# Patient Record
Sex: FEMALE | Race: BLACK OR AFRICAN AMERICAN | NOT HISPANIC OR LATINO | URBAN - METROPOLITAN AREA
[De-identification: names, ages, dates, MRNs, and addresses within clinical notes are randomized per-mention and may not be internally consistent; named-entity substitution may affect disease eponyms.]

---

## 2021-01-18 ENCOUNTER — EMERGENCY (EMERGENCY)
Facility: HOSPITAL | Age: 26
LOS: 0 days | Discharge: HOME | End: 2021-01-18
Attending: EMERGENCY MEDICINE
Payer: MEDICAID

## 2021-01-18 VITALS
RESPIRATION RATE: 18 BRPM | HEART RATE: 105 BPM | OXYGEN SATURATION: 100 % | DIASTOLIC BLOOD PRESSURE: 80 MMHG | TEMPERATURE: 99 F | SYSTOLIC BLOOD PRESSURE: 122 MMHG

## 2021-01-18 VITALS
HEIGHT: 68 IN | RESPIRATION RATE: 20 BRPM | TEMPERATURE: 99 F | OXYGEN SATURATION: 98 % | HEART RATE: 88 BPM | WEIGHT: 194.01 LBS | SYSTOLIC BLOOD PRESSURE: 120 MMHG | DIASTOLIC BLOOD PRESSURE: 82 MMHG

## 2021-01-18 DIAGNOSIS — R10.9 UNSPECIFIED ABDOMINAL PAIN: ICD-10-CM

## 2021-01-18 DIAGNOSIS — Z34.91 ENCOUNTER FOR SUPERVISION OF NORMAL PREGNANCY, UNSPECIFIED, FIRST TRIMESTER: ICD-10-CM

## 2021-01-18 DIAGNOSIS — R07.9 CHEST PAIN, UNSPECIFIED: ICD-10-CM

## 2021-01-18 DIAGNOSIS — R11.10 VOMITING, UNSPECIFIED: ICD-10-CM

## 2021-01-18 LAB
ALBUMIN SERPL ELPH-MCNC: 4.1 G/DL — SIGNIFICANT CHANGE UP (ref 3.3–5)
ALP SERPL-CCNC: 41 U/L — SIGNIFICANT CHANGE UP (ref 40–120)
ALT FLD-CCNC: 80 U/L — HIGH (ref 12–78)
ANION GAP SERPL CALC-SCNC: 11 MMOL/L — SIGNIFICANT CHANGE UP (ref 5–17)
ANISOCYTOSIS BLD QL: SLIGHT — SIGNIFICANT CHANGE UP
APPEARANCE UR: ABNORMAL
AST SERPL-CCNC: 43 U/L — HIGH (ref 15–37)
BACTERIA # UR AUTO: ABNORMAL
BASOPHILS # BLD AUTO: 0.01 K/UL — SIGNIFICANT CHANGE UP (ref 0–0.2)
BASOPHILS NFR BLD AUTO: 0.1 % — SIGNIFICANT CHANGE UP (ref 0–2)
BILIRUB SERPL-MCNC: 1.8 MG/DL — HIGH (ref 0.2–1.2)
BILIRUB UR-MCNC: ABNORMAL
BUN SERPL-MCNC: 7 MG/DL — SIGNIFICANT CHANGE UP (ref 7–23)
CALCIUM SERPL-MCNC: 8.7 MG/DL — SIGNIFICANT CHANGE UP (ref 8.5–10.1)
CHLORIDE SERPL-SCNC: 104 MMOL/L — SIGNIFICANT CHANGE UP (ref 96–108)
CO2 SERPL-SCNC: 22 MMOL/L — SIGNIFICANT CHANGE UP (ref 22–31)
COLOR SPEC: YELLOW — SIGNIFICANT CHANGE UP
CREAT SERPL-MCNC: 0.76 MG/DL — SIGNIFICANT CHANGE UP (ref 0.5–1.3)
DIFF PNL FLD: ABNORMAL
EOSINOPHIL # BLD AUTO: 0 K/UL — SIGNIFICANT CHANGE UP (ref 0–0.5)
EOSINOPHIL NFR BLD AUTO: 0 % — SIGNIFICANT CHANGE UP (ref 0–6)
EPI CELLS # UR: ABNORMAL
GLUCOSE SERPL-MCNC: 118 MG/DL — HIGH (ref 70–99)
GLUCOSE UR QL: NEGATIVE MG/DL — SIGNIFICANT CHANGE UP
HCG SERPL-ACNC: HIGH MIU/ML
HCT VFR BLD CALC: 35 % — SIGNIFICANT CHANGE UP (ref 34.5–45)
HGB BLD-MCNC: 11.9 G/DL — SIGNIFICANT CHANGE UP (ref 11.5–15.5)
IMM GRANULOCYTES NFR BLD AUTO: 0.2 % — SIGNIFICANT CHANGE UP (ref 0–1.5)
KETONES UR-MCNC: ABNORMAL
LEUKOCYTE ESTERASE UR-ACNC: ABNORMAL
LIDOCAIN IGE QN: 48 U/L — LOW (ref 73–393)
LYMPHOCYTES # BLD AUTO: 1.09 K/UL — SIGNIFICANT CHANGE UP (ref 1–3.3)
LYMPHOCYTES # BLD AUTO: 13.4 % — SIGNIFICANT CHANGE UP (ref 13–44)
MAGNESIUM SERPL-MCNC: 2.6 MG/DL — SIGNIFICANT CHANGE UP (ref 1.6–2.6)
MANUAL SMEAR VERIFICATION: SIGNIFICANT CHANGE UP
MCHC RBC-ENTMCNC: 25.5 PG — LOW (ref 27–34)
MCHC RBC-ENTMCNC: 34 GM/DL — SIGNIFICANT CHANGE UP (ref 32–36)
MCV RBC AUTO: 74.9 FL — LOW (ref 80–100)
MICROCYTES BLD QL: SIGNIFICANT CHANGE UP
MONOCYTES # BLD AUTO: 0.23 K/UL — SIGNIFICANT CHANGE UP (ref 0–0.9)
MONOCYTES NFR BLD AUTO: 2.8 % — SIGNIFICANT CHANGE UP (ref 2–14)
NEUTROPHILS # BLD AUTO: 6.77 K/UL — SIGNIFICANT CHANGE UP (ref 1.8–7.4)
NEUTROPHILS NFR BLD AUTO: 83.5 % — HIGH (ref 43–77)
NITRITE UR-MCNC: NEGATIVE — SIGNIFICANT CHANGE UP
NRBC # BLD: 0 /100 WBCS — SIGNIFICANT CHANGE UP (ref 0–0)
PH UR: 6 — SIGNIFICANT CHANGE UP (ref 5–8)
PLAT MORPH BLD: NORMAL — SIGNIFICANT CHANGE UP
PLATELET # BLD AUTO: 348 K/UL — SIGNIFICANT CHANGE UP (ref 150–400)
POTASSIUM SERPL-MCNC: 3.1 MMOL/L — LOW (ref 3.5–5.3)
POTASSIUM SERPL-SCNC: 3.1 MMOL/L — LOW (ref 3.5–5.3)
PROT SERPL-MCNC: 8.2 GM/DL — SIGNIFICANT CHANGE UP (ref 6–8.3)
PROT UR-MCNC: 100 MG/DL
RBC # BLD: 4.67 M/UL — SIGNIFICANT CHANGE UP (ref 3.8–5.2)
RBC # FLD: 14.5 % — SIGNIFICANT CHANGE UP (ref 10.3–14.5)
RBC BLD AUTO: ABNORMAL
RBC CASTS # UR COMP ASSIST: SIGNIFICANT CHANGE UP /HPF (ref 0–4)
SODIUM SERPL-SCNC: 137 MMOL/L — SIGNIFICANT CHANGE UP (ref 135–145)
SP GR SPEC: 1.02 — SIGNIFICANT CHANGE UP (ref 1.01–1.02)
TROPONIN I SERPL-MCNC: <.015 NG/ML — SIGNIFICANT CHANGE UP (ref 0.01–0.04)
UROBILINOGEN FLD QL: 4 MG/DL
WBC # BLD: 8.12 K/UL — SIGNIFICANT CHANGE UP (ref 3.8–10.5)
WBC # FLD AUTO: 8.12 K/UL — SIGNIFICANT CHANGE UP (ref 3.8–10.5)
WBC UR QL: SIGNIFICANT CHANGE UP

## 2021-01-18 PROCEDURE — 99285 EMERGENCY DEPT VISIT HI MDM: CPT

## 2021-01-18 PROCEDURE — 76856 US EXAM PELVIC COMPLETE: CPT | Mod: 26

## 2021-01-18 PROCEDURE — 76700 US EXAM ABDOM COMPLETE: CPT | Mod: 26

## 2021-01-18 RX ORDER — ONDANSETRON 8 MG/1
4 TABLET, FILM COATED ORAL ONCE
Refills: 0 | Status: COMPLETED | OUTPATIENT
Start: 2021-01-18 | End: 2021-01-18

## 2021-01-18 RX ORDER — METOCLOPRAMIDE HCL 10 MG
10 TABLET ORAL ONCE
Refills: 0 | Status: COMPLETED | OUTPATIENT
Start: 2021-01-18 | End: 2021-01-18

## 2021-01-18 RX ORDER — CEFTRIAXONE 500 MG/1
1000 INJECTION, POWDER, FOR SOLUTION INTRAMUSCULAR; INTRAVENOUS ONCE
Refills: 0 | Status: COMPLETED | OUTPATIENT
Start: 2021-01-18 | End: 2021-01-18

## 2021-01-18 RX ORDER — SODIUM CHLORIDE 9 MG/ML
1000 INJECTION INTRAMUSCULAR; INTRAVENOUS; SUBCUTANEOUS ONCE
Refills: 0 | Status: COMPLETED | OUTPATIENT
Start: 2021-01-18 | End: 2021-01-18

## 2021-01-18 RX ORDER — CEFUROXIME AXETIL 250 MG
1 TABLET ORAL
Qty: 14 | Refills: 0
Start: 2021-01-18 | End: 2021-01-24

## 2021-01-18 RX ORDER — PYRIDOXINE HCL (VITAMIN B6) 100 MG
50 TABLET ORAL ONCE
Refills: 0 | Status: COMPLETED | OUTPATIENT
Start: 2021-01-18 | End: 2021-01-18

## 2021-01-18 RX ORDER — POTASSIUM CHLORIDE 20 MEQ
10 PACKET (EA) ORAL ONCE
Refills: 0 | Status: COMPLETED | OUTPATIENT
Start: 2021-01-18 | End: 2021-01-18

## 2021-01-18 RX ORDER — ACETAMINOPHEN 500 MG
975 TABLET ORAL ONCE
Refills: 0 | Status: COMPLETED | OUTPATIENT
Start: 2021-01-18 | End: 2021-01-18

## 2021-01-18 RX ORDER — SODIUM CHLORIDE 9 MG/ML
1000 INJECTION, SOLUTION INTRAVENOUS
Refills: 0 | Status: COMPLETED | OUTPATIENT
Start: 2021-01-18 | End: 2021-01-18

## 2021-01-18 RX ORDER — DOXYLAMINE SUCCINATE AND PYRIDOXINE HYDROCHLORIDE, DELAYED RELEASE TABLETS 10 MG/10 MG 10; 10 MG/1; MG/1
1 TABLET, DELAYED RELEASE ORAL
Qty: 30 | Refills: 0
Start: 2021-01-18 | End: 2021-01-22

## 2021-01-18 RX ADMIN — ONDANSETRON 4 MILLIGRAM(S): 8 TABLET, FILM COATED ORAL at 17:39

## 2021-01-18 RX ADMIN — SODIUM CHLORIDE 1000 MILLILITER(S): 9 INJECTION, SOLUTION INTRAVENOUS at 21:02

## 2021-01-18 RX ADMIN — SODIUM CHLORIDE 1000 MILLILITER(S): 9 INJECTION INTRAMUSCULAR; INTRAVENOUS; SUBCUTANEOUS at 21:27

## 2021-01-18 RX ADMIN — SODIUM CHLORIDE 1000 MILLILITER(S): 9 INJECTION INTRAMUSCULAR; INTRAVENOUS; SUBCUTANEOUS at 17:22

## 2021-01-18 RX ADMIN — Medication 50 MILLIGRAM(S): at 21:36

## 2021-01-18 RX ADMIN — SODIUM CHLORIDE 1000 MILLILITER(S): 9 INJECTION INTRAMUSCULAR; INTRAVENOUS; SUBCUTANEOUS at 21:02

## 2021-01-18 RX ADMIN — CEFTRIAXONE 100 MILLIGRAM(S): 500 INJECTION, POWDER, FOR SOLUTION INTRAMUSCULAR; INTRAVENOUS at 21:37

## 2021-01-18 RX ADMIN — Medication 10 MILLIGRAM(S): at 21:37

## 2021-01-18 RX ADMIN — Medication 100 MILLIEQUIVALENT(S): at 18:40

## 2021-01-18 RX ADMIN — Medication 975 MILLIGRAM(S): at 17:39

## 2021-01-18 NOTE — ED PROVIDER NOTE - CARE PROVIDER_API CALL
Abi Patterson; MPH)  Vanessa FORTE  [address]], [address]  [place] [address]  Phone: [phone]  Fax: [phone]  Follow Up Time: 4-6 Days

## 2021-01-18 NOTE — ED PROVIDER NOTE - OBJECTIVE STATEMENT
25F Hx of x3  presents with c/o abdominal pain / cramping. pt states she's been vomiting since Saturday, unable to keep any food down. She states at the beginning her vomit was yellow and now is red. pt reports abdominal pain is located in the mid abdomen and rates pain 10/10 associated with some chest pain and minor back aches. No Hx of this in the past. pt does admit she has not been passing bowel since Thursday; has been able to urinate a little bit with some burning. no fevers, some chills, no diarrhea, no discharge.

## 2021-01-18 NOTE — ED ADULT NURSE REASSESSMENT NOTE - NS ED NURSE REASSESS COMMENT FT1
Pt verbalizes improvement in symptoms. VSS, slightly tachycardic after multiple liters of fluid. Pt denies dizziness after ambulation. Encouraged to increase PO intake and take antiemetics at home.

## 2021-01-18 NOTE — ED PROVIDER NOTE - NSFOLLOWUPINSTRUCTIONS_ED_ALL_ED_FT
Start taking the prenatal vitamins.    Take the Pregnancy Nausea Medication: DICLEGIS as needed.    Call the OBGYN for an immediate follow up appointment.

## 2021-01-18 NOTE — ED PROVIDER NOTE - CLINICAL SUMMARY MEDICAL DECISION MAKING FREE TEXT BOX
Gastritis versus pancreatitis versus cholecystitis. Rule out pregnancy. Plan: Fluids, labs and US Gastritis versus pancreatitis versus cholecystitis. Rule out pregnancy. Plan: Fluids, labs and US  hcg shows pregnancy. labs otherwise reassuring

## 2021-01-18 NOTE — ED ADULT TRIAGE NOTE - CHIEF COMPLAINT QUOTE
Ems states, " pt c/o abdominal pain that started on saturday , lmp 11/26/2020 approx, pt vomiting , denies diarrhea and fever, 4mg zofran and 200ml saline given in field

## 2021-01-18 NOTE — ED ADULT NURSE NOTE - NSIMPLEMENTINTERV_GEN_ALL_ED
Implemented All Universal Safety Interventions:  [place] to call system. Call bell, personal items and telephone within reach. Instruct patient to call for assistance. Room bathroom lighting operational. Non-slip footwear when patient is off stretcher. Physically safe environment: no spills, clutter or unnecessary equipment. Stretcher in lowest position, wheels locked, appropriate side rails in place.

## 2021-01-18 NOTE — ED PROVIDER NOTE - PATIENT PORTAL LINK FT
You can access the FollowMyHealth Patient Portal offered by [facility] by registering at the following website: [email or web address]. By joining [facility]’s FollowMyHealth portal, you will also be able to view your health information using other applications (apps) compatible with our system.

## 2021-01-19 LAB
CULTURE RESULTS: SIGNIFICANT CHANGE UP
SPECIMEN SOURCE: SIGNIFICANT CHANGE UP

## 2021-01-20 ENCOUNTER — INPATIENT (INPATIENT)
Facility: HOSPITAL | Age: 26
LOS: 3 days | Discharge: HOME HEALTH | End: 2021-01-24
Attending: SPECIALIST | Admitting: SPECIALIST
Payer: MEDICAID

## 2021-01-20 VITALS
DIASTOLIC BLOOD PRESSURE: 81 MMHG | SYSTOLIC BLOOD PRESSURE: 115 MMHG | TEMPERATURE: 98 F | RESPIRATION RATE: 18 BRPM | HEART RATE: 94 BPM | OXYGEN SATURATION: 100 %

## 2021-01-20 LAB
ALBUMIN SERPL ELPH-MCNC: 4.7 G/DL — SIGNIFICANT CHANGE UP (ref 3.3–5)
ALP SERPL-CCNC: 45 U/L — SIGNIFICANT CHANGE UP (ref 40–120)
ALT FLD-CCNC: 438 U/L — HIGH (ref 4–33)
ANION GAP SERPL CALC-SCNC: 14 MMOL/L — SIGNIFICANT CHANGE UP (ref 7–14)
APPEARANCE UR: ABNORMAL
AST SERPL-CCNC: 260 U/L — HIGH (ref 4–32)
BACTERIA # UR AUTO: ABNORMAL
BASOPHILS # BLD AUTO: 0.04 K/UL — SIGNIFICANT CHANGE UP (ref 0–0.2)
BASOPHILS NFR BLD AUTO: 0.7 % — SIGNIFICANT CHANGE UP (ref 0–2)
BILIRUB SERPL-MCNC: 1.4 MG/DL — HIGH (ref 0.2–1.2)
BILIRUB UR-MCNC: ABNORMAL
BLD GP AB SCN SERPL QL: NEGATIVE — SIGNIFICANT CHANGE UP
BUN SERPL-MCNC: 6 MG/DL — LOW (ref 7–23)
CALCIUM SERPL-MCNC: 9.1 MG/DL — SIGNIFICANT CHANGE UP (ref 8.4–10.5)
CHLORIDE SERPL-SCNC: 105 MMOL/L — SIGNIFICANT CHANGE UP (ref 98–107)
CO2 SERPL-SCNC: 20 MMOL/L — LOW (ref 22–31)
COLOR SPEC: ABNORMAL
CREAT SERPL-MCNC: 0.63 MG/DL — SIGNIFICANT CHANGE UP (ref 0.5–1.3)
DIFF PNL FLD: NEGATIVE — SIGNIFICANT CHANGE UP
EOSINOPHIL # BLD AUTO: 0.01 K/UL — SIGNIFICANT CHANGE UP (ref 0–0.5)
EOSINOPHIL NFR BLD AUTO: 0.2 % — SIGNIFICANT CHANGE UP (ref 0–6)
EPI CELLS # UR: ABNORMAL
GLUCOSE SERPL-MCNC: 105 MG/DL — HIGH (ref 70–99)
GLUCOSE UR QL: NEGATIVE — SIGNIFICANT CHANGE UP
HCG SERPL-ACNC: SIGNIFICANT CHANGE UP MIU/ML
HCT VFR BLD CALC: 34.7 % — SIGNIFICANT CHANGE UP (ref 34.5–45)
HGB BLD-MCNC: 11.5 G/DL — SIGNIFICANT CHANGE UP (ref 11.5–15.5)
HYALINE CASTS # UR AUTO: 0 /LPF — SIGNIFICANT CHANGE UP (ref 0–7)
IANC: 4.17 K/UL — SIGNIFICANT CHANGE UP (ref 1.5–8.5)
IMM GRANULOCYTES NFR BLD AUTO: 0.5 % — SIGNIFICANT CHANGE UP (ref 0–1.5)
KETONES UR-MCNC: ABNORMAL
LEUKOCYTE ESTERASE UR-ACNC: NEGATIVE — SIGNIFICANT CHANGE UP
LYMPHOCYTES # BLD AUTO: 1.35 K/UL — SIGNIFICANT CHANGE UP (ref 1–3.3)
LYMPHOCYTES # BLD AUTO: 22.7 % — SIGNIFICANT CHANGE UP (ref 13–44)
MCHC RBC-ENTMCNC: 25.1 PG — LOW (ref 27–34)
MCHC RBC-ENTMCNC: 33.1 GM/DL — SIGNIFICANT CHANGE UP (ref 32–36)
MCV RBC AUTO: 75.8 FL — LOW (ref 80–100)
MONOCYTES # BLD AUTO: 0.35 K/UL — SIGNIFICANT CHANGE UP (ref 0–0.9)
MONOCYTES NFR BLD AUTO: 5.9 % — SIGNIFICANT CHANGE UP (ref 2–14)
NEUTROPHILS # BLD AUTO: 4.17 K/UL — SIGNIFICANT CHANGE UP (ref 1.8–7.4)
NEUTROPHILS NFR BLD AUTO: 70 % — SIGNIFICANT CHANGE UP (ref 43–77)
NITRITE UR-MCNC: NEGATIVE — SIGNIFICANT CHANGE UP
NRBC # BLD: 0 /100 WBCS — SIGNIFICANT CHANGE UP
NRBC # FLD: 0 K/UL — SIGNIFICANT CHANGE UP
PH UR: 6.5 — SIGNIFICANT CHANGE UP (ref 5–8)
PLATELET # BLD AUTO: 312 K/UL — SIGNIFICANT CHANGE UP (ref 150–400)
POTASSIUM SERPL-MCNC: 3 MMOL/L — LOW (ref 3.5–5.3)
POTASSIUM SERPL-SCNC: 3 MMOL/L — LOW (ref 3.5–5.3)
PROT SERPL-MCNC: 7.6 G/DL — SIGNIFICANT CHANGE UP (ref 6–8.3)
PROT UR-MCNC: ABNORMAL
RBC # BLD: 4.58 M/UL — SIGNIFICANT CHANGE UP (ref 3.8–5.2)
RBC # FLD: 14.2 % — SIGNIFICANT CHANGE UP (ref 10.3–14.5)
RBC CASTS # UR COMP ASSIST: 3 /HPF — SIGNIFICANT CHANGE UP (ref 0–4)
RH IG SCN BLD-IMP: POSITIVE — SIGNIFICANT CHANGE UP
SARS-COV-2 RNA SPEC QL NAA+PROBE: SIGNIFICANT CHANGE UP
SODIUM SERPL-SCNC: 139 MMOL/L — SIGNIFICANT CHANGE UP (ref 135–145)
SP GR SPEC: 1.03 — HIGH (ref 1.01–1.02)
UROBILINOGEN FLD QL: ABNORMAL
WBC # BLD: 5.95 K/UL — SIGNIFICANT CHANGE UP (ref 3.8–10.5)
WBC # FLD AUTO: 5.95 K/UL — SIGNIFICANT CHANGE UP (ref 3.8–10.5)
WBC UR QL: 3 /HPF — SIGNIFICANT CHANGE UP (ref 0–5)

## 2021-01-20 PROCEDURE — 76815 OB US LIMITED FETUS(S): CPT | Mod: 26

## 2021-01-20 PROCEDURE — 99218: CPT

## 2021-01-20 PROCEDURE — 76705 ECHO EXAM OF ABDOMEN: CPT | Mod: 26

## 2021-01-20 PROCEDURE — 76817 TRANSVAGINAL US OBSTETRIC: CPT | Mod: 26

## 2021-01-20 RX ORDER — SODIUM CHLORIDE 9 MG/ML
1000 INJECTION, SOLUTION INTRAVENOUS
Refills: 0 | Status: DISCONTINUED | OUTPATIENT
Start: 2021-01-20 | End: 2021-01-21

## 2021-01-20 RX ORDER — POTASSIUM CHLORIDE 20 MEQ
10 PACKET (EA) ORAL
Refills: 0 | Status: COMPLETED | OUTPATIENT
Start: 2021-01-20 | End: 2021-01-20

## 2021-01-20 RX ORDER — FAMOTIDINE 10 MG/ML
20 INJECTION INTRAVENOUS
Refills: 0 | Status: DISCONTINUED | OUTPATIENT
Start: 2021-01-20 | End: 2021-01-22

## 2021-01-20 RX ORDER — SODIUM CHLORIDE 9 MG/ML
1000 INJECTION INTRAMUSCULAR; INTRAVENOUS; SUBCUTANEOUS
Refills: 0 | Status: DISCONTINUED | OUTPATIENT
Start: 2021-01-20 | End: 2021-01-22

## 2021-01-20 RX ORDER — SODIUM CHLORIDE 9 MG/ML
1000 INJECTION INTRAMUSCULAR; INTRAVENOUS; SUBCUTANEOUS ONCE
Refills: 0 | Status: COMPLETED | OUTPATIENT
Start: 2021-01-20 | End: 2021-01-20

## 2021-01-20 RX ORDER — ACETAMINOPHEN 500 MG
1000 TABLET ORAL ONCE
Refills: 0 | Status: COMPLETED | OUTPATIENT
Start: 2021-01-20 | End: 2021-01-20

## 2021-01-20 RX ORDER — METOCLOPRAMIDE HCL 10 MG
10 TABLET ORAL EVERY 8 HOURS
Refills: 0 | Status: DISCONTINUED | OUTPATIENT
Start: 2021-01-20 | End: 2021-01-22

## 2021-01-20 RX ORDER — ONDANSETRON 8 MG/1
4 TABLET, FILM COATED ORAL EVERY 8 HOURS
Refills: 0 | Status: DISCONTINUED | OUTPATIENT
Start: 2021-01-20 | End: 2021-01-22

## 2021-01-20 RX ORDER — METOCLOPRAMIDE HCL 10 MG
10 TABLET ORAL ONCE
Refills: 0 | Status: COMPLETED | OUTPATIENT
Start: 2021-01-20 | End: 2021-01-20

## 2021-01-20 RX ORDER — ONDANSETRON 8 MG/1
4 TABLET, FILM COATED ORAL ONCE
Refills: 0 | Status: COMPLETED | OUTPATIENT
Start: 2021-01-20 | End: 2021-01-20

## 2021-01-20 RX ORDER — FAMOTIDINE 10 MG/ML
20 INJECTION INTRAVENOUS ONCE
Refills: 0 | Status: COMPLETED | OUTPATIENT
Start: 2021-01-20 | End: 2021-01-20

## 2021-01-20 RX ADMIN — ONDANSETRON 4 MILLIGRAM(S): 8 TABLET, FILM COATED ORAL at 21:37

## 2021-01-20 RX ADMIN — Medication 10 MILLIGRAM(S): at 10:42

## 2021-01-20 RX ADMIN — ONDANSETRON 4 MILLIGRAM(S): 8 TABLET, FILM COATED ORAL at 14:42

## 2021-01-20 RX ADMIN — SODIUM CHLORIDE 1000 MILLILITER(S): 9 INJECTION INTRAMUSCULAR; INTRAVENOUS; SUBCUTANEOUS at 10:43

## 2021-01-20 RX ADMIN — Medication 100 MILLIEQUIVALENT(S): at 17:08

## 2021-01-20 RX ADMIN — Medication 400 MILLIGRAM(S): at 17:08

## 2021-01-20 RX ADMIN — Medication 100 MILLIEQUIVALENT(S): at 20:23

## 2021-01-20 RX ADMIN — FAMOTIDINE 20 MILLIGRAM(S): 10 INJECTION INTRAVENOUS at 17:08

## 2021-01-20 RX ADMIN — SODIUM CHLORIDE 1000 MILLILITER(S): 9 INJECTION INTRAMUSCULAR; INTRAVENOUS; SUBCUTANEOUS at 14:31

## 2021-01-20 RX ADMIN — Medication 100 MILLIEQUIVALENT(S): at 14:42

## 2021-01-20 RX ADMIN — SODIUM CHLORIDE 125 MILLILITER(S): 9 INJECTION, SOLUTION INTRAVENOUS at 18:28

## 2021-01-20 NOTE — ED PROVIDER NOTE - ATTENDING CONTRIBUTION TO CARE
Attending Statement: I have reviewed and agree with all pertinent clinical information, including history and physical exam and agree with treatment plan of the PA, except as noted.  26yo F pw  nausea, vomit and abdominal pain x few weeks. Endorse cramping lower abdominal pain, Associated episodes of NB vomit and nausea. Dec po intake. +dysuria no vaginal bleeding. LMP "sometime in October" Was seen at [facility] for same complaints two days ago, given po abx and dc home.   Vital signs noted. laying in bed AO3. no work of breathing. soft non distended tender in the LLQ pelvic dw w PA. no pedal edema. no calf tenderness. normal pulses bilateral feet.  plan labs, urine, tvus, IVF, anti emetic, re assess

## 2021-01-20 NOTE — ED CDU PROVIDER INITIAL DAY NOTE - ATTENDING CONTRIBUTION TO CARE
Attending Statement: I have reviewed and agree with all pertinent clinical information, including history and physical exam and agree with treatment plan of the PA, except as noted.  26yo F pw  nausea, vomit and abdominal pain x few weeks. Endorse cramping lower abdominal pain, Associated episodes of NB vomit and nausea. Dec po intake. +dysuria no vaginal bleeding. LMP "sometime in October" Was seen at [facility] for same complaints two days ago, given po abx and dc home.   Vital signs noted. laying in bed AO3. no work of breathing. soft non distended tender in the LLQ pelvic dw w PA. no pedal edema. no calf tenderness. normal pulses bilateral feet.  plan IVF, anti emetic, am labs trend LFT, obgyn following pt.

## 2021-01-20 NOTE — ED CDU PROVIDER INITIAL DAY NOTE - OBJECTIVE STATEMENT
24 y/o F no PMH  c/o excessive N/V for past few weeks. LMP October, approx 11 weeks GA on latest US. Pt has not followed up with OB for this pregnancy yet. Pt was evaluated at [facility] 3 days ago for same sxs, tx'd for UTI and given prenatal vitamins, no antiemetics. Denies fever, chills, CP, SOB, diarrhea, abnormal vaginal discharge, bleeding or fluid leakage. In ED pt given antiemetics without relief, US confirmed IUP of 14 week and 5 day gestation and +, labs significant for K+ 3.0, bili 1.4, , , OB consulted and advised banana bag, standing antiemetics, and rpt chemistry and trend liver function.

## 2021-01-20 NOTE — ED ADULT NURSE NOTE - OBJECTIVE STATEMENT
Pt awake, alert and oriented x 4 presents with 3 months confirmed pregnancy and constipation with abdominal pain.   pt c/o nausea but no vomiting, no fever, no chest pain or shortness of breath.   IV placed, labs sent, fluids and meds given.   urine sent.   GYN exam done by ED resident and pt awaiting US.   pt denies pain/burning with urination.   denies PMH or pregnancy complications.

## 2021-01-20 NOTE — ED CDU PROVIDER INITIAL DAY NOTE - MEDICAL DECISION MAKING DETAILS
pt with hyperemesis, sent to CDU for continued antiemetics, ivf, rpt labs, will continue to monitor and reassess

## 2021-01-20 NOTE — ED ADULT TRIAGE NOTE - CHIEF COMPLAINT QUOTE
Pt brought in by EMS from home complaining of constipation and abdominal pain. pt states she is 3 months pregnant. Pt denies chest pain, sob, n/v/d, fever or chills.

## 2021-01-20 NOTE — CONSULT NOTE ADULT - ASSESSMENT
24yo  at 14w2d by LMP 10/12 presenting with nausea, vomiting and inability to tolerate PO likely 2/2 Hyperemesis with metabolic derangements    - agree with ED to observe in CDU overnight  - replete K  - elevated transaminitis; trend AST/ALT  - please administer Banana Bag, IVF  - recommend Pepcid IV BID, Standing Zofran/Reglan  - repeat electrolytes in AM  - PO challenge when able   - f/u outpatient with our low risk clinic; will send email to arrange      JONNATHAN Kelsey,PGY-2  seen and evaluated w/ Dr. Castillo

## 2021-01-20 NOTE — CONSULT NOTE ADULT - SUBJECTIVE AND OBJECTIVE BOX
JERMAIN ALEGRIA  25y  Female [id number]    HPI: 24yo , LMP 10/12/20 at 14w3d by LMP presenting with nausea, vomiting, and inability to tolerate PO. Pt states she has not been able to eat anything for the last 5 days and she is vomiting every 5-10 minutes. States vomit was originally made up of food contents but now is brown and bloody with occasional episodes of bright red blood. She has not taken any anti-nausea medications. Pt had positive UPT but has not seen an OB in this pregnancy. Denies hyperemesis in previous pregnancies. Reports occasional headaches, constipation, and lizzy colored urine. She denies blurry vision, temperature changes, fevers, chills, chest pain, shortness of breath.      Ob/Gyn Physician: recently moved from [place]; does not have Ob/Gyn for this pregnancy    Obhx: c/s x3 (, , 2019 - [facility]UP)  GynHx: Denies fibroids, cysts, abnormal pap smears, STIs  PMH: denies  PSH: c/s x3  Social: Denies Toxic Habits x3; denies anxiety/depression  Meds: PNV  Allergies: [facility] Meds:   MEDICATIONS  (STANDING):  acetaminophen  IVPB .. 1000 milliGRAM(s) IV Intermittent Once  famotidine Injectable 20 milliGRAM(s) IV Push once  potassium chloride  10 mEq/100 mL IVPB 10 milliEquivalent(s) IV Intermittent every 1 hour  sodium chloride 0.9% 1000 milliLiter(s) (125 mL/Hr) IV Continuous <Continuous>    MEDICATIONS  (PRN):      Allergies    No Known Allergies    Intolerances        PAST MEDICAL & SURGICAL HISTORY:  No significant past surgical history                Vital Signs Last 24 Hrs  T(C): 37.3 (2021 14:24), Max: 37.3 (2021 14:24)  T(F): 99.1 (2021 14:24), Max: 99.1 (2021 14:24)  HR: 82 (2021 14:24) (82 - 94)  BP: 120/73 (2021 14:24) (115/81 - 120/73)  BP(mean): --  RR: 18 (2021 14:24) (18 - 18)  SpO2: 100% (2021 14:24) (100% - 100%)    Physical Exam:   General:  NAD   CV: RR  Lungs: unlabored on RA  Abd: Soft, non-tender, non-distended,  +BS  :  No bleeding on pad.  External labia wnl.   Speculum Exam: Physiologic discharge.  Bimanual exam with no cervical motion tenderness, uterus appropriate size, adnexa non palpable b/l.  Cervix closed  Ext: non-tender b/l, no edema     LABS:                            11.5   5.95  )-----------( 312      ( 2021 11:03 )             34.7         139  |  105  |  6<L>  ----------------------------<  105<H>  3.0<L>   |  20<L>  |  0.63    Ca    9.1      2021 11:03    TPro  7.6  /  Alb  4.7  /  TBili  1.4<H>  /  DBili  x   /  AST  260<H>  /  ALT  438<H>  /  AlkPhos  45      I&O's Detail      Urinalysis Basic - ( 2021 11:03 )    Color: Lizzy / Appearance: Slightly Turbid / S.027 / pH: x  Gluc: x / Ketone: Large  / Bili: Small / Urobili: 3 mg/dL   Blood: x / Protein: 100 mg/dL / Nitrite: Negative   Leuk Esterase: Negative / RBC: 3 /HPF / WBC 3 /HPF   Sq Epi: x / Non Sq Epi: Many / Bacteria: Moderate        RADIOLOGY & ADDITIONAL STUDIES:    < from: US Transvaginal, OB (21 @ 13:58) >  EXAM:  US OB TRANSVAGINAL        PROCEDURE DATE:  2021         INTERPRETATION:  Clinical information: Pregnancy evaluation. Left lower quadrant pain.    Transabdominal pelvic ultrasound:    A single live Intrauterine gestation is present in breech presentation. No previa. Amniotic fluid volume is within normal limits. The cervical length measures 4.0 cm.    Fetal motion is seen in real-time and the fetal heart rate measures 158 bpm.    Measurements are as follows:    BPD: 2.7 cm corresponding to 14 weeks 6 days.  HC: 10.3 cm corresponding to 14 weeks 6 days.  AC: 7.8 cm corresponding to 14 weeks 2 days.  FL: 1.6 cm corresponding to 14 weeks 6 days.    Estimated fetal weight: 100 g +/- 15 g. ( 0 lb 4 oz +/- 1 oz)    Estimated fetal weight percent: 51%    Both ovaries are seen, and within normal limits. The right ovary measures 2.1 x 1.1 x 2.1 cm. The left ovary measures 2.5 x 1.3 x 2.5 cm.    IMPRESSION:  Single live intrauterine gestation at 14 weeks and 5 days by this ultrasound.  14 weeks and 2 days by LMP corresponding to an KD of 2021.                ROBERT MCCULLOUGH MD; Attending Radiologist  This document has been electronically signed. 2021  2:22PM    < end of copied text >   JERMAIN ALEGRIA  25y  Female [id number]    HPI: 24yo , LMP 10/12/20 at 14w3d by LMP presenting with nausea, vomiting, and inability to tolerate PO. Pt states she has not been able to eat anything for the last 5 days and she is vomiting every 5-10 minutes. States vomit was originally made up of food contents but now is brown and bloody with occasional episodes of bright red blood. She has not taken any anti-nausea medications. Pt had positive UPT but has not seen an OB in this pregnancy. Pt initially presented to [facility] last Friday and was sent home with prenatal vitamins and antibiotics (she does not know why). Denies hyperemesis in previous pregnancies. Reports occasional headaches, constipation, and lizzy colored urine. She denies blurry vision, temperature changes, fevers, chills, chest pain, shortness of breath.      Ob/Gyn Physician: recently moved from [place]; does not have Ob/Gyn for this pregnancy    Obhx: c/s x3 (, , 2019 - [facility])  GynHx: Denies fibroids, cysts, abnormal pap smears, STIs  PMH: denies  PSH: c/s x3  Social: Denies Toxic Habits x3; denies anxiety/depression  Meds: PNV  Allergies: [facility] Meds:   MEDICATIONS  (STANDING):  acetaminophen  IVPB .. 1000 milliGRAM(s) IV Intermittent Once  famotidine Injectable 20 milliGRAM(s) IV Push once  potassium chloride  10 mEq/100 mL IVPB 10 milliEquivalent(s) IV Intermittent every 1 hour  sodium chloride 0.9% 1000 milliLiter(s) (125 mL/Hr) IV Continuous <Continuous>    MEDICATIONS  (PRN):      Allergies    No Known Allergies    Intolerances        PAST MEDICAL & SURGICAL HISTORY:  No significant past surgical history                Vital Signs Last 24 Hrs  T(C): 37.3 (2021 14:24), Max: 37.3 (2021 14:24)  T(F): 99.1 (2021 14:24), Max: 99.1 (2021 14:24)  HR: 82 (2021 14:24) (82 - 94)  BP: 120/73 (2021 14:24) (115/81 - 120/73)  BP(mean): --  RR: 18 (2021 14:24) (18 - 18)  SpO2: 100% (2021 14:24) (100% - 100%)    Physical Exam:   General:  NAD   CV: RR  Lungs: unlabored on RA  Abd: Soft, non-tender, non-distended,  +BS  :  No bleeding on pad.  External labia wnl.   Speculum Exam: Physiologic discharge.  Bimanual exam with no cervical motion tenderness, uterus appropriate size, adnexa non palpable b/l.  Cervix closed  Ext: non-tender b/l, no edema     LABS:                            11.5   5.95  )-----------( 312      ( 2021 11:03 )             34.7         139  |  105  |  6<L>  ----------------------------<  105<H>  3.0<L>   |  20<L>  |  0.63    Ca    9.1      2021 11:03    TPro  7.6  /  Alb  4.7  /  TBili  1.4<H>  /  DBili  x   /  AST  260<H>  /  ALT  438<H>  /  AlkPhos  45      I&O's Detail      Urinalysis Basic - ( 2021 11:03 )    Color: Lizzy / Appearance: Slightly Turbid / S.027 / pH: x  Gluc: x / Ketone: Large  / Bili: Small / Urobili: 3 mg/dL   Blood: x / Protein: 100 mg/dL / Nitrite: Negative   Leuk Esterase: Negative / RBC: 3 /HPF / WBC 3 /HPF   Sq Epi: x / Non Sq Epi: Many / Bacteria: Moderate        RADIOLOGY & ADDITIONAL STUDIES:    < from: US Transvaginal, OB (21 @ 13:58) >  EXAM:  US OB TRANSVAGINAL        PROCEDURE DATE:  2021         INTERPRETATION:  Clinical information: Pregnancy evaluation. Left lower quadrant pain.    Transabdominal pelvic ultrasound:    A single live Intrauterine gestation is present in breech presentation. No previa. Amniotic fluid volume is within normal limits. The cervical length measures 4.0 cm.    Fetal motion is seen in real-time and the fetal heart rate measures 158 bpm.    Measurements are as follows:    BPD: 2.7 cm corresponding to 14 weeks 6 days.  HC: 10.3 cm corresponding to 14 weeks 6 days.  AC: 7.8 cm corresponding to 14 weeks 2 days.  FL: 1.6 cm corresponding to 14 weeks 6 days.    Estimated fetal weight: 100 g +/- 15 g. ( 0 lb 4 oz +/- 1 oz)    Estimated fetal weight percent: 51%    Both ovaries are seen, and within normal limits. The right ovary measures 2.1 x 1.1 x 2.1 cm. The left ovary measures 2.5 x 1.3 x 2.5 cm.    IMPRESSION:  Single live intrauterine gestation at 14 weeks and 5 days by this ultrasound.  14 weeks and 2 days by LMP corresponding to an KD of 2021.                ROBERT MCCULLOUGH MD; Attending Radiologist  This document has been electronically signed. 2021  2:22PM    < end of copied text >

## 2021-01-20 NOTE — ED PROVIDER NOTE - PHYSICAL EXAMINATION
Vital signs reviewed.   CONSTITUTIONAL: Well-appearing; well-nourished; in no apparent distress. Non-toxic appearing.   HEAD: Normocephalic, atraumatic.  EYES: PERRL, EOM intact, conjunctiva and no sclera injection noted  ENT: normal nose; no rhinorrhea  CARD: Normal S1, S2  RESP: Normal chest excursion with respiration; breath sounds clear and equal bilaterally; no wheezes, rhonchi, or rales.  ABD/GI: soft, non-distended; LLQ Tenderness  : No blood noted in vaginal vault. No CMT or adnexal tenderness. Chaperon Xochitl RN  EXT/MS: moves all extremities; distal pulses are normal, no pedal edema.  SKIN: Normal for age and race; warm; dry; good turgor; no apparent lesions or exudate noted.  NEURO: Awake, alert, oriented x 3, no gross deficits, CN II-XII grossly intact, no motor or sensory deficit noted.  PSYCH: Normal mood; appropriate affect.

## 2021-01-20 NOTE — ED PROVIDER NOTE - OBJECTIVE STATEMENT
24 y/o F  questionable last menstrual cycle in October presents to ED c/o abd pain, nausea, and vomiting. Pt notes symptoms have been on and off for a few weeks. Pt was evaluated [facility] 3 days ago. US demonstrated IUP at 11 weeks. Pt was given abx for UTI, and prenatal. Pt continues to endorse lower abd pain. Pt denies recent use  of marijuana, and vaginal bleeding. 24 y/o F  last menstrual cycle in October presents to ED c/o abd pain, nausea, and vomiting. Pt notes symptoms have been on and off for a few weeks. Pt was evaluated [facility] on . US demonstrated IUP at 14 weeks. Pt was given abx for UTI, and prenatal. Pt continues to endorse lower abd pain. Pt denies recent use of marijuana, and vaginal bleeding. Denies fever, dizziness, headache, chest pain, palpitations or syncope

## 2021-01-20 NOTE — ED PROVIDER NOTE - CLINICAL SUMMARY MEDICAL DECISION MAKING FREE TEXT BOX
24 y/o F  questionable last menstrual cycle in October presents to ED c/o abd pain, nausea, and vomiting.  IV fluids, Reglan, and reassess 24 y/o F  questionable last menstrual cycle in October presents to ED c/o abd pain, nausea, and vomiting.  IV fluids, Reglan, and reassess    GYN recommends IVF, Reglan, IV pepcid and CDU for further evaluation. Pt agreeable to plan 26 y/o F  questionable last menstrual cycle in October presents to ED c/o abd pain, nausea, and vomiting.  IV fluids, Reglan, and reassess    GYN recommends IVF, Reglan, IV pepcid and CDU for further evaluation. Elevated LFTs 2/2 to hyperemesis per GYN. Pt agreeable to plan

## 2021-01-20 NOTE — ED PROVIDER NOTE - NS ED ROS FT
Constitutional: (-) fever   Head: Normal cephalic, Atraumatic  Eyes/ENT: (-) vision changes  Cardiovascular: (-) chest pain, (-) wheezing  Respiratory: (-) cough, (-) shortness of breath  Gastrointestinal: (+) nausea (+) vomiting, (-) diarrhea, (+) abdominal pain  : (-) dysuria   Musculoskeletal: (-) back pain  Integumentary: (-) rash, (-) edema  Neurological: (-)loc  Allergic/Immunologic: (-) pruritus

## 2021-01-20 NOTE — ED ADULT NURSE REASSESSMENT NOTE - NS ED NURSE REASSESS COMMENT FT1
pt c/o nausea at this time, medicated as per PRN orders, provided with jello as per pt's request, will continue to monitor.

## 2021-01-21 DIAGNOSIS — O21.0 MILD HYPEREMESIS GRAVIDARUM: ICD-10-CM

## 2021-01-21 LAB
ALBUMIN SERPL ELPH-MCNC: 4.2 G/DL — SIGNIFICANT CHANGE UP (ref 3.3–5)
ALP SERPL-CCNC: 41 U/L — SIGNIFICANT CHANGE UP (ref 40–120)
ALT FLD-CCNC: 489 U/L — HIGH (ref 4–33)
ANION GAP SERPL CALC-SCNC: 14 MMOL/L — SIGNIFICANT CHANGE UP (ref 7–14)
AST SERPL-CCNC: 201 U/L — HIGH (ref 4–32)
BASOPHILS # BLD AUTO: 0.05 K/UL — SIGNIFICANT CHANGE UP (ref 0–0.2)
BASOPHILS NFR BLD AUTO: 0.8 % — SIGNIFICANT CHANGE UP (ref 0–2)
BILIRUB SERPL-MCNC: 1.1 MG/DL — SIGNIFICANT CHANGE UP (ref 0.2–1.2)
BUN SERPL-MCNC: 4 MG/DL — LOW (ref 7–23)
CALCIUM SERPL-MCNC: 8.7 MG/DL — SIGNIFICANT CHANGE UP (ref 8.4–10.5)
CHLORIDE SERPL-SCNC: 104 MMOL/L — SIGNIFICANT CHANGE UP (ref 98–107)
CO2 SERPL-SCNC: 21 MMOL/L — LOW (ref 22–31)
CREAT SERPL-MCNC: 0.59 MG/DL — SIGNIFICANT CHANGE UP (ref 0.5–1.3)
CULTURE RESULTS: SIGNIFICANT CHANGE UP
EOSINOPHIL # BLD AUTO: 0.03 K/UL — SIGNIFICANT CHANGE UP (ref 0–0.5)
EOSINOPHIL NFR BLD AUTO: 0.5 % — SIGNIFICANT CHANGE UP (ref 0–6)
GLUCOSE SERPL-MCNC: 92 MG/DL — SIGNIFICANT CHANGE UP (ref 70–99)
HCT VFR BLD CALC: 34.1 % — LOW (ref 34.5–45)
HGB BLD-MCNC: 11.2 G/DL — LOW (ref 11.5–15.5)
IANC: 4.07 K/UL — SIGNIFICANT CHANGE UP (ref 1.5–8.5)
IMM GRANULOCYTES NFR BLD AUTO: 0.5 % — SIGNIFICANT CHANGE UP (ref 0–1.5)
LYMPHOCYTES # BLD AUTO: 1.77 K/UL — SIGNIFICANT CHANGE UP (ref 1–3.3)
LYMPHOCYTES # BLD AUTO: 28.4 % — SIGNIFICANT CHANGE UP (ref 13–44)
MAGNESIUM SERPL-MCNC: 1.8 MG/DL — SIGNIFICANT CHANGE UP (ref 1.6–2.6)
MCHC RBC-ENTMCNC: 25.2 PG — LOW (ref 27–34)
MCHC RBC-ENTMCNC: 32.8 GM/DL — SIGNIFICANT CHANGE UP (ref 32–36)
MCV RBC AUTO: 76.8 FL — LOW (ref 80–100)
MONOCYTES # BLD AUTO: 0.29 K/UL — SIGNIFICANT CHANGE UP (ref 0–0.9)
MONOCYTES NFR BLD AUTO: 4.6 % — SIGNIFICANT CHANGE UP (ref 2–14)
NEUTROPHILS # BLD AUTO: 4.07 K/UL — SIGNIFICANT CHANGE UP (ref 1.8–7.4)
NEUTROPHILS NFR BLD AUTO: 65.2 % — SIGNIFICANT CHANGE UP (ref 43–77)
NRBC # BLD: 0 /100 WBCS — SIGNIFICANT CHANGE UP
NRBC # FLD: 0 K/UL — SIGNIFICANT CHANGE UP
PLATELET # BLD AUTO: 283 K/UL — SIGNIFICANT CHANGE UP (ref 150–400)
POTASSIUM SERPL-MCNC: 3 MMOL/L — LOW (ref 3.5–5.3)
POTASSIUM SERPL-SCNC: 3 MMOL/L — LOW (ref 3.5–5.3)
PROT SERPL-MCNC: 6.9 G/DL — SIGNIFICANT CHANGE UP (ref 6–8.3)
RBC # BLD: 4.44 M/UL — SIGNIFICANT CHANGE UP (ref 3.8–5.2)
RBC # FLD: 13.9 % — SIGNIFICANT CHANGE UP (ref 10.3–14.5)
RH IG SCN BLD-IMP: POSITIVE — SIGNIFICANT CHANGE UP
SARS-COV-2 IGG SERPL QL IA: POSITIVE
SARS-COV-2 IGM SERPL IA-ACNC: 5.16 INDEX — HIGH
SODIUM SERPL-SCNC: 139 MMOL/L — SIGNIFICANT CHANGE UP (ref 135–145)
SPECIMEN SOURCE: SIGNIFICANT CHANGE UP
WBC # BLD: 6.24 K/UL — SIGNIFICANT CHANGE UP (ref 3.8–10.5)
WBC # FLD AUTO: 6.24 K/UL — SIGNIFICANT CHANGE UP (ref 3.8–10.5)

## 2021-01-21 PROCEDURE — 99217: CPT

## 2021-01-21 PROCEDURE — 99222 1ST HOSP IP/OBS MODERATE 55: CPT | Mod: GC

## 2021-01-21 RX ORDER — DIAZEPAM 5 MG
5 TABLET ORAL AT BEDTIME
Refills: 0 | Status: DISCONTINUED | OUTPATIENT
Start: 2021-01-21 | End: 2021-01-21

## 2021-01-21 RX ORDER — SODIUM CHLORIDE 9 MG/ML
1000 INJECTION, SOLUTION INTRAVENOUS
Refills: 0 | Status: DISCONTINUED | OUTPATIENT
Start: 2021-01-21 | End: 2021-01-22

## 2021-01-21 RX ORDER — DIAZEPAM 5 MG
2.5 TABLET ORAL AT BEDTIME
Refills: 0 | Status: DISCONTINUED | OUTPATIENT
Start: 2021-01-21 | End: 2021-01-24

## 2021-01-21 RX ORDER — POTASSIUM CHLORIDE 20 MEQ
10 PACKET (EA) ORAL
Refills: 0 | Status: COMPLETED | OUTPATIENT
Start: 2021-01-21 | End: 2021-01-21

## 2021-01-21 RX ORDER — DIAZEPAM 5 MG
5 TABLET ORAL
Refills: 0 | Status: DISCONTINUED | OUTPATIENT
Start: 2021-01-21 | End: 2021-01-24

## 2021-01-21 RX ORDER — DIAZEPAM 5 MG
10 TABLET ORAL
Refills: 0 | Status: DISCONTINUED | OUTPATIENT
Start: 2021-01-21 | End: 2021-01-21

## 2021-01-21 RX ADMIN — ONDANSETRON 4 MILLIGRAM(S): 8 TABLET, FILM COATED ORAL at 07:36

## 2021-01-21 RX ADMIN — Medication 5 MILLIGRAM(S): at 14:37

## 2021-01-21 RX ADMIN — SODIUM CHLORIDE 1000 MILLILITER(S): 9 INJECTION, SOLUTION INTRAVENOUS at 03:58

## 2021-01-21 RX ADMIN — Medication 10 MILLIGRAM(S): at 02:00

## 2021-01-21 RX ADMIN — Medication 100 MILLIEQUIVALENT(S): at 14:16

## 2021-01-21 RX ADMIN — Medication 2.5 MILLIGRAM(S): at 22:40

## 2021-01-21 RX ADMIN — FAMOTIDINE 20 MILLIGRAM(S): 10 INJECTION INTRAVENOUS at 07:36

## 2021-01-21 RX ADMIN — SODIUM CHLORIDE 150 MILLILITER(S): 9 INJECTION, SOLUTION INTRAVENOUS at 15:04

## 2021-01-21 RX ADMIN — Medication 100 MILLIEQUIVALENT(S): at 10:26

## 2021-01-21 RX ADMIN — Medication 30 MILLILITER(S): at 07:35

## 2021-01-21 RX ADMIN — Medication 100 MILLIEQUIVALENT(S): at 11:39

## 2021-01-21 RX ADMIN — SODIUM CHLORIDE 125 MILLILITER(S): 9 INJECTION INTRAMUSCULAR; INTRAVENOUS; SUBCUTANEOUS at 04:06

## 2021-01-21 RX ADMIN — FAMOTIDINE 20 MILLIGRAM(S): 10 INJECTION INTRAVENOUS at 18:26

## 2021-01-21 RX ADMIN — Medication 25 MILLIGRAM(S): at 10:30

## 2021-01-21 NOTE — ED CDU PROVIDER SUBSEQUENT DAY NOTE - ATTENDING CONTRIBUTION TO CARE
CDU MD GARAY:  I performed a face to face bedside interview with patient regarding history of present illness, review of symptoms and past medical history. I completed an independent physical exam.  I have discussed patient's plan of care with PA.   I agree with note as stated above, having amended the EMR as needed to reflect my findings. I have discussed the assessment and plan of care.  This includes during the time I functioned as the attending physician for this patient.

## 2021-01-21 NOTE — H&P ADULT - HISTORY OF PRESENT ILLNESS
JERMAIN ALEGRIA  25y  Female [id number]    HPI: 26yo , LMP 10/12/20 at 14w3d by LMP presenting with nausea, vomiting, and inability to tolerate PO. Pt states she has not been able to eat anything for the last 5 days and she is vomiting every 5-10 minutes. States vomit was originally made up of food contents but now is brown and bloody with occasional episodes of bright red blood. She has not taken any anti-nausea medications. Pt had positive UPT but has not seen an OB in this pregnancy. Pt initially presented to [facility] last Friday and was sent home with prenatal vitamins and antibiotics (she does not know why). Denies hyperemesis in previous pregnancies. Reports occasional headaches, constipation, and lizzy colored urine. She denies blurry vision, temperature changes, fevers, chills, chest pain, shortness of breath.      Ob/Gyn Physician: recently moved from [place]; does not have Ob/Gyn for this pregnancy    Obhx: c/s x3 (, , 2019 - [facility])  GynHx: Denies fibroids, cysts, abnormal pap smears, STIs  PMH: denies  PSH: c/s x3  Social: Denies Toxic Habits x3; denies anxiety/depression  Meds: PNV, Valium 10mg AM, 5mg PM  Allergies: NKDA

## 2021-01-21 NOTE — H&P ADULT - ASSESSMENT
24yo  at 14w3d by LMP 10/12 presenting with nausea, vomiting and inability to tolerate PO. Pt initially admitted to CDU overnight  26yo  at 14w3d by LMP 10/12 presenting with nausea, vomiting and inability to tolerate PO. Pt initially admitted to CDU overnight for antiemetics and PO challenge however showing minimal clinical improvement with persistent electrolyte abnormalities. Will admit to Antepartum service for management of Hyperemesis.

## 2021-01-21 NOTE — ED CDU PROVIDER DISPOSITION NOTE - CLINICAL COURSE
26 y/o F no PMH  ~14 wks pregnant c/o excessive N/V for past few weeks. Labs significant for hypokalemis (3.0) and mild trasnaminitis - OB consulted and advised banana bag, standing antiemetics, and rpt chemistry and trend liver function. Pt. threw up overnight - in am received pepcid/phenergan. Rpt labs show no change in K+ of decreased in LFTS. Reassessed by [facility] resident and attending. Pt. still not feeling well- will admit to Dr. Reshma Kulkarni.

## 2021-01-21 NOTE — ED CDU PROVIDER SUBSEQUENT DAY NOTE - MEDICAL DECISION MAKING DETAILS
26 y/o F no PMH   Hyperemesis   - continue anti-emetics as needed  - PO challenge   - continue IVF  Hypokalemia   - s/p 3 K runs pending repeat labs  Transaminitis  - trend LFTs repeat labs pending  - re-eval by GYN

## 2021-01-21 NOTE — PROGRESS NOTE ADULT - PROBLEM SELECTOR PLAN 1
- continue pepcid BID, Reglan, Zofran  - please administer Phenergan suppository   - f/u AM labs, replete electrolytes PRN  - PO challenge     CRebel Kelsey PGY-2

## 2021-01-21 NOTE — H&P ADULT - NSHPPHYSICALEXAM_GEN_ALL_CORE
Vital Signs Last 24 Hrs  T(C): 36.9 (21 Jan 2021 06:10), Max: 37.3 (20 Jan 2021 14:24)  T(F): 98.5 (21 Jan 2021 06:10), Max: 99.1 (20 Jan 2021 14:24)  HR: 94 (21 Jan 2021 06:10) (82 - 94)  BP: 122/80 (21 Jan 2021 06:10) (101/63 - 128/86)  BP(mean): --  ABP: --  ABP(mean): --  RR: 17 (21 Jan 2021 06:10) (16 - 18)  SpO2: 100% (21 Jan 2021 06:10) (99% - 100%)      Physical Exam:   General:  NAD   CV: RR  Lungs: unlabored on RA  Abd: Soft, non-tender, non-distended,  +BS  :  No bleeding on pad.  External labia wnl.   Speculum Exam: Physiologic discharge.  Bimanual exam with no cervical motion tenderness, uterus appropriate size, adnexa non palpable b/l.  Cervix closed  Ext: non-tender b/l, no edema

## 2021-01-21 NOTE — ED CDU PROVIDER SUBSEQUENT DAY NOTE - HISTORY
24 y/o F no PMH  c/o excessive N/V for past few weeks. LMP October, approx 11 weeks GA on latest US. US confirmed IUP of 14 week and 5 day gestation and +, labs significant for K+ 3.0, bili 1.4, , , OB consulted and advised banana bag, standing antiemetics, and rpt chemistry and trend liver function.  Pt s/p K repletion pending repeat labs. Able to tolerate PO overnight. No acute complaints. VSS.

## 2021-01-21 NOTE — H&P ADULT - PROBLEM SELECTOR PLAN 1
- admit to Antepartum service  - continue banana bag; will add 20meq K given persistent Hypokalemia  - continue pepcid BID, reglan, zofran, phenergan suppository  - pt minimall tolerating CLD with persistent nausea; continue resuscitation inpatient until able to tolerate PO  - PNV  - home valium ordered   - monitor electrolytes daily    JONNATHAN Kelsey, PGY-2  seen and discussed with Dr. Castillo

## 2021-01-21 NOTE — H&P ADULT - NSHPLABSRESULTS_GEN_ALL_CORE
11.2   6.24  )-----------( 283      (  @ 07:55 )             34.1      @ 07:55    139  |  104  |  4   ----------------------------<  92  3.0   |  21  |  0.59    Ca    8.7       @ 07:55  Mg     1.8      @ 07:55    TPro  6.9  /  Alb  4.2  /  TBili  1.1  /  DBili  x   /  AST  201  /  ALT  489  /  AlkPhos  41   @ 07:55      Urinalysis Basic - ( 2021 11:03 )    Color: Rebecca / Appearance: Slightly Turbid / S.027 / pH: x  Gluc: x / Ketone: Large  / Bili: Small / Urobili: 3 mg/dL   Blood: x / Protein: 100 mg/dL / Nitrite: Negative   Leuk Esterase: Negative / RBC: 3 /HPF / WBC 3 /HPF   Sq Epi: x / Non Sq Epi: Many / Bacteria: Moderate      u< from: US Transvaginal, OB (21 @ 13:58) >    EXAM:  US OB TRANSVAGINAL        PROCEDURE DATE:  2021         INTERPRETATION:  Clinical information: Pregnancy evaluation. Left lower quadrant pain.    Transabdominal pelvic ultrasound:    A single live Intrauterine gestation is present in breech presentation. No previa. Amniotic fluid volume is within normal limits. The cervical length measures 4.0 cm.    Fetal motion is seen in real-time and the fetal heart rate measures 158 bpm.    Measurements are as follows:    BPD: 2.7 cm corresponding to 14 weeks 6 days.  HC: 10.3 cm corresponding to 14 weeks 6 days.  AC: 7.8 cm corresponding to 14 weeks 2 days.  FL: 1.6 cm corresponding to 14 weeks 6 days.    Estimated fetal weight: 100 g +/- 15 g. ( 0 lb 4 oz +/- 1 oz)    Estimated fetal weight percent: 51%    Both ovaries are seen, and within normal limits. The right ovary measures 2.1 x 1.1 x 2.1 cm. The left ovary measures 2.5 x 1.3 x 2.5 cm.    IMPRESSION:  Single live intrauterine gestation at 14 weeks and 5 days by this ultrasound.  14 weeks and 2 days by LMP corresponding to an KD of 2021.                ROBERT MCCULLOUGH MD; Attending Radiologist  This document has been electronically signed. 2021  2:22PM    < end of copied text >

## 2021-01-22 ENCOUNTER — TRANSCRIPTION ENCOUNTER (OUTPATIENT)
Age: 26
End: 2021-01-22

## 2021-01-22 LAB
ALBUMIN SERPL ELPH-MCNC: 3.2 G/DL — LOW (ref 3.3–5)
ALP SERPL-CCNC: 35 U/L — LOW (ref 40–120)
ALT FLD-CCNC: 341 U/L — HIGH (ref 4–33)
ANION GAP SERPL CALC-SCNC: 10 MMOL/L — SIGNIFICANT CHANGE UP (ref 7–14)
AST SERPL-CCNC: 104 U/L — HIGH (ref 4–32)
BILIRUB SERPL-MCNC: 0.6 MG/DL — SIGNIFICANT CHANGE UP (ref 0.2–1.2)
BUN SERPL-MCNC: 3 MG/DL — LOW (ref 7–23)
CALCIUM SERPL-MCNC: 7.9 MG/DL — LOW (ref 8.4–10.5)
CHLORIDE SERPL-SCNC: 107 MMOL/L — SIGNIFICANT CHANGE UP (ref 98–107)
CO2 SERPL-SCNC: 20 MMOL/L — LOW (ref 22–31)
CREAT SERPL-MCNC: 0.54 MG/DL — SIGNIFICANT CHANGE UP (ref 0.5–1.3)
GLUCOSE SERPL-MCNC: 88 MG/DL — SIGNIFICANT CHANGE UP (ref 70–99)
HCT VFR BLD CALC: 26.4 % — LOW (ref 34.5–45)
HGB BLD-MCNC: 8.7 G/DL — LOW (ref 11.5–15.5)
MAGNESIUM SERPL-MCNC: 1.6 MG/DL — SIGNIFICANT CHANGE UP (ref 1.6–2.6)
MCHC RBC-ENTMCNC: 25.4 PG — LOW (ref 27–34)
MCHC RBC-ENTMCNC: 33 GM/DL — SIGNIFICANT CHANGE UP (ref 32–36)
MCV RBC AUTO: 77 FL — LOW (ref 80–100)
NRBC # BLD: 0 /100 WBCS — SIGNIFICANT CHANGE UP
NRBC # FLD: 0 K/UL — SIGNIFICANT CHANGE UP
PHOSPHATE SERPL-MCNC: 2.4 MG/DL — LOW (ref 2.5–4.5)
PLATELET # BLD AUTO: 233 K/UL — SIGNIFICANT CHANGE UP (ref 150–400)
POTASSIUM SERPL-MCNC: 3 MMOL/L — LOW (ref 3.5–5.3)
POTASSIUM SERPL-SCNC: 3 MMOL/L — LOW (ref 3.5–5.3)
PROT SERPL-MCNC: 5.3 G/DL — LOW (ref 6–8.3)
RBC # BLD: 3.43 M/UL — LOW (ref 3.8–5.2)
RBC # FLD: 14.1 % — SIGNIFICANT CHANGE UP (ref 10.3–14.5)
SODIUM SERPL-SCNC: 137 MMOL/L — SIGNIFICANT CHANGE UP (ref 135–145)
WBC # BLD: 5.38 K/UL — SIGNIFICANT CHANGE UP (ref 3.8–10.5)
WBC # FLD AUTO: 5.38 K/UL — SIGNIFICANT CHANGE UP (ref 3.8–10.5)

## 2021-01-22 PROCEDURE — 99232 SBSQ HOSP IP/OBS MODERATE 35: CPT | Mod: GC

## 2021-01-22 RX ORDER — POTASSIUM PHOSPHATE, MONOBASIC POTASSIUM PHOSPHATE, DIBASIC 236; 224 MG/ML; MG/ML
30 INJECTION, SOLUTION INTRAVENOUS ONCE
Refills: 0 | Status: COMPLETED | OUTPATIENT
Start: 2021-01-22 | End: 2021-01-22

## 2021-01-22 RX ORDER — ACETAMINOPHEN 500 MG
975 TABLET ORAL ONCE
Refills: 0 | Status: DISCONTINUED | OUTPATIENT
Start: 2021-01-22 | End: 2021-01-22

## 2021-01-22 RX ORDER — METOCLOPRAMIDE HCL 10 MG
10 TABLET ORAL EVERY 8 HOURS
Refills: 0 | Status: DISCONTINUED | OUTPATIENT
Start: 2021-01-22 | End: 2021-01-24

## 2021-01-22 RX ORDER — ONDANSETRON 8 MG/1
1 TABLET, FILM COATED ORAL
Qty: 10 | Refills: 0
Start: 2021-01-22 | End: 2021-01-24

## 2021-01-22 RX ORDER — FAMOTIDINE 10 MG/ML
1 INJECTION INTRAVENOUS
Qty: 60 | Refills: 0
Start: 2021-01-22 | End: 2021-02-20

## 2021-01-22 RX ORDER — ACETAMINOPHEN 500 MG
1000 TABLET ORAL ONCE
Refills: 0 | Status: COMPLETED | OUTPATIENT
Start: 2021-01-22 | End: 2021-01-22

## 2021-01-22 RX ORDER — DIAZEPAM 5 MG
1 TABLET ORAL
Qty: 0 | Refills: 0 | DISCHARGE
Start: 2021-01-22

## 2021-01-22 RX ORDER — ONDANSETRON 8 MG/1
4 TABLET, FILM COATED ORAL EVERY 8 HOURS
Refills: 0 | Status: DISCONTINUED | OUTPATIENT
Start: 2021-01-22 | End: 2021-01-24

## 2021-01-22 RX ORDER — SODIUM CHLORIDE 9 MG/ML
1000 INJECTION, SOLUTION INTRAVENOUS
Refills: 0 | Status: DISCONTINUED | OUTPATIENT
Start: 2021-01-22 | End: 2021-01-22

## 2021-01-22 RX ORDER — DEXTROSE MONOHYDRATE, SODIUM CHLORIDE, AND POTASSIUM CHLORIDE 50; .745; 4.5 G/1000ML; G/1000ML; G/1000ML
1000 INJECTION, SOLUTION INTRAVENOUS
Refills: 0 | Status: DISCONTINUED | OUTPATIENT
Start: 2021-01-22 | End: 2021-01-22

## 2021-01-22 RX ORDER — SODIUM CHLORIDE 9 MG/ML
1000 INJECTION, SOLUTION INTRAVENOUS
Refills: 0 | Status: COMPLETED | OUTPATIENT
Start: 2021-01-22 | End: 2021-01-22

## 2021-01-22 RX ORDER — ONDANSETRON 8 MG/1
4 TABLET, FILM COATED ORAL
Qty: 168 | Refills: 0
Start: 2021-01-22 | End: 2021-02-04

## 2021-01-22 RX ORDER — FAMOTIDINE 10 MG/ML
20 INJECTION INTRAVENOUS
Refills: 0 | Status: DISCONTINUED | OUTPATIENT
Start: 2021-01-22 | End: 2021-01-23

## 2021-01-22 RX ORDER — PYRIDOXINE HCL (VITAMIN B6) 100 MG
1 TABLET ORAL
Qty: 0 | Refills: 0 | DISCHARGE
Start: 2021-01-22

## 2021-01-22 RX ORDER — ONDANSETRON 8 MG/1
4 TABLET, FILM COATED ORAL EVERY 8 HOURS
Refills: 0 | Status: DISCONTINUED | OUTPATIENT
Start: 2021-01-22 | End: 2021-01-22

## 2021-01-22 RX ORDER — METOCLOPRAMIDE HCL 10 MG
10 TABLET ORAL THREE TIMES A DAY
Refills: 0 | Status: DISCONTINUED | OUTPATIENT
Start: 2021-01-22 | End: 2021-01-22

## 2021-01-22 RX ORDER — SODIUM CHLORIDE 9 MG/ML
1000 INJECTION, SOLUTION INTRAVENOUS
Qty: 14000 | Refills: 0
Start: 2021-01-22 | End: 2021-02-04

## 2021-01-22 RX ORDER — POTASSIUM CHLORIDE 20 MEQ
10 PACKET (EA) ORAL
Refills: 0 | Status: COMPLETED | OUTPATIENT
Start: 2021-01-22 | End: 2021-01-22

## 2021-01-22 RX ORDER — PYRIDOXINE HCL (VITAMIN B6) 100 MG
50 TABLET ORAL
Refills: 0 | Status: DISCONTINUED | OUTPATIENT
Start: 2021-01-22 | End: 2021-01-24

## 2021-01-22 RX ORDER — POTASSIUM CHLORIDE 20 MEQ
40 PACKET (EA) ORAL ONCE
Refills: 0 | Status: DISCONTINUED | OUTPATIENT
Start: 2021-01-22 | End: 2021-01-22

## 2021-01-22 RX ORDER — METOCLOPRAMIDE HCL 10 MG
5 TABLET ORAL
Qty: 210 | Refills: 0
Start: 2021-01-22 | End: 2021-02-04

## 2021-01-22 RX ADMIN — SODIUM CHLORIDE 150 MILLILITER(S): 9 INJECTION, SOLUTION INTRAVENOUS at 15:00

## 2021-01-22 RX ADMIN — Medication 100 MILLIEQUIVALENT(S): at 15:06

## 2021-01-22 RX ADMIN — Medication 100 MILLIEQUIVALENT(S): at 16:25

## 2021-01-22 RX ADMIN — FAMOTIDINE 20 MILLIGRAM(S): 10 INJECTION INTRAVENOUS at 21:23

## 2021-01-22 RX ADMIN — Medication 10 MILLIGRAM(S): at 11:34

## 2021-01-22 RX ADMIN — FAMOTIDINE 20 MILLIGRAM(S): 10 INJECTION INTRAVENOUS at 06:06

## 2021-01-22 RX ADMIN — ONDANSETRON 4 MILLIGRAM(S): 8 TABLET, FILM COATED ORAL at 10:34

## 2021-01-22 RX ADMIN — Medication 100 MILLIEQUIVALENT(S): at 21:28

## 2021-01-22 RX ADMIN — Medication 12.5 MILLIGRAM(S): at 21:53

## 2021-01-22 RX ADMIN — ONDANSETRON 4 MILLIGRAM(S): 8 TABLET, FILM COATED ORAL at 21:48

## 2021-01-22 RX ADMIN — SODIUM CHLORIDE 125 MILLILITER(S): 9 INJECTION INTRAMUSCULAR; INTRAVENOUS; SUBCUTANEOUS at 03:00

## 2021-01-22 RX ADMIN — Medication 400 MILLIGRAM(S): at 11:51

## 2021-01-22 RX ADMIN — Medication 400 MILLIGRAM(S): at 23:02

## 2021-01-22 RX ADMIN — Medication 50 MILLIGRAM(S): at 06:06

## 2021-01-22 RX ADMIN — DEXTROSE MONOHYDRATE, SODIUM CHLORIDE, AND POTASSIUM CHLORIDE 125 MILLILITER(S): 50; .745; 4.5 INJECTION, SOLUTION INTRAVENOUS at 09:45

## 2021-01-22 RX ADMIN — POTASSIUM PHOSPHATE, MONOBASIC POTASSIUM PHOSPHATE, DIBASIC 83.33 MILLIMOLE(S): 236; 224 INJECTION, SOLUTION INTRAVENOUS at 09:45

## 2021-01-22 NOTE — CHART NOTE - NSCHARTNOTEFT_GEN_A_CORE
If patient discharged today she can start IV home infusions tomorrow 1/23/21 once daily with home care agency.    Mayra ORTA-BC If patient discharged today she can start IV home infusions tomorrow 1/25/21 once daily with home care agency.     Mayra ORTA-BC

## 2021-01-22 NOTE — DISCHARGE NOTE ANTEPARTUM - BLURRED VISION
Subjective   36-year-old white female presents secondary to overdose.  Patient states that she had a headache.  She states that her neighbor had Opana and offered to give it to her IV.  She subsequently collapsed and stopped breathing.  Apparently bystanders performed CPR subsequently the ambulance gave her 2 mg of Narcan and she responded.  She was barely breathing upon their arrival.  Patient is awake alert and oriented x3 at this time.  She states that she has a history of migraines.  She states that her headache felt like her previous migraine.  She denies any falls or injury.  She states that her chest is sore.  She denies any shortness of breath.  No abdominal pain.  No nausea vomiting.  She adamantly denies suicidal homicidal ideation.          Review of Systems   Constitutional: Negative.  Negative for fever.   HENT: Negative.    Respiratory: Negative.    Cardiovascular: Negative.  Negative for chest pain.   Gastrointestinal: Negative.  Negative for abdominal pain.   Endocrine: Negative.    Genitourinary: Negative.  Negative for dysuria.   Skin: Negative.    Neurological: Negative.    Psychiatric/Behavioral: Negative.  Negative for suicidal ideas.   All other systems reviewed and are negative.      Past Medical History:   Diagnosis Date   • Kidney stone    • Urinary tract infection        Allergies   Allergen Reactions   • Reglan [Metoclopramide] Rash       Past Surgical History:   Procedure Laterality Date   •  SECTION  ;    • CHOLECYSTECTOMY     • CYSTOSCOPY BLADDER STONE LITHOTRIPSY  2012   • DILATION AND CURETTAGE, DIAGNOSTIC / THERAPEUTIC         Family History   Problem Relation Age of Onset   • Cancer Maternal Grandmother    • Heart disease Maternal Grandmother    • Stroke Maternal Grandmother    • Cancer Paternal Grandfather    • Heart disease Paternal Grandfather    • Diabetes Paternal Grandfather        Social History     Socioeconomic History   • Marital status:       Spouse name: Not on file   • Number of children: Not on file   • Years of education: Not on file   • Highest education level: Not on file   Tobacco Use   • Smoking status: Never Smoker   Substance and Sexual Activity   • Alcohol use: No   • Drug use: No   • Sexual activity: Yes     Birth control/protection: Condom     Comment: nurse,  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   Constitutional:       General: She is not in acute distress.     Appearance: She is well-developed. She is not diaphoretic.   HENT:      Head: Normocephalic and atraumatic.      Right Ear: External ear normal.      Left Ear: External ear normal.      Nose: Nose normal.   Eyes:      Conjunctiva/sclera: Conjunctivae normal.      Pupils: Pupils are equal, round, and reactive to light.   Neck:      Musculoskeletal: Normal range of motion and neck supple.      Vascular: No JVD.      Trachea: No tracheal deviation.   Cardiovascular:      Rate and Rhythm: Normal rate and regular rhythm.      Heart sounds: Normal heart sounds. No murmur.   Pulmonary:      Effort: Pulmonary effort is normal. No respiratory distress.      Breath sounds: Normal breath sounds. No wheezing.   Abdominal:      General: Bowel sounds are normal.      Palpations: Abdomen is soft.      Tenderness: There is no abdominal tenderness.   Musculoskeletal: Normal range of motion.         General: No deformity.   Skin:     General: Skin is warm and dry.      Coloration: Skin is not pale.      Findings: No erythema or rash.   Neurological:      Mental Status: She is alert and oriented to person, place, and time.      Cranial Nerves: No cranial nerve deficit.   Psychiatric:         Behavior: Behavior normal.         Thought Content: Thought content normal.         Procedures           ED Course  ED Course as of Oct 15 0909   Wed Oct 14, 2020   1914 Normal sinus rhythm rate of 81.  .  MN interval of  136.  QRS of 98.  No acute ST changes per Dr. Coleman   ECG 12 Lead [JI]   1923 Seen immediately with Dr. Coleman    [JI]   2013 Signed out to Dr. Marie    [JI]      ED Course User Index  [JI] William Rockwell PA                                           MDM  Number of Diagnoses or Management Options  Non-dose-related adverse reaction to medication, initial encounter: new and requires workup     Amount and/or Complexity of Data Reviewed  Clinical lab tests: reviewed and ordered  Tests in the radiology section of CPT®: reviewed and ordered  Tests in the medicine section of CPT®: reviewed and ordered        Final diagnoses:   Non-dose-related adverse reaction to medication, initial encounter            William Rockwell PA  10/15/20 0909     Statement Selected

## 2021-01-22 NOTE — DISCHARGE NOTE ANTEPARTUM - ADDITIONAL INSTRUCTIONS
- Continue antinausea medications and IV fluids at home with home care  - Follow up with Clinic at [facility] on 2/4 at 8am as scheduled  - Return with worsening vomiting, unable to tolerate drink/food, contractions, leaking fluid or decreased fetal movement - Continue antinausea medications and IV fluids at home with home care  - Follow up with Clinic at [facility] on 2/4 at 8am as scheduled  - Return with worsening vomiting, unable to tolerate drink/food, contractions, leaking fluid or decreased fetal movement  -please eat bland foods. Avoid spices, acidic food, heavy meals

## 2021-01-22 NOTE — PROGRESS NOTE ADULT - ASSESSMENT
A/P: 25y HD2  admitted w/ hyperemesis. The patient's vitals stable overnight. She was able to tolerate solid food last night without nausea/vomiting. Patient is stable and doing well.      CV: Hemodynamically stable  Pulm: Saturating well on room air, encourage oob/amb  GI: Continue regular diet. Continue Vit B6, Pepcid BID, Reglan, Zofran, Phenergan for nausea PRN. Continue electrolyte repletion.   : Voiding spontanously   Heme: c/w HSQ and SCDs for DVT ppx  FEN: Banana bag + K  ID: Afebrile  Endo: No active issues   Dispo: Continue routine  care. Likely discharge today if no vomiting.    Nathan Yee PGY-3 A/P: 25y HD2  admitted w/ hyperemesis. The patient's vitals stable overnight. She was able to tolerate solid food last night without nausea/vomiting. Patient is stable and doing well.      CV: Hemodynamically stable  Pulm: Saturating well on room air, encourage oob/amb  GI: Continue regular diet. Continue Vit B6, Pepcid BID, Reglan, Zofran, Phenergan for nausea PRN. Continue electrolyte repletion.   : Voiding spontanously   Heme: c/w HSQ and SCDs for DVT ppx  FEN: Banana bag + K  ID: Afebrile  Endo: No active issues   Dispo: Continue routine  care. Likely discharge today if no vomiting.    Nathan Yee PGY-3    MFM Fellow Note     Patient is a 25 yr  @ 14  admitted secondary to hyperemesis. Patient symptoms overall improved. She had one episode of vomiting today, however it was after eating a large meal.   We reviewed to continue to eat small frequent meals and to avoid large meals  Hypokalemia noted today, will continue to replete   Will continue current regimen and establish home nursing care so patient can receive IV hydration  Continue current regimen and monitor symptoms. If patient continues to feel improvement in symptoms can consider discharge today     Seen and evaluated with Dr. Mendez (MFM Attending)     Lionel Arriaga MD   Maternal Fetal Medicine Fellow

## 2021-01-22 NOTE — DISCHARGE NOTE ANTEPARTUM - PATIENT PORTAL LINK FT
You can access the FollowMyHealth Patient Portal offered by [facility] by registering at the following website: [email or web address]. By joining [facility]’s FollowMyHealth portal, you will also be able to view your health information using other applications (apps) compatible with our system.

## 2021-01-22 NOTE — DISCHARGE NOTE ANTEPARTUM - MEDICATION SUMMARY - MEDICATIONS TO TAKE
I will START or STAY ON the medications listed below when I get home from the hospital:    diazePAM 5 mg oral tablet  -- 1 tab(s) by mouth , As needed, anxiety  -- Indication: For Home med for anxiety    ondansetron 2 mg/mL injectable solution  -- 4 milligram(s) injectable 3 times a day, As Needed -for nausea   -- Indication: For Hyperemesis affecting pregnancy, antepartum    metoclopramide 5 mg/mL injectable solution  -- 5 milligram(s) injectable 3 times a day, As Needed -for nausea   -- Indication: For Hyperemesis affecting pregnancy, antepartum    famotidine 20 mg oral tablet  -- 1 tab(s) by mouth 2 times a day  -- Indication: For Hyperemesis affecting pregnancy, antepartum    Sodium Chloride 0.9% intravenous solution  -- 1000 milliliter(s) intravenous once a day  folic acid additive 1 milliGRAM(s) thiamine additive 100 milliGRAM(s) multivitamin additive 10 milliLiter(s) potassium chloride additive 20 milliEquivalent(s) End date: 2/5/2021   -- Indication: For Hyperemesis affecting pregnancy, antepartum    pyridoxine 50 mg oral tablet  -- 1 tab(s) by mouth 2 times a day  -- Indication: For Hyperemesis affecting pregnancy, antepartum   I will START or STAY ON the medications listed below when I get home from the hospital:    diazePAM 5 mg oral tablet  -- 1 tab(s) by mouth , As needed, anxiety  -- Indication: For Home med for anxiety    ondansetron 2 mg/mL injectable solution  -- 4 milligram(s) injectable 3 times a day, As Needed -for nausea   -- Indication: For Hyperemesis affecting pregnancy, antepartum    metoclopramide 5 mg/mL injectable solution  -- 5 milligram(s) injectable 3 times a day, As Needed -for nausea   -- Indication: For Hyperemesis affecting pregnancy, antepartum    promethazine 25 mg rectal suppository  -- 1 suppository(ies) rectally once a day (at bedtime)   -- For rectal use only.  Keep in refrigerator.  Do not freeze.  May cause drowsiness.  Alcohol may intensify this effect.  Use care when operating dangerous machinery.  Obtain medical advice before taking any non-prescription drugs as some may affect the action of this medication.    -- Indication: For for nausea until home IV therapy begins on 1/25    ondansetron 4 mg oral disintegrating strip  -- 1 each by mouth 3 times a day   -- Check with your doctor before becoming pregnant.  Do not chew, break, or crush.  Obtain medical advice before taking any non-prescription drugs as some may affect the action of this medication.    -- Indication: For for nausea until home iV therapy begins on 1/25    famotidine 20 mg oral tablet  -- 1 tab(s) by mouth 2 times a day  -- Indication: For Hyperemesis affecting pregnancy, antepartum    Sodium Chloride 0.9% intravenous solution  -- 1000 milliliter(s) intravenous once a day  folic acid additive 1 milliGRAM(s) thiamine additive 100 milliGRAM(s) multivitamin additive 10 milliLiter(s) potassium chloride additive 20 milliEquivalent(s) End date: 2/5/2021   -- Indication: For Hyperemesis affecting pregnancy, antepartum    pyridoxine 50 mg oral tablet  -- 1 tab(s) by mouth 2 times a day  -- Indication: For Hyperemesis affecting pregnancy, antepartum   I will START or STAY ON the medications listed below when I get home from the hospital:    diazePAM 5 mg oral tablet  -- 1 tab(s) by mouth , As needed, anxiety  -- Indication: For Home med for anxiety    ondansetron 2 mg/mL injectable solution  -- 4 milligram(s) injectable 3 times a day, As Needed -for nausea   -- Indication: For Hyperemesis affecting pregnancy, antepartum    metoclopramide 5 mg/mL injectable solution  -- 5 milligram(s) injectable 3 times a day, As Needed -for nausea   -- Indication: For Hyperemesis affecting pregnancy, antepartum    promethazine 25 mg rectal suppository  -- 1 suppository(ies) rectally once a day (at bedtime)   -- For rectal use only.  Keep in refrigerator.  Do not freeze.  May cause drowsiness.  Alcohol may intensify this effect.  Use care when operating dangerous machinery.  Obtain medical advice before taking any non-prescription drugs as some may affect the action of this medication.    -- Indication: For for nausea until home IV therapy begins on 1/25    ondansetron 4 mg oral disintegrating strip  -- 1 each by mouth 3 times a day   -- Check with your doctor before becoming pregnant.  Do not chew, break, or crush.  Obtain medical advice before taking any non-prescription drugs as some may affect the action of this medication.    -- Indication: For for nausea until home iV therapy begins on 1/25    famotidine 20 mg oral tablet  -- 1 tab(s) by mouth 2 times a day  -- Indication: For Hyperemesis affecting pregnancy, antepartum    Sodium Chloride 0.9% intravenous solution  -- 1000 milliliter(s) intravenous once a day  folic acid additive 1 milliGRAM(s) thiamine additive 100 milliGRAM(s) multivitamin additive 10 milliLiter(s) potassium chloride additive 20 milliEquivalent(s) End date: 2/5/2021   -- Indication: For Hyperemesis affecting pregnancy, antepartum    pantoprazole 40 mg oral delayed release tablet  -- 1 tab(s) by mouth once a day (before a meal)  -- Indication: For Hyperemesis affecting pregnancy, antepartum    pyridoxine 50 mg oral tablet  -- 1 tab(s) by mouth 2 times a day  -- Indication: For Hyperemesis affecting pregnancy, antepartum   I will START or STAY ON the medications listed below when I get home from the hospital:    diazePAM 5 mg oral tablet  -- 1 tab(s) by mouth , As needed, anxiety  -- Indication: For Home med for anxiety    metoclopramide 5 mg/mL injectable solution  --  injectable   -- Indication: For Hyperemesis affecting pregnancy, antepartum    ondansetron 4 mg oral tablet, disintegrating  -- 1 tab(s) by mouth every 8 hours  -- Indication: For Mild hyperemesis gravidarum    promethazine 25 mg rectal suppository  -- 1 suppository(ies) rectally once a day (at bedtime)   -- For rectal use only.  Keep in refrigerator.  Do not freeze.  May cause drowsiness.  Alcohol may intensify this effect.  Use care when operating dangerous machinery.  Obtain medical advice before taking any non-prescription drugs as some may affect the action of this medication.    -- Indication: For Hyperemesis affecting pregnancy, antepartum    famotidine 20 mg oral tablet  -- 1 tab(s) by mouth 2 times a day  -- Indication: For Hyperemesis affecting pregnancy, antepartum    Sodium Chloride 0.9% intravenous solution  -- 1000 milliliter(s) intravenous once a day  folic acid additive 1 milliGRAM(s) thiamine additive 100 milliGRAM(s) multivitamin additive 10 milliLiter(s) potassium chloride additive 20 milliEquivalent(s) End date: 2/5/2021   -- Indication: For Hyperemesis affecting pregnancy, antepartum    Prenatal Multivitamins with Folic Acid 1 mg oral tablet  --  by mouth   -- Indication: For pregnancy    pantoprazole 40 mg oral delayed release tablet  -- 1 tab(s) by mouth once a day (before a meal)  -- Indication: For Hyperemesis affecting pregnancy, antepartum    pyridoxine 50 mg oral tablet  -- 1 tab(s) by mouth 2 times a day  -- Indication: For Hyperemesis affecting pregnancy, antepartum

## 2021-01-22 NOTE — DISCHARGE NOTE ANTEPARTUM - PROVIDER TOKENS
FREE:[LAST:[[facility]],PHONE:[phone]],FAX:[(   [phone]    -],ADDRESS:[[phone] [facility] building]] 2021 03:16

## 2021-01-22 NOTE — DISCHARGE NOTE ANTEPARTUM - HOSPITAL COURSE
25y HD2  admitted w/ hyperemesis at 14.4 weeks gestation. Patient with no prenatal care thus far. Potassium and phosphorous noted to be low on admission, given IV supplementation, IV hydration and antiemetics. Patient tolerating regular diet without nausea or vomiting. TUVS: A single live Intrauterine gestation is present in breech presentation. No previa. Amniotic fluid volume is within normal limits. The cervical length measures 4.0 cm. Fetal motion is seen in real-time and the fetal heart rate measures 158 bpm. Single live intrauterine gestation at 14 weeks and 5 days by this ultrasound.  14 weeks and 2 days by LMP corresponding to an KD of 7/19/2021.Continue regular diet. Continue Vit B6, Pepcid BID, Reglan, Zofran, Phenergan for nausea PRN. Patient set up for First OB appointment with [facility] on 2/4/21 at 8am.         25y HD2  admitted w/ hyperemesis at 14.4 weeks gestation. Patient with no prenatal care thus far. Potassium and phosphorous noted to be low on admission, and LFTs elevated; given IV supplementation, IV hydration and antiemetics. Patient tolerating regular diet without nausea or vomiting. RUQ sono: WNL, Liver enzymes downtrending. TUVS: A single live Intrauterine gestation is present in breech presentation. No previa. Amniotic fluid volume is within normal limits. The cervical length measures 4.0 cm. Fetal motion is seen in real-time and the fetal heart rate measures 158 bpm. Single live intrauterine gestation at 14 weeks and 5 days by this ultrasound.14 weeks and 2 days by LMP corresponding to an KD of 7/19/2021.Continue regular diet. Continue Vit B6, Pepcid BID, Reglan, Zofran, for nausea PRN. Stable for discharge home with IV hydration and home care nurse. Patient set up for [facility] appointment with [facility] on 2/4/21 at 8am.         25y HD2  admitted w/ hyperemesis at 14.4 weeks gestation. Patient with no prenatal care thus far. Potassium and phosphorous noted to be low on admission, and LFTs elevated; given IV supplementation, IV hydration and antiemetics. Patient tolerating regular diet without nausea or vomiting. RUQ sono: WNL, Liver enzymes downtrending. TUVS: A single live Intrauterine gestation is present in breech presentation. No previa. Amniotic fluid volume is within normal limits. The cervical length measures 4.0 cm. Fetal motion is seen in real-time and the fetal heart rate measures 158 bpm. Single live intrauterine gestation at 14 weeks and 5 days by this ultrasound.14 weeks and 2 days by LMP corresponding to an KD of 7/19/2021.Continue regular diet. Continue Vit B6, Pepcid BID, Reglan, Zofran, for nausea PRN.  She as started on protonix for significant esophagitis related pain and K repleted. Her epigastric pain has improved with this. Stable for discharge home with IV hydration and home care nurse. Patient set up for First OB appointment with [facility] on 2/4/21 at 8am.

## 2021-01-22 NOTE — DISCHARGE NOTE ANTEPARTUM - HOME CARE AGENCY TYPE OF SERVICE:
IV Hydration. Nurse will visit patient on 01/25/2021. Nurse will call patient to schedule visit time.

## 2021-01-22 NOTE — DISCHARGE NOTE ANTEPARTUM - PLAN OF CARE
- Continue antinausea medications and IV fluids at home with home care  - Follow up with Clinic at [facility] on 2/4 at 8am as scheduled  - Return with worsening vomiting, unable to tolerate drink/food, contractions, leaking fluid or decreased fetal movement Recovery

## 2021-01-23 LAB
ALBUMIN SERPL ELPH-MCNC: 3.7 G/DL — SIGNIFICANT CHANGE UP (ref 3.3–5)
ALP SERPL-CCNC: 35 U/L — LOW (ref 40–120)
ALT FLD-CCNC: 344 U/L — HIGH (ref 4–33)
ANION GAP SERPL CALC-SCNC: 12 MMOL/L — SIGNIFICANT CHANGE UP (ref 7–14)
APPEARANCE UR: CLEAR — SIGNIFICANT CHANGE UP
AST SERPL-CCNC: 88 U/L — HIGH (ref 4–32)
BILIRUB SERPL-MCNC: 0.6 MG/DL — SIGNIFICANT CHANGE UP (ref 0.2–1.2)
BILIRUB UR-MCNC: NEGATIVE — SIGNIFICANT CHANGE UP
BUN SERPL-MCNC: 2 MG/DL — LOW (ref 7–23)
CALCIUM SERPL-MCNC: 8.2 MG/DL — LOW (ref 8.4–10.5)
CHLORIDE SERPL-SCNC: 105 MMOL/L — SIGNIFICANT CHANGE UP (ref 98–107)
CO2 SERPL-SCNC: 19 MMOL/L — LOW (ref 22–31)
COLOR SPEC: YELLOW — SIGNIFICANT CHANGE UP
CREAT SERPL-MCNC: 0.48 MG/DL — LOW (ref 0.5–1.3)
DIFF PNL FLD: NEGATIVE — SIGNIFICANT CHANGE UP
GLUCOSE SERPL-MCNC: 98 MG/DL — SIGNIFICANT CHANGE UP (ref 70–99)
GLUCOSE UR QL: NEGATIVE — SIGNIFICANT CHANGE UP
KETONES UR-MCNC: ABNORMAL
LEUKOCYTE ESTERASE UR-ACNC: NEGATIVE — SIGNIFICANT CHANGE UP
MAGNESIUM SERPL-MCNC: 1.6 MG/DL — SIGNIFICANT CHANGE UP (ref 1.6–2.6)
NITRITE UR-MCNC: NEGATIVE — SIGNIFICANT CHANGE UP
PH UR: 6 — SIGNIFICANT CHANGE UP (ref 5–8)
PHOSPHATE SERPL-MCNC: 2.8 MG/DL — SIGNIFICANT CHANGE UP (ref 2.5–4.5)
POTASSIUM SERPL-MCNC: 3.1 MMOL/L — LOW (ref 3.5–5.3)
POTASSIUM SERPL-SCNC: 3.1 MMOL/L — LOW (ref 3.5–5.3)
PROT SERPL-MCNC: 5.9 G/DL — LOW (ref 6–8.3)
PROT UR-MCNC: ABNORMAL
SODIUM SERPL-SCNC: 136 MMOL/L — SIGNIFICANT CHANGE UP (ref 135–145)
SP GR SPEC: 1.02 — SIGNIFICANT CHANGE UP (ref 1.01–1.02)
UROBILINOGEN FLD QL: SIGNIFICANT CHANGE UP

## 2021-01-23 PROCEDURE — 99232 SBSQ HOSP IP/OBS MODERATE 35: CPT | Mod: GC

## 2021-01-23 RX ORDER — CITRIC ACID/SODIUM CITRATE 300-500 MG
30 SOLUTION, ORAL ORAL ONCE
Refills: 0 | Status: COMPLETED | OUTPATIENT
Start: 2021-01-23 | End: 2021-01-23

## 2021-01-23 RX ORDER — ACETAMINOPHEN 500 MG
1000 TABLET ORAL ONCE
Refills: 0 | Status: DISCONTINUED | OUTPATIENT
Start: 2021-01-23 | End: 2021-01-23

## 2021-01-23 RX ORDER — FAMOTIDINE 10 MG/ML
20 INJECTION INTRAVENOUS
Refills: 0 | Status: DISCONTINUED | OUTPATIENT
Start: 2021-01-23 | End: 2021-01-24

## 2021-01-23 RX ORDER — MORPHINE SULFATE 50 MG/1
4 CAPSULE, EXTENDED RELEASE ORAL ONCE
Refills: 0 | Status: DISCONTINUED | OUTPATIENT
Start: 2021-01-23 | End: 2021-01-23

## 2021-01-23 RX ORDER — FAMOTIDINE 10 MG/ML
20 INJECTION INTRAVENOUS ONCE
Refills: 0 | Status: COMPLETED | OUTPATIENT
Start: 2021-01-23 | End: 2021-01-23

## 2021-01-23 RX ORDER — POTASSIUM CHLORIDE 20 MEQ
20 PACKET (EA) ORAL
Refills: 0 | Status: COMPLETED | OUTPATIENT
Start: 2021-01-23 | End: 2021-01-23

## 2021-01-23 RX ORDER — SUCRALFATE 1 G
1 TABLET ORAL
Refills: 0 | Status: DISCONTINUED | OUTPATIENT
Start: 2021-01-23 | End: 2021-01-24

## 2021-01-23 RX ORDER — POTASSIUM CHLORIDE 20 MEQ
10 PACKET (EA) ORAL
Refills: 0 | Status: DISCONTINUED | OUTPATIENT
Start: 2021-01-23 | End: 2021-01-23

## 2021-01-23 RX ORDER — SODIUM CHLORIDE 9 MG/ML
1000 INJECTION INTRAMUSCULAR; INTRAVENOUS; SUBCUTANEOUS
Refills: 0 | Status: DISCONTINUED | OUTPATIENT
Start: 2021-01-23 | End: 2021-01-24

## 2021-01-23 RX ADMIN — Medication 1 GRAM(S): at 16:03

## 2021-01-23 RX ADMIN — FAMOTIDINE 20 MILLIGRAM(S): 10 INJECTION INTRAVENOUS at 08:47

## 2021-01-23 RX ADMIN — SODIUM CHLORIDE 125 MILLILITER(S): 9 INJECTION INTRAMUSCULAR; INTRAVENOUS; SUBCUTANEOUS at 01:23

## 2021-01-23 RX ADMIN — FAMOTIDINE 20 MILLIGRAM(S): 10 INJECTION INTRAVENOUS at 23:08

## 2021-01-23 RX ADMIN — ONDANSETRON 4 MILLIGRAM(S): 8 TABLET, FILM COATED ORAL at 13:01

## 2021-01-23 RX ADMIN — Medication 20 MILLIEQUIVALENT(S): at 14:07

## 2021-01-23 RX ADMIN — Medication 20 MILLIEQUIVALENT(S): at 16:03

## 2021-01-23 RX ADMIN — FAMOTIDINE 20 MILLIGRAM(S): 10 INJECTION INTRAVENOUS at 13:01

## 2021-01-23 RX ADMIN — MORPHINE SULFATE 4 MILLIGRAM(S): 50 CAPSULE, EXTENDED RELEASE ORAL at 16:46

## 2021-01-23 RX ADMIN — ONDANSETRON 4 MILLIGRAM(S): 8 TABLET, FILM COATED ORAL at 22:11

## 2021-01-23 RX ADMIN — Medication 10 MILLIGRAM(S): at 08:42

## 2021-01-23 RX ADMIN — Medication 10 MILLIGRAM(S): at 16:48

## 2021-01-23 RX ADMIN — Medication 20 MILLIEQUIVALENT(S): at 19:41

## 2021-01-23 RX ADMIN — Medication 50 MILLIGRAM(S): at 08:42

## 2021-01-23 RX ADMIN — Medication 50 MILLIGRAM(S): at 22:11

## 2021-01-23 RX ADMIN — ONDANSETRON 4 MILLIGRAM(S): 8 TABLET, FILM COATED ORAL at 06:15

## 2021-01-23 RX ADMIN — Medication 30 MILLILITER(S): at 23:18

## 2021-01-23 RX ADMIN — Medication 5 MILLIGRAM(S): at 14:48

## 2021-01-23 NOTE — PROGRESS NOTE ADULT - ASSESSMENT
A/P: 26y/o  at 14w5d admitted w/ hyperemesis. Patient was about to be discharged yesterday but had nausea/vomiting so kept additional night. Reports symptoms overall well controlled but acid feeling still present. Vitals stable.     CV: Hemodynamically stable  Pulm: Saturating well on room air, encourage oob/amb  GI: Continue regular diet. Continue Vit B6, Pepcid BID, Reglan, Zofran, Phenergan for nausea PRN. Will try bicitra  : Voiding spontaneously   Heme: c/w HSQ and SCDs for DVT ppx  FEN: Banana bag + K.  Continue electrolyte repletion.   ID: Afebrile  Endo: No active issues   Dispo: likely d/c home today    JONNATHAN Alonso PGY3

## 2021-01-24 VITALS
DIASTOLIC BLOOD PRESSURE: 58 MMHG | OXYGEN SATURATION: 100 % | TEMPERATURE: 98 F | RESPIRATION RATE: 18 BRPM | SYSTOLIC BLOOD PRESSURE: 100 MMHG | HEART RATE: 86 BPM

## 2021-01-24 LAB
ALBUMIN SERPL ELPH-MCNC: 3.3 G/DL — SIGNIFICANT CHANGE UP (ref 3.3–5)
ALP SERPL-CCNC: 33 U/L — LOW (ref 40–120)
ALT FLD-CCNC: 266 U/L — HIGH (ref 4–33)
ANION GAP SERPL CALC-SCNC: 12 MMOL/L — SIGNIFICANT CHANGE UP (ref 7–14)
APPEARANCE UR: ABNORMAL
AST SERPL-CCNC: 57 U/L — HIGH (ref 4–32)
BASOPHILS # BLD AUTO: 0.02 K/UL — SIGNIFICANT CHANGE UP (ref 0–0.2)
BASOPHILS NFR BLD AUTO: 0.4 % — SIGNIFICANT CHANGE UP (ref 0–2)
BILIRUB SERPL-MCNC: 0.6 MG/DL — SIGNIFICANT CHANGE UP (ref 0.2–1.2)
BILIRUB UR-MCNC: NEGATIVE — SIGNIFICANT CHANGE UP
BUN SERPL-MCNC: <2 MG/DL — LOW (ref 7–23)
CALCIUM SERPL-MCNC: 8 MG/DL — LOW (ref 8.4–10.5)
CHLORIDE SERPL-SCNC: 105 MMOL/L — SIGNIFICANT CHANGE UP (ref 98–107)
CO2 SERPL-SCNC: 19 MMOL/L — LOW (ref 22–31)
COLOR SPEC: YELLOW — SIGNIFICANT CHANGE UP
CREAT SERPL-MCNC: 0.48 MG/DL — LOW (ref 0.5–1.3)
DIFF PNL FLD: ABNORMAL
EOSINOPHIL # BLD AUTO: 0.06 K/UL — SIGNIFICANT CHANGE UP (ref 0–0.5)
EOSINOPHIL NFR BLD AUTO: 1.1 % — SIGNIFICANT CHANGE UP (ref 0–6)
GLUCOSE SERPL-MCNC: 85 MG/DL — SIGNIFICANT CHANGE UP (ref 70–99)
GLUCOSE UR QL: NEGATIVE — SIGNIFICANT CHANGE UP
HAV IGM SER-ACNC: SIGNIFICANT CHANGE UP
HBV CORE IGM SER-ACNC: SIGNIFICANT CHANGE UP
HBV SURFACE AG SER-ACNC: SIGNIFICANT CHANGE UP
HCT VFR BLD CALC: 27.4 % — LOW (ref 34.5–45)
HCV AB S/CO SERPL IA: 0.08 S/CO — SIGNIFICANT CHANGE UP (ref 0–0.99)
HCV AB SERPL-IMP: SIGNIFICANT CHANGE UP
HGB BLD-MCNC: 8.9 G/DL — LOW (ref 11.5–15.5)
IANC: 3.21 K/UL — SIGNIFICANT CHANGE UP (ref 1.5–8.5)
IMM GRANULOCYTES NFR BLD AUTO: 0.8 % — SIGNIFICANT CHANGE UP (ref 0–1.5)
KETONES UR-MCNC: ABNORMAL
LEUKOCYTE ESTERASE UR-ACNC: NEGATIVE — SIGNIFICANT CHANGE UP
LYMPHOCYTES # BLD AUTO: 1.68 K/UL — SIGNIFICANT CHANGE UP (ref 1–3.3)
LYMPHOCYTES # BLD AUTO: 32 % — SIGNIFICANT CHANGE UP (ref 13–44)
MAGNESIUM SERPL-MCNC: 1.5 MG/DL — LOW (ref 1.6–2.6)
MCHC RBC-ENTMCNC: 25.1 PG — LOW (ref 27–34)
MCHC RBC-ENTMCNC: 32.5 GM/DL — SIGNIFICANT CHANGE UP (ref 32–36)
MCV RBC AUTO: 77.2 FL — LOW (ref 80–100)
MONOCYTES # BLD AUTO: 0.24 K/UL — SIGNIFICANT CHANGE UP (ref 0–0.9)
MONOCYTES NFR BLD AUTO: 4.6 % — SIGNIFICANT CHANGE UP (ref 2–14)
NEUTROPHILS # BLD AUTO: 3.21 K/UL — SIGNIFICANT CHANGE UP (ref 1.8–7.4)
NEUTROPHILS NFR BLD AUTO: 61.1 % — SIGNIFICANT CHANGE UP (ref 43–77)
NITRITE UR-MCNC: NEGATIVE — SIGNIFICANT CHANGE UP
NRBC # BLD: 0 /100 WBCS — SIGNIFICANT CHANGE UP
NRBC # FLD: 0 K/UL — SIGNIFICANT CHANGE UP
PH UR: 6.5 — SIGNIFICANT CHANGE UP (ref 5–8)
PHOSPHATE SERPL-MCNC: 2.4 MG/DL — LOW (ref 2.5–4.5)
PLATELET # BLD AUTO: 225 K/UL — SIGNIFICANT CHANGE UP (ref 150–400)
POTASSIUM SERPL-MCNC: 3.4 MMOL/L — LOW (ref 3.5–5.3)
POTASSIUM SERPL-SCNC: 3.4 MMOL/L — LOW (ref 3.5–5.3)
PROT SERPL-MCNC: 5.6 G/DL — LOW (ref 6–8.3)
PROT UR-MCNC: ABNORMAL
RBC # BLD: 3.55 M/UL — LOW (ref 3.8–5.2)
RBC # FLD: 14.1 % — SIGNIFICANT CHANGE UP (ref 10.3–14.5)
SODIUM SERPL-SCNC: 136 MMOL/L — SIGNIFICANT CHANGE UP (ref 135–145)
SP GR SPEC: 1.01 — SIGNIFICANT CHANGE UP (ref 1.01–1.02)
T4 FREE SERPL-MCNC: 1.2 NG/DL — SIGNIFICANT CHANGE UP (ref 0.9–1.8)
UROBILINOGEN FLD QL: ABNORMAL
WBC # BLD: 5.25 K/UL — SIGNIFICANT CHANGE UP (ref 3.8–10.5)
WBC # FLD AUTO: 5.25 K/UL — SIGNIFICANT CHANGE UP (ref 3.8–10.5)

## 2021-01-24 RX ORDER — ONDANSETRON 8 MG/1
4 TABLET, FILM COATED ORAL
Qty: 168 | Refills: 0
Start: 2021-01-24 | End: 2021-02-06

## 2021-01-24 RX ORDER — ONDANSETRON 8 MG/1
1 TABLET, FILM COATED ORAL
Qty: 10 | Refills: 0
Start: 2021-01-24 | End: 2021-01-26

## 2021-01-24 RX ORDER — FAMOTIDINE 10 MG/ML
1 INJECTION INTRAVENOUS
Qty: 60 | Refills: 0
Start: 2021-01-24 | End: 2021-02-22

## 2021-01-24 RX ORDER — PYRIDOXINE HCL (VITAMIN B6) 100 MG
1 TABLET ORAL
Qty: 60 | Refills: 3
Start: 2021-01-24 | End: 2021-05-23

## 2021-01-24 RX ORDER — MORPHINE SULFATE 50 MG/1
4 CAPSULE, EXTENDED RELEASE ORAL ONCE
Refills: 0 | Status: DISCONTINUED | OUTPATIENT
Start: 2021-01-24 | End: 2021-01-24

## 2021-01-24 RX ORDER — METOCLOPRAMIDE HCL 10 MG
0 TABLET ORAL
Qty: 0 | Refills: 0 | DISCHARGE
Start: 2021-01-24

## 2021-01-24 RX ORDER — PANTOPRAZOLE SODIUM 20 MG/1
40 TABLET, DELAYED RELEASE ORAL
Refills: 0 | Status: DISCONTINUED | OUTPATIENT
Start: 2021-01-24 | End: 2021-01-24

## 2021-01-24 RX ORDER — PANTOPRAZOLE SODIUM 20 MG/1
1 TABLET, DELAYED RELEASE ORAL
Qty: 30 | Refills: 2
Start: 2021-01-24

## 2021-01-24 RX ORDER — METOCLOPRAMIDE HCL 10 MG
5 TABLET ORAL
Qty: 210 | Refills: 0
Start: 2021-01-24 | End: 2021-02-06

## 2021-01-24 RX ORDER — HEPARIN SODIUM 5000 [USP'U]/ML
5000 INJECTION INTRAVENOUS; SUBCUTANEOUS EVERY 12 HOURS
Refills: 0 | Status: DISCONTINUED | OUTPATIENT
Start: 2021-01-24 | End: 2021-01-24

## 2021-01-24 RX ORDER — ONDANSETRON 8 MG/1
1 TABLET, FILM COATED ORAL
Qty: 0 | Refills: 0 | DISCHARGE
Start: 2021-01-24

## 2021-01-24 RX ADMIN — PANTOPRAZOLE SODIUM 40 MILLIGRAM(S): 20 TABLET, DELAYED RELEASE ORAL at 07:02

## 2021-01-24 RX ADMIN — ONDANSETRON 4 MILLIGRAM(S): 8 TABLET, FILM COATED ORAL at 13:45

## 2021-01-24 RX ADMIN — ONDANSETRON 4 MILLIGRAM(S): 8 TABLET, FILM COATED ORAL at 06:57

## 2021-01-24 RX ADMIN — Medication 1 GRAM(S): at 06:58

## 2021-01-24 RX ADMIN — Medication 10 MILLIGRAM(S): at 00:31

## 2021-01-24 RX ADMIN — MORPHINE SULFATE 4 MILLIGRAM(S): 50 CAPSULE, EXTENDED RELEASE ORAL at 00:32

## 2021-01-24 RX ADMIN — FAMOTIDINE 20 MILLIGRAM(S): 10 INJECTION INTRAVENOUS at 08:58

## 2021-01-24 RX ADMIN — Medication 50 MILLIGRAM(S): at 06:57

## 2021-01-24 NOTE — PROGRESS NOTE ADULT - SUBJECTIVE AND OBJECTIVE BOX
Patient is a 25y old  Female who presents with a chief complaint of Hyperemesis (2021 02:52)      HPI:  JERMAIN ALEGRIA  25y  Female [id number]    HPI: 26yo , LMP 10/12/20 at 14w3d by LMP presenting with nausea, vomiting, and inability to tolerate PO. Pt states she has not been able to eat anything for the last 5 days and she is vomiting every 5-10 minutes. States vomit was originally made up of food contents but now is brown and bloody with occasional episodes of bright red blood. She has not taken any anti-nausea medications. Pt had positive UPT but has not seen an OB in this pregnancy. Pt initially presented to [facility] last Friday and was sent home with prenatal vitamins and antibiotics (she does not know why). Denies hyperemesis in previous pregnancies. Reports occasional headaches, constipation, and lizzy colored urine. She denies blurry vision, temperature changes, fevers, chills, chest pain, shortness of breath.      Ob/Gyn Physician: recently moved from [place]; does not have Ob/Gyn for this pregnancy    Obhx: c/s x3 (, , 2019 - [facility])  GynHx: Denies fibroids, cysts, abnormal pap smears, STIs  PMH: denies  PSH: c/s x3  Social: Denies Toxic Habits x3; denies anxiety/depression  Meds: PNV, Valium 10mg AM, 5mg PM  Allergies: NKDA     (2021 09:38)      PAST MEDICAL & SURGICAL HISTORY:  No pertinent past medical history    No significant past surgical history        MEDICATIONS  (STANDING):  acetaminophen  IVPB .. 1000 milliGRAM(s) IV Intermittent once  citric acid/sodium citrate Solution 30 milliLiter(s) Oral once  famotidine    Tablet 20 milliGRAM(s) Oral two times a day  ondansetron   Disintegrating Tablet 4 milliGRAM(s) Oral every 8 hours  pyridoxine 50 milliGRAM(s) Oral two times a day  sodium chloride 0.9%. 1000 milliLiter(s) (125 mL/Hr) IV Continuous <Continuous>              Vital Signs Last 24 Hrs  T(C): 36.7 (2021 18:03), Max: 37.1 (2021 14:06)  T(F): 98 (2021 18:03), Max: 98.8 (2021 14:06)  HR: 80 (2021 06:17) (78 - 96)  BP: 98/59 (2021 06:17) (98/59 - 123/79)  BP(mean): --  RR: 18 (2021 18:03) (16 - 18)  SpO2: 100% (2021 18:03) (100% - 100%)    PHYSICAL EXAM:  HAs upper abdominal pain mostly and vommiting is continuing off and on.  Will order ultrasound abdomen and if non diagnostic MRI for upper abdominal pain  Amylase/lipase        I&O's Summary    2021 07:01  -  2021 07:00  --------------------------------------------------------  IN: 1125 mL / OUT: 0 mL / NET: 1125 mL    2021 07:01  -  2021 10:16  --------------------------------------------------------  IN: 525 mL / OUT: 300 mL / NET: 225 mL        LABORATORY:                        8.7    5.38  )-----------( 233      ( 2021 06:43 )             26.4         136  |  105  |  2<L>  ----------------------------<  98  3.1<L>   |  19<L>  |  0.48<L>    Ca    8.2<L>      2021 09:04  Phos  2.8       Mg     1.6         TPro  5.9<L>  /  Alb  3.7  /  TBili  0.6  /  DBili  x   /  AST  88<H>  /  ALT  344<H>  /  AlkPhos  35<L>        Urinalysis Basic - ( 2021 08:03 )    Color: Yellow / Appearance: Clear / S.016 / pH: x  Gluc: x / Ketone: Large  / Bili: Negative / Urobili: <2 mg/dL   Blood: x / Protein: Trace / Nitrite: Negative   Leuk Esterase: Negative / RBC: x / WBC x   Sq Epi: x / Non Sq Epi: x / Bacteria: x    
JERMAIN ALEGRIA    R3 Antepartum Note, HD#4    Interval events: Patient reports worsening epigastric pain as well as LLQ pain and L back pain. Pain is relieved with morphine. On review of history, patient reports that nausea/vomiting became worse 6d ago, but that the emesis became bloody and epigastric pain started 2d later. Last episode of hematemesis yesterday morning--approx 4cm spot of bright red blood in the toilet when she vomited. Pain is exacerbated by swallowing and vomiting. Alleviated by morphine.    Patient seen and examined at bedside. Pt denies LOF, VB, ctx, HA, epigastric pain, blurred vision, CP, SOB, N/V, fevers, and chills. No lightheadedness or dizziness.    Vital Signs Last 24 Hours  T(C): 37 (01-24-21 @ 06:56), Max: 37.3 (01-24-21 @ 01:52)  HR: 78 (01-24-21 @ 06:56) (78 - 93)  BP: 109/56 (01-24-21 @ 06:56) (94/59 - 119/79)  RR: 16 (01-24-21 @ 06:56) (16 - 19)  SpO2: 98% (01-24-21 @ 06:56) (97% - 100%)    CAPILLARY BLOOD GLUCOSE          Physical Exam:  General: NAD  Neck: No TTP or swelling  Chest: TTP bilateral costochondral borders  Abdomen: Soft, gravid, mild TTP b/l LW, no rebound or guarding. Pain reproducible with lateral deviation of uterus.  Ext: No pain or swelling      Labs:             8.9    5.25  )-----------( 225      ( 01-24 @ 02:08 )             27.4     01-24 @ 02:08    136  |  105  |  <2  ----------------------------<  85  3.4   |  19  |  0.48    Ca    8.0      01-24 @ 02:08  Phos  2.4     01-24 @ 02:08  Mg     1.5     01-24 @ 02:08    TPro  5.6  /  Alb  3.3  /  TBili  0.6  /  DBili  x   /  AST  57  /  ALT  266  /  AlkPhos  33  01-24 @ 02:08            MEDICATIONS  (STANDING):  famotidine    Tablet 20 milliGRAM(s) Oral two times a day  ondansetron   Disintegrating Tablet 4 milliGRAM(s) Oral every 8 hours  pantoprazole    Tablet 40 milliGRAM(s) Oral before breakfast  pyridoxine 50 milliGRAM(s) Oral two times a day  sodium chloride 0.9%. 1000 milliLiter(s) (125 mL/Hr) IV Continuous <Continuous>  sucralfate suspension 1 Gram(s) Oral two times a day    MEDICATIONS  (PRN):  diazepam    Tablet 5 milliGRAM(s) Oral <User Schedule> PRN anxiety  diazepam    Tablet 2.5 milliGRAM(s) Oral at bedtime PRN anxiety  metoclopramide Injectable 10 milliGRAM(s) IV Push every 8 hours PRN N/V  
R3 Antepartum Note, HD#3    Interval events: none.    Patient seen and examined at bedside, no acute overnight events. Reports persistence of acid reflux, worse when trying to sleep at night. Pt denies LOF, VB, CTX, HA, blurred vision, CP, SOB, N/V, fevers, and chills.    Vital Signs Last 24 Hours  T(C): 36.7 (01-22-21 @ 18:03), Max: 37.3 (01-22-21 @ 05:36)  HR: 90 (01-22-21 @ 21:53) (78 - 96)  BP: 118/76 (01-22-21 @ 21:53) (84/50 - 123/79)  RR: 18 (01-22-21 @ 18:03) (16 - 18)  SpO2: 100% (01-22-21 @ 18:03) (98% - 100%)    CAPILLARY BLOOD GLUCOSE    Physical Exam:  General: NAD  Abdomen: Soft, non-tender, gravid  Ext: No pain or swelling    FH check yesterday wnl    Labs:             8.7    5.38  )-----------( 233      ( 01-22 @ 06:43 )             26.4     01-22 @ 06:43    137  |  107  |  3   ----------------------------<  88  3.0   |  20  |  0.54    Ca    7.9      01-22 @ 06:43  Phos  2.4     01-22 @ 06:43  Mg     1.6     01-22 @ 06:43    TPro  5.3  /  Alb  3.2  /  TBili  0.6  /  DBili  x   /  AST  104  /  ALT  341  /  AlkPhos  35  01-22 @ 06:43        MEDICATIONS  (STANDING):  famotidine    Tablet 20 milliGRAM(s) Oral two times a day  ondansetron   Disintegrating Tablet 4 milliGRAM(s) Oral every 8 hours  pyridoxine 50 milliGRAM(s) Oral two times a day  sodium chloride 0.9%. 1000 milliLiter(s) (125 mL/Hr) IV Continuous <Continuous>    MEDICATIONS  (PRN):  diazepam    Tablet 5 milliGRAM(s) Oral <User Schedule> PRN anxiety  diazepam    Tablet 2.5 milliGRAM(s) Oral at bedtime PRN anxiety  metoclopramide Injectable 10 milliGRAM(s) IV Push every 8 hours PRN N/V  
R3 GYN Progress Note   HD# 2    Patient seen and examined at bedside.  No acute events overnight. No acute complaints.  Pain well controlled.  Patient is ambulating and tolerating PO solids without vomiting. She hasn't felt any nausea overnight. Was able to eat soup broth and stuffing.   Patient is voiding spontanously  Denies CP, SOB, N/V, fevers, and chills.    Vital Signs Last 24 Hours  T(C): 37.3 (01-22-21 @ 05:36), Max: 37.3 (01-22-21 @ 05:36)  HR: 81 (01-22-21 @ 05:36) (81 - 95)  BP: 84/50 (01-22-21 @ 05:36) (78/43 - 111/72)  RR: 18 (01-22-21 @ 05:36) (14 - 18)  SpO2: 99% (01-22-21 @ 05:36) (97% - 100%)    I&O's Summary    21 Jan 2021 07:01  -  22 Jan 2021 07:00  --------------------------------------------------------  IN: 2075 mL / OUT: 0 mL / NET: 2075 mL        Physical Exam:  General: NAD  CV: RR, S1, S2, no M/R/G  Lungs: CTA b/l, good air flow b/l   Abdomen: Soft, appropriately-tender,normoactive bowel sounds, Gravid uterus  : no bleeding on pad  Ext: No pain or swelling     Labs:                        8.7    5.38  )-----------( 233      ( 22 Jan 2021 06:43 )             26.4   baso x      eos x      imm gran x      lymph x      mono x      poly x                            11.2   6.24  )-----------( 283      ( 21 Jan 2021 07:55 )             34.1   baso 0.8    eos 0.5    imm gran 0.5    lymph 28.4   mono 4.6    poly 65.2                         11.5   5.95  )-----------( 312      ( 20 Jan 2021 11:03 )             34.7   baso 0.7    eos 0.2    imm gran 0.5    lymph 22.7   mono 5.9    poly 70.0       MEDICATIONS  (STANDING):  famotidine Injectable 20 milliGRAM(s) IV Push two times a day  prenatal multivitamin 1 Tablet(s) Oral daily  pyridoxine 50 milliGRAM(s) Oral two times a day  sodium chloride 0.9% 1000 milliLiter(s) (150 mL/Hr) IV Continuous <Continuous>  sodium chloride 0.9%. 1000 milliLiter(s) (125 mL/Hr) IV Continuous <Continuous>    MEDICATIONS  (PRN):  diazepam    Tablet 5 milliGRAM(s) Oral <User Schedule> PRN anxiety  diazepam    Tablet 2.5 milliGRAM(s) Oral at bedtime PRN anxiety  metoclopramide Injectable 10 milliGRAM(s) IV Push every 8 hours PRN N/V  ondansetron Injectable 4 milliGRAM(s) IV Push every 8 hours PRN Nausea and/or Vomiting      
R2 GYN Progress Note    Patient seen and examined at bedside.  Pt reports 4 episodes of NBNB emesis. States she ate applesauce and jello but threw it up. She does, however, feel slightly better. Patient denies chest pain, shortness of breath, fevers, chills, diarrhea.       Vital Signs Last 24 Hours  T(C): 37 (01-20-21 @ 20:22), Max: 37.3 (01-20-21 @ 14:24)  HR: 89 (01-21-21 @ 02:00) (82 - 94)  BP: 128/86 (01-21-21 @ 02:00) (101/63 - 128/86)  RR: 18 (01-21-21 @ 02:00) (16 - 18)  SpO2: 100% (01-21-21 @ 02:00) (99% - 100%)    I&O's Summary      Physical Exam:  General: NAD  CV: RR  Lungs: unlabored on RA  Abdomen: Soft, nontender, nondistended, +BS  Ext: No pain or swelling     Labs:                        11.5   5.95  )-----------( 312      ( 20 Jan 2021 11:03 )             34.7   baso 0.7    eos 0.2    imm gran 0.5    lymph 22.7   mono 5.9    poly 70.0       MEDICATIONS  (STANDING):  aluminum hydroxide/magnesium hydroxide/simethicone Suspension 30 milliLiter(s) Oral once  famotidine Injectable 20 milliGRAM(s) IV Push two times a day  sodium chloride 0.9% 1000 milliLiter(s) (125 mL/Hr) IV Continuous <Continuous>  sodium chloride 0.9%. 1000 milliLiter(s) (125 mL/Hr) IV Continuous <Continuous>    MEDICATIONS  (PRN):  metoclopramide Injectable 10 milliGRAM(s) IV Push every 8 hours PRN N/V  ondansetron Injectable 4 milliGRAM(s) IV Push every 8 hours PRN Nausea and/or Vomiting

## 2021-01-24 NOTE — PROGRESS NOTE ADULT - ASSESSMENT
A/P: 24y/o  at 14w6d admitted w/ hyperemesis. Patient was about to be discharged yesterday but had significant epigastric pain so kept additional night. MRI pending. Vitals stable.     CV: Hemodynamically stable. Drop in H/H noted, stable from previous. Likely dilutional but given h/o hematemesis will continue to monitor.  Pulm: Saturating well on room air, encourage oob/amb  GI: Continue regular diet. Continue Vit B6, Pepcid BID, Reglan, Zofran, Phenergan, bicitra for nausea PRN. Protonix added for possible esophagitis.  : Voiding spontaneously   Heme: c/w HSQ and SCDs for DVT ppx  FEN: Banana bag + K.  Continue electrolyte repletion.   ID: Afebrile  Endo: No active issues   Dispo: c/w inpatient care    Rebecca Cordero PGY3

## 2021-02-04 ENCOUNTER — RESULT REVIEW (OUTPATIENT)
Age: 26
End: 2021-02-04

## 2021-02-04 ENCOUNTER — NON-APPOINTMENT (OUTPATIENT)
Age: 26
End: 2021-02-04

## 2021-02-04 ENCOUNTER — APPOINTMENT (OUTPATIENT)
Dept: OBGYN | Facility: HOSPITAL | Age: 26
End: 2021-02-04

## 2021-02-04 ENCOUNTER — OUTPATIENT (OUTPATIENT)
Dept: OUTPATIENT SERVICES | Facility: HOSPITAL | Age: 26
LOS: 1 days | End: 2021-02-04
Payer: MEDICAID

## 2021-02-04 VITALS
SYSTOLIC BLOOD PRESSURE: 121 MMHG | BODY MASS INDEX: 27.89 KG/M2 | HEIGHT: 68 IN | WEIGHT: 184 LBS | TEMPERATURE: 97.3 F | DIASTOLIC BLOOD PRESSURE: 69 MMHG | HEART RATE: 102 BPM

## 2021-02-04 DIAGNOSIS — Z82.49 FAMILY HISTORY OF ISCHEMIC HEART DISEASE AND OTHER DISEASES OF THE CIRCULATORY SYSTEM: ICD-10-CM

## 2021-02-04 DIAGNOSIS — Z83.3 FAMILY HISTORY OF DIABETES MELLITUS: ICD-10-CM

## 2021-02-04 DIAGNOSIS — Z00.00 ENCOUNTER FOR GENERAL ADULT MEDICAL EXAMINATION W/OUT ABNORMAL FINDINGS: ICD-10-CM

## 2021-02-04 DIAGNOSIS — Z34.82 ENCOUNTER FOR SUPERVISION OF OTHER NORMAL PREGNANCY, SECOND TRIMESTER: ICD-10-CM

## 2021-02-04 DIAGNOSIS — O21.9 VOMITING OF PREGNANCY, UNSPECIFIED: ICD-10-CM

## 2021-02-04 DIAGNOSIS — Z87.59 PERSONAL HISTORY OF OTHER COMPLICATIONS OF PREGNANCY, CHILDBIRTH AND THE PUERPERIUM: ICD-10-CM

## 2021-02-04 DIAGNOSIS — F17.200 NICOTINE DEPENDENCE, UNSPECIFIED, UNCOMPLICATED: ICD-10-CM

## 2021-02-04 DIAGNOSIS — Z78.9 OTHER SPECIFIED HEALTH STATUS: ICD-10-CM

## 2021-02-04 DIAGNOSIS — K59.00 CONSTIPATION, UNSPECIFIED: ICD-10-CM

## 2021-02-04 DIAGNOSIS — Z86.59 PERSONAL HISTORY OF OTHER COMPLICATIONS OF PREGNANCY, CHILDBIRTH AND THE PUERPERIUM: ICD-10-CM

## 2021-02-04 PROCEDURE — 83020 HEMOGLOBIN ELECTROPHORESIS: CPT | Mod: 26

## 2021-02-04 RX ORDER — DOCUSATE SODIUM 100 MG/1
100 CAPSULE ORAL TWICE DAILY
Qty: 60 | Refills: 1 | Status: ACTIVE | COMMUNITY
Start: 2021-02-04 | End: 1900-01-01

## 2021-02-04 RX ORDER — ONDANSETRON 4 MG/1
4 TABLET ORAL AS DIRECTED
Qty: 30 | Refills: 0 | Status: ACTIVE | COMMUNITY
Start: 2021-02-04

## 2021-02-04 RX ORDER — PAROXETINE HYDROCHLORIDE 20 MG/1
20 TABLET, FILM COATED ORAL DAILY
Qty: 30 | Refills: 0 | Status: COMPLETED | COMMUNITY
Start: 2021-02-04 | End: 2021-03-06

## 2021-02-04 RX ORDER — DIAZEPAM 5 MG/1
5 TABLET ORAL
Refills: 0 | Status: ACTIVE | COMMUNITY
Start: 2021-02-04

## 2021-02-05 DIAGNOSIS — K59.00 CONSTIPATION, UNSPECIFIED: ICD-10-CM

## 2021-02-05 DIAGNOSIS — F41.9 ANXIETY DISORDER, UNSPECIFIED: ICD-10-CM

## 2021-02-05 DIAGNOSIS — Z87.59 PERSONAL HISTORY OF OTHER COMPLICATIONS OF PREGNANCY, CHILDBIRTH AND THE PUERPERIUM: ICD-10-CM

## 2021-02-05 DIAGNOSIS — Z34.82 ENCOUNTER FOR SUPERVISION OF OTHER NORMAL PREGNANCY, SECOND TRIMESTER: ICD-10-CM

## 2021-02-05 DIAGNOSIS — O21.9 VOMITING OF PREGNANCY, UNSPECIFIED: ICD-10-CM

## 2021-02-05 DIAGNOSIS — F43.10 POST-TRAUMATIC STRESS DISORDER, UNSPECIFIED: ICD-10-CM

## 2021-02-05 PROBLEM — F32.9 MAJOR DEPRESSIVE DISORDER, SINGLE EPISODE, UNSPECIFIED: Chronic | Status: ACTIVE | Noted: 2021-01-18

## 2021-02-05 LAB
24R-OH-CALCIDIOL SERPL-MCNC: 14.8 NG/ML — LOW (ref 30–80)
A1C WITH ESTIMATED AVERAGE GLUCOSE RESULT: 5 % — SIGNIFICANT CHANGE UP (ref 4–5.6)
ALBUMIN SERPL ELPH-MCNC: 4.3 G/DL — SIGNIFICANT CHANGE UP (ref 3.3–5)
ALP SERPL-CCNC: 37 U/L — LOW (ref 40–120)
ALT FLD-CCNC: 41 U/L — HIGH (ref 4–33)
ANION GAP SERPL CALC-SCNC: 18 MMOL/L — HIGH (ref 7–14)
APPEARANCE UR: ABNORMAL
AST SERPL-CCNC: 16 U/L — SIGNIFICANT CHANGE UP (ref 4–32)
BACTERIA # UR AUTO: ABNORMAL
BASOPHILS # BLD AUTO: 0.04 K/UL — SIGNIFICANT CHANGE UP (ref 0–0.2)
BASOPHILS NFR BLD AUTO: 0.8 % — SIGNIFICANT CHANGE UP (ref 0–2)
BILIRUB SERPL-MCNC: 0.4 MG/DL — SIGNIFICANT CHANGE UP (ref 0.2–1.2)
BILIRUB UR-MCNC: NEGATIVE — SIGNIFICANT CHANGE UP
BUN SERPL-MCNC: 6 MG/DL — LOW (ref 7–23)
CALCIUM SERPL-MCNC: 9.2 MG/DL — SIGNIFICANT CHANGE UP (ref 8.4–10.5)
CHLORIDE SERPL-SCNC: 98 MMOL/L — SIGNIFICANT CHANGE UP (ref 98–107)
CO2 SERPL-SCNC: 19 MMOL/L — LOW (ref 22–31)
COLOR SPEC: YELLOW — SIGNIFICANT CHANGE UP
CREAT SERPL-MCNC: 0.51 MG/DL — SIGNIFICANT CHANGE UP (ref 0.5–1.3)
CULTURE RESULTS: SIGNIFICANT CHANGE UP
DIFF PNL FLD: NEGATIVE — SIGNIFICANT CHANGE UP
EOSINOPHIL # BLD AUTO: 0.06 K/UL — SIGNIFICANT CHANGE UP (ref 0–0.5)
EOSINOPHIL NFR BLD AUTO: 1.2 % — SIGNIFICANT CHANGE UP (ref 0–6)
EPI CELLS # UR: 19 /HPF — HIGH (ref 0–5)
ESTIMATED AVERAGE GLUCOSE: 97 MG/DL — SIGNIFICANT CHANGE UP (ref 68–114)
GLUCOSE SERPL-MCNC: 46 MG/DL — CRITICAL LOW (ref 70–99)
GLUCOSE UR QL: NEGATIVE — SIGNIFICANT CHANGE UP
HBV SURFACE AG SER-ACNC: SIGNIFICANT CHANGE UP
HCT VFR BLD CALC: 34.1 % — LOW (ref 34.5–45)
HCV AB S/CO SERPL IA: 0.08 S/CO — SIGNIFICANT CHANGE UP (ref 0–0.99)
HCV AB SERPL-IMP: SIGNIFICANT CHANGE UP
HGB BLD-MCNC: 10.9 G/DL — LOW (ref 11.5–15.5)
HIV 1+2 AB+HIV1 P24 AG SERPL QL IA: SIGNIFICANT CHANGE UP
HYALINE CASTS # UR AUTO: 0 /LPF — SIGNIFICANT CHANGE UP (ref 0–7)
IANC: 2.86 K/UL — SIGNIFICANT CHANGE UP (ref 1.5–8.5)
IMM GRANULOCYTES NFR BLD AUTO: 0.4 % — SIGNIFICANT CHANGE UP (ref 0–1.5)
KETONES UR-MCNC: NEGATIVE — SIGNIFICANT CHANGE UP
LEUKOCYTE ESTERASE UR-ACNC: NEGATIVE — SIGNIFICANT CHANGE UP
LYMPHOCYTES # BLD AUTO: 1.95 K/UL — SIGNIFICANT CHANGE UP (ref 1–3.3)
LYMPHOCYTES # BLD AUTO: 37.5 % — SIGNIFICANT CHANGE UP (ref 13–44)
MCHC RBC-ENTMCNC: 25.7 PG — LOW (ref 27–34)
MCHC RBC-ENTMCNC: 32 GM/DL — SIGNIFICANT CHANGE UP (ref 32–36)
MCV RBC AUTO: 80.4 FL — SIGNIFICANT CHANGE UP (ref 80–100)
MONOCYTES # BLD AUTO: 0.27 K/UL — SIGNIFICANT CHANGE UP (ref 0–0.9)
MONOCYTES NFR BLD AUTO: 5.2 % — SIGNIFICANT CHANGE UP (ref 2–14)
NEUTROPHILS # BLD AUTO: 2.86 K/UL — SIGNIFICANT CHANGE UP (ref 1.8–7.4)
NEUTROPHILS NFR BLD AUTO: 54.9 % — SIGNIFICANT CHANGE UP (ref 43–77)
NITRITE UR-MCNC: NEGATIVE — SIGNIFICANT CHANGE UP
NRBC # BLD: 0 /100 WBCS — SIGNIFICANT CHANGE UP
NRBC # FLD: 0 K/UL — SIGNIFICANT CHANGE UP
PH UR: 6.5 — SIGNIFICANT CHANGE UP (ref 5–8)
PLATELET # BLD AUTO: 288 K/UL — SIGNIFICANT CHANGE UP (ref 150–400)
POTASSIUM SERPL-MCNC: 3.6 MMOL/L — SIGNIFICANT CHANGE UP (ref 3.5–5.3)
POTASSIUM SERPL-SCNC: 3.6 MMOL/L — SIGNIFICANT CHANGE UP (ref 3.5–5.3)
PROT SERPL-MCNC: 7.1 G/DL — SIGNIFICANT CHANGE UP (ref 6–8.3)
PROT UR-MCNC: ABNORMAL
RBC # BLD: 4.24 M/UL — SIGNIFICANT CHANGE UP (ref 3.8–5.2)
RBC # FLD: 16.3 % — HIGH (ref 10.3–14.5)
RBC CASTS # UR COMP ASSIST: 8 /HPF — HIGH (ref 0–4)
SARS-COV-2 IGG SERPL QL IA: NEGATIVE — SIGNIFICANT CHANGE UP
SARS-COV-2 IGM SERPL IA-ACNC: 0.1 INDEX — SIGNIFICANT CHANGE UP
SODIUM SERPL-SCNC: 135 MMOL/L — SIGNIFICANT CHANGE UP (ref 135–145)
SP GR SPEC: 1.02 — SIGNIFICANT CHANGE UP (ref 1.01–1.02)
SPECIMEN SOURCE: SIGNIFICANT CHANGE UP
URATE SERPL-MCNC: 3.4 MG/DL — SIGNIFICANT CHANGE UP (ref 2.5–7)
UROBILINOGEN FLD QL: SIGNIFICANT CHANGE UP
WBC # BLD: 5.2 K/UL — SIGNIFICANT CHANGE UP (ref 3.8–10.5)
WBC # FLD AUTO: 5.2 K/UL — SIGNIFICANT CHANGE UP (ref 3.8–10.5)
WBC UR QL: 4 /HPF — SIGNIFICANT CHANGE UP (ref 0–5)

## 2021-02-06 LAB
LEAD BLD-MCNC: <1 UG/DL — SIGNIFICANT CHANGE UP (ref 0–4)
MEV IGG SER-ACNC: >300 AU/ML — SIGNIFICANT CHANGE UP
MEV IGG+IGM SER-IMP: POSITIVE — SIGNIFICANT CHANGE UP
RUBV IGG SER-ACNC: 3.9 INDEX — SIGNIFICANT CHANGE UP
RUBV IGG SER-IMP: POSITIVE — SIGNIFICANT CHANGE UP
T PALLIDUM AB TITR SER: NEGATIVE — SIGNIFICANT CHANGE UP
VZV IGG FLD QL IA: 3519 INDEX — SIGNIFICANT CHANGE UP
VZV IGG FLD QL IA: POSITIVE — SIGNIFICANT CHANGE UP

## 2021-02-08 DIAGNOSIS — K59.00 CONSTIPATION, UNSPECIFIED: ICD-10-CM

## 2021-02-08 LAB
2ND TRIMESTER DATA: SIGNIFICANT CHANGE UP
AFP SERPL-ACNC: SIGNIFICANT CHANGE UP
B-HCG FREE SERPL-MCNC: SIGNIFICANT CHANGE UP
CLINICAL BIOCHEMIST REVIEW: SIGNIFICANT CHANGE UP
CYTOLOGY SPEC DOC CYTO: SIGNIFICANT CHANGE UP
DEMOGRAPHIC DATA: SIGNIFICANT CHANGE UP
HEMOGLOBIN INTERPRETATION: SIGNIFICANT CHANGE UP
HGB A MFR BLD: 61.3 % — LOW
HGB A2 MFR BLD: 3.2 % — SIGNIFICANT CHANGE UP (ref 2.4–3.5)
HGB F MFR BLD: <1 % — SIGNIFICANT CHANGE UP (ref 0–1.5)
HGB S BLD QL: POSITIVE
HGB S MFR BLD: 35.2 % — HIGH
INHIBIN A SERPL-MCNC: SIGNIFICANT CHANGE UP
SCREEN-FOOTER: SIGNIFICANT CHANGE UP
SOLUBILITY: POSITIVE
U ESTRIOL SERPL-SCNC: SIGNIFICANT CHANGE UP

## 2021-02-08 RX ORDER — DOCUSATE SODIUM 100 MG/1
100 CAPSULE ORAL TWICE DAILY
Qty: 60 | Refills: 3 | Status: ACTIVE | COMMUNITY
Start: 2021-02-08 | End: 1900-01-01

## 2021-02-08 RX ORDER — MULTIVIT-MIN/IRON/FOLIC ACID/K 18-600-40
50 MCG CAPSULE ORAL
Qty: 30 | Refills: 4 | Status: ACTIVE | COMMUNITY
Start: 2021-02-08 | End: 1900-01-01

## 2021-02-08 RX ORDER — CHLORHEXIDINE GLUCONATE 4 %
325 (65 FE) LIQUID (ML) TOPICAL DAILY
Qty: 40 | Refills: 3 | Status: ACTIVE | COMMUNITY
Start: 2021-02-08 | End: 1900-01-01

## 2021-02-12 LAB — CYSTIC FIBROSIS EXPANDED PANEL: SIGNIFICANT CHANGE UP

## 2021-02-16 LAB
C TRACH RRNA SPEC QL NAA+PROBE: SIGNIFICANT CHANGE UP
C TRACH+GC RRNA SPEC QL PROBE: SIGNIFICANT CHANGE UP
N GONORRHOEA RRNA SPEC QL NAA+PROBE: SIGNIFICANT CHANGE UP

## 2021-03-01 ENCOUNTER — APPOINTMENT (OUTPATIENT)
Dept: ANTEPARTUM | Facility: CLINIC | Age: 26
End: 2021-03-01
Payer: MEDICAID

## 2021-03-01 ENCOUNTER — NON-APPOINTMENT (OUTPATIENT)
Age: 26
End: 2021-03-01

## 2021-03-01 ENCOUNTER — RESULT REVIEW (OUTPATIENT)
Age: 26
End: 2021-03-01

## 2021-03-01 ENCOUNTER — ASOB RESULT (OUTPATIENT)
Age: 26
End: 2021-03-01

## 2021-03-01 ENCOUNTER — OUTPATIENT (OUTPATIENT)
Dept: OUTPATIENT SERVICES | Facility: HOSPITAL | Age: 26
LOS: 1 days | End: 2021-03-01

## 2021-03-01 ENCOUNTER — APPOINTMENT (OUTPATIENT)
Dept: OBGYN | Facility: HOSPITAL | Age: 26
End: 2021-03-01

## 2021-03-01 VITALS
DIASTOLIC BLOOD PRESSURE: 63 MMHG | BODY MASS INDEX: 28.89 KG/M2 | TEMPERATURE: 98 F | SYSTOLIC BLOOD PRESSURE: 109 MMHG | HEART RATE: 82 BPM | WEIGHT: 190 LBS

## 2021-03-01 DIAGNOSIS — B37.3 CANDIDIASIS OF VULVA AND VAGINA: ICD-10-CM

## 2021-03-01 DIAGNOSIS — R79.89 OTHER SPECIFIED ABNORMAL FINDINGS OF BLOOD CHEMISTRY: ICD-10-CM

## 2021-03-01 LAB
ALT FLD-CCNC: 20 U/L — SIGNIFICANT CHANGE UP (ref 4–33)
AST SERPL-CCNC: 24 U/L — SIGNIFICANT CHANGE UP (ref 4–32)

## 2021-03-01 PROCEDURE — 76811 OB US DETAILED SNGL FETUS: CPT | Mod: 26

## 2021-03-02 RX ORDER — TERCONAZOLE 80 MG/1
80 SUPPOSITORY VAGINAL
Qty: 1 | Refills: 1 | Status: DISCONTINUED | COMMUNITY
Start: 2021-03-01 | End: 2021-03-02

## 2021-03-02 RX ORDER — MICONAZOLE NITRATE 200 MG-2 %
200 & 2 KIT VAGINAL TWICE DAILY
Qty: 1 | Refills: 0 | Status: ACTIVE | COMMUNITY
Start: 2021-03-02 | End: 1900-01-01

## 2021-03-03 LAB
GAMMA INTERFERON BACKGROUND BLD IA-ACNC: 0.03 IU/ML — SIGNIFICANT CHANGE UP
M TB IFN-G BLD-IMP: NEGATIVE — SIGNIFICANT CHANGE UP
M TB IFN-G CD4+ BCKGRND COR BLD-ACNC: 0 IU/ML — SIGNIFICANT CHANGE UP
M TB IFN-G CD4+CD8+ BCKGRND COR BLD-ACNC: 0 IU/ML — SIGNIFICANT CHANGE UP
QUANT TB PLUS MITOGEN MINUS NIL: 8.3 IU/ML — SIGNIFICANT CHANGE UP

## 2021-03-08 DIAGNOSIS — R79.89 OTHER SPECIFIED ABNORMAL FINDINGS OF BLOOD CHEMISTRY: ICD-10-CM

## 2021-03-08 DIAGNOSIS — B37.3 CANDIDIASIS OF VULVA AND VAGINA: ICD-10-CM

## 2021-03-08 DIAGNOSIS — R74.01 ELEVATION OF LEVELS OF LIVER TRANSAMINASE LEVELS: ICD-10-CM

## 2021-03-08 DIAGNOSIS — Z34.92 ENCOUNTER FOR SUPERVISION OF NORMAL PREGNANCY, UNSPECIFIED, SECOND TRIMESTER: ICD-10-CM

## 2021-03-18 ENCOUNTER — EMERGENCY (EMERGENCY)
Facility: HOSPITAL | Age: 26
LOS: 1 days | Discharge: HOME | End: 2021-03-18
Attending: EMERGENCY MEDICINE | Admitting: EMERGENCY MEDICINE
Payer: MEDICAID

## 2021-03-18 VITALS
TEMPERATURE: 98 F | DIASTOLIC BLOOD PRESSURE: 61 MMHG | HEIGHT: 68 IN | HEART RATE: 106 BPM | SYSTOLIC BLOOD PRESSURE: 107 MMHG | RESPIRATION RATE: 18 BRPM | OXYGEN SATURATION: 100 %

## 2021-03-18 PROCEDURE — 99284 EMERGENCY DEPT VISIT MOD MDM: CPT

## 2021-03-18 NOTE — ED ADULT TRIAGE NOTE - CHIEF COMPLAINT QUOTE
Pt reports having an anxiety attack today. States she has had anxiety for "months" but she has been trying to "wean off my medications." Pt states she is prescribed valium & medical marijuana. Reports having a glass of wine today to reduce her anxiety. EMS reports "pt drank a bottle of Ro at home today." Also reports being 23 weeks pregnant (due date 7/19). Denies SI/HI/hallucination.

## 2021-03-19 ENCOUNTER — EMERGENCY (EMERGENCY)
Facility: HOSPITAL | Age: 26
LOS: 1 days | Discharge: HOME | End: 2021-03-19
Attending: STUDENT IN AN ORGANIZED HEALTH CARE EDUCATION/TRAINING PROGRAM | Admitting: STUDENT IN AN ORGANIZED HEALTH CARE EDUCATION/TRAINING PROGRAM
Payer: MEDICAID

## 2021-03-19 VITALS
DIASTOLIC BLOOD PRESSURE: 56 MMHG | OXYGEN SATURATION: 100 % | TEMPERATURE: 98 F | SYSTOLIC BLOOD PRESSURE: 94 MMHG | RESPIRATION RATE: 18 BRPM | HEART RATE: 99 BPM

## 2021-03-19 VITALS
RESPIRATION RATE: 18 BRPM | TEMPERATURE: 98 F | DIASTOLIC BLOOD PRESSURE: 83 MMHG | HEART RATE: 128 BPM | SYSTOLIC BLOOD PRESSURE: 128 MMHG | HEIGHT: 68 IN | OXYGEN SATURATION: 100 %

## 2021-03-19 LAB
ALBUMIN SERPL ELPH-MCNC: 3.8 G/DL — SIGNIFICANT CHANGE UP (ref 3.3–5)
ALP SERPL-CCNC: 31 U/L — LOW (ref 40–120)
ALT FLD-CCNC: 22 U/L — SIGNIFICANT CHANGE UP (ref 4–33)
ANION GAP SERPL CALC-SCNC: 14 MMOL/L — SIGNIFICANT CHANGE UP (ref 7–14)
AST SERPL-CCNC: 19 U/L — SIGNIFICANT CHANGE UP (ref 4–32)
BASOPHILS # BLD AUTO: 0.07 K/UL — SIGNIFICANT CHANGE UP (ref 0–0.2)
BASOPHILS NFR BLD AUTO: 1.2 % — SIGNIFICANT CHANGE UP (ref 0–2)
BILIRUB SERPL-MCNC: 1.3 MG/DL — HIGH (ref 0.2–1.2)
BLOOD GAS VENOUS COMPREHENSIVE RESULT: SIGNIFICANT CHANGE UP
BUN SERPL-MCNC: 8 MG/DL — SIGNIFICANT CHANGE UP (ref 7–23)
CALCIUM SERPL-MCNC: 8.6 MG/DL — SIGNIFICANT CHANGE UP (ref 8.4–10.5)
CHLORIDE SERPL-SCNC: 108 MMOL/L — HIGH (ref 98–107)
CO2 SERPL-SCNC: 19 MMOL/L — LOW (ref 22–31)
CREAT SERPL-MCNC: 0.65 MG/DL — SIGNIFICANT CHANGE UP (ref 0.5–1.3)
EOSINOPHIL # BLD AUTO: 0.11 K/UL — SIGNIFICANT CHANGE UP (ref 0–0.5)
EOSINOPHIL NFR BLD AUTO: 1.9 % — SIGNIFICANT CHANGE UP (ref 0–6)
GLUCOSE SERPL-MCNC: 82 MG/DL — SIGNIFICANT CHANGE UP (ref 70–99)
HCT VFR BLD CALC: 29.9 % — LOW (ref 34.5–45)
HGB BLD-MCNC: 10.1 G/DL — LOW (ref 11.5–15.5)
IANC: 2.34 K/UL — SIGNIFICANT CHANGE UP (ref 1.5–8.5)
IMM GRANULOCYTES NFR BLD AUTO: 0.8 % — SIGNIFICANT CHANGE UP (ref 0–1.5)
LYMPHOCYTES # BLD AUTO: 2.93 K/UL — SIGNIFICANT CHANGE UP (ref 1–3.3)
LYMPHOCYTES # BLD AUTO: 49.7 % — HIGH (ref 13–44)
MAGNESIUM SERPL-MCNC: 1.6 MG/DL — SIGNIFICANT CHANGE UP (ref 1.6–2.6)
MCHC RBC-ENTMCNC: 26.5 PG — LOW (ref 27–34)
MCHC RBC-ENTMCNC: 33.8 GM/DL — SIGNIFICANT CHANGE UP (ref 32–36)
MCV RBC AUTO: 78.5 FL — LOW (ref 80–100)
MONOCYTES # BLD AUTO: 0.4 K/UL — SIGNIFICANT CHANGE UP (ref 0–0.9)
MONOCYTES NFR BLD AUTO: 6.8 % — SIGNIFICANT CHANGE UP (ref 2–14)
NEUTROPHILS # BLD AUTO: 2.34 K/UL — SIGNIFICANT CHANGE UP (ref 1.8–7.4)
NEUTROPHILS NFR BLD AUTO: 39.6 % — LOW (ref 43–77)
NRBC # BLD: 0 /100 WBCS — SIGNIFICANT CHANGE UP
NRBC # FLD: 0 K/UL — SIGNIFICANT CHANGE UP
PHOSPHATE SERPL-MCNC: 3.7 MG/DL — SIGNIFICANT CHANGE UP (ref 2.5–4.5)
PLATELET # BLD AUTO: 269 K/UL — SIGNIFICANT CHANGE UP (ref 150–400)
POTASSIUM SERPL-MCNC: 3.5 MMOL/L — SIGNIFICANT CHANGE UP (ref 3.5–5.3)
POTASSIUM SERPL-SCNC: 3.5 MMOL/L — SIGNIFICANT CHANGE UP (ref 3.5–5.3)
PROT SERPL-MCNC: 6.4 G/DL — SIGNIFICANT CHANGE UP (ref 6–8.3)
RBC # BLD: 3.81 M/UL — SIGNIFICANT CHANGE UP (ref 3.8–5.2)
RBC # FLD: 14.7 % — HIGH (ref 10.3–14.5)
SODIUM SERPL-SCNC: 141 MMOL/L — SIGNIFICANT CHANGE UP (ref 135–145)
WBC # BLD: 5.9 K/UL — SIGNIFICANT CHANGE UP (ref 3.8–10.5)
WBC # FLD AUTO: 5.9 K/UL — SIGNIFICANT CHANGE UP (ref 3.8–10.5)

## 2021-03-19 PROCEDURE — 99284 EMERGENCY DEPT VISIT MOD MDM: CPT

## 2021-03-19 RX ORDER — ACETAMINOPHEN 500 MG
975 TABLET ORAL ONCE
Refills: 0 | Status: COMPLETED | OUTPATIENT
Start: 2021-03-19 | End: 2021-03-19

## 2021-03-19 RX ADMIN — Medication 975 MILLIGRAM(S): at 06:19

## 2021-03-19 RX ADMIN — Medication 975 MILLIGRAM(S): at 04:50

## 2021-03-19 NOTE — ED ADULT NURSE NOTE - OBJECTIVE STATEMENT
Pt received to room 25. Pt A&Ox4, ambulatory at baseline. Pt states that she came in for a "mental reason." Pt states that she is approximately 6 months pregnant, due date 7/19. Pt states that she had 2 previous births delivered by c section. Pt states that she has been having increasing anxiety and panic attacks. Pt states that she drank an unknown amount of wine yesterday. Respirations equal and unlabored, no acute distress noted. Denies chest pain, palpitations, SOB, fever, cough, chills, N/V/D, recent sick contacts. 20g IV placed in right forearm, labs drawn and sent as ordered. Vital signs as noted, comfort measures provided, call bell within reach. MD at bedside, will continue to monitor.

## 2021-03-19 NOTE — ED PROVIDER NOTE - CLINICAL SUMMARY MEDICAL DECISION MAKING FREE TEXT BOX
26yo F  currently 23 wks pregnant seen last night for anxiety and intoxication, presenting today for anxiety. only had one alcohol drink today, clinically sober. initially came to speak with psychiatrist but doesn't want to wait for medical clearance and requesting discharge. with no abd cramping, vaginal bleeding or discharge. will obtain FHR and supply pt with material for crisis center and discharge home. feels safe at home and denies any HI/SI

## 2021-03-19 NOTE — ED PROVIDER NOTE - PMH
<<----- Click to add NO pertinent Past Medical History Anxiety    Depression    No pertinent past medical history

## 2021-03-19 NOTE — ED PROVIDER NOTE - PATIENT PORTAL LINK FT
You can access the FollowMyHealth Patient Portal offered by [facility] by registering at the following website: [email or web address]. By joining [employer]’s FollowMyHealth portal, you will also be able to view your health information using other applications (apps) compatible with our system.

## 2021-03-19 NOTE — ED PROVIDER NOTE - PATIENT PORTAL LINK FT
You can access the FollowMyHealth Patient Portal offered by [facility] by registering at the following website: [email or web address]. By joining [employer]’s FollowMyHealth portal, you will also be able to view your health information using other applications (apps) compatible with our system.

## 2021-03-19 NOTE — ED PROVIDER NOTE - PROGRESS NOTE DETAILS
ruth callahan pgy2: pt states that she wants to talk with a psychiatrist, however she doesn't want to have to wait for medical clearance to do so. doesn't like being away from her children for so long and that she wants to go home. no Hx of hurting herself, denies SI/HI and feels safe at home. discussed the crisis center and that if she is not willing to stay to speak with psychiatry she can walk in to the crisis center for evaluation. pt is alert and oriented and clinically sober at this tome. will obtain FHR and discharge with crisis center.
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
## 2021-03-19 NOTE — ED PROVIDER NOTE - CONSTITUTIONAL, MLM
normal... anxious appearing and tearful on exam. awake, alert, oriented to person, place, time/situation.

## 2021-03-19 NOTE — ED PROVIDER NOTE - PROGRESS NOTE DETAILS
Lyudmila-PGY2:  bpm on bedside doppler Warren SANON: Spoke with OB - pt is previable and has no OB related complaints at this time, no need for  huddle. Lyudmila-PGY2: pt seen and re-evaluated at bedside. Pt comfortable in NAD.  Denies any SI/HI, states she feels safe at home. Will continue to monitor for sobriety. Warren SANON: Pt feeling better, tolerating PO.   is here to pick her up.  Areli for dc.

## 2021-03-19 NOTE — ED PROVIDER NOTE - OBJECTIVE STATEMENT
24 y/o F at 23 weeks gestation (KD 7/19/21) BIB EMS for alcohol intoxication.  Per EMS report pt drank a bottle of [place] tonight.  Pt endorses drinking alcohol and calling EMS, but hx limited by patient intoxication.

## 2021-03-19 NOTE — ED PROVIDER NOTE - PMH
Anxiety    Depression    No pertinent past medical history     <<----- Click to add NO pertinent Past Medical History
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
## 2021-03-19 NOTE — ED PROVIDER NOTE - CLINICAL SUMMARY MEDICAL DECISION MAKING FREE TEXT BOX
24 y/o F at 23 weeks gestation BIB EMS for alcohol intoxication.  Pt is acutely intoxicated on exam.  Atraumatic exam, FHR is appropriate.  Will obtain labs to eval for electrolyte disturbance (pt with recent hospitalization for hyperemesis gravidarum), attempt to obtain collateral, reassess for sobriety.

## 2021-03-19 NOTE — ED PROVIDER NOTE - ATTENDING CONTRIBUTION TO CARE
25F with pmh anxiety, depression 23 weeks pregnant presenting with increased anxiety. States she was very anxious tonight, having another panic attack and has been having frequent panic attacks about her pregnancy and has been having trouble reaching a therapist. Evaluated earlier today for same symptoms and discharged. States she has been drinking alcohol because of the stress with one mixed drink earlier today. She states called 911 because she was stressed and wanted to speak with a psychiatrist but by the time she arrived to ED she was much calmer and just wants to go home. Denies any current complaints.     GEN: NAD, awake, well appearing  HEENT: NCAT, MMM, normal conjunctiva, perrl  CHEST/LUNGS: Non-tachypneic, CTAB, bilateral breath sounds  CARDIAC: Non-tachycardic, s1s2, normal perfusion, no peripheral edema  ABDOMEN: Soft gravid abd, NTND, No rebound/guarding  MSK: No joint tenderness, no gross deformity of extremities  SKIN: No rashes, no petechiae, no vesicles  NEURO: CN grossly intact, normal coordination, no focal motor or sensory deficits  PSYCH: Alert, appropriate, cooperative. Denies SI/HI/hallucinations    Patient presenting for increased anxiety requesting psych evaluation. However, stating feeling much better and would rather go home because she does not want to spend all night in the hospital waiting for psych to see her. States she just feels more stressed waiting in the hospital and will follow up at the crisis center. 25F with pmh anxiety, depression 23 weeks pregnant presenting with increased anxiety. States she was very anxious tonight, having another panic attack and has been having frequent panic attacks about her pregnancy and has been having trouble reaching a therapist. Evaluated earlier today for same symptoms and discharged. States she has been drinking alcohol because of the stress with one mixed drink earlier today. She states called 911 because she was stressed and wanted to speak with a psychiatrist but by the time she arrived to ED she was much calmer and just wants to go home. Denies any current complaints.     GEN: NAD, awake, well appearing  HEENT: NCAT, MMM, normal conjunctiva, perrl  CHEST/LUNGS: Non-tachypneic, CTAB, bilateral breath sounds  CARDIAC: Non-tachycardic, s1s2, normal perfusion, no peripheral edema  ABDOMEN: Soft gravid abd, NTND, No rebound/guarding  MSK: No joint tenderness, no gross deformity of extremities  SKIN: No rashes, no petechiae, no vesicles  NEURO: CN grossly intact, normal coordination, no focal motor or sensory deficits  PSYCH: Alert, appropriate, cooperative. Denies SI/HI/hallucinations    Patient presenting for increased anxiety requesting psych evaluation. However, stating feeling much better and would rather go home because she does not want to spend all night in the hospital waiting for psych to see her. States she just feels more stressed waiting in the hospital and will follow up at the crisis center. Patient clinically sober, does not appear to be an acute danger to self or others. Given information for Crisis Center with patient understanding to follow up.

## 2021-03-20 ENCOUNTER — EMERGENCY (EMERGENCY)
Facility: HOSPITAL | Age: 26
LOS: 0 days | Discharge: HOME | End: 2021-03-20
Attending: EMERGENCY MEDICINE
Payer: MEDICAID

## 2021-03-20 VITALS — WEIGHT: 169.98 LBS | HEIGHT: 68 IN

## 2021-03-20 VITALS
OXYGEN SATURATION: 99 % | SYSTOLIC BLOOD PRESSURE: 100 MMHG | DIASTOLIC BLOOD PRESSURE: 64 MMHG | RESPIRATION RATE: 18 BRPM | TEMPERATURE: 99 F | HEART RATE: 98 BPM

## 2021-03-20 DIAGNOSIS — F10.129 ALCOHOL ABUSE WITH INTOXICATION, UNSPECIFIED: ICD-10-CM
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
## 2021-03-20 DIAGNOSIS — O99.312 ALCOHOL USE COMPLICATING PREGNANCY, SECOND TRIMESTER: ICD-10-CM

## 2021-03-20 DIAGNOSIS — Z3A.21 21 WEEKS GESTATION OF PREGNANCY: ICD-10-CM

## 2021-03-20 LAB
ALBUMIN SERPL ELPH-MCNC: 3.5 G/DL — SIGNIFICANT CHANGE UP (ref 3.3–5)
ALP SERPL-CCNC: 38 U/L — LOW (ref 40–120)
ALT FLD-CCNC: 39 U/L — SIGNIFICANT CHANGE UP (ref 12–78)
AMPHET UR-MCNC: NEGATIVE — SIGNIFICANT CHANGE UP
ANION GAP SERPL CALC-SCNC: 13 MMOL/L — SIGNIFICANT CHANGE UP (ref 5–17)
APTT BLD: 29.1 SEC — SIGNIFICANT CHANGE UP (ref 27.5–35.5)
AST SERPL-CCNC: 29 U/L — SIGNIFICANT CHANGE UP (ref 15–37)
BARBITURATES UR SCN-MCNC: NEGATIVE — SIGNIFICANT CHANGE UP
BASOPHILS # BLD AUTO: 0.1 K/UL — SIGNIFICANT CHANGE UP (ref 0–0.2)
BASOPHILS NFR BLD AUTO: 1.7 % — SIGNIFICANT CHANGE UP (ref 0–2)
BENZODIAZ UR-MCNC: POSITIVE — SIGNIFICANT CHANGE UP
BILIRUB SERPL-MCNC: 1.8 MG/DL — HIGH (ref 0.2–1.2)
BUN SERPL-MCNC: 7 MG/DL — SIGNIFICANT CHANGE UP (ref 7–23)
CALCIUM SERPL-MCNC: 7.7 MG/DL — LOW (ref 8.5–10.1)
CHLORIDE SERPL-SCNC: 111 MMOL/L — HIGH (ref 96–108)
CO2 SERPL-SCNC: 18 MMOL/L — LOW (ref 22–31)
COCAINE METAB.OTHER UR-MCNC: NEGATIVE — SIGNIFICANT CHANGE UP
CREAT SERPL-MCNC: 0.78 MG/DL — SIGNIFICANT CHANGE UP (ref 0.5–1.3)
EOSINOPHIL # BLD AUTO: 0.04 K/UL — SIGNIFICANT CHANGE UP (ref 0–0.5)
EOSINOPHIL NFR BLD AUTO: 0.7 % — SIGNIFICANT CHANGE UP (ref 0–6)
ETHANOL SERPL-MCNC: 360 MG/DL — HIGH (ref 0–10)
GLUCOSE SERPL-MCNC: 79 MG/DL — SIGNIFICANT CHANGE UP (ref 70–99)
HCG SERPL-ACNC: HIGH MIU/ML
HCT VFR BLD CALC: 30.4 % — LOW (ref 34.5–45)
HGB BLD-MCNC: 10.7 G/DL — LOW (ref 11.5–15.5)
IMM GRANULOCYTES NFR BLD AUTO: 0.9 % — SIGNIFICANT CHANGE UP (ref 0–1.5)
INR BLD: 1.23 RATIO — HIGH (ref 0.88–1.16)
LYMPHOCYTES # BLD AUTO: 2.82 K/UL — SIGNIFICANT CHANGE UP (ref 1–3.3)
LYMPHOCYTES # BLD AUTO: 48.1 % — HIGH (ref 13–44)
MCHC RBC-ENTMCNC: 26.6 PG — LOW (ref 27–34)
MCHC RBC-ENTMCNC: 35.2 GM/DL — SIGNIFICANT CHANGE UP (ref 32–36)
MCV RBC AUTO: 75.6 FL — LOW (ref 80–100)
METHADONE UR-MCNC: NEGATIVE — SIGNIFICANT CHANGE UP
MONOCYTES # BLD AUTO: 0.21 K/UL — SIGNIFICANT CHANGE UP (ref 0–0.9)
MONOCYTES NFR BLD AUTO: 3.6 % — SIGNIFICANT CHANGE UP (ref 2–14)
NEUTROPHILS # BLD AUTO: 2.64 K/UL — SIGNIFICANT CHANGE UP (ref 1.8–7.4)
NEUTROPHILS NFR BLD AUTO: 45 % — SIGNIFICANT CHANGE UP (ref 43–77)
NRBC # BLD: 0 /100 WBCS — SIGNIFICANT CHANGE UP (ref 0–0)
OPIATES UR-MCNC: NEGATIVE — SIGNIFICANT CHANGE UP
PCP SPEC-MCNC: SIGNIFICANT CHANGE UP
PCP UR-MCNC: NEGATIVE — SIGNIFICANT CHANGE UP
PLATELET # BLD AUTO: 300 K/UL — SIGNIFICANT CHANGE UP (ref 150–400)
POTASSIUM SERPL-MCNC: 3.5 MMOL/L — SIGNIFICANT CHANGE UP (ref 3.5–5.3)
POTASSIUM SERPL-SCNC: 3.5 MMOL/L — SIGNIFICANT CHANGE UP (ref 3.5–5.3)
PROT SERPL-MCNC: 7.3 GM/DL — SIGNIFICANT CHANGE UP (ref 6–8.3)
PROTHROM AB SERPL-ACNC: 14.1 SEC — HIGH (ref 10.6–13.6)
RAPID RVP RESULT: SIGNIFICANT CHANGE UP
RBC # BLD: 4.02 M/UL — SIGNIFICANT CHANGE UP (ref 3.8–5.2)
RBC # FLD: 14.6 % — HIGH (ref 10.3–14.5)
SARS-COV-2 RNA SPEC QL NAA+PROBE: SIGNIFICANT CHANGE UP
SODIUM SERPL-SCNC: 142 MMOL/L — SIGNIFICANT CHANGE UP (ref 135–145)
THC UR QL: NEGATIVE — SIGNIFICANT CHANGE UP
TSH SERPL-MCNC: 0.52 UU/ML — SIGNIFICANT CHANGE UP (ref 0.36–3.74)
WBC # BLD: 5.86 K/UL — SIGNIFICANT CHANGE UP (ref 3.8–10.5)
WBC # FLD AUTO: 5.86 K/UL — SIGNIFICANT CHANGE UP (ref 3.8–10.5)

## 2021-03-20 PROCEDURE — 93010 ELECTROCARDIOGRAM REPORT: CPT

## 2021-03-20 PROCEDURE — 76805 OB US >/= 14 WKS SNGL FETUS: CPT | Mod: 26

## 2021-03-20 PROCEDURE — 99285 EMERGENCY DEPT VISIT HI MDM: CPT

## 2021-03-20 RX ORDER — ACETAMINOPHEN 500 MG
650 TABLET ORAL ONCE
Refills: 0 | Status: COMPLETED | OUTPATIENT
Start: 2021-03-20 | End: 2021-03-20

## 2021-03-20 RX ORDER — DIPHENHYDRAMINE HCL 50 MG
50 CAPSULE ORAL ONCE
Refills: 0 | Status: COMPLETED | OUTPATIENT
Start: 2021-03-20 | End: 2021-03-20

## 2021-03-20 RX ADMIN — Medication 650 MILLIGRAM(S): at 10:06

## 2021-03-20 RX ADMIN — Medication 50 MILLIGRAM(S): at 01:37
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
## 2021-03-20 NOTE — ED PROVIDER NOTE - PROGRESS NOTE DETAILS
will hold for yadi as pt. is still sleepy and not cooperative for questions Robi: Pt care signed out to me at change of shift. Clinically sober. Robi: Pt went for OB US. SW met w/ pt.  called and will pick pt up from ED. Robi: Pt went for OB US. Requesting Tylenol for headache. SW met w/ pt - pt in treatment per last note from January however SW will provide w/ additional resources. Pt denies HI/SI/hallucinations.  called and will pick pt up from ED. Robi: Pt went for OB US. Requesting Tylenol for headache. SW met w/ pt: Pt in treatment per last note from January however SW will provide w/ additional resources. Pt denies SI/HI/hallucinations.  called and will pick pt up from ED. Stable for d/c home. Given and instructed for OB f/u. D/w pt importance of alcohol cessation. Pt understands and agrees w/ this plan.

## 2021-03-20 NOTE — ED PROVIDER NOTE - PHYSICAL EXAMINATION
Gen: AAOx3, NAD, laying comfortably in stretcher, alcohol on breath, slurred speech   Head: ncat, perrla, eomi b/l  Neck: supple, no lymphadenopathy, no midline deviation  Heart: rrr, no m/r/g  Lungs: CTA b/l, no rales/ronchi/wheezes  Abd: soft, nontender, gravid consistent with dates, no rebound or guarding  Ext: no clubbing/cyanosis/edema  Neuro: sensation and muscle strength intact b/l, unsteady gait, CN2-12 intact b/l

## 2021-03-20 NOTE — ED ADULT NURSE NOTE - OBJECTIVE STATEMENT
BIBA for intoxication, patient from home,  called EMS due to patient being 6 months pregnant and intoxicated. Patient is alert, agitated, refusing VS at this time, denies pain.

## 2021-03-20 NOTE — ED ADULT NURSE REASSESSMENT NOTE - NS ED NURSE REASSESS COMMENT FT1
Pt received sitting on stretcher asleep but easily aroused , pt on 1:1 observation in place  in NAD. Pt AOx3 , no complaint voiced  RR even unlabored. abd:pt pregnant . Denies Nausea, Vomiting, Diarrhea. Skin warm, dry, color appropriate for age and race.

## 2021-03-20 NOTE — ED PROVIDER NOTE - PATIENT PORTAL LINK FT
You can access the FollowMyHealth Patient Portal offered by [facility] by registering at the following website: [email or web address]. By joining [facility]’s FollowMyHealth portal, you will also be able to view your health information using other applications (apps) compatible with our system.

## 2021-03-20 NOTE — ED PROVIDER NOTE - CARE PROVIDER_API CALL
Luz Sousa  OBSTETRICS AND GYNECOLOGY  [address] [id number]  Phone: [phone]  Fax: [phone]  Follow Up Time: 4-6 Days

## 2021-03-20 NOTE — ED PROVIDER NOTE - CLINICAL SUMMARY MEDICAL DECISION MAKING FREE TEXT BOX
24 yo F with alcohol abuse and current intox, denies si/hi/hallucination, no vaginal/pelvic complains at this time  -basic lab, etoh, drug screen, hcg, rvp/covid, tsh, us pelvis if pt. accepts to check fetal status (although no pelvic complaints at this time--high risk given significant drinking history), benadryl to assist sleep  -will reeval when sober

## 2021-03-21 ENCOUNTER — EMERGENCY (EMERGENCY)
Facility: HOSPITAL | Age: 26
LOS: 1 days | Discharge: HOME | End: 2021-03-21
Attending: EMERGENCY MEDICINE | Admitting: EMERGENCY MEDICINE
Payer: MEDICAID

## 2021-03-21 VITALS
OXYGEN SATURATION: 100 % | RESPIRATION RATE: 20 BRPM | SYSTOLIC BLOOD PRESSURE: 125 MMHG | DIASTOLIC BLOOD PRESSURE: 77 MMHG | HEART RATE: 109 BPM | HEIGHT: 68 IN | TEMPERATURE: 98 F

## 2021-03-21 VITALS
DIASTOLIC BLOOD PRESSURE: 69 MMHG | RESPIRATION RATE: 18 BRPM | TEMPERATURE: 98 F | SYSTOLIC BLOOD PRESSURE: 113 MMHG | HEART RATE: 100 BPM | OXYGEN SATURATION: 100 %

## 2021-03-21 LAB
ALBUMIN SERPL ELPH-MCNC: 3.9 G/DL — SIGNIFICANT CHANGE UP (ref 3.3–5)
ALP SERPL-CCNC: 37 U/L — LOW (ref 40–120)
ALT FLD-CCNC: 39 U/L — HIGH (ref 4–33)
ANION GAP SERPL CALC-SCNC: 23 MMOL/L — HIGH (ref 7–14)
APPEARANCE UR: ABNORMAL
AST SERPL-CCNC: 36 U/L — HIGH (ref 4–32)
BACTERIA # UR AUTO: ABNORMAL
BASOPHILS # BLD AUTO: 0.15 K/UL — SIGNIFICANT CHANGE UP (ref 0–0.2)
BASOPHILS NFR BLD AUTO: 3.7 % — HIGH (ref 0–2)
BILIRUB SERPL-MCNC: 1.6 MG/DL — HIGH (ref 0.2–1.2)
BILIRUB UR-MCNC: NEGATIVE — SIGNIFICANT CHANGE UP
BLOOD GAS VENOUS COMPREHENSIVE RESULT: SIGNIFICANT CHANGE UP
BUN SERPL-MCNC: 5 MG/DL — LOW (ref 7–23)
CALCIUM SERPL-MCNC: 7.8 MG/DL — LOW (ref 8.4–10.5)
CHLORIDE SERPL-SCNC: 103 MMOL/L — SIGNIFICANT CHANGE UP (ref 98–107)
CO2 SERPL-SCNC: 15 MMOL/L — LOW (ref 22–31)
COLOR SPEC: SIGNIFICANT CHANGE UP
CREAT SERPL-MCNC: 0.68 MG/DL — SIGNIFICANT CHANGE UP (ref 0.5–1.3)
DIFF PNL FLD: NEGATIVE — SIGNIFICANT CHANGE UP
EOSINOPHIL # BLD AUTO: 0.04 K/UL — SIGNIFICANT CHANGE UP (ref 0–0.5)
EOSINOPHIL NFR BLD AUTO: 0.9 % — SIGNIFICANT CHANGE UP (ref 0–6)
EPI CELLS # UR: 9 /HPF — HIGH (ref 0–5)
GLUCOSE SERPL-MCNC: 83 MG/DL — SIGNIFICANT CHANGE UP (ref 70–99)
GLUCOSE UR QL: NEGATIVE — SIGNIFICANT CHANGE UP
HCT VFR BLD CALC: 29.6 % — LOW (ref 34.5–45)
HGB BLD-MCNC: 10.2 G/DL — LOW (ref 11.5–15.5)
HYALINE CASTS # UR AUTO: 1 /LPF — SIGNIFICANT CHANGE UP (ref 0–7)
IANC: 1.56 K/UL — SIGNIFICANT CHANGE UP (ref 1.5–8.5)
KETONES UR-MCNC: NEGATIVE — SIGNIFICANT CHANGE UP
LEUKOCYTE ESTERASE UR-ACNC: NEGATIVE — SIGNIFICANT CHANGE UP
LYMPHOCYTES # BLD AUTO: 1.87 K/UL — SIGNIFICANT CHANGE UP (ref 1–3.3)
LYMPHOCYTES # BLD AUTO: 45.8 % — HIGH (ref 13–44)
MCHC RBC-ENTMCNC: 26.4 PG — LOW (ref 27–34)
MCHC RBC-ENTMCNC: 34.5 GM/DL — SIGNIFICANT CHANGE UP (ref 32–36)
MCV RBC AUTO: 76.7 FL — LOW (ref 80–100)
MONOCYTES # BLD AUTO: 0.11 K/UL — SIGNIFICANT CHANGE UP (ref 0–0.9)
MONOCYTES NFR BLD AUTO: 2.8 % — SIGNIFICANT CHANGE UP (ref 2–14)
NEUTROPHILS # BLD AUTO: 1.64 K/UL — LOW (ref 1.8–7.4)
NEUTROPHILS NFR BLD AUTO: 40.2 % — LOW (ref 43–77)
NITRITE UR-MCNC: NEGATIVE — SIGNIFICANT CHANGE UP
PCP SPEC-MCNC: SIGNIFICANT CHANGE UP
PH UR: 6 — SIGNIFICANT CHANGE UP (ref 5–8)
PLATELET # BLD AUTO: 287 K/UL — SIGNIFICANT CHANGE UP (ref 150–400)
POTASSIUM SERPL-MCNC: 3.2 MMOL/L — LOW (ref 3.5–5.3)
POTASSIUM SERPL-SCNC: 3.2 MMOL/L — LOW (ref 3.5–5.3)
PROT SERPL-MCNC: 6.7 G/DL — SIGNIFICANT CHANGE UP (ref 6–8.3)
PROT UR-MCNC: NEGATIVE — SIGNIFICANT CHANGE UP
RBC # BLD: 3.86 M/UL — SIGNIFICANT CHANGE UP (ref 3.8–5.2)
RBC # FLD: 14.7 % — HIGH (ref 10.3–14.5)
RBC CASTS # UR COMP ASSIST: 1 /HPF — SIGNIFICANT CHANGE UP (ref 0–4)
SODIUM SERPL-SCNC: 141 MMOL/L — SIGNIFICANT CHANGE UP (ref 135–145)
SP GR SPEC: 1 — LOW (ref 1.01–1.02)
TOXICOLOGY SCREEN, DRUGS OF ABUSE, SERUM RESULT: SIGNIFICANT CHANGE UP
TSH SERPL-MCNC: 0.6 UIU/ML — SIGNIFICANT CHANGE UP (ref 0.27–4.2)
UROBILINOGEN FLD QL: SIGNIFICANT CHANGE UP
WBC # BLD: 4.09 K/UL — SIGNIFICANT CHANGE UP (ref 3.8–10.5)
WBC # FLD AUTO: 4.09 K/UL — SIGNIFICANT CHANGE UP (ref 3.8–10.5)
WBC UR QL: 2 /HPF — SIGNIFICANT CHANGE UP (ref 0–5)

## 2021-03-21 PROCEDURE — 93010 ELECTROCARDIOGRAM REPORT: CPT

## 2021-03-21 PROCEDURE — 99285 EMERGENCY DEPT VISIT HI MDM: CPT | Mod: 25

## 2021-03-21 RX ORDER — POTASSIUM CHLORIDE 20 MEQ
40 PACKET (EA) ORAL ONCE
Refills: 0 | Status: COMPLETED | OUTPATIENT
Start: 2021-03-21 | End: 2021-03-21

## 2021-03-21 RX ORDER — ACETAMINOPHEN 500 MG
975 TABLET ORAL ONCE
Refills: 0 | Status: COMPLETED | OUTPATIENT
Start: 2021-03-21 | End: 2021-03-21

## 2021-03-21 RX ORDER — SODIUM CHLORIDE 9 MG/ML
1000 INJECTION, SOLUTION INTRAVENOUS ONCE
Refills: 0 | Status: COMPLETED | OUTPATIENT
Start: 2021-03-21 | End: 2021-03-21

## 2021-03-21 RX ORDER — FOLIC ACID 0.8 MG
1 TABLET ORAL ONCE
Refills: 0 | Status: COMPLETED | OUTPATIENT
Start: 2021-03-21 | End: 2021-03-21

## 2021-03-21 RX ORDER — THIAMINE MONONITRATE (VIT B1) 100 MG
100 TABLET ORAL ONCE
Refills: 0 | Status: COMPLETED | OUTPATIENT
Start: 2021-03-21 | End: 2021-03-21

## 2021-03-21 RX ADMIN — Medication 975 MILLIGRAM(S): at 07:48

## 2021-03-21 RX ADMIN — SODIUM CHLORIDE 1000 MILLILITER(S): 9 INJECTION, SOLUTION INTRAVENOUS at 08:01

## 2021-03-21 RX ADMIN — SODIUM CHLORIDE 1000 MILLILITER(S): 9 INJECTION, SOLUTION INTRAVENOUS at 07:16

## 2021-03-21 RX ADMIN — Medication 40 MILLIEQUIVALENT(S): at 07:48

## 2021-03-21 RX ADMIN — Medication 1 MILLIGRAM(S): at 07:47

## 2021-03-21 RX ADMIN — Medication 100 MILLIGRAM(S): at 07:16
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
## 2021-03-21 NOTE — ED PROVIDER NOTE - PATIENT PORTAL LINK FT
You can access the FollowMyHealth Patient Portal offered by [facility] by registering at the following website: [email or web address]. By joining [facility]’s FollowMyHealth portal, you will also be able to view your health information using other applications (apps) compatible with our system.

## 2021-03-21 NOTE — ED PROVIDER NOTE - PROGRESS NOTE DETAILS
Lyudmila-PGY2: pt received at sign-out, seen and evaluated at bedside.  Pt tearful on exam, denies any abdominal pain or vaginal bleeding. FH on bedside doppler 145 bpm. Pending lab results, will reassess when clinically sober. Lucks-PGY2: EKG showing prolonged QTc 507. K 3.2, will replete. Added magnesium level to labs. Lyudmila-PGY2: pt seen and re-evaluated at bedside.  Pt comfortable in NAD.  Pt clinically sober at this time with steady gait. Pt denies any SI/HI at this time, states she "was drunk and wanted to sleep." Pt states she will take Lyft home, requesting information for outpatient resources. Discussed with SW to provide resources, will provide information for crisis center.

## 2021-03-21 NOTE — ED PROVIDER NOTE - CLINICAL SUMMARY MEDICAL DECISION MAKING FREE TEXT BOX
Puneet SAN MD PGY3: 26 yo F,  currently 23wks pregnant (KD 21), with alcohol intox in pregnancy also endorsing passive SI at this time, albeit intoxicated. WIll obtain IV, labs, TSH, serum tox and urine tox included. Placed on 1:1 obs. Does not have capacity at this time, will reassess once sober.

## 2021-03-21 NOTE — ED ADULT TRIAGE NOTE - CHIEF COMPLAINT QUOTE
Patient is 5 month pregnant, presents to ED for ETOH intox and suicidal thoughts.  She expressed to EMS, she want to jump and she's not getting the help she needs.  Seen yesterday at [facility] for ETOH intox. Complaining of lower back pain and left shoulder pain. Patient is 5 month pregnant, presents to ED for ETOH intox and suicidal thoughts.  She expressed to EMS, she want to jump and she's not getting the help she needs.  Seen yesterday at [facility] for ETOH intox. Complaining of lower back pain and left shoulder pain.  History of anxiety.

## 2021-03-21 NOTE — ED PROVIDER NOTE - NSFOLLOWUPINSTRUCTIONS_ED_ALL_ED_FT
Alcohol Abuse    Alcohol intoxication occurs when the amount of alcohol that a person has consumed impairs his or her ability to mentally and physically function. Chronic alcohol consumption can also lead to a variety of health issues including neurological disease, stomach disease, heart disease, liver disease, etc. Do not drive after drinking alcohol. Drinking enough alcohol to end up in an Emergency Room suggests you may have an alcohol abuse problem. Seek help at a drug addiction center.    Follow up with your OB/GYN as discussed.    SEEK IMMEDIATE MEDICAL CARE IF YOU HAVE ANY OF THE FOLLOWING SYMPTOMS: seizures, vomiting blood, blood in your stool, lightheadedness/dizziness, or becoming shaky to tremulous when you stop drinking.     You have been given information necessary to follow up with the  [facility] ([facility]) Crisis center & other outpatient  psychiatric clinics within your community    • [facility] walk in Crisis centre  [address] [place] [address] [phone] [email or web address]  Hours of operation:  Day	                                        Hours  [place]                                  Closed  Monday                                9am - 3pm  Tuesday                                9am - 3pm  Wednesday                          9am - 3pm  Thursday                               9am - 3pm  Friday                                    9am - 3pm  Saturday                                Closed    .....additionally if your current problem is associated with drug or alcohol abuse further information can be obtained at the Drug Abuse Evaluation Health Referral Servce ([facility])    • [facility] clinic [address] [place] [address] [phone] [email or web address]    Additionally if more support and information and help is needed in the area of suicide prevention pleas3 feel free to contact :   • Su[place] [address]  Phone: 8-905-101-FSND (3481)  Web Address: http://www.suicidepreventionlifeline.org  • Suicide Awareness Voices of Education  [address]  [place], [place]  Phone: 1-585.743.8463  Web Address: http://www.save.org

## 2021-03-21 NOTE — ED ADULT NURSE NOTE - CHIEF COMPLAINT QUOTE
Patient is 5 month pregnant, presents to ED for ETOH intox and suicidal thoughts.  She expressed to EMS, she want to jump and she's not getting the help she needs.  Seen yesterday at [facility] for ETOH intox. Complaining of lower back pain and left shoulder pain.  History of anxiety.
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
## 2021-03-21 NOTE — ED PROVIDER NOTE - PHYSICAL EXAMINATION
Puneet SAN MD PGY3:   PHYSICAL EXAM:    GENERAL: Tearful, slightly disheveled.   HEENT:  Atraumatic, Normocephalic  CHEST/LUNG: Chest rise equal bilaterally  HEART: Regular rate and rhythm  ABDOMEN: Soft, Nontender, Nondistended  EXTREMITIES:  2+ Peripheral Pulses.  PSYCH: A&Ox3, tearful, endorses passive SI "I just want to end it all"  SKIN: No obvious rashes or lesions Puneet SAN MD PGY3:   PHYSICAL EXAM:    GENERAL: Tearful, slightly disheveled.   HEENT:  Atraumatic, Normocephalic  CHEST/LUNG: Chest rise equal bilaterally  HEART: Regular rate and rhythm  ABDOMEN: Soft, Nontender, Nondistended  EXTREMITIES:  2+ Peripheral Pulses.  PSYCH: A&Ox3, tearful, endorses passive SI "I just want to end it all"  SKIN: No obvious rashes or lesions  ATTENDING PHYSICAL EXAM  GEN - NAD; well appearing; A+O x3  HEAD - NC/AT; EYES/NOSE - PERRL, EOMI, mucous membranes moist, no discharge; THROAT: Oral cavity and pharynx normal. No inflammation, swelling, exudate, or lesions  NECK: Neck supple, non-tender without lymphadenopathy, no masses, no JVD  PULMONARY - CTA b/l, symmetric breath sounds, no w/r/r  CARDIAC -s1s2, RRR, no M,R,G  ABDOMEN - + Gravid with uterine fundus above umbilicus, NT, soft, no guarding, no rebound  BACK - no CVA tenderness, No vertebral or paravertebral tenderness  EXTREMITIES - symmetric pulses, 2+ dp, capillary refill < 2 seconds, no clubbing, no cyanosis, no edema  SKIN - no rash or bruising   NEUROLOGIC - alert, CN 2-12 intact, no focal deficits  PSYCH - normal thought.  + SI

## 2021-03-22 LAB
CULTURE RESULTS: SIGNIFICANT CHANGE UP
SPECIMEN SOURCE: SIGNIFICANT CHANGE UP

## 2021-03-23 NOTE — ED POST DISCHARGE NOTE - RESULT SUMMARY
culture grew 3 or more types of organisms  which indicate collection contamination, consider recollection only if clinically indicated. No antibiotic listed in ED provider note or prescription writer at time of discharge. Patient pregnant. Patient contact # [phone] S/W patient discussed with patient needs a reepat UA/UCX with MD. Discussed with patient need to return to ED if symptoms don't continue to improve or recur or develops any new or worsening symptoms that are of concern.

## 2021-03-27 ENCOUNTER — EMERGENCY (EMERGENCY)
Facility: HOSPITAL | Age: 26
LOS: 1 days | Discharge: HOME | End: 2021-03-27
Attending: STUDENT IN AN ORGANIZED HEALTH CARE EDUCATION/TRAINING PROGRAM | Admitting: STUDENT IN AN ORGANIZED HEALTH CARE EDUCATION/TRAINING PROGRAM
Payer: MEDICAID

## 2021-03-27 VITALS
OXYGEN SATURATION: 100 % | SYSTOLIC BLOOD PRESSURE: 130 MMHG | RESPIRATION RATE: 18 BRPM | HEART RATE: 115 BPM | DIASTOLIC BLOOD PRESSURE: 70 MMHG | HEIGHT: 68 IN | TEMPERATURE: 98 F

## 2021-03-27 PROCEDURE — 99284 EMERGENCY DEPT VISIT MOD MDM: CPT

## 2021-03-27 NOTE — ED ADULT TRIAGE NOTE - CHIEF COMPLAINT QUOTE
PT brought in by EMS for ETOH intoxication. As per PT/ EMS approx 5 weeks pregnant,  called 911 due to ETOH intox. Denies other drugs, PHX. States takes valium for anxiety. Denies CP, SOB. Pt tkane to  with PCA to secure belongings. PT brought in by EMS for ETOH intoxication. As per PT/ EMS approx 5 weeks pregnant,  called 911 due to ETOH intox. Denies other drugs, PHX. States takes valium for anxiety. Denies CP, SOB. Pt tkane to  with PCA to secure belongings.    Addendum: PT belongings secured in locker outside room 21. Valuables sent to security as per protocol.

## 2021-03-28 VITALS
RESPIRATION RATE: 19 BRPM | HEART RATE: 108 BPM | DIASTOLIC BLOOD PRESSURE: 66 MMHG | TEMPERATURE: 98 F | SYSTOLIC BLOOD PRESSURE: 104 MMHG | OXYGEN SATURATION: 100 %

## 2021-03-28 NOTE — ED PROVIDER NOTE - NSFOLLOWUPINSTRUCTIONS_ED_ALL_ED_FT
No signs of emergency medical condition on today's workup.   Please follow up with your primary care physician in 24-48 hours  Please come back if any of the following: Fever, chest pain, shortness of breath, nausea, vomiting, back pain, pelvic pain, suicidal or homicidal thoughts

## 2021-03-28 NOTE — ED PROVIDER NOTE - PATIENT PORTAL LINK FT
You can access the FollowMyHealth Patient Portal offered by [facility] by registering at the following website: [email or web address]. By joining [employer]’s FollowMyHealth portal, you will also be able to view your health information using other applications (apps) compatible with our system.
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
## 2021-03-28 NOTE — ED ADULT NURSE NOTE - CHIEF COMPLAINT QUOTE
PT brought in by EMS for ETOH intoxication. As per PT/ EMS approx 5 weeks pregnant,  called 911 due to ETOH intox. Denies other drugs, PHX. States takes valium for anxiety. Denies CP, SOB. Pt tkane to  with PCA to secure belongings.    Addendum: PT belongings secured in locker outside room 21. Valuables sent to security as per protocol.

## 2021-03-28 NOTE — ED PROVIDER NOTE - PHYSICAL EXAMINATION
Gen: AAOx3, non-toxic  Head: NCAT  HEENT: EOMI, oral mucosa moist, normal conjunctiva  Lung: CTAB, no respiratory distress, no wheezes/rhonchi/rales B/L, speaking in full sentences  CV: RRR, no murmurs, rubs or gallops  Abd: soft, NTND no guarding, no CVA tenderness            FHR: 146  MSK: no visible deformities  Neuro: No focal sensory or motor deficits  Skin: Warm, well perfused, no rash  Psych: normal affect.   ~Tigre Davis M.D. Resident
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
## 2021-03-28 NOTE — ED PROVIDER NOTE - NS ED ROS FT
GENERAL: No fever or chills, EYES: no change in vision, HEENT: no trouble swallowing or speaking, CARDIAC: no chest pain, PULMONARY: no cough or SOB, GI: no abdominal pain, no nausea, no vomiting, no diarrhea or constipation, : No changes in urination, SKIN: no rashes, NEURO: no headache,  MSK: No joint pain ~Tigre Davis M.D. Resident

## 2021-03-28 NOTE — ED ADULT NURSE NOTE - CAS DISCH BELONGINGS RETURNED
valuables from security returned. Belongings from cabinet across room 21 also returned to patient./Yes

## 2021-03-28 NOTE — ED PROVIDER NOTE - CLINICAL SUMMARY MEDICAL DECISION MAKING FREE TEXT BOX
26yF  at 24 weeks pregnant presents with intox. Patient well appearing, clinically sober and no evidence of intox.

## 2021-03-28 NOTE — ED PROVIDER NOTE - PROGRESS NOTE DETAILS
Susan SANON: Patient states that she feels safe being discharged. States that she can go a hotel or her sisters place.

## 2021-03-29 ENCOUNTER — EMERGENCY (EMERGENCY)
Facility: HOSPITAL | Age: 26
LOS: 1 days | Discharge: LEFT BEFORE DISCHARGE | End: 2021-03-29
Admitting: EMERGENCY MEDICINE
Payer: MEDICAID

## 2021-03-29 VITALS
HEART RATE: 131 BPM | RESPIRATION RATE: 18 BRPM | SYSTOLIC BLOOD PRESSURE: 100 MMHG | HEIGHT: 68 IN | DIASTOLIC BLOOD PRESSURE: 65 MMHG | TEMPERATURE: 98 F | OXYGEN SATURATION: 99 %

## 2021-03-29 PROCEDURE — L9991: CPT
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
## 2021-03-29 NOTE — ED ADULT NURSE REASSESSMENT NOTE - NS ED NURSE REASSESS COMMENT FT1
late entry [id number]; pt located in ambulance bay awaiting evaluation.  Pt talking/arguing on cell phone.  Pt ambulated to bathroom and back to stretcher without difficulty unassisted pt encouraged to stay on stretcher for safety.  Pt moved closer to triage desk.  Pt continued to talk loudly on phone.  At 1940 pt noted to not be on stretcher, ED waiting areas and bathroom searched, immediate outdoor space searched for pt, pt not seen.  Code flight called, pt not located.  Multiple attempts made to call pt's cellphone, pt not answering.

## 2021-03-30 ENCOUNTER — NON-APPOINTMENT (OUTPATIENT)
Age: 26
End: 2021-03-30

## 2021-03-30 ENCOUNTER — INPATIENT (INPATIENT)
Facility: HOSPITAL | Age: 26
LOS: 1 days | Discharge: LEFT BEFORE DISCHARGE | End: 2021-04-01
Attending: STUDENT IN AN ORGANIZED HEALTH CARE EDUCATION/TRAINING PROGRAM | Admitting: STUDENT IN AN ORGANIZED HEALTH CARE EDUCATION/TRAINING PROGRAM
Payer: MEDICAID

## 2021-03-30 ENCOUNTER — APPOINTMENT (OUTPATIENT)
Dept: ANTEPARTUM | Facility: HOSPITAL | Age: 26
End: 2021-03-30
Payer: MEDICAID

## 2021-03-30 ENCOUNTER — ASOB RESULT (OUTPATIENT)
Age: 26
End: 2021-03-30

## 2021-03-30 VITALS
HEART RATE: 134 BPM | HEIGHT: 68 IN | TEMPERATURE: 98 F | RESPIRATION RATE: 15 BRPM | DIASTOLIC BLOOD PRESSURE: 60 MMHG | SYSTOLIC BLOOD PRESSURE: 100 MMHG

## 2021-03-30 DIAGNOSIS — Z02.9 ENCOUNTER FOR ADMINISTRATIVE EXAMINATIONS, UNSPECIFIED: ICD-10-CM

## 2021-03-30 DIAGNOSIS — Z29.9 ENCOUNTER FOR PROPHYLACTIC MEASURES, UNSPECIFIED: ICD-10-CM

## 2021-03-30 DIAGNOSIS — Z3A.24 24 WEEKS GESTATION OF PREGNANCY: ICD-10-CM

## 2021-03-30 DIAGNOSIS — E87.6 HYPOKALEMIA: ICD-10-CM

## 2021-03-30 DIAGNOSIS — R45.851 SUICIDAL IDEATIONS: ICD-10-CM

## 2021-03-30 DIAGNOSIS — F10.230 ALCOHOL DEPENDENCE WITH WITHDRAWAL, UNCOMPLICATED: ICD-10-CM

## 2021-03-30 DIAGNOSIS — F41.9 ANXIETY DISORDER, UNSPECIFIED: ICD-10-CM

## 2021-03-30 LAB
ALBUMIN SERPL ELPH-MCNC: 4.1 G/DL — SIGNIFICANT CHANGE UP (ref 3.3–5)
ALP SERPL-CCNC: 43 U/L — SIGNIFICANT CHANGE UP (ref 40–120)
ALT FLD-CCNC: 77 U/L — HIGH (ref 4–33)
ANION GAP SERPL CALC-SCNC: 17 MMOL/L — HIGH (ref 7–14)
ANION GAP SERPL CALC-SCNC: 19 MMOL/L — HIGH (ref 7–14)
APAP SERPL-MCNC: <15 UG/ML — SIGNIFICANT CHANGE UP (ref 15–25)
AST SERPL-CCNC: 52 U/L — HIGH (ref 4–32)
BASOPHILS # BLD AUTO: 0.07 K/UL — SIGNIFICANT CHANGE UP (ref 0–0.2)
BASOPHILS NFR BLD AUTO: 1.7 % — SIGNIFICANT CHANGE UP (ref 0–2)
BILIRUB SERPL-MCNC: 1.1 MG/DL — SIGNIFICANT CHANGE UP (ref 0.2–1.2)
BUN SERPL-MCNC: 4 MG/DL — LOW (ref 7–23)
BUN SERPL-MCNC: 5 MG/DL — LOW (ref 7–23)
CALCIUM SERPL-MCNC: 6.8 MG/DL — LOW (ref 8.4–10.5)
CALCIUM SERPL-MCNC: 8.4 MG/DL — SIGNIFICANT CHANGE UP (ref 8.4–10.5)
CHLORIDE SERPL-SCNC: 103 MMOL/L — SIGNIFICANT CHANGE UP (ref 98–107)
CHLORIDE SERPL-SCNC: 98 MMOL/L — SIGNIFICANT CHANGE UP (ref 98–107)
CO2 SERPL-SCNC: 14 MMOL/L — LOW (ref 22–31)
CO2 SERPL-SCNC: 19 MMOL/L — LOW (ref 22–31)
CREAT SERPL-MCNC: 0.56 MG/DL — SIGNIFICANT CHANGE UP (ref 0.5–1.3)
CREAT SERPL-MCNC: 0.6 MG/DL — SIGNIFICANT CHANGE UP (ref 0.5–1.3)
EOSINOPHIL # BLD AUTO: 0 K/UL — SIGNIFICANT CHANGE UP (ref 0–0.5)
EOSINOPHIL NFR BLD AUTO: 0 % — SIGNIFICANT CHANGE UP (ref 0–6)
ETHANOL SERPL-MCNC: 210 MG/DL — HIGH
GLUCOSE SERPL-MCNC: 110 MG/DL — HIGH (ref 70–99)
GLUCOSE SERPL-MCNC: 111 MG/DL — HIGH (ref 70–99)
HCT VFR BLD CALC: 31 % — LOW (ref 34.5–45)
HGB BLD-MCNC: 10.5 G/DL — LOW (ref 11.5–15.5)
IANC: 1.6 K/UL — SIGNIFICANT CHANGE UP (ref 1.5–8.5)
LYMPHOCYTES # BLD AUTO: 1.57 K/UL — SIGNIFICANT CHANGE UP (ref 1–3.3)
LYMPHOCYTES # BLD AUTO: 38.6 % — SIGNIFICANT CHANGE UP (ref 13–44)
MCHC RBC-ENTMCNC: 25.4 PG — LOW (ref 27–34)
MCHC RBC-ENTMCNC: 33.9 GM/DL — SIGNIFICANT CHANGE UP (ref 32–36)
MCV RBC AUTO: 74.9 FL — LOW (ref 80–100)
MONOCYTES # BLD AUTO: 0.18 K/UL — SIGNIFICANT CHANGE UP (ref 0–0.9)
MONOCYTES NFR BLD AUTO: 4.4 % — SIGNIFICANT CHANGE UP (ref 2–14)
NEUTROPHILS # BLD AUTO: 1.75 K/UL — LOW (ref 1.8–7.4)
NEUTROPHILS NFR BLD AUTO: 43 % — SIGNIFICANT CHANGE UP (ref 43–77)
PLATELET # BLD AUTO: 192 K/UL — SIGNIFICANT CHANGE UP (ref 150–400)
POTASSIUM SERPL-MCNC: 2.8 MMOL/L — CRITICAL LOW (ref 3.5–5.3)
POTASSIUM SERPL-MCNC: 4.9 MMOL/L — SIGNIFICANT CHANGE UP (ref 3.5–5.3)
POTASSIUM SERPL-SCNC: 2.8 MMOL/L — CRITICAL LOW (ref 3.5–5.3)
POTASSIUM SERPL-SCNC: 4.9 MMOL/L — SIGNIFICANT CHANGE UP (ref 3.5–5.3)
PROT SERPL-MCNC: 7.1 G/DL — SIGNIFICANT CHANGE UP (ref 6–8.3)
RBC # BLD: 4.14 M/UL — SIGNIFICANT CHANGE UP (ref 3.8–5.2)
RBC # FLD: 13.9 % — SIGNIFICANT CHANGE UP (ref 10.3–14.5)
SALICYLATES SERPL-MCNC: <5 MG/DL — LOW (ref 15–30)
SARS-COV-2 RNA SPEC QL NAA+PROBE: SIGNIFICANT CHANGE UP
SODIUM SERPL-SCNC: 134 MMOL/L — LOW (ref 135–145)
SODIUM SERPL-SCNC: 136 MMOL/L — SIGNIFICANT CHANGE UP (ref 135–145)
TOXICOLOGY SCREEN, DRUGS OF ABUSE, SERUM RESULT: SIGNIFICANT CHANGE UP
WBC # BLD: 4.07 K/UL — SIGNIFICANT CHANGE UP (ref 3.8–10.5)
WBC # FLD AUTO: 4.07 K/UL — SIGNIFICANT CHANGE UP (ref 3.8–10.5)

## 2021-03-30 PROCEDURE — 99223 1ST HOSP IP/OBS HIGH 75: CPT

## 2021-03-30 PROCEDURE — 93010 ELECTROCARDIOGRAM REPORT: CPT

## 2021-03-30 PROCEDURE — 99285 EMERGENCY DEPT VISIT HI MDM: CPT | Mod: 25

## 2021-03-30 PROCEDURE — 76816 OB US FOLLOW-UP PER FETUS: CPT | Mod: 26

## 2021-03-30 RX ORDER — THIAMINE MONONITRATE (VIT B1) 100 MG
500 TABLET ORAL THREE TIMES A DAY
Refills: 0 | Status: DISCONTINUED | OUTPATIENT
Start: 2021-03-30 | End: 2021-04-01

## 2021-03-30 RX ORDER — FOLIC ACID 0.8 MG
1 TABLET ORAL DAILY
Refills: 0 | Status: DISCONTINUED | OUTPATIENT
Start: 2021-03-30 | End: 2021-04-01

## 2021-03-30 RX ORDER — PREGABALIN 225 MG/1
1000 CAPSULE ORAL DAILY
Refills: 0 | Status: DISCONTINUED | OUTPATIENT
Start: 2021-03-30 | End: 2021-04-01

## 2021-03-30 RX ORDER — POTASSIUM CHLORIDE 20 MEQ
40 PACKET (EA) ORAL
Refills: 0 | Status: COMPLETED | OUTPATIENT
Start: 2021-03-30 | End: 2021-03-30

## 2021-03-30 RX ORDER — SODIUM CHLORIDE 9 MG/ML
1000 INJECTION INTRAMUSCULAR; INTRAVENOUS; SUBCUTANEOUS ONCE
Refills: 0 | Status: COMPLETED | OUTPATIENT
Start: 2021-03-30 | End: 2021-03-30

## 2021-03-30 RX ORDER — MAGNESIUM SULFATE 500 MG/ML
1 VIAL (ML) INJECTION ONCE
Refills: 0 | Status: COMPLETED | OUTPATIENT
Start: 2021-03-30 | End: 2021-03-30

## 2021-03-30 RX ORDER — INFLUENZA VIRUS VACCINE 15; 15; 15; 15 UG/.5ML; UG/.5ML; UG/.5ML; UG/.5ML
0.5 SUSPENSION INTRAMUSCULAR ONCE
Refills: 0 | Status: COMPLETED | OUTPATIENT
Start: 2021-03-30 | End: 2021-03-30

## 2021-03-30 RX ORDER — DIAZEPAM 5 MG
0 TABLET ORAL
Qty: 0 | Refills: 0 | DISCHARGE

## 2021-03-30 RX ORDER — POTASSIUM CHLORIDE 20 MEQ
10 PACKET (EA) ORAL
Refills: 0 | Status: COMPLETED | OUTPATIENT
Start: 2021-03-30 | End: 2021-03-30

## 2021-03-30 RX ORDER — THIAMINE MONONITRATE (VIT B1) 100 MG
100 TABLET ORAL DAILY
Refills: 0 | Status: CANCELLED | OUTPATIENT
Start: 2021-04-03 | End: 2021-04-01

## 2021-03-30 RX ADMIN — Medication 40 MILLIEQUIVALENT(S): at 11:16

## 2021-03-30 RX ADMIN — Medication 2 MILLIGRAM(S): at 18:24

## 2021-03-30 RX ADMIN — Medication 100 MILLIEQUIVALENT(S): at 08:04

## 2021-03-30 RX ADMIN — Medication 100 MILLIEQUIVALENT(S): at 07:05

## 2021-03-30 RX ADMIN — SODIUM CHLORIDE 1000 MILLILITER(S): 9 INJECTION INTRAMUSCULAR; INTRAVENOUS; SUBCUTANEOUS at 08:11

## 2021-03-30 RX ADMIN — Medication 2 MILLIGRAM(S): at 22:48

## 2021-03-30 RX ADMIN — Medication 100 MILLIEQUIVALENT(S): at 06:05

## 2021-03-30 RX ADMIN — Medication 100 GRAM(S): at 10:16

## 2021-03-30 RX ADMIN — SODIUM CHLORIDE 1000 MILLILITER(S): 9 INJECTION INTRAMUSCULAR; INTRAVENOUS; SUBCUTANEOUS at 03:03

## 2021-03-30 RX ADMIN — Medication 105 MILLIGRAM(S): at 22:48

## 2021-03-30 RX ADMIN — Medication 40 MILLIEQUIVALENT(S): at 14:58
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
## 2021-03-30 NOTE — H&P ADULT - PROBLEM SELECTOR PLAN 2
-Reports severe uncontrolled anxiety leading to alcohol abuse  -C/w paxil for now  -Psych evaluation requested. Patient is interested in inpatient psych admission  -c/w constant observation -Reports severe uncontrolled anxiety leading to alcohol abuse  -C/w paxil for now  -On valium ( See chart note for ISTOP) - discuss with psych if should be continued.   -Psych evaluation requested. Patient is interested in inpatient psych admission  -c/w constant observation

## 2021-03-30 NOTE — ED PROVIDER NOTE - PHYSICAL EXAMINATION
Gen: AAOx3, intox  Head: NCAT  HEENT: EOMI, oral mucosa moist, normal conjunctiva  Lung: CTAB, no respiratory distress, speaking in full sentences  CV: RRR, no murmurs, rubs or gallops  Abd: soft, NTND, no guarding, no CVA tenderness          FHR: 146 Chaperoned by Amisha THOMAS  MSK: no visible deformities  Neuro: No focal sensory or motor deficits  Skin: Warm, well perfused, no rash  Psych: intox  ~Tigre Davis M.D. Resident

## 2021-03-30 NOTE — ED PROVIDER NOTE - CARE PLAN
Principal Discharge DX:	Suicidal ideation  Secondary Diagnosis:	Tachycardia  Secondary Diagnosis:	Hypokalemia

## 2021-03-30 NOTE — ED ADULT NURSE NOTE - OBJECTIVE STATEMENT
Break coverage RN: Pt aaox3 and ambulatory at baseline brought in for psych eval. Pt is 6 months pregnant. Pt denies S-I, H-I, drug use, abdominal pain, N+V. Pt states she drank today but does not drink daily. Pt asking for food and drink. Vital signs as noted. 20g in L wrist; labs collected and sent as per MD order. Pt calm and cooperative at this time.

## 2021-03-30 NOTE — H&P ADULT - HISTORY OF PRESENT ILLNESS
27 y/o F with PMH of PTSD, anxiety, depression, alcohol abuse who presents with alcohol intoxication. Patient states that she is extremely anxious and depression. She has been drinking 1/2 pint to 1pint of alcohol for last week or so to control her anxiety. She also reports severe insomnia. She states that her last drink was last night. Denies h/o DTs but reports she did have seizure after stopping drinking about a year ago. Denies any chest pain or SOB. Has acid reflux and nausea. No abdominal pain. No vaginal bleeding. Feels fetal movement. Wants to voluntarily go to in patient psych facility. At home takes paxil and valium and states that current medications are not helpful.     Vital Signs Last 24 Hrs  T(C): 36.7 (30 Mar 2021 08:32), Max: 36.7 (29 Mar 2021 17:47)  T(F): 98 (30 Mar 2021 08:32), Max: 98 (29 Mar 2021 17:47)  HR: 109 (30 Mar 2021 08:32) (104 - 134)  BP: 117/58 (30 Mar 2021 08:32) (98/58 - 117/58)  BP(mean): --  RR: 18 (30 Mar 2021 08:32) (15 - 18)  SpO2: 99% (30 Mar 2021 08:32) (99% - 100%)      CIWA score 0

## 2021-03-30 NOTE — CHART NOTE - NSCHARTNOTEFT_GEN_A_CORE
Patient Name: Rachel Zuñiga     YOB: 1995     Address: [address] [place] [address]     03/08/2021 03/18/2021 diazepam 5 mg tablet  45 30 [facility] Vinod [employer] [id number]   02/19/2021 02/27/2021 [facility]iazepam 5 mg tablet  [id number]   01/14/2021 01/30/2021 diazepam 5 mg tablet  45 30 [facility] Joan [id number]

## 2021-03-30 NOTE — CONSULT NOTE ADULT - ASSESSMENT
31 yo  at 24 weeks 1 day gestation dated by LMP c/w 14 week sono, presents to the ED with suicidal ideation, clinically stable at this time    - Patient is hemodynamically stable with normal vital signs and benign physical exam  - Elevated blood alcohol level   - Patient denies any obstetric complaints. Fetal tracing reactive (per Dr. Jackson, not seen by me) with reassuring fetal status.  - ATU to scan fetus this afternoon    Patient admitted to Medicine team for electrolyte abnormalities.  33 yo  at 24 weeks 1 day gestation dated by LMP c/w 14 week sono, presents to the ED with suicidal ideation, clinically stable at this time    - Patient is hemodynamically stable with normal vital signs and benign physical exam  - Elevated blood alcohol level   - Patient denies any obstetric complaints. Fetal tracing reactive (per Dr. Jackson, not seen by me) with reassuring fetal status.  - ATU to scan fetus this afternoon  - For daily NSTs  - Delivery not indicated at this time,  - MOD- plan for repeat     Will address consents for delivery once patient's system cleared of alcohol.    Patient admitted to Medicine team for electrolyte abnormalities. Psych liason team consulted.    OB team to continue to follow  Seen and evaluated with Dr. Vanessa Rose MD  Fellow, Maternal Fetal Medicine
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
## 2021-03-30 NOTE — BH CONSULTATION LIAISON ASSESSMENT NOTE - CURRENT MEDICATION
MEDICATIONS  (STANDING):  cyanocobalamin 1000 MICROGram(s) Oral daily  folic acid 1 milliGRAM(s) Oral daily  LORazepam   Injectable   IV Push   prenatal multivitamin 1 Tablet(s) Oral daily  thiamine IVPB 500 milliGRAM(s) IV Intermittent three times a day    MEDICATIONS  (PRN):

## 2021-03-30 NOTE — ED ADULT NURSE NOTE - SUICIDE SCREENING DEPRESSION
[facility], [place], Emergency Department    [address] [address]    Phone:  [phone]                                       Starr Martinez   MRN: [id number]    Department:  [facility], Emergency Department   Date of Visit:  4/29/2017           After Visit Summary Signature Page     I have received my discharge instructions, and my questions have been answered. I have discussed any challenges I see with this plan with the nurse or doctor.    ..........................................................................................................................................  Patient/Patient Representative Signature      ..........................................................................................................................................  Patient Representative Print Name and Relationship to Patient    ..................................................               ................................................  Date                                            Time    ..........................................................................................................................................  Reviewed by Signature/Title    ...................................................              ..............................................  Date                                                            Time           Negative
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
## 2021-03-30 NOTE — ED PROVIDER NOTE - PROGRESS NOTE DETAILS
pt sleeping, ordered K runs for hypokalemia. Pending urine sample, repeat K, BH evaluation and SW. Sign out to day team. pt sleeping, ordered K runs for hypokalemia. Pending urine sample, repeat K, BH evaluation and SW. OB for fetal monitoring.  Sign out to day team. Navin Edward PGY3  psych consulted, will evaluate pt, recommend CIWA. Obgyn consulted, will perform NST. pt more alert,  regular. not tremulous, no tongue fasciculation. pulse ox 98-100RA no cp no sob no abdominal pain. will give a second IVF, OB to evaluate pt. Navin Edward PGY3  psych consulted, will evaluate pt, recommend CIWA. Obgyn consulted, will perform NST. CIWA 0 currently Navin Edward PGY3  pt receiving NST currently. admitted to medicine. psych C/L will need to follow patient inpatient

## 2021-03-30 NOTE — ED ADULT TRIAGE NOTE - CHIEF COMPLAINT QUOTE
alert intoxicated drank a liter rum 6 months pregnant  as per significant other stated to EMS that patient has intermittent SI no plan  hx psych depression anxiety PTSD

## 2021-03-30 NOTE — H&P ADULT - ASSESSMENT
25 y/o F with PMH of PTSD, anxiety, depression, alcohol abuse who presents with alcohol intoxication and admitted for alcohol intoxication

## 2021-03-30 NOTE — ED PROVIDER NOTE - CLINICAL SUMMARY MEDICAL DECISION MAKING FREE TEXT BOX
26yF  at 24 weeks pregnant (KD 21), presents with intox during pregnancy. Will get psych clearance labs, psych consult, social work and CPS involvement

## 2021-03-30 NOTE — ED PROVIDER NOTE - NS ED ROS FT
GENERAL: intox, No fever or chills, EYES: no change in vision, HEENT: no trouble swallowing or speaking, CARDIAC: no chest pain, PULMONARY: no cough or SOB, GI: no abdominal pain, no nausea, no vomiting, no diarrhea or constipation, : No changes in urination, SKIN: no rashes, NEURO: no headache,  MSK: No joint pain ~Tigre Davis M.D. Resident

## 2021-03-30 NOTE — CONSULT NOTE ADULT - SUBJECTIVE AND OBJECTIVE BOX
M Consult Note    Ms. Zuñiga was seen and evaluated at bedside. She is feeling sleepy. She denies any obstetric complaints: no vaginal bleeding, loss of fluid or decreased fetal movement. She presented to the emergency department with complaints of severe depression and suicidal ideation. No fevers, chills, chest pain, shortness of breath, dizziness or lightheadedness.     PMH: Anxiety, depression, PTSD  PSurgHx:  Delivery x 3  Medications: PNV, Valium, Paxil  OBGYN: , 3 prior CS, 1 twin delivery. Denies any complications  Allergies: None  SocHx: Denies any ilicit drug use. Reports history of alcohol use with increasing frequency in the past 2 weeks. Denies tobacco use. Lives at home with her spouse and 4 children. Does not work. Reports feeling safe at home with good social support.      ICU Vital Signs Last 24 Hrs  T(C): 36.7 (30 Mar 2021 08:32), Max: 36.7 (29 Mar 2021 17:47)  T(F): 98 (30 Mar 2021 08:32), Max: 98 (29 Mar 2021 17:47)  HR: 109 (30 Mar 2021 08:32) (104 - 134)  BP: 117/58 (30 Mar 2021 08:32) (98/58 - 117/58)  BP(mean): --  ABP: --  ABP(mean): --  RR: 18 (30 Mar 2021 08:32) (15 - 18)  SpO2: 99% (30 Mar 2021 08:32) (99% - 100%)    Gen: NAD  Resp: Unlabored  Abd: Soft, gravid, nontender to palpation, no rebound tenderness, no guarding                          10.5   4.07  )-----------( 192      ( 30 Mar 2021 03:27 )             31.0   03-30    136  |  98  |  5<L>  ----------------------------<  110<H>  2.8<LL>   |  19<L>  |  0.60    Ca    8.4      30 Mar 2021 03:27  Mg     1.6     03-30    TPro  7.1  /  Alb  4.1  /  TBili  1.1  /  DBili  x   /  AST  52<H>  /  ALT  77<H>  /  AlkPhos  43  30

## 2021-03-30 NOTE — BH CONSULTATION LIAISON ASSESSMENT NOTE - NSBHCHARTREVIEWVS_PSY_A_CORE FT
Vital Signs Last 24 Hrs  T(C): 37.1 (30 Mar 2021 15:54), Max: 37.1 (30 Mar 2021 15:54)  T(F): 98.7 (30 Mar 2021 15:54), Max: 98.7 (30 Mar 2021 15:54)  HR: 102 (30 Mar 2021 15:54) (102 - 134)  BP: 102/63 (30 Mar 2021 15:54) (98/58 - 117/58)  BP(mean): --  RR: 20 (30 Mar 2021 15:54) (15 - 20)  SpO2: 100% (30 Mar 2021 15:54) (99% - 100%)
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
## 2021-03-30 NOTE — BH CONSULTATION LIAISON ASSESSMENT NOTE - RISK ASSESSMENT
Risk Factors: etoh abuse, hx depression/anxiety, PTSD, reports recent trauma  Protective Factors: residential stability, supportive family, denies si, denies hx si/sa/hi, denies ah/vh, denies access to firearms

## 2021-03-30 NOTE — H&P ADULT - PROBLEM SELECTOR PLAN 1
-Patient with blood alcohol level 210; Last drink yesterday  -Reports h/o alcohol withdrawal seizure  -h.o alcohol abuse  -CIWA scores 0, very mild tremors noted on extended hands  -Monitor CIWA  -Avoid ativan/librium given pregnancy category D  -psych eval requested for management of severe anxiety and depression  -SW evaluation

## 2021-03-30 NOTE — ED PROVIDER NOTE - ATTENDING CONTRIBUTION TO CARE
Attending Statement: I have personally seen and examined this patient. I have fully participated in the care of this patient. I have reviewed all pertinent clinical information, including history physical exam, plan and the Resident's note and agree except as noted  25yo F pw intoxicated. Endorse she was drinking today. Denies drug use. Denies any complaints. NO cp/ no abdomina pain. no cramping. no vag bleeding or dc. PT is preg w KD 7/19/21, 24 weeks preg.  Vital signs noted. laying on side, intoxicated, slurred speech. alert to self/ place. no sign of head/facial trauma no work of breathing. soft nt abdomen. no ecchymosis of chest/back or abdomen. no lac of ext  plan obtain collateral, fetal HR> labs. reassess

## 2021-03-30 NOTE — BH CONSULTATION LIAISON ASSESSMENT NOTE - CASE SUMMARY
case discussed with cristopher cohen np impression and plan discussed and agreed upon. Met with the patient today 3/31. Fetal tracing ongoing. She endorses ongoing symptoms of depression, but adamantly denies any si or hi. Endorses hope that she will be able to connect care with a substance abuse clinic within [facility]. She denies any psychosis when asked. No overt symptoms of alcohol w/d. States that last drink was on Monday.   Continue plan as documented.

## 2021-03-31 DIAGNOSIS — F19.94 OTHER PSYCHOACTIVE SUBSTANCE USE, UNSPECIFIED WITH PSYCHOACTIVE SUBSTANCE-INDUCED MOOD DISORDER: ICD-10-CM

## 2021-03-31 DIAGNOSIS — F41.9 ANXIETY DISORDER, UNSPECIFIED: ICD-10-CM

## 2021-03-31 LAB
ANION GAP SERPL CALC-SCNC: 13 MMOL/L — SIGNIFICANT CHANGE UP (ref 7–14)
BUN SERPL-MCNC: 3 MG/DL — LOW (ref 7–23)
CALCIUM SERPL-MCNC: 7.2 MG/DL — LOW (ref 8.4–10.5)
CHLORIDE SERPL-SCNC: 103 MMOL/L — SIGNIFICANT CHANGE UP (ref 98–107)
CO2 SERPL-SCNC: 20 MMOL/L — LOW (ref 22–31)
COVID-19 SPIKE DOMAIN AB INTERP: POSITIVE
COVID-19 SPIKE DOMAIN ANTIBODY RESULT: 152 U/ML — HIGH
CREAT SERPL-MCNC: 0.53 MG/DL — SIGNIFICANT CHANGE UP (ref 0.5–1.3)
GLUCOSE SERPL-MCNC: 103 MG/DL — HIGH (ref 70–99)
HCT VFR BLD CALC: 25.3 % — LOW (ref 34.5–45)
HGB BLD-MCNC: 8.7 G/DL — LOW (ref 11.5–15.5)
LACTATE SERPL-SCNC: 1.1 MMOL/L — SIGNIFICANT CHANGE UP (ref 0.5–2)
MAGNESIUM SERPL-MCNC: 1.4 MG/DL — LOW (ref 1.6–2.6)
MCHC RBC-ENTMCNC: 26.4 PG — LOW (ref 27–34)
MCHC RBC-ENTMCNC: 34.4 GM/DL — SIGNIFICANT CHANGE UP (ref 32–36)
MCV RBC AUTO: 76.7 FL — LOW (ref 80–100)
NRBC # BLD: 0 /100 WBCS — SIGNIFICANT CHANGE UP
NRBC # FLD: 0 K/UL — SIGNIFICANT CHANGE UP
PHOSPHATE SERPL-MCNC: 1.8 MG/DL — LOW (ref 2.5–4.5)
PLATELET # BLD AUTO: 129 K/UL — LOW (ref 150–400)
POTASSIUM SERPL-MCNC: 3.2 MMOL/L — LOW (ref 3.5–5.3)
POTASSIUM SERPL-SCNC: 3.2 MMOL/L — LOW (ref 3.5–5.3)
RBC # BLD: 3.3 M/UL — LOW (ref 3.8–5.2)
RBC # FLD: 14.1 % — SIGNIFICANT CHANGE UP (ref 10.3–14.5)
SARS-COV-2 IGG+IGM SERPL QL IA: 152 U/ML — HIGH
SARS-COV-2 IGG+IGM SERPL QL IA: POSITIVE
SODIUM SERPL-SCNC: 136 MMOL/L — SIGNIFICANT CHANGE UP (ref 135–145)
WBC # BLD: 2.92 K/UL — LOW (ref 3.8–10.5)
WBC # FLD AUTO: 2.92 K/UL — LOW (ref 3.8–10.5)

## 2021-03-31 PROCEDURE — 99233 SBSQ HOSP IP/OBS HIGH 50: CPT

## 2021-03-31 PROCEDURE — 99223 1ST HOSP IP/OBS HIGH 75: CPT

## 2021-03-31 RX ORDER — POTASSIUM CHLORIDE 20 MEQ
40 PACKET (EA) ORAL EVERY 4 HOURS
Refills: 0 | Status: COMPLETED | OUTPATIENT
Start: 2021-03-31 | End: 2021-03-31

## 2021-03-31 RX ORDER — MAGNESIUM SULFATE 500 MG/ML
2 VIAL (ML) INJECTION ONCE
Refills: 0 | Status: COMPLETED | OUTPATIENT
Start: 2021-03-31 | End: 2021-03-31

## 2021-03-31 RX ORDER — LANOLIN ALCOHOL/MO/W.PET/CERES
6 CREAM (GRAM) TOPICAL AT BEDTIME
Refills: 0 | Status: DISCONTINUED | OUTPATIENT
Start: 2021-03-31 | End: 2021-04-01

## 2021-03-31 RX ADMIN — Medication 40 MILLIEQUIVALENT(S): at 10:32

## 2021-03-31 RX ADMIN — Medication 1 TABLET(S): at 12:29

## 2021-03-31 RX ADMIN — Medication 2 MILLIGRAM(S): at 06:32

## 2021-03-31 RX ADMIN — Medication 40 MILLIEQUIVALENT(S): at 14:31

## 2021-03-31 RX ADMIN — Medication 2 MILLIGRAM(S): at 02:35

## 2021-03-31 RX ADMIN — Medication 2 MILLIGRAM(S): at 10:18

## 2021-03-31 RX ADMIN — Medication 1.5 MILLIGRAM(S): at 22:08

## 2021-03-31 RX ADMIN — PREGABALIN 1000 MICROGRAM(S): 225 CAPSULE ORAL at 12:29

## 2021-03-31 RX ADMIN — Medication 105 MILLIGRAM(S): at 22:08

## 2021-03-31 RX ADMIN — Medication 2 MILLIGRAM(S): at 14:31

## 2021-03-31 RX ADMIN — Medication 105 MILLIGRAM(S): at 06:33

## 2021-03-31 RX ADMIN — Medication 6 MILLIGRAM(S): at 23:45

## 2021-03-31 RX ADMIN — Medication 1 MILLIGRAM(S): at 12:29

## 2021-03-31 RX ADMIN — Medication 50 GRAM(S): at 10:23

## 2021-03-31 RX ADMIN — Medication 105 MILLIGRAM(S): at 14:32

## 2021-03-31 RX ADMIN — Medication 1.5 MILLIGRAM(S): at 18:12

## 2021-03-31 NOTE — PROVIDER CONTACT NOTE (OTHER) - SITUATION
EDC 7/19/21 24.1 weeks. Non reactive NST. EDC 7/19/21 24.1 weeks. Non reactive NST. Admitted for alcohol withdrawal.

## 2021-03-31 NOTE — PROVIDER CONTACT NOTE (OTHER) - ASSESSMENT
Tracing reviewed with Angelica SIMS and , tracing is not reactive at this time.  Pt to remain on continuous EFM at this time.    150 minimal with spontaneous decelerations no accelerations noted at this time. Tracing reviewed with Angelica SIMS and , tracing is not reactive at this time.  Pt to remain on continuous EFM at this time.    150 minimal with spontaneous decelerations no accelerations noted at this time. 3 ctx noted during 20 min NST. Pt denies feeling of tightening or pain at this time.

## 2021-03-31 NOTE — CHART NOTE - NSCHARTNOTEFT_GEN_A_CORE
Per Psych recs, no need for constant observation at this time as pt not endorsing SI. Routine observation.

## 2021-03-31 NOTE — PROGRESS NOTE ADULT - SUBJECTIVE AND OBJECTIVE BOX
Patient is a 26y old  Female who presents with a chief complaint of Anxiety and depression and alcohol intoxication (31 Mar 2021 09:05)      SUBJECTIVE / OVERNIGHT EVENTS:    MEDICATIONS  (STANDING):  cyanocobalamin 1000 MICROGram(s) Oral daily  folic acid 1 milliGRAM(s) Oral daily  influenza   Vaccine 0.5 milliLiter(s) IntraMuscular once  LORazepam   Injectable   IV Push   LORazepam   Injectable 2 milliGRAM(s) IV Push every 4 hours  LORazepam   Injectable 1.5 milliGRAM(s) IV Push every 4 hours  potassium chloride   Powder 40 milliEquivalent(s) Oral every 4 hours  prenatal multivitamin 1 Tablet(s) Oral daily  thiamine IVPB 500 milliGRAM(s) IV Intermittent three times a day    MEDICATIONS  (PRN):        CAPILLARY BLOOD GLUCOSE        I&O's Summary      PHYSICAL EXAM:  GENERAL: NAD, well-developed  HEAD:  Atraumatic, Normocephalic  EYES: EOMI, PERRLA, conjunctiva and sclera clear  NECK: Supple, No JVD  CHEST/LUNG: Clear to auscultation bilaterally; No wheeze  HEART: Regular rate and rhythm; No murmurs, rubs, or gallops  ABDOMEN: Soft, Nontender, Nondistended; Bowel sounds present  EXTREMITIES:  2+ Peripheral Pulses, No clubbing, cyanosis, or edema  PSYCH: AAOx3  NEUROLOGY: non-focal  SKIN: No rashes or lesions    LABS:                        8.7    2.92  )-----------( 129      ( 31 Mar 2021 07:03 )             25.3     03-31    136  |  103  |  3<L>  ----------------------------<  103<H>  3.2<L>   |  20<L>  |  0.53    Ca    7.2<L>      31 Mar 2021 07:03  Phos  1.8     03-31  Mg     1.4     03-31    TPro  7.1  /  Alb  4.1  /  TBili  1.1  /  DBili  x   /  AST  52<H>  /  ALT  77<H>  /  AlkPhos  43  03-30              RADIOLOGY & ADDITIONAL TESTS:    Imaging Personally Reviewed:    Consultant(s) Notes Reviewed:      Care Discussed with Consultants/Other Providers:   Patient is a 26y old  Female who presents with a chief complaint of Anxiety and depression and alcohol intoxication (31 Mar 2021 09:05)      SUBJECTIVE / OVERNIGHT EVENTS: No events overnight. Pt appears mildly anxious with depressed mood. She is tolerating PO without N/V/abd pain.    MEDICATIONS  (STANDING):  cyanocobalamin 1000 MICROGram(s) Oral daily  folic acid 1 milliGRAM(s) Oral daily  influenza   Vaccine 0.5 milliLiter(s) IntraMuscular once  LORazepam   Injectable   IV Push   LORazepam   Injectable 2 milliGRAM(s) IV Push every 4 hours  LORazepam   Injectable 1.5 milliGRAM(s) IV Push every 4 hours  potassium chloride   Powder 40 milliEquivalent(s) Oral every 4 hours  prenatal multivitamin 1 Tablet(s) Oral daily  thiamine IVPB 500 milliGRAM(s) IV Intermittent three times a day    MEDICATIONS  (PRN):    Vital Signs Last 24 Hrs  T(C): 36.7 (31 Mar 2021 10:01), Max: 37.1 (30 Mar 2021 15:54)  T(F): 98 (31 Mar 2021 10:01), Max: 98.7 (30 Mar 2021 15:54)  HR: 93 (31 Mar 2021 10:01) (63 - 102)  BP: 95/60 (31 Mar 2021 10:01) (93/57 - 105/65)  BP(mean): --  RR: 17 (31 Mar 2021 10:01) (17 - 20)  SpO2: 99% (31 Mar 2021 10:01) (99% - 100%)    PHYSICAL EXAM:  GENERAL: NAD, well-developed  HEAD:  Atraumatic, Normocephalic  EYES: EOMI, PERRLA, conjunctiva and sclera clear  NECK: Supple, No JVD  CHEST/LUNG: Clear to auscultation bilaterally; No wheeze  HEART: Regular rate and rhythm; No murmurs, rubs, or gallops  ABDOMEN: +gravid. Soft, Nontender Bowel sounds present  EXTREMITIES:  2+ Peripheral Pulses, No clubbing, cyanosis, or edema  PSYCH: AAOx3, mildly anxious  NEUROLOGY: non-focal, mild b/l hand tremors  SKIN: No rashes or lesions    LABS:                        8.7    2.92  )-----------( 129      ( 31 Mar 2021 07:03 )             25.3     03-31    136  |  103  |  3<L>  ----------------------------<  103<H>  3.2<L>   |  20<L>  |  0.53    Ca    7.2<L>      31 Mar 2021 07:03  Phos  1.8     03-31  Mg     1.4     03-31    TPro  7.1  /  Alb  4.1  /  TBili  1.1  /  DBili  x   /  AST  52<H>  /  ALT  77<H>  /  AlkPhos  43  03-30

## 2021-03-31 NOTE — PROVIDER CONTACT NOTE (OTHER) - ACTION/TREATMENT ORDERED:
As per provider, okay to give Ativan 2mg IV push
Pt repositioned with ice chips. Will continue to monitor.    Tracing placed in pt's chart.

## 2021-03-31 NOTE — PROGRESS NOTE ADULT - ASSESSMENT
27 y/o F with PMH of PTSD, anxiety, depression, alcohol abuse who presents with alcohol intoxication and admitted for alcohol intoxication

## 2021-03-31 NOTE — PROGRESS NOTE ADULT - PROBLEM SELECTOR PLAN 4
impairments found/rehab potential/therapy frequency/anticipated discharge recommendation -Seen by OB  -Monitor for now, delivery not indicated at this time

## 2021-03-31 NOTE — PROGRESS NOTE ADULT - ASSESSMENT
26y  at 24w2d presented with suicidal ideation and alcohol intoxication, admitted for multiple electrolyte abnormalities and for observation.     - appreciate excellent care per primary team  - confirmed with patient that she would like her domestic partner, Sha Hall, to be her healthcare proxy in the event of incapacitation. Phone [phone]. Please obtain formal proxy consents  - discussed standard delivery consents at bedside, to be signed once patient is more alert--still groggy at this time  - medication management to be discussed with psychiatrist. Will  patient re: risks and benefits as well as overall recommendation for continuing psychiatric medications  - daily NST to be performed by L+D nursing  - no obstetric complaints. For any issues, call [phone]. If concern for obstetric emergency, call [facility].    Rebecca Cordeor PGY3 26y  at 24w2d presented with suicidal ideation and alcohol intoxication, admitted for multiple electrolyte abnormalities and for observation.     - appreciate excellent care per primary team  - confirmed with patient that she would like her domestic partner, Sha Hall, to be her healthcare proxy in the event of incapacitation. Phone [phone]. Please obtain formal proxy consents  - discussed standard delivery consents at bedside, to be signed once patient is more alert--still groggy at this time  - medication management to be discussed with psychiatrist. Will  patient re: risks and benefits as well as overall recommendation for continuing psychiatric medications  - daily NST to be performed by L+D nursing  - no obstetric complaints. For any issues, call [phone]. If concern for obstetric emergency, call [facility].    Virginia Gil PGY3    ***MFM ADDENDUM***  Patient seen and evaluated at bedside w/ MFM attending Dr. Velasquez.  Patient reports feeling better, denies abdominal pain consistent with contractions, LOF, VB and reports normal fetal movements. She is worried about being discharged over the weekend because she would like to go straight to her rehabilitation clinic in the [place] for care. She fears that she would start drinking again if she went home. The patient has established care with psych in the [place] for a long time and would like to continue her care there, however it appears she has received mixed messages regarding her paroxetine therapy. She states she was told to take it when she feels very anxious or is having a panic attack rather than maintenance medication. We reviewed that in patients with a history of anxiety controlled on medications like paroxetine, despite not being first line in pregnancy given risk of associated congenital heart defects, that continuing as maintenance therapy (i.e. taking medication daily) is recommended to reduce risk of relapse and other adverse outcomes as discontinuation of medication increases her risk of severe anxiety and subsequent behavioral changes. This may have contributed to her alcohol consumption. Her fetal comprehensive ultrasound did not identify any heart defects however a fetal echocardiogram is suggested. Given that the patient desires discharge, we can schedule as outpatient at her next clinic visit. She also expressed concern regarding rehabilitation scheduling and the possible conflict with school, would suggest social work consultation to help patient with coordination of care. 26y  at 24w2d presented with suicidal ideation and alcohol intoxication, admitted for multiple electrolyte abnormalities and for observation.     - appreciate excellent care per primary team  - confirmed with patient that she would like her domestic partner, Sha Hall, to be her healthcare proxy in the event of incapacitation. Phone [phone]. Please obtain formal proxy consents  - discussed standard delivery consents at bedside, to be signed once patient is more alert--still groggy at this time  - medication management to be discussed with psychiatrist. Will  patient re: risks and benefits as well as overall recommendation for continuing psychiatric medications  - daily NST to be performed by L+D nursing  - no obstetric complaints. For any issues, call [phone]. If concern for obstetric emergency, call [facility].    Rebecca Cordero PGY3

## 2021-03-31 NOTE — PROGRESS NOTE ADULT - SUBJECTIVE AND OBJECTIVE BOX
ALEJANDRO ALEGRIA    R3 Antepartum Note, HD#2    Interval events: patient admitted yesterday, no acute events.    Patient seen and examined at bedside, no acute overnight events. No acute complaints. Pt reports +FM, denies LOF, VB, ctx, HA, epigastric pain, blurred vision, CP, SOB, N/V, fevers, and chills.    Vital Signs Last 24 Hours  T(C): 36.6 (03-31-21 @ 05:52), Max: 37.1 (03-30-21 @ 15:54)  HR: 93 (03-31-21 @ 06:31) (63 - 102)  BP: 105/65 (03-31-21 @ 06:31) (93/57 - 105/65)  RR: 18 (03-31-21 @ 06:31) (18 - 20)  SpO2: 99% (03-31-21 @ 06:31) (99% - 100%)    CAPILLARY BLOOD GLUCOSE          Physical Exam:  General: NAD  Abdomen: Soft, non-tender, gravid  Ext: No pain or swelling    NST reactive overnight    Labs:             8.7    2.92  )-----------( 129      ( 03-31 @ 07:03 )             25.3     03-31 @ 07:03    136  |  103  |  3   ----------------------------<  103  3.2   |  20  |  0.53    Ca    7.2      03-31 @ 07:03  Phos  1.8     03-31 @ 07:03  Mg     1.4     03-31 @ 07:03    TPro  7.1  /  Alb  4.1  /  TBili  1.1  /  DBili  x   /  AST  52  /  ALT  77  /  AlkPhos  43  03-30 @ 03:27            MEDICATIONS  (STANDING):  cyanocobalamin 1000 MICROGram(s) Oral daily  folic acid 1 milliGRAM(s) Oral daily  influenza   Vaccine 0.5 milliLiter(s) IntraMuscular once  LORazepam   Injectable   IV Push   LORazepam   Injectable 2 milliGRAM(s) IV Push every 4 hours  LORazepam   Injectable 1.5 milliGRAM(s) IV Push every 4 hours  prenatal multivitamin 1 Tablet(s) Oral daily  thiamine IVPB 500 milliGRAM(s) IV Intermittent three times a day    MEDICATIONS  (PRN):

## 2021-04-01 ENCOUNTER — APPOINTMENT (OUTPATIENT)
Dept: OBGYN | Facility: HOSPITAL | Age: 26
End: 2021-04-01

## 2021-04-01 ENCOUNTER — TRANSCRIPTION ENCOUNTER (OUTPATIENT)
Age: 26
End: 2021-04-01

## 2021-04-01 ENCOUNTER — NON-APPOINTMENT (OUTPATIENT)
Age: 26
End: 2021-04-01

## 2021-04-01 VITALS
SYSTOLIC BLOOD PRESSURE: 103 MMHG | OXYGEN SATURATION: 100 % | RESPIRATION RATE: 14 BRPM | DIASTOLIC BLOOD PRESSURE: 64 MMHG | HEART RATE: 90 BPM | TEMPERATURE: 98 F

## 2021-04-01 PROCEDURE — 99233 SBSQ HOSP IP/OBS HIGH 50: CPT

## 2021-04-01 PROCEDURE — 99239 HOSP IP/OBS DSCHRG MGMT >30: CPT

## 2021-04-01 PROCEDURE — 99232 SBSQ HOSP IP/OBS MODERATE 35: CPT | Mod: GC

## 2021-04-01 RX ORDER — THIAMINE MONONITRATE (VIT B1) 100 MG
1 TABLET ORAL
Qty: 0 | Refills: 0 | DISCHARGE
Start: 2021-04-01

## 2021-04-01 RX ORDER — DIAZEPAM 5 MG
1 TABLET ORAL
Qty: 0 | Refills: 0 | DISCHARGE

## 2021-04-01 RX ORDER — PREGABALIN 225 MG/1
1 CAPSULE ORAL
Qty: 0 | Refills: 0 | DISCHARGE
Start: 2021-04-01

## 2021-04-01 RX ORDER — FOLIC ACID 0.8 MG
1 TABLET ORAL
Qty: 0 | Refills: 0 | DISCHARGE
Start: 2021-04-01

## 2021-04-01 RX ADMIN — Medication 105 MILLIGRAM(S): at 06:17

## 2021-04-01 RX ADMIN — Medication 105 MILLIGRAM(S): at 14:05

## 2021-04-01 RX ADMIN — Medication 1.5 MILLIGRAM(S): at 02:15

## 2021-04-01 RX ADMIN — Medication 1.5 MILLIGRAM(S): at 14:02

## 2021-04-01 RX ADMIN — Medication 1.5 MILLIGRAM(S): at 06:16
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
## 2021-04-01 NOTE — PROGRESS NOTE ADULT - PROBLEM SELECTOR PLAN 1
-Patient with blood alcohol level 210; last drink 1 day PTA  -Reports h/o alcohol withdrawal seizure  -CIWA scores 1-2, very mild tremors noted on extended hands  -Monitor CIWA  -per OB ok with standing ativan taper, in addition add symptom triggered ativan  -SW evaluation
-Patient with blood alcohol level 210; last drink 1 day PTA  -Reports h/o alcohol withdrawal seizure  -CIWA scores 0-1, UE b/l tremors resolved  -Monitor CIWA  -per OB ok with standing ativan taper, in addition add symptom triggered ativan  -SW evaluation  - pt wishes to be discharged, will have psych evaluate if pt can leave AMA.

## 2021-04-01 NOTE — PROGRESS NOTE ADULT - SUBJECTIVE AND OBJECTIVE BOX
Patient seen and evaluated at bedside w/ MFM attending Dr. Velasquez.  Patient reports feeling better, denies abdominal pain consistent with contractions, LOF, VB and reports normal fetal movements. She is worried about being discharged over the weekend because she would like to go straight to her rehabilitation clinic in the [place] for care. She fears that she would start drinking again if she went home. The patient has established care with psych in the [place] for a long time and would like to continue her care there, however it appears she has received mixed messages regarding her paroxetine therapy. She states she was told to take it when she feels very anxious or is having a panic attack rather than maintenance medication. We reviewed that in patients with a history of anxiety controlled on medications like paroxetine, despite not being first line in pregnancy given risk of associated congenital heart defects, that continuing as maintenance therapy (i.e. taking medication daily) is recommended to reduce risk of relapse and other adverse outcomes as discontinuation of medication increases her risk of severe anxiety and subsequent behavioral changes. This may have contributed to her alcohol consumption. Her fetal comprehensive ultrasound did not identify any heart defects however a fetal echocardiogram is suggested. Given that the patient desires discharge, we can schedule as outpatient at her next clinic visit. She also expressed concern regarding rehabilitation scheduling and the possible conflict with school, would suggest social work consultation to help patient with coordination of care.   Patient seen and evaluated at bedside w/ MFM attending Dr. Velasquez.  Patient reports feeling better, denies abdominal pain consistent with contractions, LOF, VB and reports normal fetal movements. She is worried about being discharged over the weekend because she would like to go straight to her rehabilitation clinic in the [place] for care. She fears that she would start drinking again if she went home. The patient has established care with psych in the [place] for a long time and would like to continue her care there, however it appears she has received mixed messages regarding her paroxetine therapy. She states she was told to take it when she feels very anxious or is having a panic attack rather than maintenance medication. We reviewed that in patients with a history of anxiety controlled on medications like paroxetine, despite not being first line in pregnancy given risk of associated congenital heart defects, that continuing as maintenance therapy (i.e. taking medication daily) is recommended to reduce risk of relapse and other adverse outcomes as discontinuation of medication increases her risk of severe anxiety and subsequent behavioral changes. This may have contributed to her alcohol consumption. Her fetal comprehensive ultrasound did not identify any heart defects however a fetal echocardiogram is recommended. Given that the patient desires discharge and fetal echo is not urgent,, we can schedule fetal echo  as outpatient at her next clinic visit. She also expressed concern regarding rehabilitation scheduling and the possible conflict with school, would suggest social work consultation to help patient with coordination of care.

## 2021-04-01 NOTE — PROGRESS NOTE ADULT - PROBLEM SELECTOR PLAN 6
Dispo: psych to evaluate if has capacity to AMA especially given she is pregnant.
Patient interested in inpatient evaluation

## 2021-04-01 NOTE — DISCHARGE NOTE NURSING/CASE MANAGEMENT/SOCIAL WORK - PATIENT PORTAL LINK FT
You can access the FollowMyHealth Patient Portal offered by [facility] by registering at the following website: [email or web address]. By joining [facility]’s FollowMyHealth portal, you will also be able to view your health information using other applications (apps) compatible with our system.

## 2021-04-01 NOTE — DISCHARGE NOTE PROVIDER - HOSPITAL COURSE
27 y/o F with PMH of PTSD, anxiety, depression, alcohol abuse who presents with alcohol intoxication and admitted for alcohol intoxication and possible etoh withdrawal.    Alcohol withdrawal syndrome  -Patient with blood alcohol level 210; last drink 1 day PTA  -Reports h/o alcohol withdrawal seizure -CIWA scores 0-1, UE b/l tremors resolved ativan taper continued during hospital stay. OB consulted, Per OB ok with standing ativan taper, in addition add symptom triggered ativan  -SW evaluation called and followed patient. Patient has significant hsitory of Anxiety and depression. Reports severe uncontrolled anxiety leading to alcohol abuse. Psychiatry consulted recommending to  HOLD: Paxil, Prazosin, Remeron, Valium. -On valium ( See chart note for ISTOP) but will cont to hold. Patient reporting interest in inpatient substance abuse rehab in the [place]. Social madea ware and reached out to [facility] who confirmed she can come tomorrow am before 8am for intake as a walk in.  Patient requesting to leave hospital today however medically not cleared considering her continued ativan taper and risk for withdrawl.  Psych team evaluated patient and confirmed she may sign out AMA if that is her wish and is cleared to do so per psychiatry. Of note patient has been followed throughout admission by OB/MFM team and u ndergoing frequent NST per OB.  Patient to follow up outpatient with [facility], with whom she follows.    Patient is leaving hospital AMA. Risks described to patient and she agrees and understands risks.

## 2021-04-01 NOTE — PROGRESS NOTE ADULT - REASON FOR ADMISSION
Anxiety and depression and alcohol intoxication

## 2021-04-01 NOTE — BH CONSULTATION LIAISON PROGRESS NOTE - NSBHCONSULTMEDPRN_PSY_A_CORE
Progress Notes by Zeny Chavis RN,PALAKE at 06/12/17 04:14 PM     Author:  Zeny Chavis RN,PALAKE Service:  (none) Author Type:  Registered Nurse-Ancillary     Filed:  06/12/17 04:21 PM Encounter Date:  6/12/2017 Status:  Signed     :  Zeny Chavis RN,REMY (Registered Nurse-Ancillary)              Zenypayton Del Rosario, who is a patient of Dr. Rock, came in today for information on insulin pumps and CGM.[EK1.1T]   Patient is interested in the 670G.[EK1.1M]      Patient verbalized understanding of the purpose of basal insulin, and bolus insulin, the benefits and risks of insulin pump therapy,[EK1.1T] carb-to-insulin ratio, sensitivity, basal rate, active insulin time,[EK1.1M] the necessity of testing[EK1.1T] twice a day to calibrate the sensor,[EK1.1M] importance of counting carbohydrates, the need for changing infusion set every three days sensor every seven days, how to treat low blood sugars, wearing a medical ID, steps to follow when glucose levels are over 250, sick-day protocol, DKA and DKA prevention[EK1.1T] and[EK1.1M] use of glucagon.[EK1.1T]  Patient was recently hospitalized for DKA.[EK1.1M]      Patient was shown the Medtronic and T-Slim pumps and Uptake Medicalite and Dexcom CGMs.[EK1.1T]  She is interested in the 670G, and will do further research on it.  She will also practice carb counting which she has not done before.  Book on carb counting was given to the patient.  She was also given forms to fill out when she decides on a pump.    Time spent with patient:  1 hour[EK1.1M]      Revision History        User Key Date/Time User Provider Type Action    > EK1.1 06/12/17 04:21 PM Zeny Chavis RN,CDE Registered Nurse-Ancillary Sign    M - Manual, T - Template            
no

## 2021-04-01 NOTE — DISCHARGE NOTE PROVIDER - NSDCCPCAREPLAN_GEN_ALL_CORE_FT
PRINCIPAL DISCHARGE DIAGNOSIS  Diagnosis: Suicidal ideation  Assessment and Plan of Treatment: You were evaluated by psychiatry duringyour stay.   You need further psychiatric care outpatien t      SECONDARY DISCHARGE DIAGNOSES  Diagnosis: Alcohol withdrawal syndrome without complication  Assessment and Plan of Treatment: You were being treated for alcohol withdrawl with a taper of ativan.  It is not recommended for you to leave the hospital at thie time, however your chose to leave against medical advice.   You need to follow up for further care for substance abuse.   You have reported that you have an appointment for an inpatient program [facility] - Substance Abuse.    Diagnosis: Anxiety and depression  Assessment and Plan of Treatment: Follow up with psychiatry outpatient    Diagnosis: 24 weeks gestation of pregnancy  Assessment and Plan of Treatment: Continue prenatal medication   You must follow up with your OB outpatient for further care     PRINCIPAL DISCHARGE DIAGNOSIS  Diagnosis: Suicidal ideation  Assessment and Plan of Treatment: You were evaluated by psychiatry duringyour stay.   You need further psychiatric care outpatien t      SECONDARY DISCHARGE DIAGNOSES  Diagnosis: Alcohol withdrawal syndrome without complication  Assessment and Plan of Treatment: You were being treated for alcohol withdrawl with a taper of ativan.  It is not recommended for you to leave the hospital at thie time, however your chose to leave against medical advice.   You need to follow up for further care for substance abuse.   You have reported that you have an appointment for an inpatient program [facility] - Substance Abuse.    Diagnosis: Anxiety and depression  Assessment and Plan of Treatment: Follow up with psychiatry outpatient    Diagnosis: 24 weeks gestation of pregnancy  Assessment and Plan of Treatment: Continue prenatal vitamin  You must follow up with your OB outpatient for further care  YOUR NEXT APPOINTMENT IS APRIL 6th

## 2021-04-01 NOTE — PROGRESS NOTE ADULT - SUBJECTIVE AND OBJECTIVE BOX
Patient is a 26y old  Female who presents with a chief complaint of Anxiety and depression and alcohol intoxication (31 Mar 2021 13:14)      SUBJECTIVE / OVERNIGHT EVENTS: no events overnight. today AM pt stated she wants to be discharged and get admitted to inpatient rehab at [facility]. She denies feeling anxious, HA, tremors, N/V, audio or visual hallucination    MEDICATIONS  (STANDING):  cyanocobalamin 1000 MICROGram(s) Oral daily  folic acid 1 milliGRAM(s) Oral daily  influenza   Vaccine 0.5 milliLiter(s) IntraMuscular once  LORazepam     Tablet   Oral   LORazepam     Tablet 1 milliGRAM(s) Oral every 4 hours  LORazepam     Tablet 1 milliGRAM(s) Oral every 4 hours  LORazepam   Injectable   IV Push   LORazepam   Injectable 1.5 milliGRAM(s) IV Push every 4 hours  melatonin 6 milliGRAM(s) Oral at bedtime  prenatal multivitamin 1 Tablet(s) Oral daily  thiamine IVPB 500 milliGRAM(s) IV Intermittent three times a day    MEDICATIONS  (PRN):        CAPILLARY BLOOD GLUCOSE        I&O's Summary      PHYSICAL EXAM:  GENERAL: NAD, well-developed  HEAD:  Atraumatic, Normocephalic  EYES: EOMI, PERRLA, conjunctiva and sclera clear  NECK: Supple, No JVD  CHEST/LUNG: Clear to auscultation bilaterally; No wheeze  HEART: Regular rate and rhythm; No murmurs, rubs, or gallops  ABDOMEN: Soft, Nontender, Nondistended; Bowel sounds present  EXTREMITIES:  2+ Peripheral Pulses, No clubbing, cyanosis, or edema  PSYCH: AAOx3  NEUROLOGY: non-focal  SKIN: No rashes or lesions    LABS:                        9.4    3.77  )-----------( 130      ( 01 Apr 2021 07:19 )             28.3     04-01    134<L>  |  106  |  3<L>  ----------------------------<  91  3.6   |  18<L>  |  0.52    Ca    7.9<L>      01 Apr 2021 07:19  Phos  1.8     03-31  Mg     1.7     04-01                RADIOLOGY & ADDITIONAL TESTS:    Imaging Personally Reviewed:    Consultant(s) Notes Reviewed:      Care Discussed with Consultants/Other Providers:

## 2021-04-01 NOTE — DISCHARGE NOTE PROVIDER - NSFOLLOWUPCLINICS_GEN_ALL_ED_FT
[facility]  [place] [address]  Phone: [phone]  Fax:      [facility]  [place] [address]  Phone: [phone]  Fax:     [facility]  OB/GYN & Surg  [address] [place] [id number]  Phone: [phone]  Fax:

## 2021-04-01 NOTE — CHART NOTE - NSCHARTNOTEFT_GEN_A_CORE
Patient requesting to be discharged. Psych team evaluated patient and determined patient able to sign out AMA. Patient reporting reason to sign out AMA being patient wants to sign into [facility] tomorrow am by 8 am. Patient educated on risks of leaving including abrupt discontinuation of ativan taper. Patient reports understanding of necessary follow up care and risks to leaving against medical advice.   Dr. Barnes aware, [facility] team also made aware. Patient educated on not using alcohol and reports she is seeking help and has no intention of using alcohol.

## 2021-04-01 NOTE — DISCHARGE NOTE PROVIDER - NSDCMRMEDTOKEN_GEN_ALL_CORE_FT
Paxil 20 mg oral tablet: 1 tab(s) orally once a day  Prenatal Multivitamins with Folic Acid 1 mg oral capsule: 1 cap(s) orally once a day  Valium 5 mg oral tablet: 1  orally once a day, and 1/2 tab at bedtime As Needed   cyanocobalamin 1000 mcg oral tablet: 1 tab(s) orally once a day  folic acid 1 mg oral tablet: 1 tab(s) orally once a day  Prenatal Multivitamins with Folic Acid 1 mg oral capsule: 1 cap(s) orally once a day  thiamine 100 mg oral tablet: 1 tab(s) orally once a day

## 2021-04-01 NOTE — PROGRESS NOTE ADULT - ASSESSMENT
25 y/o F with PMH of PTSD, anxiety, depression, alcohol abuse who presents with alcohol intoxication and admitted for alcohol intoxication and possible etoh withdrawal.

## 2021-04-01 NOTE — PROGRESS NOTE ADULT - ASSESSMENT
MFM attg  I spoke with pt and counseled her face to face.  Recommend fetal echo as outpt due to Paxil exposure.  Would not discontinue Paxil if this has been effective treatment for her anxiety- pt implied she got mixed messages re continuation, but she is poor historian, may have been referring to intermittent use of benzodiazepine.

## 2021-04-01 NOTE — BH CONSULTATION LIAISON PROGRESS NOTE - CURRENT MEDICATION
MEDICATIONS  (STANDING):  cyanocobalamin 1000 MICROGram(s) Oral daily  folic acid 1 milliGRAM(s) Oral daily  influenza   Vaccine 0.5 milliLiter(s) IntraMuscular once  LORazepam     Tablet   Oral   LORazepam     Tablet 1 milliGRAM(s) Oral every 4 hours  LORazepam     Tablet 1 milliGRAM(s) Oral every 4 hours  melatonin 6 milliGRAM(s) Oral at bedtime  prenatal multivitamin 1 Tablet(s) Oral daily  thiamine IVPB 500 milliGRAM(s) IV Intermittent three times a day    MEDICATIONS  (PRN):

## 2021-04-01 NOTE — BH CONSULTATION LIAISON PROGRESS NOTE - NSBHCHARTREVIEWVS_PSY_A_CORE FT
Vital Signs Last 24 Hrs  T(C): 36.7 (01 Apr 2021 14:00), Max: 36.8 (31 Mar 2021 22:06)  T(F): 98 (01 Apr 2021 14:00), Max: 98.3 (01 Apr 2021 02:06)  HR: 90 (01 Apr 2021 14:00) (87 - 99)  BP: 103/64 (01 Apr 2021 14:00) (93/56 - 103/66)  BP(mean): --  RR: 14 (01 Apr 2021 14:00) (14 - 16)  SpO2: 100% (01 Apr 2021 14:00) (100% - 100%)

## 2021-04-01 NOTE — PROGRESS NOTE ADULT - PROBLEM SELECTOR PLAN 2
-Reports severe uncontrolled anxiety leading to alcohol abuse  -psych recc  to HOLD: Paxil, Prazosin, Remeron, Valium until evaluated by CL attending  -On valium ( See chart note for ISTOP) but will cont to hold  -Patient is interested in inpatient psych admission  -c/w constant observation
-Reports severe uncontrolled anxiety leading to alcohol abuse  -psych recc  to HOLD: Paxil, Prazosin, Remeron, Valium until evaluated by CL attending  -On valium ( See chart note for ISTOP) but will cont to hold  -Patient is interested in inpatient psych admission  -c/w constant observation

## 2021-04-04 ENCOUNTER — INPATIENT (INPATIENT)
Facility: HOSPITAL | Age: 26
LOS: 4 days | Discharge: HOME | End: 2021-04-09
Attending: HOSPITALIST | Admitting: HOSPITALIST
Payer: MEDICAID

## 2021-04-04 VITALS
TEMPERATURE: 99 F | RESPIRATION RATE: 18 BRPM | DIASTOLIC BLOOD PRESSURE: 75 MMHG | HEIGHT: 68 IN | OXYGEN SATURATION: 100 % | HEART RATE: 106 BPM | SYSTOLIC BLOOD PRESSURE: 131 MMHG

## 2021-04-04 DIAGNOSIS — N39.0 URINARY TRACT INFECTION, SITE NOT SPECIFIED: ICD-10-CM

## 2021-04-04 DIAGNOSIS — Z3A.24 24 WEEKS GESTATION OF PREGNANCY: ICD-10-CM

## 2021-04-04 DIAGNOSIS — F32.9 MAJOR DEPRESSIVE DISORDER, SINGLE EPISODE, UNSPECIFIED: ICD-10-CM

## 2021-04-04 DIAGNOSIS — E87.2 ACIDOSIS: ICD-10-CM

## 2021-04-04 DIAGNOSIS — F10.239 ALCOHOL DEPENDENCE WITH WITHDRAWAL, UNSPECIFIED: ICD-10-CM

## 2021-04-04 DIAGNOSIS — R80.9 PROTEINURIA, UNSPECIFIED: ICD-10-CM

## 2021-04-04 DIAGNOSIS — Z29.9 ENCOUNTER FOR PROPHYLACTIC MEASURES, UNSPECIFIED: ICD-10-CM

## 2021-04-04 LAB
ALBUMIN SERPL ELPH-MCNC: 5 G/DL — SIGNIFICANT CHANGE UP (ref 3.3–5)
ALP SERPL-CCNC: 55 U/L — SIGNIFICANT CHANGE UP (ref 40–120)
ALT FLD-CCNC: 171 U/L — HIGH (ref 4–33)
ANION GAP SERPL CALC-SCNC: 20 MMOL/L — HIGH (ref 7–14)
APPEARANCE UR: ABNORMAL
AST SERPL-CCNC: 181 U/L — HIGH (ref 4–32)
BACTERIA # UR AUTO: ABNORMAL
BASE EXCESS BLDV CALC-SCNC: -6.3 MMOL/L — LOW (ref -3–2)
BASOPHILS # BLD AUTO: 0.01 K/UL — SIGNIFICANT CHANGE UP (ref 0–0.2)
BASOPHILS NFR BLD AUTO: 0.2 % — SIGNIFICANT CHANGE UP (ref 0–2)
BILIRUB SERPL-MCNC: 1.8 MG/DL — HIGH (ref 0.2–1.2)
BILIRUB UR-MCNC: ABNORMAL
BLOOD GAS VENOUS - CREATININE: 0.8 MG/DL — SIGNIFICANT CHANGE UP (ref 0.5–1.3)
BLOOD GAS VENOUS COMPREHENSIVE RESULT: SIGNIFICANT CHANGE UP
BUN SERPL-MCNC: 5 MG/DL — LOW (ref 7–23)
CALCIUM SERPL-MCNC: 9.6 MG/DL — SIGNIFICANT CHANGE UP (ref 8.4–10.5)
CHLORIDE BLDV-SCNC: 113 MMOL/L — HIGH (ref 96–108)
CHLORIDE SERPL-SCNC: 104 MMOL/L — SIGNIFICANT CHANGE UP (ref 98–107)
CO2 SERPL-SCNC: 16 MMOL/L — LOW (ref 22–31)
COLOR SPEC: ABNORMAL
CREAT SERPL-MCNC: 0.64 MG/DL — SIGNIFICANT CHANGE UP (ref 0.5–1.3)
DIFF PNL FLD: ABNORMAL
EOSINOPHIL # BLD AUTO: 0 K/UL — SIGNIFICANT CHANGE UP (ref 0–0.5)
EOSINOPHIL NFR BLD AUTO: 0 % — SIGNIFICANT CHANGE UP (ref 0–6)
EPI CELLS # UR: 24 /HPF — HIGH (ref 0–5)
ETHANOL SERPL-MCNC: <10 MG/DL — SIGNIFICANT CHANGE UP
GAS PNL BLDV: 144 MMOL/L — SIGNIFICANT CHANGE UP (ref 136–146)
GLUCOSE BLDV-MCNC: 125 MG/DL — HIGH (ref 70–99)
GLUCOSE SERPL-MCNC: 120 MG/DL — HIGH (ref 70–99)
GLUCOSE UR QL: NEGATIVE — SIGNIFICANT CHANGE UP
HCO3 BLDV-SCNC: 19 MMOL/L — LOW (ref 20–27)
HCT VFR BLD CALC: 36.9 % — SIGNIFICANT CHANGE UP (ref 34.5–45)
HCT VFR BLDA CALC: 39.3 % — SIGNIFICANT CHANGE UP (ref 34.5–46.5)
HGB BLD CALC-MCNC: 12.8 G/DL — SIGNIFICANT CHANGE UP (ref 11.5–15.5)
HGB BLD-MCNC: 12.1 G/DL — SIGNIFICANT CHANGE UP (ref 11.5–15.5)
HYALINE CASTS # UR AUTO: 56 /LPF — HIGH (ref 0–7)
IANC: 4.08 K/UL — SIGNIFICANT CHANGE UP (ref 1.5–8.5)
IMM GRANULOCYTES NFR BLD AUTO: 0.9 % — SIGNIFICANT CHANGE UP (ref 0–1.5)
KETONES UR-MCNC: ABNORMAL
LACTATE BLDV-MCNC: 1.8 MMOL/L — SIGNIFICANT CHANGE UP (ref 0.5–2)
LEUKOCYTE ESTERASE UR-ACNC: ABNORMAL
LYMPHOCYTES # BLD AUTO: 0.89 K/UL — LOW (ref 1–3.3)
LYMPHOCYTES # BLD AUTO: 16.3 % — SIGNIFICANT CHANGE UP (ref 13–44)
MAGNESIUM SERPL-MCNC: 2.3 MG/DL — SIGNIFICANT CHANGE UP (ref 1.6–2.6)
MCHC RBC-ENTMCNC: 26.6 PG — LOW (ref 27–34)
MCHC RBC-ENTMCNC: 32.8 GM/DL — SIGNIFICANT CHANGE UP (ref 32–36)
MCV RBC AUTO: 81.1 FL — SIGNIFICANT CHANGE UP (ref 80–100)
MONOCYTES # BLD AUTO: 0.43 K/UL — SIGNIFICANT CHANGE UP (ref 0–0.9)
MONOCYTES NFR BLD AUTO: 7.9 % — SIGNIFICANT CHANGE UP (ref 2–14)
NEUTROPHILS # BLD AUTO: 4.08 K/UL — SIGNIFICANT CHANGE UP (ref 1.8–7.4)
NEUTROPHILS NFR BLD AUTO: 74.7 % — SIGNIFICANT CHANGE UP (ref 43–77)
NITRITE UR-MCNC: NEGATIVE — SIGNIFICANT CHANGE UP
NRBC # BLD: 0 /100 WBCS — SIGNIFICANT CHANGE UP
NRBC # FLD: 0 K/UL — SIGNIFICANT CHANGE UP
PCO2 BLDV: 34 MMHG — LOW (ref 39–42)
PH BLDV: 7.34 — SIGNIFICANT CHANGE UP (ref 7.32–7.43)
PH UR: 6 — SIGNIFICANT CHANGE UP (ref 5–8)
PLATELET # BLD AUTO: 243 K/UL — SIGNIFICANT CHANGE UP (ref 150–400)
PO2 BLDV: 33 MMHG — LOW (ref 35–40)
POTASSIUM BLDV-SCNC: 3.5 MMOL/L — SIGNIFICANT CHANGE UP (ref 3.4–4.5)
POTASSIUM SERPL-MCNC: 4 MMOL/L — SIGNIFICANT CHANGE UP (ref 3.5–5.3)
POTASSIUM SERPL-SCNC: 4 MMOL/L — SIGNIFICANT CHANGE UP (ref 3.5–5.3)
PROT SERPL-MCNC: 8.8 G/DL — HIGH (ref 6–8.3)
PROT UR-MCNC: ABNORMAL
RBC # BLD: 4.55 M/UL — SIGNIFICANT CHANGE UP (ref 3.8–5.2)
RBC # FLD: 15.2 % — HIGH (ref 10.3–14.5)
RBC CASTS # UR COMP ASSIST: 18 /HPF — HIGH (ref 0–4)
SAO2 % BLDV: 54.3 % — LOW (ref 60–85)
SARS-COV-2 RNA SPEC QL NAA+PROBE: SIGNIFICANT CHANGE UP
SODIUM SERPL-SCNC: 140 MMOL/L — SIGNIFICANT CHANGE UP (ref 135–145)
SP GR SPEC: 1.03 — HIGH (ref 1.01–1.02)
UROBILINOGEN FLD QL: ABNORMAL
WBC # BLD: 5.46 K/UL — SIGNIFICANT CHANGE UP (ref 3.8–10.5)
WBC # FLD AUTO: 5.46 K/UL — SIGNIFICANT CHANGE UP (ref 3.8–10.5)
WBC UR QL: 14 /HPF — HIGH (ref 0–5)

## 2021-04-04 PROCEDURE — 93010 ELECTROCARDIOGRAM REPORT: CPT

## 2021-04-04 PROCEDURE — 99285 EMERGENCY DEPT VISIT HI MDM: CPT | Mod: 25

## 2021-04-04 RX ORDER — METOCLOPRAMIDE HCL 10 MG
10 TABLET ORAL ONCE
Refills: 0 | Status: COMPLETED | OUTPATIENT
Start: 2021-04-04 | End: 2021-04-04

## 2021-04-04 RX ORDER — ONDANSETRON 8 MG/1
4 TABLET, FILM COATED ORAL ONCE
Refills: 0 | Status: COMPLETED | OUTPATIENT
Start: 2021-04-04 | End: 2021-04-04

## 2021-04-04 RX ORDER — CEFTRIAXONE 500 MG/1
1000 INJECTION, POWDER, FOR SOLUTION INTRAMUSCULAR; INTRAVENOUS ONCE
Refills: 0 | Status: COMPLETED | OUTPATIENT
Start: 2021-04-04 | End: 2021-04-04

## 2021-04-04 RX ORDER — SODIUM CHLORIDE 9 MG/ML
1000 INJECTION INTRAMUSCULAR; INTRAVENOUS; SUBCUTANEOUS ONCE
Refills: 0 | Status: COMPLETED | OUTPATIENT
Start: 2021-04-04 | End: 2021-04-04

## 2021-04-04 RX ADMIN — ONDANSETRON 4 MILLIGRAM(S): 8 TABLET, FILM COATED ORAL at 16:53

## 2021-04-04 RX ADMIN — SODIUM CHLORIDE 1000 MILLILITER(S): 9 INJECTION INTRAMUSCULAR; INTRAVENOUS; SUBCUTANEOUS at 16:53

## 2021-04-04 RX ADMIN — CEFTRIAXONE 100 MILLIGRAM(S): 500 INJECTION, POWDER, FOR SOLUTION INTRAMUSCULAR; INTRAVENOUS at 19:52

## 2021-04-04 RX ADMIN — Medication 10 MILLIGRAM(S): at 21:20

## 2021-04-04 NOTE — CONSULT NOTE ADULT - ASSESSMENT
25y/o  at 24w6d with history of alcohol abuse this gestation (last drink reportedly 3/28) s/p recent admission to [facility] for suicidal ideation and alcohol abuse discharged AMA to outpatient rehab representing with persistent nausea and emesis as well as inability to tolerate oral intake since x4d. Fetal monitoring notable for BPP 6/10 (nonreactive NST, no breathing).  Differential diagnosis of patients presenting complaint include alcohol withdrawal versus possible preeclampsia given increased AST/ALT versus pancreatitis or possible appendicitis. Preeclampsia remains low within the differential given that patient is normotensive without sequelae.     - Appreciate management of presumed alcohol withdrawal as per primary medicine team.   - Would recommend utox   - Please obtain amylase/lipase to rule-out possible pancreatitis as alternative etiology of sx  - At this gestational age, BPP 6/10 is equivocal. Will repeat BPP/NST in AM   - MFM will continue to follow     D/w Dr. Valencia (OB service attending) and Dr. Pedroza (MFM fellow)  Sumi, PGY-2  27y/o  at 24w6d with history of alcohol abuse this gestation (last drink reportedly 3/28) s/p recent admission to [facility] for suicidal ideation and alcohol abuse discharged AMA to outpatient rehab representing with persistent nausea and emesis as well as inability to tolerate oral intake since x4d. Fetal monitoring notable for BPP 6/10 (nonreactive NST, no breathing).  Differential diagnosis of patients presenting complaint include alcohol withdrawal versus possible preeclampsia given increased AST/ALT versus pancreatitis or possible appendicitis. Preeclampsia remains low within the differential given that patient is normotensive without sequelae.     - Appreciate management of presumed alcohol withdrawal as per primary medicine team.   - Would recommend utox   - Please obtain amylase/lipase to rule-out possible pancreatitis as alternative etiology of sx  - At this gestational age, BPP 6/10 is equivocal. Will repeat BPP/NST in AM   - MFM will continue to follow     D/w Dr. Valencia (OB service attending) and Dr. Pedroza (MFM fellow)  Sumi, PGY-2     ***MFM ADDENDUM***  Called by PGY2 regarding management recommendations.  This patient is well known to us, recently left AMA on Thursday to obtain care at her rehabilitation center in [place] however was unable to be admitted.  Etiology of her presentation unclear as blood alcohol level is <10 and it has been 1 week since her last alcoholic beverage. Appreciate medicine care.  Regarding fetal status, BPP 6/10 is equivocal at this gestational age, would suggest toxicology evaluation and will repeat testing in AM.  d/w MFM attending Dr. Grande

## 2021-04-04 NOTE — H&P ADULT - PROBLEM SELECTOR PLAN 1
Patient had recent admission for alcohol withdrawal syndrome. At that time ; last drink 3/29/21  -Reports hx of alcohol withdrawal seizure  -CIWA  -per OB at prior hospitalization, ok with standing ativan taper, in addition add symptom triggered ativan  -SW evaluation since denied utpatient rehab care coordinated facility in [place]. Patient presents with nausea, vomiting, abdominal/ chest pain. Differential includes alcohol withdrawal, UTI, medication withdrawal, pancreatitis/gastritis   - afebrile, tachycardia, /75, satting well RA.  - EKG shows normal sinus tachycardia w/ . No OLIVIA/STD.   - mildly elevated transaminases, higher than prior admission.   - low suspicion for alcohol withdrawal given last drink 3/29 and no new substances. Will send tox to confirm  - possible medication withdrawal as patient told to hold valium, remeron, paxil, minipress until follow up outpt or evaluation by psychiatry, which was interrupted as pt left AMA. Psych consult  - check amylase, lipase. Will obtain RUQ u/s  - PRN reglan, zofran. IVF rehydration given poor PO intake. Patient presents with nausea, vomiting, abdominal/chest pain. Differential includes alcohol withdrawal, UTI, medication withdrawal, pancreatitis/gastritis   - afebrile, tachycardia, /75, satting well RA.  - EKG shows normal sinus tachycardia w/ . No OLIVIA/STD.   - mildly elevated transaminases, higher than prior admission.   - low suspicion for alcohol withdrawal given last drink 3/29 and no new substances. Will send tox to confirm  - possible medication withdrawal as patient told to hold valium, remeron, paxil, minipress until follow up outpt or evaluation by psychiatry, which was interrupted as pt left AMA. Psych consult  - check amylase, lipase. Will obtain RUQ u/s  - PRN reglan, zofran. IVF rehydration given poor PO intake. Patient presents with nausea, vomiting, abdominal/chest pain. Differential includes alcohol withdrawal, UTI, medication withdrawal, pancreatitis/gastritis   - afebrile, tachycardia, /75, satting well RA.  - EKG shows normal sinus tachycardia w/ . No OLIVIA/STD.   - mildly elevated transaminases, higher than prior admission.   - low suspicion for alcohol withdrawal given last drink 3/29 and no new substances. Will send tox to confirm  - possible medication withdrawal as patient told to hold valium, remeron, paxil, minipress until follow up outpt or evaluation by psychiatry, which was interrupted as pt left AMA. Psych consult in AM  - check amylase, lipase. Will obtain RUQ u/s  - PRN reglan, zofran. IVF rehydration given poor PO intake.  - Patient also reports blood mixed with her emesis since Saturday, states that she has been throwing up every 10-30 minutes since Friday, suspect patient likely has a nevin hill tear, would make her NPO, place on protonic PPI BID, and monitor, if bleeding continues then consider GI eval

## 2021-04-04 NOTE — ED ADULT TRIAGE NOTE - NS ED NURSE BANDS TYPE
Name band; Finasteride Male Counseling: Finasteride Counseling:  I discussed with the patient the risks of use of finasteride including but not limited to decreased libido, decreased ejaculate volume, gynecomastia, and depression. Women should not handle medication.  All of the patient's questions and concerns were addressed. Finasteride Counseling:  I discussed with the patient the risks of use of finasteride including but not limited to decreased libido, decreased ejaculate volume, gynecomastia, and depression. Women should not handle medication.  All of the patient's questions and concerns were addressed.

## 2021-04-04 NOTE — H&P ADULT - ASSESSMENT
Patient is a 27 y/o F with PMH of PTSD, anxiety, depression, alcohol abuse w/ seizures, with recent admission for 3/30-4/1 for alcohol intoxication and withdrawal and left AMA when not medically not cleared considering her continued ativan taper and risk for withdrawal, who presents to ED for as patient was recently left AMA to f/u at outpatient rehab care coordinated facility in [place] but was denied. Patient with metabolic acidosis with bicarb on presentation of 16, gap of 20. Patient is a 27 y/o F with PMH of PTSD, anxiety, depression, alcohol abuse w/ seizures, with recent admission for 3/30-4/1 for alcohol intoxication and withdrawal in setting of pregnancy and left AMA, who presents to ED for nausea, emesis and poor PO intake with associated abdominal/ chest pain likely in setting of polysubstance withdrawal. Patient is a 25 y/o F with PMH of PTSD, anxiety, depression, alcohol abuse w/ seizures, with recent admission for 3/30-4/1 for alcohol intoxication and withdrawal in setting of pregnancy and left AMA, who presents to ED for nausea, emesis and poor PO intake with associated abdominal/ chest pain likely in setting of EtOH withdrawal.

## 2021-04-04 NOTE — H&P ADULT - NSHPREVIEWOFSYSTEMS_GEN_ALL_CORE
REVIEW OF SYSTEMS:    CONSTITUTIONAL: No weakness, fevers or chills  EYES/ENT: No visual changes;  No vertigo or throat pain   NECK: No pain or stiffness  RESPIRATORY: No cough, wheezing, hemoptysis; No shortness of breath  CARDIOVASCULAR: No chest pain or palpitations  GASTROINTESTINAL: No abdominal or epigastric pain. No nausea, vomiting, or hematemesis; No diarrhea or constipation. No melena or hematochezia.  GENITOURINARY: No dysuria, frequency or hematuria  NEUROLOGICAL: No numbness or weakness  SKIN: No itching, burning, rashes, or lesions   All other review of systems is negative unless indicated above. REVIEW OF SYSTEMS:    CONSTITUTIONAL: + generalized fatigue and shaking. No fevers  EYES/ENT: No current visual changes;    NECK: No pain or stiffness  RESPIRATORY: No cough, wheezing, hemoptysis; No shortness of breath  CARDIOVASCULAR: See HPI  GASTROINTESTINAL: + mid anterior abdominal pain.  No diarrhea. No melena or hematochezia.  GENITOURINARY: No dysuria, hematuria  NEUROLOGICAL: No numbness or weakness  All other review of systems is negative unless indicated above. REVIEW OF SYSTEMS:    CONSTITUTIONAL: + generalized fatigue and shaking. No fevers  EYES: No visual changes or eye discharge  ENT: No rhinorrhea or sore throat  NECK: No pain or stiffness  RESPIRATORY: No cough, wheezing, hemoptysis; No shortness of breath  CARDIOVASCULAR: +Midsternal chest pain radiating to epigastrium, no palpitations, no AGUIRRE  GASTROINTESTINAL: + mid anterior abdominal pain. +N/V. No diarrhea. No melena or hematochezia.  GENITOURINARY: No dysuria, hematuria  NEUROLOGICAL: No numbness or weakness

## 2021-04-04 NOTE — ED ADULT NURSE NOTE - OBJECTIVE STATEMENT
Receive pt. in ER room 28 alert and oriented x 4, presenting to the ER requesting detox. from alcohol Pt. is pregnant and stated " I need detox from alcohol , I went to a facility but they refuse me". C/o of nausea and vomiting. Medicated as ordered, labs sent.

## 2021-04-04 NOTE — ED PROVIDER NOTE - OBJECTIVE STATEMENT
25 Y/O F  25 weeks pregnant presents to ER for ETOH withdrawal. Pt discharged on  after outpatient rehab care coordinated to facility in [place]. Presented to facility but was refused because not resident of [place] and told to return next week. Last consumed ETOH on Monday 3/29. No recent substance use. Follows at [facility]. Provided Ativan taper during inpatient stay. Admits to increased weakness, nausea but tolerating PO. Denies fever, dizziness, headache, auditory/visual hallucinations, chest pain, SOB, difficulty breathing, SI/HI, abdominal pain, dysuria or vaginal bleeding.

## 2021-04-04 NOTE — ED ADULT TRIAGE NOTE - CHIEF COMPLAINT QUOTE
Pt is 24 weeks pregnant, was admitted here for alcohol withdrawal, on ativan drip, discharged and referred to a treatment facility a few days ago. Pt was unable to go to the facility because they had no space for her, had to take an emergent patient. Pt reports last alcohol intake on Monday, c/o N/V, headache, chest pain, abd pain.

## 2021-04-04 NOTE — ED PROVIDER NOTE - ATTENDING CONTRIBUTION TO CARE
Attending MD Ramos.  Agree with above.  Pt is a 25 yo female at 24 wks pregnant who lives with partner and 4 children.  Pt has hx of EtOH abuse (1/2-1 pint EtOH regularly) and hx of DT’s with seizures, PTSD, anxiety, depression and previous inpt detox.  Pt endorsing EtOH withdrawal this last week and was d/c’d for outpt rehab but was unable to get into the facility because they didn’t have space for her.  Now presenting to ED with complaint of headache, CP, n/v, abdominal pain. Attending MD Ramos.  Agree with above.  Pt is a 25 yo female at 24 wks pregnant who lives with partner and 4 children.  Pt has hx of EtOH abuse (1/2-1 pint EtOH regularly) and hx of DT’s with seizures, PTSD, anxiety, depression and previous inpt detox.  Pt endorsing EtOH withdrawal this last week and was d/c’d for outpt rehab but was unable to get into the facility because they didn’t have space for her.  Now presenting to ED with complaint of headache, CP, n/v, abdominal pain.  Planned FHR, OB consult, formal CIWA score, discuss with SW re: best plan for concern for need for rehab/detox.  Pt hemodynamically stable with only mild tachy at time of signout to incoming team.

## 2021-04-04 NOTE — H&P ADULT - ATTENDING COMMENTS
Patient seen and examined on 4/5/2021, case discussed with Dr. Cuate Chang. This is a 26M with history as above who presents to the Patient seen and examined on 4/5/2021, case discussed with Dr. Cuate Chang. This is a 26M with history as above who presents to the hospital with complaints of epigastric abd/chest pain with associated nausea/emesis and EtOH withdrawals. Patient was at [facility] recently for EtOH abuse and was being managed with an ativan taper but left early as she wanted to get into a substance abuse program in the [place] but was unable to get in. Said that she went home and was initially doing well but on Friday started to have her symptoms. Also reports EtOH use on Friday but states it was not her usual amount. Since Friday has had persistent nausea/vomiting with associated abd/chest pain (describes it as a knot that there persistently, does not radiate to her back/upper chest/L chest, does not worsen with exertion). Has been throwing up constantly and therefore came to the hospital. On eval, noted to be in some distress 2/2 abd pain/nausea/emesis, states she is still throwing up (yellow/some blood mixed in), and now states that she is having withdrawal symptoms (noted to have tremulousness, tongue fasciculations, states she has worsening anxiety/HA/nausea/agitation). Lab work notable for transaminitis and a positive UA (though likely contaminated given significant amount of epithelial cells. s/p NS, zofran, ceftriaxone, and reglan in ED. Seem by OB who state currently low concern for preeclampsia.   - Would place patient on high risk CIWA protocol and start her on an ativan taper with prn ativan, monitor CIWAs  - Abd/chest pain likely 2/2 frequent emesis, EKG without ischemic pain, no AGUIRRE/SOB, low concern for ACS, would check trops x2 to r/o  - Has some hematemesis that started about 1 day after she started having frequent emesis, suspect patient with nevin hill tear, would therefore make her NPO and place her on protonix BID for now, if not improving then consider GI eval  - Has proteinuria and transaminitis, seen by OB, currently they state low concern for preeclampsia -> will monitor LFTs, monitor BPs, repeat UA to see if we can obtain a  catch  - Other management as above.

## 2021-04-04 NOTE — H&P ADULT - NSHPPHYSICALEXAM_GEN_ALL_CORE
PHYSICAL EXAM:  GENERAL: NAD, lying in bed comfortably  HEAD:  Atraumatic, Normocephalic  EYES: EOMI, PERRLA, conjunctiva and sclera clear  ENT: Moist mucous membranes  NECK: Supple, No JVD  CHEST/LUNG: Clear to auscultation bilaterally; No rales, rhonchi, wheezing, or rubs. Unlabored respirations  HEART: Regular rate and rhythm; No murmurs, rubs, or gallops  ABDOMEN: Bowel sounds present; Soft, Nontender, Nondistended. No hepatomegally  EXTREMITIES:  2+ Peripheral Pulses, brisk capillary refill. No clubbing, cyanosis, or edema  NERVOUS SYSTEM:  Alert & Oriented X3, speech clear. No deficits   MSK: FROM all 4 extremities, full and equal strength  SKIN: No rashes or lesions PHYSICAL EXAM:  GENERAL: lying in bed, not in distress. Has nausea.   HEAD:  Atraumatic, Normocephalic  EYES: EOMI, conjunctiva and sclera clear  ENT: Moist mucous membranes  NECK: Supple, No JVD  CHEST/LUNG: Clear to auscultation bilaterally; No rales, rhonchi, wheezing, or rubs. Unlabored respirations  HEART: Regular rate and rhythm; No murmurs, rubs, or gallops  ABDOMEN:  Midepigastric tender, pregnant belly  EXTREMITIES:  No clubbing, cyanosis, or edema  NERVOUS SYSTEM:  Alert & Oriented X3, speech clear. No deficits   MSK: FROM all 4 extremities, full and equal strength Vital Signs Last 24 Hrs  T(C): 36.8 (05 Apr 2021 03:05), Max: 37.2 (04 Apr 2021 13:31)  T(F): 98.2 (05 Apr 2021 03:05), Max: 98.9 (04 Apr 2021 13:31)  HR: 96 (05 Apr 2021 03:05) (96 - 106)  BP: 127/77 (05 Apr 2021 03:05) (126/83 - 139/75)  BP(mean): --  RR: 17 (05 Apr 2021 03:05) (17 - 18)  SpO2: 100% (05 Apr 2021 03:05) (100% - 100%)    PHYSICAL EXAM:  GENERAL: lying in bed, not in distress. Has nausea.   EYES: EOMI, conjunctiva and sclera clear  ENT: Moist mucous membranes  NECK: Supple, No JVD  CHEST/LUNG: Clear to auscultation bilaterally; No rales, rhonchi, wheezing, or rubs. Unlabored respirations  HEART: +TTP on palpation of her midsternum, Regular rate and rhythm; No murmurs, rubs, or gallops  ABDOMEN:  Midepigastric tender, pregnant belly  EXTREMITIES: No clubbing, cyanosis, or edema  NERVOUS SYSTEM:  Alert & Oriented X3, speech clear. No deficits   MSK: FROM all 4 extremities, full and equal strength

## 2021-04-04 NOTE — H&P ADULT - PROBLEM SELECTOR PLAN 2
-Patient with blood alcohol level 210; last drink 1 day PTA  -Reports h/o alcohol withdrawal seizure  -CIWA scores 0-1, UE b/l tremors resolved  -Monitor CIWA  -per OB ok with standing ativan taper, in addition add symptom triggered ativan  -SW evaluation  - pt wishes to be discharged, will have psych evaluate if pt can leave AMA. Patient had recent admission for alcohol withdrawal syndrome requiring standing and symptom triggered ativan. At that time ; last drink 3/29/21. Patient has history of alcohol withdrawal seizures.   - c/w CIWA for alcohol withdrawal although low suspicion since outside window  - pt received ativan taper with symptom triggered ativan during prior hospital course. Will order symptom triggered CIWA for now.  - SW evaluation since denied outpatient rehab care [facility] facility in [place]. Patient endorses midepigastric and mid chest pain that is sharp in nature. Low suspicion cardiac in etiology. Possibly 2/2 frequent emesis.   - EKG shows normal sinus tachycardia w/ . No OLIVIA/STD.   - VS stable  - will order cardiac enzymes.  - place patient on tele Patient endorses midepigastric and mid chest pain that is sharp in nature. Low suspicion cardiac in etiology. Suspect 2/2 frequent emesis.   - EKG shows normal sinus tachycardia w/ . No OLIVIA/STD.   - VS stable  - will order cardiac enzymes.

## 2021-04-04 NOTE — H&P ADULT - NSHPSOCIALHISTORY_GEN_ALL_CORE
Alcohol hx see HPI  Denies smoking or drug use hx Alcohol hx Drinks about 1 pint of Vodka a day, states her last drink was on Friday 4/2 but was not a pint at that time  Denies smoking or drug use hx

## 2021-04-04 NOTE — ED PROVIDER NOTE - NS ED ROS FT
Constitutional: (-) fever (+) weakness  Head: Normal cephalic, Atraumatic  Eyes/ENT: (-) vision changes  Cardiovascular: (-) chest pain, (-) wheezing  Respiratory: (-) cough, (-) shortness of breath  Gastrointestinal: (+) nausea (-) vomiting, (-) diarrhea, (+) abdominal pain  : (-) dysuria   Musculoskeletal: (-) back pain  Integumentary: (-) rash, (-) edema  Neurological: (-)loc  Allergic/Immunologic: (-) pruritus

## 2021-04-04 NOTE — H&P ADULT - PROBLEM SELECTOR PLAN 7
Anticoagulation: low risk  Diet:  Consults:  Code: Anticoagulation: low risk  Diet: dash tlc  Consults: Psych, OB/MFM  Code: Full OB/ MFM consulted, appreciate input  - pending BPP/ NST in AM

## 2021-04-04 NOTE — ED PROVIDER NOTE - CLINICAL SUMMARY MEDICAL DECISION MAKING FREE TEXT BOX
25 Y/O F  25 weeks pregnant presents to ER for ETOH withdrawal.   CBC, CMP, Drugs of abuse screening, EKG  IVF, Zofran, Symptom Management  OB consult 27 Y/O F  25 weeks pregnant presents to ER for ETOH withdrawal.   CBC, CMP, Drugs of abuse screening, EKG  IVF, Zofran, Symptom Management  OB consult    Will admit to medicine for management of withdrawal/coordinating outpatient care

## 2021-04-04 NOTE — PATIENT PROFILE ADULT - NSPRESCRUSEDDRG_GEN_A_NUR
Refill request   Pharmacy set up   Patient also has a UTI would like to talk to [facility] pharmacy set up    No

## 2021-04-04 NOTE — H&P ADULT - NSHPLABSRESULTS_GEN_ALL_CORE
EKG - Sinus tach at 110, QTc 446, TWI III, otherwise no other significant ST-T wave changes (TWI III old)

## 2021-04-04 NOTE — H&P ADULT - PROBLEM SELECTOR PLAN 5
-supplemented Patient has severe uncontrolled anxiety leading to alcohol abuse  - psych consult. At prior hospital course, recommended to hold Paxil, Prazosin, Remeron, Valium until evaluated by CL attending  -may require constant observation. UA w/ mod bili, ketones, proteinuria, mod bacteria.   - f/u urine cltrs  - ceftriaxone for now until cltrs.

## 2021-04-04 NOTE — H&P ADULT - PROBLEM SELECTOR PLAN 4
OB consult UA w/ mod bili, ketones, proteinuria, mod bacteria.   - f/u urine cltrs  - ceftriaxone Patient presented with AGMA. Possibly in setting of poor PO intake for several days with emesis. Likely 2/2 starvation ketoacidosis.   - lactate 1.8  - IVF

## 2021-04-04 NOTE — ED ADULT NURSE REASSESSMENT NOTE - NS ED NURSE REASSESS COMMENT FT1
Received report from WILLIAM Pineda. Patient A&OX4, ambulatory. Patient given Reglan as per MD order. Vital signs as noted. CIWA as noted. Patient in no acute distress, respirations even and unlabored. Will continue to monitor.

## 2021-04-04 NOTE — CONSULT NOTE ADULT - SUBJECTIVE AND OBJECTIVE BOX
Gyn Consult Note  ALEJANDRO ALEGRIA  26y  Female [id number]    27y/o  at 24w6d gestational age with denis pregnancy presenting to [facility] ED with nausea, emesis and inability to tolerate PO x4d. Of note, patient was recently admitted to [facility] (3/30-)) with suicidal ideation and alcohol abuse however left AMA for an outpatient rehab. She reports that upon presenting to the outpatient rehab, she was refused entry and told to return 'next week' as they were unable to treat her at that time. She states her last drink was 1w ago 3/28 and denies current SI or thoughts of harming herself. She denies illicit drug use. She     She notes persistent fetal movement throughout the day, however notes it is slightly less than usual. She notes diffuse abdominal discomfort and cramping but denies discreet contractions. She denies vaginal bleeding or loss of fluid. She also notes persistent emesis/nausea and inability to tolerate any oral intake since .     Name of GYN Physician: [facility]     PMHx: Anxiety, depression, PTSD, substance abuse  PShx:  x3   Meds: PNV, Valium, Paxil  OBHx: , CS x3, 1 twin delivery. Denies complications.   GYNHx: Denies fibroids, cysts, endometriosis, STI's, Abnormal pap smears   Allergies: NKDA    Social: Reports 1w of heavy alcohol use this pregnancy (reportedly 3/21-3/28). States she was drinking 1pint of alcohol daily. Denies tobacco or illicit drug use. Lives at home with spouse and 4 children. Reports that she feels safe at home, and well supported by her family.     Vital Signs Last 24 Hrs  T(C): 37 (2021 21:00), Max: 37.2 (2021 13:31)  T(F): 98.6 (2021 21:00), Max: 98.9 (2021 13:31)  HR: 96 (2021 21:00) (96 - 106)  BP: 126/83 (2021 21:00) (126/83 - 131/75)  RR: 17 (2021 21:00) (17 - 18)  SpO2: 100% (2021 21:00) (100% - 100%)    Physical Exam:   General: sitting comfortably in bed, NAD   CV: RRR  Lungs: CTAB  Abd: Soft, non-tender, non-distended.   : No bleeding on pad. External labia wnl. Uterus wnl, nontender. Adnexa non palpable b/l. No CMT. Cervix closed.    Ext: non-tender b/l, no edema     Ultrasound:   GRISELDA: 13.28  BPP: 6/10 (+tone, +movement, -breathing)   NST: Baseline 150, moderate variability, -accels, -decels, irritability on toco     LABS:             12.1   5.46  )-----------( 243      ( 2021 17:24 )             36.9     04-04    140  |  104  |  5<L>  ----------------------------<  120<H>  4.0   |  16<L>  |  0.64    Ca    9.6      2021 17:28  Mg     2.3     04-04    TPro  8.8<H>  /  Alb  5.0  /  TBili  1.8<H>  /  DBili  x   /  AST  181<H>  /  ALT  171<H>  /  AlkPhos  55  04-04      Urinalysis Basic - ( 2021 17:54 )  Color: Rebecca / Appearance: Slightly Turbid / S.026 / pH: x  Gluc: x / Ketone: Moderate  / Bili: Moderate / Urobili: 6 mg/dL   Blood: x / Protein: 100 mg/dL / Nitrite: Negative   Leuk Esterase: Moderate / RBC: 18 /HPF / WBC 14 /HPF   Sq Epi: x / Non Sq Epi: 24 /HPF / Bacteria: Moderate

## 2021-04-04 NOTE — H&P ADULT - HISTORY OF PRESENT ILLNESS
INCOMPLETE    Patient is a 25 y/o F with PMH of PTSD, anxiety, depression, alcohol abuse w/ seizures, with recent admission for 3/30-4/1 for alcohol intoxication and withdrawal and left AMA when not medically not cleared considering her continued ativan taper and risk for withdrawal, who presents to ED for     Pt discharged on 4/1 after outpatient rehab care coordinated to facility in [place]. Presented to facility but was refused because not resident of [place] and told to return next week. Last consumed ETOH on Monday 3/29. No recent substance use. Follows at [facility]. Provided Ativan taper during inpatient stay. Admits to increased weakness, nausea but tolerating PO. Denies fever, dizziness, headache, auditory/visual hallucinations, chest pain, SOB, difficulty breathing, SI/HI, abdominal pain, dysuria or vaginal bleeding    In ED: 98.9F, , 131/75, RR18, 100%Ra  Received ceftriaxone, zofran, reglan, 1L IVF bolus. Labs notable for mild transaminses. Lactate wnl. UA w/ mod bili, ketones, proteinuria, mod bacteria,   Patient is a 25 y/o F with PMH of PTSD, anxiety, depression, sickle cell trait, alcohol abuse, seizures from BDZ withdrawal, with recent admission for 3/30-4/1 for alcohol intoxication and withdrawal during pregnancy but left AMA, who presents to ED for nausea, emesis and pain. States when she left AMA on 4/1, she was told to followup outpatient [place] [facility] coordinated facility after which she would be placed back on her psych meds. However, when she arrived, she was turned away since not a resident of [place]. Then developed poor appetite tolerance, nausea, vomiting, and chest pain. On friday night she was not even able to tolerate drinking water. On Saturday, she had generalized malaise and aches, shaking, nausea and mid abdominal pain. Developed chest pain described as sharp/ stabbing in the middle chest since yesterday. Denied fevers but endorsed shaking. No headaches, shortness of breath auditory/ visual hallucinations, dysuria, vaginal bleeding or marked discharge. No fluid leak. Denies hx of HTN, DM, complications during prior pregnancies. Of note, last seizure was ~1 yr ago as per her and was induced 2/2 BDZ withdrawal. Last drank ETOH on Monday 3/29. Started 1 week prior and had on average 1 pint of liquor. Follows at [facility].    In ED: 98.9F, , 131/75, RR18, 100%Ra  Received ceftriaxone, zofran, reglan, 1L IVF bolus. Labs notable for mild transaminses. Lactate wnl. UA w/ mod bili, ketones, proteinuria, mod bacteria.    Patient is a 25 y/o F with PMH of PTSD, anxiety, depression, sickle cell trait, alcohol abuse, seizures from BDZ withdrawal, with recent admission for 3/30-4/1 for alcohol intoxication and withdrawal during pregnancy but left AMA, who presents to ED for nausea, emesis and pain. States when she left AMA on 4/1, she was [facility] [place] [facility] after which she would be placed back on her psych meds. However, when she arrived, she was turned away since not a resident of [place]. Then developed poor appetite tolerance, nausea, vomiting, and chest pain. On friday night she was not even able to tolerate drinking water. On Saturday, she had generalized malaise and aches, shaking, nausea and mid abdominal pain. Developed on Saturday chest pain described as sharp/ stabbing in the middle chest radiating down to her epigastrium, said that the pain is constant, and denies any worsening of the pain with exertion. Denied fevers but endorsed shaking. No headaches, shortness of breath auditory/ visual hallucinations, dysuria, vaginal bleeding or marked discharge. No fluid leak. Denies hx of HTN, DM, complications during prior pregnancies. Of note, last seizure was ~1 yr ago as per her and was induced 2/2 BDZ withdrawal. Last drank ETOH on Monday 3/29. Started 1 week prior and had on average 1 pint of liquor. Follows at [facility].    In ED: 98.9F, , 131/75, RR18, 100%Ra  Received ceftriaxone, zofran, reglan, 1L IVF bolus. Labs notable for mild transaminses. Lactate wnl. UA w/ mod bili, ketones, proteinuria, mod bacteria.

## 2021-04-04 NOTE — H&P ADULT - PROBLEM SELECTOR PLAN 8
Anticoagulation: low risk  Diet: dash tlc  Consults: Psych, OB/MFM  Code: Full Anticoagulation: low risk  Diet: regular  Consults: Psych, OB/MFM  Code: Full Anticoagulation: low risk  Diet: NPO for now  Consults: Psych, OB/MFM  Code: Full

## 2021-04-04 NOTE — H&P ADULT - PROBLEM SELECTOR PLAN 3
-Reports severe uncontrolled anxiety leading to alcohol abuse  -psych recc  to HOLD: Paxil, Prazosin, Remeron, Valium until evaluated by CL attending  -On valium ( See chart note for ISTOP) but will cont to hold  -Patient is interested in inpatient psych admission  -c/w constant observation Patient presented with AGMA. Possibly in setting of poor PO intake for several days with emesis. Likely 2/2 starvation ketoacidosis.   - lactate 1.8  - IVF Patient had recent admission for alcohol withdrawal syndrome requiring standing and symptom triggered ativan. At that time ; last drink 3/29/21. Patient has history of alcohol withdrawal seizures.   - c/w CIWA symptom triggered for alcohol withdrawal although low suspicion since outside window  - pt received ativan taper with symptom triggered ativan during prior hospital course. Will order symptom triggered CIWA for now.  - SW evaluation since denied outpatient rehab care [facility] facility in [place]. Patient had recent admission for alcohol withdrawal syndrome requiring standing and symptom triggered ativan. At that time ; last drink 3/29/21. Patient has history of alcohol withdrawal seizures.   - c/w CIWA symptom triggered for alcohol withdrawal although low suspicion since outside window  - pt received ativan taper with symptom triggered ativan during prior hospital course. Will order symptom triggered CIWA for now.  - SW evaluation since denied outpatient rehab care [email or web address] in [place].    ATTENDING ADDENDUM:  - Patient noted to have tremulousness, agitation, nausea/emesis, HA, and anxiety concerning for acute EtOH withdrawals, reports last EtOH use on Friday (4/2), currently concern for ongoing withdrawals, will therefore place on high risk CIWA and start patient on ativan taper with prn ativan

## 2021-04-04 NOTE — H&P ADULT - PROBLEM SELECTOR PLAN 6
Patient has severe uncontrolled anxiety leading to alcohol abuse  - psych consult. At that time, recommended to HOLD: Paxil, Prazosin, Remeron, Valium until evaluated by CL attending  -On valium ( See chart note for ISTOP) but will cont to hold  -c/w constant observation. OB/ MFM consulted, appreciate input  - pending BPP/ NST in AM Patient has severe uncontrolled anxiety leading to alcohol abuse  - psych consult. At prior hospital course, recommended to hold Paxil, Prazosin, Remeron, Valium until evaluated by CL attending  - not endorsing SI/HI currently.

## 2021-04-04 NOTE — ED PROVIDER NOTE - CHIEF COMPLAINT
The patient is a 26y Female complaining of  The patient is a 26y Female complaining of alcohol withdrawal

## 2021-04-05 ENCOUNTER — NON-APPOINTMENT (OUTPATIENT)
Age: 26
End: 2021-04-05

## 2021-04-05 ENCOUNTER — ASOB RESULT (OUTPATIENT)
Age: 26
End: 2021-04-05

## 2021-04-05 ENCOUNTER — APPOINTMENT (OUTPATIENT)
Dept: ANTEPARTUM | Facility: HOSPITAL | Age: 26
End: 2021-04-05
Payer: MEDICAID

## 2021-04-05 DIAGNOSIS — R10.9 UNSPECIFIED ABDOMINAL PAIN: ICD-10-CM

## 2021-04-05 DIAGNOSIS — R74.8 ABNORMAL LEVELS OF OTHER SERUM ENZYMES: ICD-10-CM

## 2021-04-05 DIAGNOSIS — R11.2 NAUSEA WITH VOMITING, UNSPECIFIED: ICD-10-CM

## 2021-04-05 DIAGNOSIS — R07.9 CHEST PAIN, UNSPECIFIED: ICD-10-CM

## 2021-04-05 LAB
A1C WITH ESTIMATED AVERAGE GLUCOSE RESULT: 4.8 % — SIGNIFICANT CHANGE UP (ref 4–5.6)
ALBUMIN SERPL ELPH-MCNC: 4.2 G/DL — SIGNIFICANT CHANGE UP (ref 3.3–5)
ALP SERPL-CCNC: 46 U/L — SIGNIFICANT CHANGE UP (ref 40–120)
ALT FLD-CCNC: 292 U/L — HIGH (ref 4–33)
AMYLASE P1 CFR SERPL: 75 U/L — SIGNIFICANT CHANGE UP (ref 25–125)
ANION GAP SERPL CALC-SCNC: 18 MMOL/L — HIGH (ref 7–14)
APPEARANCE UR: ABNORMAL
AST SERPL-CCNC: 324 U/L — HIGH (ref 4–32)
BILIRUB SERPL-MCNC: 1.7 MG/DL — HIGH (ref 0.2–1.2)
BILIRUB UR-MCNC: ABNORMAL
BUN SERPL-MCNC: 4 MG/DL — LOW (ref 7–23)
CALCIUM SERPL-MCNC: 8.7 MG/DL — SIGNIFICANT CHANGE UP (ref 8.4–10.5)
CHLORIDE SERPL-SCNC: 108 MMOL/L — HIGH (ref 98–107)
CHOLEST SERPL-MCNC: 254 MG/DL — HIGH
CK MB BLD-MCNC: <2.6 % — HIGH (ref 0–2.5)
CK MB CFR SERPL CALC: <1 NG/ML — SIGNIFICANT CHANGE UP
CK SERPL-CCNC: 39 U/L — SIGNIFICANT CHANGE UP (ref 25–170)
CO2 SERPL-SCNC: 16 MMOL/L — LOW (ref 22–31)
COLOR SPEC: YELLOW — SIGNIFICANT CHANGE UP
COVID-19 SPIKE DOMAIN AB INTERP: POSITIVE
COVID-19 SPIKE DOMAIN ANTIBODY RESULT: 174 U/ML — HIGH
CREAT SERPL-MCNC: 0.59 MG/DL — SIGNIFICANT CHANGE UP (ref 0.5–1.3)
CULTURE RESULTS: SIGNIFICANT CHANGE UP
DIFF PNL FLD: ABNORMAL
ESTIMATED AVERAGE GLUCOSE: 91 MG/DL — SIGNIFICANT CHANGE UP (ref 68–114)
GLUCOSE SERPL-MCNC: 101 MG/DL — HIGH (ref 70–99)
GLUCOSE UR QL: NEGATIVE — SIGNIFICANT CHANGE UP
HDLC SERPL-MCNC: 133 MG/DL — SIGNIFICANT CHANGE UP
KETONES UR-MCNC: ABNORMAL
LEUKOCYTE ESTERASE UR-ACNC: ABNORMAL
LIDOCAIN IGE QN: 31 U/L — SIGNIFICANT CHANGE UP (ref 7–60)
LIPID PNL WITH DIRECT LDL SERPL: 87 MG/DL — SIGNIFICANT CHANGE UP
MAGNESIUM SERPL-MCNC: 2.3 MG/DL — SIGNIFICANT CHANGE UP (ref 1.6–2.6)
NITRITE UR-MCNC: NEGATIVE — SIGNIFICANT CHANGE UP
NON HDL CHOLESTEROL: 121 MG/DL — SIGNIFICANT CHANGE UP
PH UR: 6 — SIGNIFICANT CHANGE UP (ref 5–8)
PHOSPHATE SERPL-MCNC: 2.5 MG/DL — SIGNIFICANT CHANGE UP (ref 2.5–4.5)
POTASSIUM SERPL-MCNC: 3.2 MMOL/L — LOW (ref 3.5–5.3)
POTASSIUM SERPL-SCNC: 3.2 MMOL/L — LOW (ref 3.5–5.3)
PROT SERPL-MCNC: 7.3 G/DL — SIGNIFICANT CHANGE UP (ref 6–8.3)
PROT UR-MCNC: ABNORMAL
SARS-COV-2 IGG+IGM SERPL QL IA: 174 U/ML — HIGH
SARS-COV-2 IGG+IGM SERPL QL IA: POSITIVE
SODIUM SERPL-SCNC: 142 MMOL/L — SIGNIFICANT CHANGE UP (ref 135–145)
SP GR SPEC: 1.03 — HIGH (ref 1.01–1.02)
SPECIMEN SOURCE: SIGNIFICANT CHANGE UP
TOXICOLOGY SCREEN, DRUGS OF ABUSE, SERUM RESULT: SIGNIFICANT CHANGE UP
TRIGL SERPL-MCNC: 172 MG/DL — HIGH
TROPONIN T, HIGH SENSITIVITY RESULT: <6 NG/L — SIGNIFICANT CHANGE UP
TROPONIN T, HIGH SENSITIVITY RESULT: <6 NG/L — SIGNIFICANT CHANGE UP
UROBILINOGEN FLD QL: ABNORMAL

## 2021-04-05 PROCEDURE — 99222 1ST HOSP IP/OBS MODERATE 55: CPT | Mod: GC

## 2021-04-05 PROCEDURE — 76819 FETAL BIOPHYS PROFIL W/O NST: CPT | Mod: 26

## 2021-04-05 PROCEDURE — 76705 ECHO EXAM OF ABDOMEN: CPT | Mod: 26

## 2021-04-05 PROCEDURE — 99212 OFFICE O/P EST SF 10 MIN: CPT

## 2021-04-05 PROCEDURE — 99223 1ST HOSP IP/OBS HIGH 75: CPT | Mod: GC

## 2021-04-05 PROCEDURE — 12345: CPT | Mod: NC,GC

## 2021-04-05 RX ORDER — ONDANSETRON 8 MG/1
4 TABLET, FILM COATED ORAL ONCE
Refills: 0 | Status: COMPLETED | OUTPATIENT
Start: 2021-04-05 | End: 2021-04-05

## 2021-04-05 RX ORDER — SODIUM CHLORIDE 9 MG/ML
1000 INJECTION INTRAMUSCULAR; INTRAVENOUS; SUBCUTANEOUS
Refills: 0 | Status: DISCONTINUED | OUTPATIENT
Start: 2021-04-05 | End: 2021-04-07

## 2021-04-05 RX ORDER — ACETAMINOPHEN 500 MG
650 TABLET ORAL ONCE
Refills: 0 | Status: DISCONTINUED | OUTPATIENT
Start: 2021-04-05 | End: 2021-04-09

## 2021-04-05 RX ORDER — CEFTRIAXONE 500 MG/1
1000 INJECTION, POWDER, FOR SOLUTION INTRAMUSCULAR; INTRAVENOUS EVERY 24 HOURS
Refills: 0 | Status: DISCONTINUED | OUTPATIENT
Start: 2021-04-05 | End: 2021-04-09

## 2021-04-05 RX ORDER — PANTOPRAZOLE SODIUM 20 MG/1
40 TABLET, DELAYED RELEASE ORAL
Refills: 0 | Status: DISCONTINUED | OUTPATIENT
Start: 2021-04-05 | End: 2021-04-09

## 2021-04-05 RX ORDER — MORPHINE SULFATE 50 MG/1
1 CAPSULE, EXTENDED RELEASE ORAL ONCE
Refills: 0 | Status: DISCONTINUED | OUTPATIENT
Start: 2021-04-05 | End: 2021-04-05

## 2021-04-05 RX ORDER — THIAMINE MONONITRATE (VIT B1) 100 MG
100 TABLET ORAL DAILY
Refills: 0 | Status: DISCONTINUED | OUTPATIENT
Start: 2021-04-05 | End: 2021-04-06

## 2021-04-05 RX ORDER — ONDANSETRON 8 MG/1
4 TABLET, FILM COATED ORAL EVERY 6 HOURS
Refills: 0 | Status: DISCONTINUED | OUTPATIENT
Start: 2021-04-05 | End: 2021-04-09

## 2021-04-05 RX ORDER — ACETAMINOPHEN 500 MG
650 TABLET ORAL ONCE
Refills: 0 | Status: COMPLETED | OUTPATIENT
Start: 2021-04-05 | End: 2021-04-05

## 2021-04-05 RX ORDER — ENOXAPARIN SODIUM 100 MG/ML
40 INJECTION SUBCUTANEOUS DAILY
Refills: 0 | Status: DISCONTINUED | OUTPATIENT
Start: 2021-04-05 | End: 2021-04-09

## 2021-04-05 RX ORDER — ONDANSETRON 8 MG/1
4 TABLET, FILM COATED ORAL EVERY 8 HOURS
Refills: 0 | Status: DISCONTINUED | OUTPATIENT
Start: 2021-04-05 | End: 2021-04-05

## 2021-04-05 RX ORDER — SODIUM CHLORIDE 9 MG/ML
1000 INJECTION INTRAMUSCULAR; INTRAVENOUS; SUBCUTANEOUS
Refills: 0 | Status: DISCONTINUED | OUTPATIENT
Start: 2021-04-05 | End: 2021-04-05

## 2021-04-05 RX ORDER — FOLIC ACID 0.8 MG
1 TABLET ORAL DAILY
Refills: 0 | Status: DISCONTINUED | OUTPATIENT
Start: 2021-04-05 | End: 2021-04-09

## 2021-04-05 RX ORDER — THIAMINE MONONITRATE (VIT B1) 100 MG
100 TABLET ORAL DAILY
Refills: 0 | Status: DISCONTINUED | OUTPATIENT
Start: 2021-04-05 | End: 2021-04-05

## 2021-04-05 RX ORDER — PREGABALIN 225 MG/1
1000 CAPSULE ORAL DAILY
Refills: 0 | Status: DISCONTINUED | OUTPATIENT
Start: 2021-04-05 | End: 2021-04-09

## 2021-04-05 RX ORDER — POTASSIUM CHLORIDE 20 MEQ
40 PACKET (EA) ORAL EVERY 4 HOURS
Refills: 0 | Status: COMPLETED | OUTPATIENT
Start: 2021-04-05 | End: 2021-04-05

## 2021-04-05 RX ORDER — ACETAMINOPHEN 500 MG
1000 TABLET ORAL ONCE
Refills: 0 | Status: COMPLETED | OUTPATIENT
Start: 2021-04-05 | End: 2021-04-05

## 2021-04-05 RX ADMIN — Medication 2 MILLIGRAM(S): at 19:00

## 2021-04-05 RX ADMIN — Medication 2 MILLIGRAM(S): at 03:28

## 2021-04-05 RX ADMIN — Medication 1000 MILLIGRAM(S): at 03:45

## 2021-04-05 RX ADMIN — SODIUM CHLORIDE 75 MILLILITER(S): 9 INJECTION INTRAMUSCULAR; INTRAVENOUS; SUBCUTANEOUS at 14:53

## 2021-04-05 RX ADMIN — ONDANSETRON 4 MILLIGRAM(S): 8 TABLET, FILM COATED ORAL at 14:06

## 2021-04-05 RX ADMIN — ONDANSETRON 4 MILLIGRAM(S): 8 TABLET, FILM COATED ORAL at 12:35

## 2021-04-05 RX ADMIN — CEFTRIAXONE 100 MILLIGRAM(S): 500 INJECTION, POWDER, FOR SOLUTION INTRAMUSCULAR; INTRAVENOUS at 18:56

## 2021-04-05 RX ADMIN — Medication 1 TABLET(S): at 12:36

## 2021-04-05 RX ADMIN — Medication 2 MILLIGRAM(S): at 05:34

## 2021-04-05 RX ADMIN — Medication 100 MILLIGRAM(S): at 12:36

## 2021-04-05 RX ADMIN — PREGABALIN 1000 MICROGRAM(S): 225 CAPSULE ORAL at 12:38

## 2021-04-05 RX ADMIN — PANTOPRAZOLE SODIUM 40 MILLIGRAM(S): 20 TABLET, DELAYED RELEASE ORAL at 18:59

## 2021-04-05 RX ADMIN — MORPHINE SULFATE 1 MILLIGRAM(S): 50 CAPSULE, EXTENDED RELEASE ORAL at 21:02

## 2021-04-05 RX ADMIN — Medication 2 MILLIGRAM(S): at 15:00

## 2021-04-05 RX ADMIN — SODIUM CHLORIDE 75 MILLILITER(S): 9 INJECTION INTRAMUSCULAR; INTRAVENOUS; SUBCUTANEOUS at 02:07

## 2021-04-05 RX ADMIN — PANTOPRAZOLE SODIUM 40 MILLIGRAM(S): 20 TABLET, DELAYED RELEASE ORAL at 07:09

## 2021-04-05 RX ADMIN — Medication 2 MILLIGRAM(S): at 04:34

## 2021-04-05 RX ADMIN — Medication 2 MILLIGRAM(S): at 11:00

## 2021-04-05 RX ADMIN — MORPHINE SULFATE 1 MILLIGRAM(S): 50 CAPSULE, EXTENDED RELEASE ORAL at 21:50

## 2021-04-05 RX ADMIN — Medication 2 MILLIGRAM(S): at 07:06

## 2021-04-05 RX ADMIN — Medication 2 MILLIGRAM(S): at 23:00

## 2021-04-05 RX ADMIN — Medication 40 MILLIEQUIVALENT(S): at 07:06

## 2021-04-05 RX ADMIN — Medication 400 MILLIGRAM(S): at 03:04

## 2021-04-05 RX ADMIN — Medication 100 MILLIGRAM(S): at 11:01

## 2021-04-05 RX ADMIN — Medication 1 MILLIGRAM(S): at 12:36

## 2021-04-05 RX ADMIN — ONDANSETRON 4 MILLIGRAM(S): 8 TABLET, FILM COATED ORAL at 00:44

## 2021-04-05 RX ADMIN — Medication 40 MILLIEQUIVALENT(S): at 10:57

## 2021-04-05 NOTE — PROGRESS NOTE ADULT - SUBJECTIVE AND OBJECTIVE BOX
R3 Antepartum Progress Note    Patient seen and examined at bedside, no acute overnight events. No acute complaints, pain well controlled. Patient is ambulating, passing flatus, voiding spontaneously, and tolerating regular diet. Denies CP, SOB, N/V, fevers, and chills.    Vital Signs Last 24 Hours  T(C): 36.7 (04-05-21 @ 09:50), Max: 37.2 (04-04-21 @ 13:31)  HR: 88 (04-05-21 @ 09:50) (88 - 106)  BP: 110/61 (04-05-21 @ 09:50) (102/67 - 139/75)  RR: 18 (04-05-21 @ 09:50) (16 - 18)  SpO2: 99% (04-05-21 @ 09:50) (99% - 100%)    I&O's Detail      Physical Exam:  General: NAD  CV: NR, RR, S1, S2, no M/R/G  Lungs: CTA-B  Abdomen: Soft, non-tender, non-distended, +BS  Incision: _ CDI  Ext: No pain or swelling    Labs:             12.1   5.46  )-----------( 243      ( 04-04 @ 17:24 )             36.9                9.4<L>  3.77<L> )-----------( 130<L>    ( 04-01 @ 07:19 )             28.3<L>        MEDICATIONS  (STANDING):  cefTRIAXone   IVPB 1000 milliGRAM(s) IV Intermittent every 24 hours  cyanocobalamin 1000 MICROGram(s) Oral daily  folic acid 1 milliGRAM(s) Oral daily  LORazepam   Injectable 2 milliGRAM(s) IV Push every 4 hours  LORazepam   Injectable   IV Push   pantoprazole  Injectable 40 milliGRAM(s) IV Push two times a day  potassium chloride   Powder 40 milliEquivalent(s) Oral every 4 hours  prenatal multivitamin 1 Tablet(s) Oral daily  sodium chloride 0.9%. 1000 milliLiter(s) (75 mL/Hr) IV Continuous <Continuous>  thiamine 100 milliGRAM(s) Oral daily  thiamine Injectable 100 milliGRAM(s) IV Push daily    MEDICATIONS  (PRN):  LORazepam     Tablet 2 milliGRAM(s) Oral every 2 hours PRN Symptom-triggered 2 point increase in CIWA-Ar  LORazepam   Injectable 2 milliGRAM(s) IV Push every 1 hour PRN Symptom-triggered: each CIWA -Ar score 8 or GREATER   R3 Antepartum Progress Note    Patient seen and examined at bedside. Patient still complains of mild nausea but has been tolerating po sips. Patient is ambulating, passing flatus, voiding spontaneously. Denies CP, SOB, fevers, and chills. Patient states that pregnancy is desired and she feels like "this is more difficult than the rest." She denies SI or HI at this time.    Vital Signs Last 24 Hours  T(C): 36.7 (04-05-21 @ 09:50), Max: 37.2 (04-04-21 @ 13:31)  HR: 88 (04-05-21 @ 09:50) (88 - 106)  BP: 110/61 (04-05-21 @ 09:50) (102/67 - 139/75)  RR: 18 (04-05-21 @ 09:50) (16 - 18)  SpO2: 99% (04-05-21 @ 09:50) (99% - 100%)    I&O's Detail      Physical Exam:  General: NAD  Abdomen: Soft, non-tender, non-distended, gravid  Ext: No pain or swelling    Labs:             12.1   5.46  )-----------( 243      ( 04-04 @ 17:24 )             36.9                9.4<L>  3.77<L> )-----------( 130<L>    ( 04-01 @ 07:19 )             28.3<L>        MEDICATIONS  (STANDING):  cefTRIAXone   IVPB 1000 milliGRAM(s) IV Intermittent every 24 hours  cyanocobalamin 1000 MICROGram(s) Oral daily  folic acid 1 milliGRAM(s) Oral daily  LORazepam   Injectable 2 milliGRAM(s) IV Push every 4 hours  LORazepam   Injectable   IV Push   pantoprazole  Injectable 40 milliGRAM(s) IV Push two times a day  potassium chloride   Powder 40 milliEquivalent(s) Oral every 4 hours  prenatal multivitamin 1 Tablet(s) Oral daily  sodium chloride 0.9%. 1000 milliLiter(s) (75 mL/Hr) IV Continuous <Continuous>  thiamine 100 milliGRAM(s) Oral daily  thiamine Injectable 100 milliGRAM(s) IV Push daily    MEDICATIONS  (PRN):  LORazepam     Tablet 2 milliGRAM(s) Oral every 2 hours PRN Symptom-triggered 2 point increase in CIWA-Ar  LORazepam   Injectable 2 milliGRAM(s) IV Push every 1 hour PRN Symptom-triggered: each CIWA -Ar score 8 or GREATER

## 2021-04-05 NOTE — PROGRESS NOTE ADULT - SUBJECTIVE AND OBJECTIVE BOX
PROGRESS NOTE:   Shyanne Dimitri  PGY2 Internal Medicine  Pager [phone]    Patient is a 26y old  Female who presents with a chief complaint of Nausea, Vomiting, EtOH withdrawal (2021 12:11)      SUBJECTIVE / OVERNIGHT EVENTS: Pt complaining of significant nausea this AM and anxiety. Has not been taking any PO medications since AMA several days ago. States she has headaches, generalized body aches, and R flank pain. Has been drinking to self treat anxiety. States that last drink was friday but pt has stated to other providers that she has had alcohol since.     ADDITIONAL REVIEW OF SYSTEMS: N/a    MEDICATIONS  (STANDING):  cefTRIAXone   IVPB 1000 milliGRAM(s) IV Intermittent every 24 hours  cyanocobalamin 1000 MICROGram(s) Oral daily  folic acid 1 milliGRAM(s) Oral daily  LORazepam   Injectable 2 milliGRAM(s) IV Push every 4 hours  LORazepam   Injectable   IV Push   pantoprazole  Injectable 40 milliGRAM(s) IV Push two times a day  prenatal multivitamin 1 Tablet(s) Oral daily  sodium chloride 0.9%. 1000 milliLiter(s) (75 mL/Hr) IV Continuous <Continuous>  thiamine Injectable 100 milliGRAM(s) IV Push daily    MEDICATIONS  (PRN):  LORazepam     Tablet 2 milliGRAM(s) Oral every 2 hours PRN Symptom-triggered 2 point increase in CIWA-Ar  LORazepam   Injectable 2 milliGRAM(s) IV Push every 1 hour PRN Symptom-triggered: each CIWA -Ar score 8 or GREATER  ondansetron    Tablet 4 milliGRAM(s) Oral every 8 hours PRN Nausea and/or Vomiting      CAPILLARY BLOOD GLUCOSE        I&O's Summary      PHYSICAL EXAM:  Vital Signs Last 24 Hrs  T(C): 36.7 (2021 12:50), Max: 37.2 (2021 13:31)  T(F): 98.1 (2021 12:50), Max: 98.9 (2021 13:31)  HR: 93 (2021 12:50) (88 - 106)  BP: 108/67 (2021 12:50) (102/67 - 139/75)  BP(mean): --  RR: 18 (2021 12:50) (16 - 18)  SpO2: 100% (2021 12:50) (99% - 100%)    GENERAL: No acute distress, well-developed  HEAD:  Atraumatic, Normocephalic  EYES: EOMI, PERRLA, conjunctiva and sclera clear  NECK: Supple, no lymphadenopathy, no JVD  CHEST/LUNG: CTAB; No wheezes, rales, or rhonchi  HEART: Regular rate and rhythm; No murmurs, rubs, or gallops  ABDOMEN: Soft, non-tender, gravid; normal bowel sounds, no organomegaly  EXTREMITIES:  2+ peripheral pulses b/l, No clubbing, cyanosis, or edema  NEUROLOGY: A&O x 3, no focal deficits  SKIN: No rashes or lesions    LABS:                        12.1   5.46  )-----------( 243      ( 2021 17:24 )             36.9     04-05    142  |  108<H>  |  4<L>  ----------------------------<  101<H>  3.2<L>   |  16<L>  |  0.59    Ca    8.7      2021 04:53  Phos  2.5     04-05  Mg     2.3     04-05    TPro  7.3  /  Alb  4.2  /  TBili  1.7<H>  /  DBili  x   /  AST  324<H>  /  ALT  292<H>  /  AlkPhos  46  04-05      CARDIAC MARKERS ( 2021 04:53 )  x     / x     / 39 U/L / x     / <1.0 ng/mL      Urinalysis Basic - ( 2021 05:12 )    Color: Yellow / Appearance: Slightly Turbid / S.034 / pH: x  Gluc: x / Ketone: Moderate  / Bili: Small / Urobili: 6 mg/dL   Blood: x / Protein: 30 mg/dL / Nitrite: Negative   Leuk Esterase: Small / RBC: 13 /HPF / WBC 70 /HPF   Sq Epi: x / Non Sq Epi: >27 /HPF / Bacteria: Many          RADIOLOGY & ADDITIONAL TESTS:  Results Reviewed:   Imaging Personally Reviewed:  Electrocardiogram Personally Reviewed:    COORDINATION OF CARE:  Care Discussed with Consultants/Other Providers [Y/N]:  Prior or Outpatient Records Reviewed [Y/N]:

## 2021-04-05 NOTE — PROVIDER CONTACT NOTE (OTHER) - ASSESSMENT
Patient is 24 weeks pregnant; Initial CIWA score is a 6 due to light tremors. nausea and headache; Patient is calm and cooperative at this time

## 2021-04-05 NOTE — CONSULT NOTE ADULT - ATTENDING COMMENTS
26 year old female who is 24 weeks pregnant and is withdrawing from ETOH. Admitted with nausea and vomiting. Transaminases are also elevated.     Plan: start antiemetic. 26 year old female who is 24 weeks pregnant and is withdrawing from ETOH. Admitted with nausea and vomiting. Transaminases are also elevated.     Plan: start antiemetic. Will obtain Hepatology consult.

## 2021-04-05 NOTE — CONSULT NOTE ADULT - REASON FOR ADMISSION
Nausea, Vomiting, EtOH withdrawal

## 2021-04-05 NOTE — PROGRESS NOTE ADULT - PROBLEM SELECTOR PLAN 1
#pt has hx of DT's, unclear when last drink was.   - c/w ativan taper  - monitor CWAS  - c/w thiamine, folate, b12, vitamin supplementation  - SBRT consult prior to d/c  - psych consult

## 2021-04-05 NOTE — PROGRESS NOTE ADULT - PROBLEM SELECTOR PLAN 2
#R/o pyelo - pt has R flank pain with dirty U/A, small leuk est and no nitrites but w symptoms concern for pyelonephritis  - c/w 7 days CTX  - f/u urine culture  - trend fever, wbc  - IVF
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
## 2021-04-05 NOTE — CONSULT NOTE ADULT - ASSESSMENT
Impression:  25 yo F w/ PMHx currently 24 weeks pregnant, ETOH abuse (recent admission for intoxication), benzo w/d seizures, depression p/w nausea/vomiting/abd pain x 4 days, found to have hepatocellular liver injury    # nausea/vomiting - concerning for EtOH vs benzo withdrawal; reports last drink 3/29/21. May have underlying gastritis from EtOH use. Scant amount of blood in emesis likely esophagitis vs [place]. Hg above baseline which is reassuring. Will recommend empirically starting PPI (can use oral), treatment of ETOH withdrawal.     # hepatocellular liver injury - elevated AST/ALT compared to baseline, likely has underlying alcohol liver injury; no new drugs to suggest DILI, will rule out acute viral hepatitis (unlikely) and biliary pathology (also unlikely given normal cholestatic parameters)    Recommendations:  - pantoprazole 40mg PO BID  - treatment of etoh withdrawal per primary team  - [facility]  - acute viral hepatitis panel  - check INR, lipase  - antiemetics (zofran) prn in addition to benzos if ok w/ MFM  - f/u MFM recommendations  - no plans for endoscopy at this time  - rest of care per primary team    GI will continue to follow.     Tomas Alberto, PGY4  Gastroenterology Fellow  Available on [employer] Teams  [phone] ([email or web address] Short Range Pager)  [phone] (Long Range Pager)    After 5pm, please contact the on-call GI fellow. [phone]   Impression:  27 yo F w/ PMHx currently 24 weeks pregnant, ETOH abuse (recent admission for intoxication), benzo w/d seizures, depression p/w nausea/vomiting/abd pain x 4 days, found to have hepatocellular liver injury    # nausea/vomiting - concerning for EtOH vs benzo withdrawal; reports last drink 3/29/21. May have underlying gastritis from EtOH use. Scant amount of blood in emesis likely esophagitis vs [place]. Hg above baseline which is reassuring. Will recommend empirically starting PPI (can use oral), treatment of ETOH withdrawal, and monitoring. No plans for endoscopy at this time. Will also appreciate OB/GYN input for further recommendations, though unlikely nausea/vomiting is related to pregnancy.     # hepatocellular liver injury - elevated AST/ALT compared to baseline, likely has underlying alcohol liver injury; no new drugs to suggest DILI, will rule out acute viral hepatitis (unlikely) and biliary pathology (also unlikely given normal cholestatic parameters). Acute fatty liver of pregnancy less likely.     Recommendations:  - pantoprazole 40mg PO BID  - treatment of etoh withdrawal per primary team  - RUQ   - acute viral hepatitis panel  - check INR, lipase  - antiemetics (zofran) prn in addition to benzos if ok w/ MFM  - f/u MFM recommendations  - no plans for endoscopy at this time  - rest of care per primary team    GI will continue to follow.     Tomas Alberto, PGY4  Gastroenterology Fellow  Available on [employer] Teams  [phone] (vMobo Short Range Pager)  [phone] (Long Range Pager)    After 5pm, please contact the on-call GI fellow. [phone]   Impression:  27 yo F w/ PMHx currently 24 weeks pregnant, ETOH abuse (recent admission for intoxication), benzo w/d seizures, depression p/w nausea/vomiting/abd pain x 4 days, found to have hepatocellular liver injury    # nausea/vomiting - concerning for EtOH vs benzo withdrawal; reports last drink 3/29/21. May have underlying gastritis from EtOH use. Scant amount of blood in emesis likely esophagitis vs [place]. Hg above baseline which is reassuring. Will recommend empirically starting PPI (can use oral), treatment of ETOH withdrawal, and monitoring. No plans for endoscopy at this time. Will also appreciate OB/GYN input for further recommendations, though unlikely nausea/vomiting is related to pregnancy.     # hepatocellular liver injury - elevated AST/ALT compared to baseline, likely has underlying alcohol liver injury; no new drugs to suggest DILI, will rule out acute viral hepatitis (unlikely) and biliary pathology (also unlikely given normal cholestatic parameters). Acute fatty liver of pregnancy less likely, but will discuss w/ hepatology.     Recommendations:  - pantoprazole 40mg PO BID  - treatment of etoh withdrawal per primary team  - RUQ US  - acute viral hepatitis panel  - check INR, lipase  - antiemetics (zofran) prn in addition to benzos if ok w/ MFM  - f/u MFM recommendations  - no plans for endoscopy at this time  - rest of care per primary team    GI will continue to follow.     Tomas Alberto, PGY4  Gastroenterology Fellow  Available on [employer] Teams  [phone] (Lang-8 Short Range Pager)  [phone] (Long Range Pager)    After 5pm, please contact the on-call GI fellow. [phone]

## 2021-04-05 NOTE — PROGRESS NOTE ADULT - PROBLEM SELECTOR PLAN 4
#expected due date (dated per US) July 19th 2021, currently 25 weeks  - OB consulted, apprec recs  - prenatal vitamins  -

## 2021-04-05 NOTE — PROGRESS NOTE ADULT - PROBLEM SELECTOR PLAN 7
F - IV  E - replete as needed  N - npo for now with ice chips and medication, will advance as tolerated tomorrow  D

## 2021-04-05 NOTE — CONSULT NOTE ADULT - ATTENDING COMMENTS
Patient seen and discussed with OB team.  Patient  with 1 set of twins. Now 25 weeks pregnant and admitted with anorexia, vomiting , dyspepsia and upper abdominal pain. Patient has long history of substance abuse and has been drinking cognac heavily over past month. Has elevated liver tests  which have risen recently . Review of chart shows prior liver test elevation in 2021.  Patient not aware of prior hepatitis. she does use tylenol at home along with multiple psychotropic meds. Recently noted minor itching, no elevated BP and platelets normal.     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  suggest topical therapy such as sucralfate for heartburn. Look to reintroduce food as tolerated. Monitor INR. picture looks most like alcohol liver injury rather than pregnancy associated fatty liver.  check uric acid and bile acids.    Patient will need plan and support for maintaining abstinence.

## 2021-04-05 NOTE — PROGRESS NOTE ADULT - ASSESSMENT
27 yo F w/ PMHx currently 25 weeks pregnant with hx of ETOH abuse (recent admission for intoxication), benzo w/d seizures, depression p/w nausea/vomiting/abd pain x 4 days.    # nausea/vomiting - concerning for EtOH vs benzo withdrawal; reports last drink 3/29/21  -GI following and appreciate recs  -Zofran, Reglan, pantoprazole are acceptable anti-emetics  -If clinical suspicion for withdrawal high, can give Ativan/benzodiazepine as needed     #transaminitis  -elevated AST/ALT compared to baseline  -GI following: will rule out acute viral hepatitis (unlikely) and biliary pathology    #FWB  -for NST/BPP today  -BPP on admission was 6/10  -MFM to follow    BRIDGETT Bain PGY-3  [id number] 27 yo F w/ PMHx currently 25 weeks pregnant with hx of ETOH abuse (recent admission for intoxication), benzo w/d seizures, depression p/w nausea/vomiting/abd pain x 4 days.    # nausea/vomiting - concerning for EtOH vs benzo withdrawal; reports last drink 3/29/21  -GI following and appreciate recs  -Zofran, Reglan, pantoprazole are acceptable anti-emetics  -If clinical suspicion for withdrawal high, can give Ativan/benzodiazepine as needed     #transaminitis  -elevated AST/ALT compared to baseline  -GI following: will rule out acute viral hepatitis (unlikely) and biliary pathology    #FWB  -for NST/BPP today  -BPP on admission was 6/10  -MFM to follow    BRIDGETT Bain PGY-3  [id number]    ***MFM ADDENDUM***  Patient seen at bedside w/ MFM attending Dr. Mendez.  She reports continued nausea/vomiting and inability to tolerate PO. She reports generalized abdominal discomfort, denies ctx, LOF, VB and reports fetal movements. She also is reporting pruritis.  Vital signs and labs reviewed, notable for increasing LFTs-- we were with hepatology at bedside and the differential diagnosis is wide, in terms of pregnancy-related complications, hyperemesis gravidarum rarely extends to this point of pregnancy and would be a diagnosis of exclusion at this time, amylase/lipase WNL, hepatitis panel pending although unlikely given recent testing, patient known to have alcohol use disorder however it has been 7 days since last drink and blood levels are negative thus would defer to hepatology/medicine to determine whether this transaminitis is related, patient has normal blood pressures and normal serum glucose, suspicion for preeclampsia/AFLP is low. Appreciated medicine and consulting teams care.  Regarding fetal status, repeat BPP  this AM, will continue  testing with NST daily. 27 yo F w/ PMHx currently 25 weeks pregnant with hx of ETOH abuse (recent admission for intoxication), benzo w/d seizures, depression p/w nausea/vomiting/abd pain x 4 days.    # nausea/vomiting - concerning for EtOH vs benzo withdrawal; reports last drink 3/29/21  -GI following and appreciate recs  -Zofran, Reglan, pantoprazole are acceptable anti-emetics  -If clinical suspicion for withdrawal high, can give Ativan/benzodiazepine as needed     #transaminitis  -elevated AST/ALT compared to baseline  -GI following: will rule out acute viral hepatitis (unlikely) and biliary pathology    #FWB  -for NST/BPP today  -BPP on admission was 6/10  -MFM to follow    BRIDGETT Bain PGY-3  [id number]    ***MFM ADDENDUM***  Patient seen at bedside w/ MFM attending Dr. Mendez.  She reports continued nausea/vomiting and inability to tolerate PO. She reports generalized abdominal discomfort, denies ctx, LOF, VB and reports fetal movements. She also is reporting pruritis.  Vital signs and labs reviewed, notable for increasing LFTs-- we were with hepatology at bedside and the differential diagnosis is wide, in terms of pregnancy-related complications, hyperemesis gravidarum rarely extends to this point of pregnancy and would be a diagnosis of exclusion at this time, amylase/lipase WNL, hepatitis panel pending although unlikely given recent testing, patient known to have alcohol use disorder however it has been 7 days since last drink and blood levels are negative thus would defer to hepatology/medicine to determine whether this transaminitis is related, patient has normal blood pressures and normal serum glucose, suspicion for preeclampsia/AFLP is low. Appreciated medicine and consulting teams care.  Regarding fetal status, repeat BPP  this AM, will continue  testing with NST daily.  There remains a confusion regarding anxiety care for this patient, she was taking paroxetine however states she was told to take it as needed rather than daily, which may been confused with her valium which is a PRN medication. This appears to not have been a recommendation by our clinic. Although paroxetine is not first line in pregnancy and has an association with congenital heart defects, it can certainly be used in pregnancy particularly when benefits of maternal anxiety control outweigh risks of relapse as she resorts to drinking alcohol when anxiety is uncontrolled, she should be maintained on this medication daily.

## 2021-04-05 NOTE — PROGRESS NOTE ADULT - PROBLEM SELECTOR PLAN 5
#likely in setting of alcohol use, low suspicion at this point for HELLP (plt WNR, no evidence of hemolysis), VSS so low suspicion for preeclampsia, RUQ US neg  -GI consult  - f/u hepatitis panel although low suspicion  - dose meds accordingly
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
## 2021-04-05 NOTE — PROGRESS NOTE ADULT - PROBLEM SELECTOR PLAN 6
#multiple possible etiologies: pregnancy (has hx of n/v with prescriptions in past of antiemetics), withdrawal, and pyelonephritis  - c/w zofran prn  - PPI  - IVF  - NPO for now, will advance as tolerated

## 2021-04-05 NOTE — PROVIDER CONTACT NOTE (OTHER) - ACTION/TREATMENT ORDERED:
As per provider Cuate Chang, patient's CIWA protocol is to be discussed with the attending on the proper CIWA protocol the team wants to initiate for the patient; Will continue to monitor

## 2021-04-05 NOTE — CONSULT NOTE ADULT - SUBJECTIVE AND OBJECTIVE BOX
Chief Complaint:  Patient is a 26y old  Female who presents with a chief complaint of Nausea, Vomiting, EtOH withdrawal (2021 21:30)      HPI:  25 yo F w/ PMHx currently 24wk pregnant, ETOH use (recently hospitalized 3/30-21 for etoh withdrawal) h/o benzo w/d, seizures, presenting w/ nausea/vomiting since discharge.     Patient admitted 3/30/21-21 for suicidal ideation and alcohol abuse. Patient left AMA, and presented to [place] [facility] care coordinated to establish care and reinstate her psych meds. However upon arrival patient was refused entry to [facility] bc she is not a resident of the [place]. She states since then, she has poor PO tolerance, ongoing nausea, intermittent vomiting. Vomit mostly bilious, reports several episodes w/ some blood. Also reports some epigastric and chest pain.   Denies fever/chills, no SOB. Reports last drink was prior to previous hospiatlization (3/29/21).     On presentation, patient HDS. Labs notable for Hg above discharge Hg (12, from 9.4), BMP wnl, AST/ALT elevated (324/292 from 181/171 on discharge). Started on ativan taper, PPI, and admitted to medicine.     Allergies:  No Known Allergies      Home Medications:    Hospital Medications:  cefTRIAXone   IVPB 1000 milliGRAM(s) IV Intermittent every 24 hours  cyanocobalamin 1000 MICROGram(s) Oral daily  folic acid 1 milliGRAM(s) Oral daily  LORazepam     Tablet 2 milliGRAM(s) Oral every 2 hours PRN  LORazepam   Injectable 2 milliGRAM(s) IV Push every 4 hours  LORazepam   Injectable   IV Push   LORazepam   Injectable 2 milliGRAM(s) IV Push every 1 hour PRN  pantoprazole  Injectable 40 milliGRAM(s) IV Push two times a day  potassium chloride   Powder 40 milliEquivalent(s) Oral every 4 hours  prenatal multivitamin 1 Tablet(s) Oral daily  sodium chloride 0.9%. 1000 milliLiter(s) IV Continuous <Continuous>  thiamine 100 milliGRAM(s) Oral daily  thiamine Injectable 100 milliGRAM(s) IV Push daily      PMHX/PSHX:  Depression    Anxiety    No pertinent past medical history    No significant past surgical history    No significant past surgical history        Family history:  No pertinent family history in first degree relatives    No pertinent family history in first degree relatives    FH: diabetes mellitus (Father, Mother)        Denies family history of colon cancer/polyps, stomach cancer/polyps, pancreatic cancer/masses, liver cancer/disease, ovarian cancer and endometrial cancer.    Social History:   Tob: Denies  EtOH: Denies  Illicit Drugs: Denies    ROS:     General:  No wt loss, fevers, chills, night sweats, fatigue  Eyes:  Good vision, no reported pain  ENT:  No sore throat, pain, runny nose, dysphagia  CV:  No pain, palpitations, hypo/hypertension  Pulm:  No dyspnea, cough, tachypnea, wheezing  GI:  see HPI  :  No pain, bleeding, incontinence, nocturia  Muscle:  No pain, weakness  Neuro:  No weakness, tingling, memory problems  Psych:  No fatigue, insomnia, mood problems, depression  Endocrine:  No polyuria, polydipsia, cold/heat intolerance  Heme:  No petechiae, ecchymosis, easy bruisability  Skin:  No rash, tattoos, scars, edema    PHYSICAL EXAM:     GENERAL:  No acute distress  HEENT:  NCAT, no scleral icterus   CHEST:  no respiratory distress  HEART:  Regular rate and rhythm  ABDOMEN:  Soft, non-tender, non-distended, normoactive bowel sounds,  no masses, no hepato-splenomegaly, no signs of chronic liver disease  EXTREMITIES: No edema  SKIN:  No rash/erythema/ecchymoses/petechiae/wounds/abscess/warm/dry  NEURO:  Alert and oriented x 3, no asterixis    Vital Signs:  Vital Signs Last 24 Hrs  T(C): 36.4 (2021 07:00), Max: 37.2 (2021 13:31)  T(F): 97.5 (2021 07:00), Max: 98.9 (2021 13:31)  HR: 89 (2021 07:00) (89 - 106)  BP: 102/67 (2021 07:00) (102/67 - 139/75)  BP(mean): --  RR: 17 (2021 07:00) (16 - 18)  SpO2: 100% (2021 07:00) (100% - 100%)  Daily Height in cm: 172.72 (2021 23:41)    Daily     LABS:                        12.1   5.46  )-----------( 243      ( 2021 17:24 )             36.9     Mean Cell Volume: 81.1 fL (21 @ 17:24)    04-    142  |  108<H>  |  4<L>  ----------------------------<  101<H>  3.2<L>   |  16<L>  |  0.59    Ca    8.7      2021 04:53  Phos  2.5     -  Mg     2.3     04-    TPro  7.3  /  Alb  4.2  /  TBili  1.7<H>  /  DBili  x   /  AST  324<H>  /  ALT  292<H>  /  AlkPhos  46  -05    LIVER FUNCTIONS - ( 2021 04:53 )  Alb: 4.2 g/dL / Pro: 7.3 g/dL / ALK PHOS: 46 U/L / ALT: 292 U/L / AST: 324 U/L / GGT: x             Urinalysis Basic - ( 2021 05:12 )    Color: Yellow / Appearance: Slightly Turbid / S.034 / pH: x  Gluc: x / Ketone: Moderate  / Bili: Small / Urobili: 6 mg/dL   Blood: x / Protein: 30 mg/dL / Nitrite: Negative   Leuk Esterase: Small / RBC: 13 /HPF / WBC 70 /HPF   Sq Epi: x / Non Sq Epi: >27 /HPF / Bacteria: Many      Amylase Serum75      Lipase serum31       Ammonia--                          12.1   5.46  )-----------( 243      ( 2021 17:24 )             36.9       Imaging:           Chief Complaint:  Patient is a 26y old  Female who presents with a chief complaint of Nausea, Vomiting, EtOH withdrawal (2021 21:30)      HPI:  25 yo F w/ PMHx currently 24wk pregnant, ETOH use (recently hospitalized 3/30-21 for etoh withdrawal) h/o benzo w/d, seizures, presenting w/ nausea/vomiting since discharge.     Patient admitted 3/30/21-21 for suicidal ideation and alcohol abuse. Patient left AMA, and presented to [facility] re[facility] care coordinated to establish care and reinstate her psych meds. However upon arrival patient was refused entry to [facility] rehab bc she is not a resident of the [place]. She states since then, she has poor PO tolerance, ongoing nausea, intermittent vomiting. Vomit mostly bilious, reports several episodes w/ some blood. Reports 30+ episodes of emesis per day. Also reports some epigastric and chest pain. For symptoms, patient drank EtOH (most recently 21) w/o improvement in symptoms. No bowel movement since hospital discharge (21). No drug use.   Denies fever/chills, no SOB.     On presentation, patient HDS. Labs notable for Hg above discharge Hg (12, from 9.4), BMP wnl, AST/ALT elevated (324/292 from 181/171 on discharge). Started on ativan taper, PPI, and admitted to medicine.     Allergies:  No Known Allergies      Home Medications:    Hospital Medications:  cefTRIAXone   IVPB 1000 milliGRAM(s) IV Intermittent every 24 hours  cyanocobalamin 1000 MICROGram(s) Oral daily  folic acid 1 milliGRAM(s) Oral daily  LORazepam     Tablet 2 milliGRAM(s) Oral every 2 hours PRN  LORazepam   Injectable 2 milliGRAM(s) IV Push every 4 hours  LORazepam   Injectable   IV Push   LORazepam   Injectable 2 milliGRAM(s) IV Push every 1 hour PRN  pantoprazole  Injectable 40 milliGRAM(s) IV Push two times a day  potassium chloride   Powder 40 milliEquivalent(s) Oral every 4 hours  prenatal multivitamin 1 Tablet(s) Oral daily  sodium chloride 0.9%. 1000 milliLiter(s) IV Continuous <Continuous>  thiamine 100 milliGRAM(s) Oral daily  thiamine Injectable 100 milliGRAM(s) IV Push daily      PMHX/PSHX:  Depression    Anxiety    No pertinent past medical history    No significant past surgical history    No significant past surgical history        Family history:  No pertinent family history in first degree relatives    No pertinent family history in first degree relatives    FH: diabetes mellitus (Father, Mother)        Denies family history of colon cancer/polyps, stomach cancer/polyps, pancreatic cancer/masses, liver cancer/disease, ovarian cancer and endometrial cancer.    Social History:   Tob: Denies  EtOH: Denies  Illicit Drugs: Denies    ROS:     General:  No wt loss, fevers, chills, night sweats, fatigue  Eyes:  Good vision, no reported pain  ENT:  No sore throat, pain, runny nose, dysphagia  CV:  No pain, palpitations, hypo/hypertension  Pulm:  No dyspnea, cough, tachypnea, wheezing  GI:  see HPI  :  No pain, bleeding, incontinence, nocturia  Muscle:  No pain, weakness  Neuro:  No weakness, tingling, memory problems  Psych:  No fatigue, insomnia, mood problems, depression  Endocrine:  No polyuria, polydipsia, cold/heat intolerance  Heme:  No petechiae, ecchymosis, easy bruisability  Skin:  No rash, tattoos, scars, edema    PHYSICAL EXAM:     GENERAL:  No acute distress  HEENT:  NCAT, no scleral icterus   CHEST:  no respiratory distress  HEART:  Regular rate and rhythm  ABDOMEN:  Soft, non-tender, non-distended, normoactive bowel sounds,  no masses, no hepato-splenomegaly, no signs of chronic liver disease  EXTREMITIES: No edema  SKIN:  No rash/erythema/ecchymoses/petechiae/wounds/abscess/warm/dry  NEURO:  Alert and oriented x 3, no asterixis    Vital Signs:  Vital Signs Last 24 Hrs  T(C): 36.4 (2021 07:00), Max: 37.2 (2021 13:31)  T(F): 97.5 (2021 07:00), Max: 98.9 (2021 13:31)  HR: 89 (2021 07:00) (89 - 106)  BP: 102/67 (2021 07:00) (102/67 - 139/75)  BP(mean): --  RR: 17 (2021 07:00) (16 - 18)  SpO2: 100% (2021 07:00) (100% - 100%)  Daily Height in cm: 172.72 (2021 23:41)    Daily     LABS:                        12.1   5.46  )-----------( 243      ( 2021 17:24 )             36.9     Mean Cell Volume: 81.1 fL (21 @ 17:24)    04-    142  |  108<H>  |  4<L>  ----------------------------<  101<H>  3.2<L>   |  16<L>  |  0.59    Ca    8.7      2021 04:53  Phos  2.5     04-  Mg     2.3     04-05    TPro  7.3  /  Alb  4.2  /  TBili  1.7<H>  /  DBili  x   /  AST  324<H>  /  ALT  292<H>  /  AlkPhos  46  04-05    LIVER FUNCTIONS - ( 2021 04:53 )  Alb: 4.2 g/dL / Pro: 7.3 g/dL / ALK PHOS: 46 U/L / ALT: 292 U/L / AST: 324 U/L / GGT: x             Urinalysis Basic - ( 2021 05:12 )    Color: Yellow / Appearance: Slightly Turbid / S.034 / pH: x  Gluc: x / Ketone: Moderate  / Bili: Small / Urobili: 6 mg/dL   Blood: x / Protein: 30 mg/dL / Nitrite: Negative   Leuk Esterase: Small / RBC: 13 /HPF / WBC 70 /HPF   Sq Epi: x / Non Sq Epi: >27 /HPF / Bacteria: Many      Amylase Serum75      Lipase serum31       Ammonia--                          12.1   5.46  )-----------( 243      ( 2021 17:24 )             36.9       Imaging:

## 2021-04-05 NOTE — CONSULT NOTE ADULT - SUBJECTIVE AND OBJECTIVE BOX
Patient is a 26y old  Female who presents with a chief complaint of Nausea, Vomiting, EtOH withdrawal (05 Apr 2021 10:08)      Admitted on: 04-04-21    HPI:     Patient is a 25 y/o F with PMH of PTSD, anxiety, depression, sickle cell trait, alcohol abuse, seizures from BZD withdrawal, with recent admission for 3/30-4/1 for alcohol intoxication and withdrawal during pregnancy but left AMA, who presents to ED for nausea, blood tinged, emesis and chest pain and found to have elevated liver enzymes and hepatology has been called for the same. Patient admitted 3/30/21-4/1/21 for suicidal ideation and alcohol abuse. Patient left AMA, and presented to [place] [facility] rehab care coordinated to establish care and reinstate her psych meds. However upon arrival patient was refused entry to [facility] rehab bc she is not a resident of the [facility]. She states since then, she has poor PO tolerance, ongoing nausea, intermittent vomiting. Vomit mostly bilious, reports several episodes w/ some blood. Reports 30+ episodes of emesis per day. Also reports some epigastric and chest pain. For symptoms, patient drank EtOH (most recently 4/2/21) w/o improvement in symptoms. No bowel movement since hospital discharge (4/1/21). No drug use.   Denies fever/chills, no SOB.     In ED: 98.9F, , 131/75, RR18, 100%Ra  Received ceftriaxone, Zofran, Reglan, 1L IVF bolus.       COVID 19: negative      PAST MEDICAL & SURGICAL HISTORY:  Depression    Anxiety    No pertinent past medical history    No significant past surgical history    No significant past surgical history        FAMILY HISTORY:  FH: diabetes mellitus (Father, Mother)        Social History:  Alcohol abuse    Home Medications:  cyanocobalamin 1000 mcg oral tablet: 1 tab(s) orally once a day (05 Apr 2021 00:00)  folic acid 1 mg oral tablet: 1 tab(s) orally once a day (05 Apr 2021 00:00)  thiamine 100 mg oral tablet: 1 tab(s) orally once a day (05 Apr 2021 00:00)    MEDICATIONS  (STANDING):  cefTRIAXone   IVPB 1000 milliGRAM(s) IV Intermittent every 24 hours  cyanocobalamin 1000 MICROGram(s) Oral daily  folic acid 1 milliGRAM(s) Oral daily  LORazepam   Injectable 2 milliGRAM(s) IV Push every 4 hours  LORazepam   Injectable   IV Push   ondansetron Injectable 4 milliGRAM(s) IV Push once  pantoprazole  Injectable 40 milliGRAM(s) IV Push two times a day  prenatal multivitamin 1 Tablet(s) Oral daily  sodium chloride 0.9%. 1000 milliLiter(s) (75 mL/Hr) IV Continuous <Continuous>  thiamine 100 milliGRAM(s) Oral daily  thiamine Injectable 100 milliGRAM(s) IV Push daily    MEDICATIONS  (PRN):  LORazepam     Tablet 2 milliGRAM(s) Oral every 2 hours PRN Symptom-triggered 2 point increase in CIWA-Ar  LORazepam   Injectable 2 milliGRAM(s) IV Push every 1 hour PRN Symptom-triggered: each CIWA -Ar score 8 or GREATER      Allergies  No Known Allergies      Review of Systems:   Constitutional:  No Fever, No Chills  ENT/Mouth:  No Hearing Changes,  No Difficulty Swallowing  Eyes:  No Eye Pain, No Vision Changes  Cardiovascular:  chest pain  Respiratory:  No Cough, No Dyspnea  Gastrointestinal:  As described in HPI  Musculoskeletal:  No Joint Swelling, No Back Pain  Skin:  Jaundice  Neuro:  No Syncope, No Dizziness  Heme/Lymph:  No Bruising, No Bleeding.          Physical Examination:  T(C): 36.6 (04-05-21 @ 10:50), Max: 37.2 (04-04-21 @ 13:31)  HR: 91 (04-05-21 @ 10:50) (88 - 106)  BP: 107/64 (04-05-21 @ 10:50) (102/67 - 139/75)  RR: 18 (04-05-21 @ 10:50) (16 - 18)  SpO2: 100% (04-05-21 @ 10:50) (99% - 100%)  Height (cm): 172.7 (04-04-21 @ 23:41)  Weight (kg): 82.9 (04-04-21 @ 23:41)      Constitutional: No acute distress.  Eyes: Sclera is icteric.  Ears Nose and Throat: The external ears are normal appearing,  Oral mucosa is pink and moist.  Respiratory:  No signs of respiratory distress. Lung sounds are clear bilaterally.  Cardiovascular:  S1 S2, Regular rate and rhythm.  GI: Abdomen is soft, symmetric, and non-tender without distention.   Neuro: No Tremor, No involuntary movements            Data: (reviewed by attending)                        12.1   5.46  )-----------( 243      ( 04 Apr 2021 17:24 )             36.9     Hgb Trend:  12.1  04-04-21 @ 17:24      04-05    142  |  108<H>  |  4<L>  ----------------------------<  101<H>  3.2<L>   |  16<L>  |  0.59    Ca    8.7      05 Apr 2021 04:53  Phos  2.5     04-05  Mg     2.3     04-05    TPro  7.3  /  Alb  4.2  /  TBili  1.7<H>  /  DBili  x   /  AST  324<H>  /  ALT  292<H>  /  AlkPhos  46  04-05    Liver panel trend:  TBili 1.7   /      /      /   AlkP 46   /   Tptn 7.3   /   Alb 4.2    /   DBili --      04-05  TBili 1.8   /      /      /   AlkP 55   /   Tptn 8.8   /   Alb 5.0    /   DBili --      04-04  TBili 1.1   /   AST 52   /   ALT 77   /   AlkP 43   /   Tptn 7.1   /   Alb 4.1    /   DBili --      03-30              Radiology:(reviewed by attending)    US Abdomen Upper Quadrant Right:   EXAM:  US ABDOMEN RT UPR QUADRANT        PROCEDURE DATE:  Apr 5 2021         INTERPRETATION:  CLINICAL INFORMATION: Elevated liver enzymes.    COMPARISON: January 20, 2021.    TECHNIQUE: Sonography of the right upper quadrant.    FINDINGS:    Liver: Within normal limits.  Bile ducts: Normal caliber. Common bile duct measures 2 mm.  Gallbladder: Within normal limits.  Pancreas: Visualized portions are within normal limits.  Right kidney: 10.2 cm. No hydronephrosis.  Ascites: None.  IVC: Poorly visualized due to overlying bowel gas.    IMPRESSION:    Normal right upper quadrant abdominal ultrasound.              KHALIF JUAREZ MD; Attending Radiologist  This document has been electronically signed. Apr 5 2021  8:15AM (04-05-21 @ 08:11)

## 2021-04-05 NOTE — CONSULT NOTE ADULT - ASSESSMENT
Patient is a 27 y/o F with PMH of PTSD, anxiety, depression, sickle cell trait, alcohol abuse, seizures from BZD withdrawal, with recent admission for 3/30-4/1 for alcohol intoxication, suicidal ideation during pregnancy but left AMA, who presents to ED for nausea, blood tinged, emesis and chest pain and found to have elevated liver enzymes and hepatology has been called for the same.      #  Patient is a 25 y/o currently pregnant 25 weeks F with PMH of PTSD, anxiety, depression, sickle cell trait, alcohol abuse, seizures from BZD withdrawal, with recent admission for 3/30-4/1 for alcohol intoxication, suicidal ideation during pregnancy but left AMA, who presents to ED for nausea, blood tinged, emesis and chest pain and found to have elevated liver enzymes and hepatology has been called for the same.      # Elevated liver enzymes Hepatocellular pattern) in 3rd trimester.  DD includes alcoholic liver disease (AST>ALT, active drinking, last drink was 3/29/2021), Cannot calculate DF as no INR/PT  vs R/O Fatty liver of pregnancy  (Unlikely, INR pending, PLT N)  R/O viral hepatitis   Patient currently admitted with diagnosis of alcohol withdrawal vs BZD withdrawal.  US abdomen: liver normal, no biliary dilation  No significant cholestasis, no pruritis, AAOX3, no asterixis.   BP is stable, trace protein in UA.    Rec:  Alcohol cessation, alcohol rehab.  trend CMP, INR/PT daily.  get conjugated bilirubin, LDH.  get HBSAg, HCV AB, HBcIGM, HAV IgM.  will follow up.     Patient is a 25 y/o currently pregnant 25 weeks F with PMH of PTSD, anxiety, depression, sickle cell trait, alcohol abuse, seizures from BZD withdrawal, with recent admission for 3/30-4/1 for alcohol intoxication, suicidal ideation during pregnancy but left AMA, who presents to ED for nausea, blood tinged, emesis and chest pain and found to have elevated liver enzymes and hepatology has been called for the same.      # Elevated liver enzymes Hepatocellular pattern) in 3rd trimester.  DD includes alcoholic liver disease (AST>ALT, active drinking, last drink was 3/29/2021), Cannot calculate DF as no INR/PT  vs R/O Fatty liver of pregnancy  (Unlikely, INR pending, PLT N)  R/O viral hepatitis   Patient currently admitted with diagnosis of alcohol withdrawal vs BZD withdrawal.  US abdomen: liver normal, no biliary dilation  No significant cholestasis, AAOX3, no asterixis.   BP is stable, trace protein in UA.  Pruritis+    Rec:  Alcohol cessation, alcohol rehab.  trend CMP, INR/PT daily.  get conjugated bilirubin, LDH.  get HBSAg, HCV AB, HBcIGM, HAV IgM.  get Bile acids.  will follow up.     Patient is a 25 y/o currently pregnant 25 weeks F with PMH of PTSD, anxiety, depression, sickle cell trait, alcohol abuse, seizures from BZD withdrawal, with recent admission for 3/30-4/1 for alcohol intoxication, suicidal ideation during pregnancy but left AMA, who presents to ED for nausea, blood tinged, emesis and chest pain and found to have elevated liver enzymes and hepatology has been called for the same.      # Elevated liver enzymes Hepatocellular pattern) in 3rd trimester.  DD includes alcoholic liver disease (AST>ALT, active drinking, last drink was 3/29/2021), Cannot calculate DF as no INR/PT  vs R/O Fatty liver of pregnancy  (Unlikely, INR pending, PLT N)  R/O viral hepatitis   Patient currently admitted with diagnosis of alcohol withdrawal vs BZD withdrawal.  US abdomen: liver normal, no biliary dilation  No significant cholestasis, AAOX3, no asterixis.   BP is stable, trace protein in UA.  Pruritis+  Patient also has H/O elevated liver enzymes in Jan 2021 (but ALT> AST at that time)    Rec:  Alcohol cessation, alcohol rehab.  trend CMP, INR/PT daily.  get conjugated bilirubin, LDH.  Please perform a CLD work up which includes HAV IgM/IgG, HBsAg, HBsAb, HBcAb IgM/IgG, HCV Ab, Anti HEV, ANIBAL, SMA, immunoglobulins panel, AMA, Anti LK microsomal Ab, anti-soluble liver Ag.  get EBV serology, CMV IgM, HSV 1,2 IgM/igG, varicella IgM.  get US doppler of liver  get Bile acids.  consider carafate for possible gastritis and esophagitis.   will follow up.

## 2021-04-06 ENCOUNTER — APPOINTMENT (OUTPATIENT)
Dept: OBGYN | Facility: HOSPITAL | Age: 26
End: 2021-04-06

## 2021-04-06 DIAGNOSIS — F41.9 ANXIETY DISORDER, UNSPECIFIED: ICD-10-CM

## 2021-04-06 DIAGNOSIS — F19.94 OTHER PSYCHOACTIVE SUBSTANCE USE, UNSPECIFIED WITH PSYCHOACTIVE SUBSTANCE-INDUCED MOOD DISORDER: ICD-10-CM

## 2021-04-06 LAB
ALBUMIN SERPL ELPH-MCNC: 3.2 G/DL — LOW (ref 3.3–5)
ALP SERPL-CCNC: 36 U/L — LOW (ref 40–120)
ALT FLD-CCNC: 370 U/L — HIGH (ref 4–33)
ANION GAP SERPL CALC-SCNC: 11 MMOL/L — SIGNIFICANT CHANGE UP (ref 7–14)
AST SERPL-CCNC: 330 U/L — HIGH (ref 4–32)
BASOPHILS # BLD AUTO: 0.02 K/UL — SIGNIFICANT CHANGE UP (ref 0–0.2)
BASOPHILS NFR BLD AUTO: 0.5 % — SIGNIFICANT CHANGE UP (ref 0–2)
BILIRUB DIRECT SERPL-MCNC: 0.4 MG/DL — HIGH (ref 0–0.2)
BILIRUB INDIRECT FLD-MCNC: 0.5 MG/DL — SIGNIFICANT CHANGE UP (ref 0–1)
BILIRUB SERPL-MCNC: 0.8 MG/DL — SIGNIFICANT CHANGE UP (ref 0.2–1.2)
BILIRUB SERPL-MCNC: 0.9 MG/DL — SIGNIFICANT CHANGE UP (ref 0.2–1.2)
BUN SERPL-MCNC: 4 MG/DL — LOW (ref 7–23)
CALCIUM SERPL-MCNC: 7.8 MG/DL — LOW (ref 8.4–10.5)
CHLORIDE SERPL-SCNC: 105 MMOL/L — SIGNIFICANT CHANGE UP (ref 98–107)
CMV IGM FLD-ACNC: <8 AU/ML — SIGNIFICANT CHANGE UP
CMV IGM SERPL QL: NEGATIVE — SIGNIFICANT CHANGE UP
CO2 SERPL-SCNC: 20 MMOL/L — LOW (ref 22–31)
CREAT ?TM UR-MCNC: 69 MG/DL — SIGNIFICANT CHANGE UP
CREAT SERPL-MCNC: 0.67 MG/DL — SIGNIFICANT CHANGE UP (ref 0.5–1.3)
EBV EA AB SER IA-ACNC: <5 U/ML — SIGNIFICANT CHANGE UP
EBV EA AB TITR SER IF: POSITIVE
EBV EA IGG SER-ACNC: NEGATIVE — SIGNIFICANT CHANGE UP
EBV NA IGG SER IA-ACNC: 131 U/ML — HIGH
EBV PATRN SPEC IB-IMP: SIGNIFICANT CHANGE UP
EBV VCA IGG AVIDITY SER QL IA: POSITIVE
EBV VCA IGM SER IA-ACNC: 279 U/ML — HIGH
EBV VCA IGM SER IA-ACNC: <10 U/ML — SIGNIFICANT CHANGE UP
EBV VCA IGM TITR FLD: NEGATIVE — SIGNIFICANT CHANGE UP
EOSINOPHIL # BLD AUTO: 0.05 K/UL — SIGNIFICANT CHANGE UP (ref 0–0.5)
EOSINOPHIL NFR BLD AUTO: 1.2 % — SIGNIFICANT CHANGE UP (ref 0–6)
GLUCOSE SERPL-MCNC: 83 MG/DL — SIGNIFICANT CHANGE UP (ref 70–99)
HAPTOGLOB SERPL-MCNC: <20 MG/DL — LOW (ref 34–200)
HAV IGM SER-ACNC: SIGNIFICANT CHANGE UP
HBV CORE IGM SER-ACNC: SIGNIFICANT CHANGE UP
HBV SURFACE AG SER-ACNC: SIGNIFICANT CHANGE UP
HCT VFR BLD CALC: 28.6 % — LOW (ref 34.5–45)
HCV AB S/CO SERPL IA: 0.07 S/CO — SIGNIFICANT CHANGE UP (ref 0–0.99)
HCV AB SERPL-IMP: SIGNIFICANT CHANGE UP
HGB BLD-MCNC: 9.5 G/DL — LOW (ref 11.5–15.5)
IANC: 1.83 K/UL — SIGNIFICANT CHANGE UP (ref 1.5–8.5)
IGA FLD-MCNC: 69 MG/DL — LOW (ref 84–499)
IGG FLD-MCNC: 1065 MG/DL — SIGNIFICANT CHANGE UP (ref 610–1660)
IGM SERPL-MCNC: 104 MG/DL — SIGNIFICANT CHANGE UP (ref 35–242)
IMM GRANULOCYTES NFR BLD AUTO: 1.2 % — SIGNIFICANT CHANGE UP (ref 0–1.5)
INR BLD: 1.02 RATIO — SIGNIFICANT CHANGE UP (ref 0.88–1.16)
KAPPA LC SER QL IFE: 1.47 MG/DL — SIGNIFICANT CHANGE UP (ref 0.33–1.94)
KAPPA/LAMBDA FREE LIGHT CHAIN RATIO, SERUM: 1.04 RATIO — SIGNIFICANT CHANGE UP (ref 0.26–1.65)
LAMBDA LC SER QL IFE: 1.42 MG/DL — SIGNIFICANT CHANGE UP (ref 0.57–2.63)
LDH SERPL L TO P-CCNC: 291 U/L — HIGH (ref 135–225)
LYMPHOCYTES # BLD AUTO: 1.92 K/UL — SIGNIFICANT CHANGE UP (ref 1–3.3)
LYMPHOCYTES # BLD AUTO: 44.8 % — HIGH (ref 13–44)
MAGNESIUM SERPL-MCNC: 1.8 MG/DL — SIGNIFICANT CHANGE UP (ref 1.6–2.6)
MCHC RBC-ENTMCNC: 26.6 PG — LOW (ref 27–34)
MCHC RBC-ENTMCNC: 33.2 GM/DL — SIGNIFICANT CHANGE UP (ref 32–36)
MCV RBC AUTO: 80.1 FL — SIGNIFICANT CHANGE UP (ref 80–100)
MONOCYTES # BLD AUTO: 0.42 K/UL — SIGNIFICANT CHANGE UP (ref 0–0.9)
MONOCYTES NFR BLD AUTO: 9.8 % — SIGNIFICANT CHANGE UP (ref 2–14)
NEUTROPHILS # BLD AUTO: 1.83 K/UL — SIGNIFICANT CHANGE UP (ref 1.8–7.4)
NEUTROPHILS NFR BLD AUTO: 42.5 % — LOW (ref 43–77)
NRBC # BLD: 0 /100 WBCS — SIGNIFICANT CHANGE UP
NRBC # FLD: 0 K/UL — SIGNIFICANT CHANGE UP
PHOSPHATE SERPL-MCNC: 2.5 MG/DL — SIGNIFICANT CHANGE UP (ref 2.5–4.5)
PLATELET # BLD AUTO: 172 K/UL — SIGNIFICANT CHANGE UP (ref 150–400)
POTASSIUM SERPL-MCNC: 2.8 MMOL/L — CRITICAL LOW (ref 3.5–5.3)
POTASSIUM SERPL-SCNC: 2.8 MMOL/L — CRITICAL LOW (ref 3.5–5.3)
PROT ?TM UR-MCNC: 8 MG/DL — SIGNIFICANT CHANGE UP
PROT SERPL-MCNC: 5.7 G/DL — LOW (ref 6–8.3)
PROT/CREAT UR-RTO: 0.1 RATIO — SIGNIFICANT CHANGE UP (ref 0–0.2)
PROTHROM AB SERPL-ACNC: 11.6 SEC — SIGNIFICANT CHANGE UP (ref 10.6–13.6)
RBC # BLD: 3.57 M/UL — LOW (ref 3.8–5.2)
RBC # FLD: 14.7 % — HIGH (ref 10.3–14.5)
RETICS #: 79.9 K/UL — SIGNIFICANT CHANGE UP (ref 25–125)
RETICS/RBC NFR: 2.3 % — SIGNIFICANT CHANGE UP (ref 0.5–2.5)
SODIUM SERPL-SCNC: 136 MMOL/L — SIGNIFICANT CHANGE UP (ref 135–145)
WBC # BLD: 4.29 K/UL — SIGNIFICANT CHANGE UP (ref 3.8–10.5)
WBC # FLD AUTO: 4.29 K/UL — SIGNIFICANT CHANGE UP (ref 3.8–10.5)

## 2021-04-06 PROCEDURE — 99232 SBSQ HOSP IP/OBS MODERATE 35: CPT | Mod: GC

## 2021-04-06 PROCEDURE — 93975 VASCULAR STUDY: CPT | Mod: 26

## 2021-04-06 PROCEDURE — 99233 SBSQ HOSP IP/OBS HIGH 50: CPT | Mod: GC

## 2021-04-06 PROCEDURE — 90792 PSYCH DIAG EVAL W/MED SRVCS: CPT

## 2021-04-06 PROCEDURE — 99212 OFFICE O/P EST SF 10 MIN: CPT

## 2021-04-06 RX ORDER — POTASSIUM CHLORIDE 20 MEQ
10 PACKET (EA) ORAL
Refills: 0 | Status: COMPLETED | OUTPATIENT
Start: 2021-04-06 | End: 2021-04-06

## 2021-04-06 RX ORDER — LANOLIN ALCOHOL/MO/W.PET/CERES
3 CREAM (GRAM) TOPICAL AT BEDTIME
Refills: 0 | Status: DISCONTINUED | OUTPATIENT
Start: 2021-04-06 | End: 2021-04-09

## 2021-04-06 RX ORDER — SODIUM CHLORIDE 9 MG/ML
1000 INJECTION, SOLUTION INTRAVENOUS ONCE
Refills: 0 | Status: COMPLETED | OUTPATIENT
Start: 2021-04-06 | End: 2021-04-06

## 2021-04-06 RX ORDER — POTASSIUM CHLORIDE 20 MEQ
40 PACKET (EA) ORAL
Refills: 0 | Status: DISCONTINUED | OUTPATIENT
Start: 2021-04-06 | End: 2021-04-06

## 2021-04-06 RX ORDER — SUCRALFATE 1 G
1 TABLET ORAL
Refills: 0 | Status: DISCONTINUED | OUTPATIENT
Start: 2021-04-06 | End: 2021-04-09

## 2021-04-06 RX ORDER — POTASSIUM CHLORIDE 20 MEQ
40 PACKET (EA) ORAL EVERY 4 HOURS
Refills: 0 | Status: COMPLETED | OUTPATIENT
Start: 2021-04-06 | End: 2021-04-06

## 2021-04-06 RX ORDER — SODIUM CHLORIDE 9 MG/ML
500 INJECTION INTRAMUSCULAR; INTRAVENOUS; SUBCUTANEOUS ONCE
Refills: 0 | Status: COMPLETED | OUTPATIENT
Start: 2021-04-06 | End: 2021-04-06

## 2021-04-06 RX ORDER — MORPHINE SULFATE 50 MG/1
1 CAPSULE, EXTENDED RELEASE ORAL ONCE
Refills: 0 | Status: DISCONTINUED | OUTPATIENT
Start: 2021-04-06 | End: 2021-04-06

## 2021-04-06 RX ORDER — THIAMINE MONONITRATE (VIT B1) 100 MG
500 TABLET ORAL DAILY
Refills: 0 | Status: COMPLETED | OUTPATIENT
Start: 2021-04-06 | End: 2021-04-08

## 2021-04-06 RX ADMIN — Medication 3 MILLIGRAM(S): at 22:36

## 2021-04-06 RX ADMIN — Medication 40 MILLIEQUIVALENT(S): at 18:46

## 2021-04-06 RX ADMIN — MORPHINE SULFATE 1 MILLIGRAM(S): 50 CAPSULE, EXTENDED RELEASE ORAL at 22:36

## 2021-04-06 RX ADMIN — SODIUM CHLORIDE 75 MILLILITER(S): 9 INJECTION INTRAMUSCULAR; INTRAVENOUS; SUBCUTANEOUS at 03:07

## 2021-04-06 RX ADMIN — Medication 1 MILLIGRAM(S): at 12:08

## 2021-04-06 RX ADMIN — CEFTRIAXONE 100 MILLIGRAM(S): 500 INJECTION, POWDER, FOR SOLUTION INTRAMUSCULAR; INTRAVENOUS at 18:46

## 2021-04-06 RX ADMIN — Medication 1.5 MILLIGRAM(S): at 03:07

## 2021-04-06 RX ADMIN — SODIUM CHLORIDE 1000 MILLILITER(S): 9 INJECTION, SOLUTION INTRAVENOUS at 12:17

## 2021-04-06 RX ADMIN — Medication 1.5 MILLIGRAM(S): at 18:47

## 2021-04-06 RX ADMIN — Medication 1.5 MILLIGRAM(S): at 23:37

## 2021-04-06 RX ADMIN — MORPHINE SULFATE 1 MILLIGRAM(S): 50 CAPSULE, EXTENDED RELEASE ORAL at 00:00

## 2021-04-06 RX ADMIN — Medication 100 MILLIGRAM(S): at 13:46

## 2021-04-06 RX ADMIN — PANTOPRAZOLE SODIUM 40 MILLIGRAM(S): 20 TABLET, DELAYED RELEASE ORAL at 06:55

## 2021-04-06 RX ADMIN — PREGABALIN 1000 MICROGRAM(S): 225 CAPSULE ORAL at 12:08

## 2021-04-06 RX ADMIN — ONDANSETRON 4 MILLIGRAM(S): 8 TABLET, FILM COATED ORAL at 08:39

## 2021-04-06 RX ADMIN — Medication 1 TABLET(S): at 12:08

## 2021-04-06 RX ADMIN — Medication 1 GRAM(S): at 18:46

## 2021-04-06 RX ADMIN — Medication 100 MILLIEQUIVALENT(S): at 13:26

## 2021-04-06 RX ADMIN — SODIUM CHLORIDE 500 MILLILITER(S): 9 INJECTION INTRAMUSCULAR; INTRAVENOUS; SUBCUTANEOUS at 20:28

## 2021-04-06 RX ADMIN — Medication 40 MILLIEQUIVALENT(S): at 22:36

## 2021-04-06 RX ADMIN — Medication 1.5 MILLIGRAM(S): at 15:14

## 2021-04-06 RX ADMIN — Medication 1 GRAM(S): at 23:38

## 2021-04-06 RX ADMIN — ONDANSETRON 4 MILLIGRAM(S): 8 TABLET, FILM COATED ORAL at 15:18

## 2021-04-06 RX ADMIN — Medication 1.5 MILLIGRAM(S): at 11:07

## 2021-04-06 RX ADMIN — ENOXAPARIN SODIUM 40 MILLIGRAM(S): 100 INJECTION SUBCUTANEOUS at 12:08

## 2021-04-06 RX ADMIN — PANTOPRAZOLE SODIUM 40 MILLIGRAM(S): 20 TABLET, DELAYED RELEASE ORAL at 18:47

## 2021-04-06 RX ADMIN — Medication 100 MILLIEQUIVALENT(S): at 10:44

## 2021-04-06 RX ADMIN — Medication 1.5 MILLIGRAM(S): at 07:00
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
## 2021-04-06 NOTE — PROGRESS NOTE ADULT - PROBLEM SELECTOR PLAN 7
F - IV  E - replete as needed  N - npo for now with ice chips and medication, will advance as tolerated tomorrow  D F - IV  E - replete as needed  N - advance as tolerated  Dvt ppx lovenox
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
## 2021-04-06 NOTE — PROVIDER CONTACT NOTE (OTHER) - ASSESSMENT
Pt A&Ox4. Patient states that she feels slight lightheadedness but denies chest pain, palpitations and shortness of breath. Current CIWA Score 3.

## 2021-04-06 NOTE — PROGRESS NOTE ADULT - PROBLEM SELECTOR PLAN 5
#likely in setting of alcohol use, low suspicion at this point for HELLP (plt WNR, no evidence of hemolysis), VSS so low suspicion for preeclampsia, RUQ US neg  -GI consult  - f/u hepatitis panel although low suspicion  - dose meds accordingly #expected due date (dated per US) July 19th 2021, currently 25 weeks  - OB consulted, apprec recs  - prenatal vitamins  -
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
## 2021-04-06 NOTE — BH CONSULTATION LIAISON ASSESSMENT NOTE - NSBHCHARTREVIEWVS_PSY_A_CORE FT
Vital Signs Last 24 Hrs  T(C): 36.7 (06 Apr 2021 15:00), Max: 36.7 (06 Apr 2021 03:00)  T(F): 98 (06 Apr 2021 15:00), Max: 98 (06 Apr 2021 03:00)  HR: 96 (06 Apr 2021 15:00) (80 - 97)  BP: 101/61 (06 Apr 2021 15:00) (89/59 - 111/65)  BP(mean): --  RR: 16 (06 Apr 2021 15:00) (16 - 18)  SpO2: 100% (06 Apr 2021 15:00) (99% - 100%)

## 2021-04-06 NOTE — BH CONSULTATION LIAISON ASSESSMENT NOTE - NSBHCHARTREVIEWLAB_PSY_A_CORE FT
CBC Full  -  ( 06 Apr 2021 06:45 )  WBC Count : 4.29 K/uL  RBC Count : 3.57 M/uL  Hemoglobin : 9.5 g/dL  Hematocrit : 28.6 %  Platelet Count - Automated : 172 K/uL  Mean Cell Volume : 80.1 fL  Mean Cell Hemoglobin : 26.6 pg  Mean Cell Hemoglobin Concentration : 33.2 gm/dL  Auto Neutrophil # : 1.83 K/uL  Auto Lymphocyte # : 1.92 K/uL  Auto Monocyte # : 0.42 K/uL  Auto Eosinophil # : 0.05 K/uL  Auto Basophil # : 0.02 K/uL  Auto Neutrophil % : 42.5 %  Auto Lymphocyte % : 44.8 %  Auto Monocyte % : 9.8 %  Auto Eosinophil % : 1.2 %  Auto Basophil % : 0.5 %  04-06    136  |  105  |  4<L>  ----------------------------<  83  2.8<LL>   |  20<L>  |  0.67    Ca    7.8<L>      06 Apr 2021 06:45  Phos  2.5     04-06  Mg     1.8     04-06    TPro  x   /  Alb  x   /  TBili  0.9  /  DBili  0.4<H>  /  AST  x   /  ALT  x   /  AlkPhos  x   04-06  Alcohol, Blood: <10: <10 mg/dL = Negative mg/dL

## 2021-04-06 NOTE — BH CONSULTATION LIAISON ASSESSMENT NOTE - CURRENT MEDICATION
MEDICATIONS  (STANDING):  cefTRIAXone   IVPB 1000 milliGRAM(s) IV Intermittent every 24 hours  cyanocobalamin 1000 MICROGram(s) Oral daily  enoxaparin Injectable 40 milliGRAM(s) SubCutaneous daily  folic acid 1 milliGRAM(s) Oral daily  LORazepam   Injectable 1.5 milliGRAM(s) IV Push every 4 hours  LORazepam   Injectable   IV Push   melatonin 3 milliGRAM(s) Oral at bedtime  pantoprazole  Injectable 40 milliGRAM(s) IV Push two times a day  potassium chloride   Solution 40 milliEquivalent(s) Oral every 4 hours  prenatal multivitamin 1 Tablet(s) Oral daily  sodium chloride 0.9%. 1000 milliLiter(s) (75 mL/Hr) IV Continuous <Continuous>  sucralfate 1 Gram(s) Oral four times a day  thiamine IVPB 500 milliGRAM(s) IV Intermittent daily    MEDICATIONS  (PRN):  acetaminophen   Tablet .. 650 milliGRAM(s) Oral once PRN Mild Pain (1 - 3), Moderate Pain (4 - 6), Severe Pain (7 - 10)  LORazepam     Tablet 2 milliGRAM(s) Oral every 2 hours PRN Symptom-triggered 2 point increase in CIWA-Ar  LORazepam   Injectable 2 milliGRAM(s) IV Push every 1 hour PRN Symptom-triggered: each CIWA -Ar score 8 or GREATER  morphine  - Injectable 1 milliGRAM(s) IV Push once PRN pain  ondansetron Injectable 4 milliGRAM(s) IV Push every 6 hours PRN Nausea and/or Vomiting

## 2021-04-06 NOTE — BH CONSULTATION LIAISON ASSESSMENT NOTE - CASE SUMMARY
Case discussed with Kristie Pedroza NP, impression and plan discussed and agreed upon. Met with the patient this morning- 4/7. Calm, engaged in conversation. States that she feels discouraged that inpatient rehab did not accept her, and that she relapsed into alcohol drinking. She denies any mood symptoms today, denies any si or hi, denies any psychosis. She states that she wants to start an antidepressant so that it can help her with anxiety. States that Lexapro has been ineffective. Discussed trying Zoloft. She is in agreement. Discussed the risk of SSRI use in pregnancy and she is consenting.     - Continue CIWA. Not scoring on CIWA and BP is low. Can change Ativan dosing as follows- ativan 1mg q 6hrs IV- today, 4/8== Ativan 0.5mg q 6hrs IV; 4/9== Ativan 0.5mg BID IV.   - Start tomorrow 4/8-----Sertraline 50mg q 7AM PO. (ensure qtc<500)  - SW input--- would benefit from inpatient substance abuse rehab. Gave patient options for [facility] and she+ will call to establish appt.

## 2021-04-06 NOTE — MEDICAL STUDENT PROGRESS NOTE(EDUCATION) - NS MD HP STUD ASPLAN PLAN FT
#alcohol withdrawal:  Last drink was either 3/29 or 4/2. Patient has history of DT, EtOH vs BZD withdrawal seizure most recently last year. Was hospitalized for EtOH withdrawal here 3/30-4/1 with Ativan taper, but left AMA for rehab (unable to secure spot). CIWA 3-7 overnight.  -Ativan taper. Currently 1.5mg q4h   -CIWA q2    #nausea/vomiting - patient states that she has been vomiting (reported 30+ episodes per day, mostly bilious, several episodes with some blood) since Friday, developing chest pain in mid-chest radiating down to epigastrium. Differential includes EtOH vs BZD withdrawal, UTI/pyelo, gastritis 2/2 alcohol use, esophagitis w/MW tear, hyperemesis gravidarum (less likely at this stage of pregnancy).  -Zofran 4mg q6 PRN  -pantoprazole 40mg BID   -Hgb at baseline, no endoscopy at this time  -CP less concerning for cardiac etiology given normal EKG and negative troponins.     #transaminitis. AST/ALT elevated compared to baseline (on discharge from prior admission); AST//292 up from 181/171. Likely due to EtOH use given AST > ALT.   -     #pregnancy #alcohol withdrawal:  Last drink was either 3/29 or . Patient has history of DT, EtOH vs BZD withdrawal seizure most recently last year. Was hospitalized for EtOH withdrawal here 3/30- with Ativan taper, but left AMA for rehab (unable to secure spot). CIWA 3-7 overnight.  -Ativan taper. Currently 1.5mg q4h   -CIWA q2  -thiamine, folate, B12, multivitamins  -SBIRT prior to d/c  -appreciate psych recs    #nausea/vomiting - patient states that she has been vomiting (reported 30+ episodes per day, mostly bilious, several episodes with some blood) since Friday, developing chest pain in mid-chest radiating down to epigastrium. Differential includes EtOH vs BZD withdrawal, UTI/pyelo, gastritis 2/2 alcohol use, esophagitis w/MW tear, hyperemesis gravidarum (less likely at this stage of pregnancy). GI, hepatology following  -Zofran 4mg q6 PRN  -pantoprazole 40mg BID   -Hgb at baseline, no endoscopy at this time  -CP less concerning for cardiac etiology given normal EKG and negative troponins.     #transaminitis. AST/ALT elevated compared to baseline (on discharge from prior admission); AST//292 up from 181/171. Likely due to EtOH use given AST > ALT vs viral hepatitis, chronic liver disease. Less likely HELLP given normal platelets, no hemolysis, and nonelevated BP. RUQ US wnl. GI, hepatology following  -f/u CLD and viral hepatitis workup  -f/u bile acids   -f/u US doppler liver    #UTI/pyelo - suspect given R flank pain. Pt had dirty UA x 2 with small leuk esterase and no nitrites  -ceftriaxone x 7 days  -f/u UCx  -IVF    #pregnancy:  (w/prior twin pregnancy) at ?27w2d  -PNV  -OBGYN following    #depression/anxiety/PTSD  -patient off medications since prior admission   -appreciate psych recs #alcohol withdrawal:  Last drink was either 3/29 or . Patient has history of DT, EtOH vs BZD withdrawal seizure most recently last year. Was hospitalized for EtOH withdrawal here 3/30- with Ativan taper, but left AMA for rehab (unable to secure spot). CIWA 3-7 overnight.  -Ativan taper. Currently 1.5mg q4h   -CIWA q2  -thiamine, folate, B12, multivitamins  -SBIRT prior to d/c  -appreciate psych recs    #nausea/vomiting - patient states that she has been vomiting (reported 30+ episodes per day, mostly bilious, several episodes with some blood) since Friday, developing chest pain in mid-chest radiating down to epigastrium. Differential includes EtOH vs BZD withdrawal, UTI/pyelo, gastritis 2/2 alcohol use, esophagitis w/MW tear, hyperemesis gravidarum (less likely at this stage of pregnancy). GI, hepatology following  -Zofran 4mg q6 PRN  -pantoprazole 40mg BID   -Hgb at baseline, no endoscopy at this time  -CP less concerning for cardiac etiology given normal EKG and negative troponins.     #transaminitis. AST/ALT elevated compared to baseline (on discharge from prior admission); AST//292 up from 181/171. Likely due to EtOH use given AST > ALT vs viral hepatitis, chronic liver disease. Less likely HELLP given normal platelets, no hemolysis, and nonelevated BP. RUQ US wnl. GI, hepatology following  -f/u CLD and viral hepatitis workup  -f/u bile acids   -f/u US doppler liver    #UTI/pyelo - suspect given R flank pain. Pt had dirty UA x 2 with small leuk esterase and no nitrites  -ceftriaxone x 7 days  -f/u UCx  -IVF    #pregnancy:  (w/prior twin pregnancy) at ?27w2d  -PNV  -OBGYN following    #depression/anxiety/PTSD  -patient off medications (Paxil, prazosin, Remeron, Valium) since prior admission   -appreciate psych recs #alcohol withdrawal:  Last drink was either 3/29 or . Patient has history of DT, EtOH vs BZD withdrawal seizure most recently last year. Was hospitalized for EtOH withdrawal here 3/30- with Ativan taper, but left AMA for rehab (unable to secure spot). CIWA 3-7 overnight.  -Ativan taper. Currently 1.5mg q4h   -CIWA q2  -thiamine, folate, B12, multivitamins  -SBIRT prior to d/c  -appreciate psych recs    #nausea/vomiting - patient states that she has been vomiting (reported 30+ episodes per day, mostly bilious, several episodes with some blood) since Friday, developing chest pain in mid-chest radiating down to epigastrium. Differential includes EtOH vs BZD withdrawal, UTI/pyelo, gastritis 2/2 alcohol use, esophagitis w/MW tear, hyperemesis gravidarum (less likely at this stage of pregnancy). Symptomatically improved today with no Zofran PRN overnight. GI, hepatology following  -Zofran 4mg q6 PRN  -pantoprazole 40mg BID   -Hgb at baseline, no endoscopy at this time  -CP less concerning for cardiac etiology given normal EKG and negative troponins.     #transaminitis. AST/ALT elevated compared to baseline (on discharge from prior admission); AST//292 up from 181/171. Likely due to EtOH use given AST > ALT vs viral hepatitis, chronic liver disease. Less likely HELLP given normal platelets, no hemolysis, and nonelevated BP. RUQ US wnl. GI, hepatology following  -f/u CLD and viral hepatitis workup  -f/u bile acids   -clarify patency of hepatic and portal vein on RUQ US prior to US Doppler liver    #UTI/pyelo - suspect given R flank pain and increased frequency. Patient afebrile and denying dysuria. Pt had dirty UA x 2 with small leuk esterase and no nitrites. UCx showing >3 organisms, likely contaminant.  -ceftriaxone x 7 day  -IVF NS 75 cc/hr    #pregnancy:  (w/prior twin pregnancy) at 25w1d, KD 21. 3 prior C/S.   -PNV  -OBGYN following    #depression/anxiety/PTSD  -patient off medications (Paxil, prazosin, Remeron, Valium) since prior admission   -appreciate psych recs #alcohol withdrawal:  Last drink was either 3/29 or . Patient has history of DT, EtOH vs BZD withdrawal seizure most recently last year. Was hospitalized for EtOH withdrawal here 3/30- with Ativan taper, but left AMA for rehab (unable to secure spot). CIWA 3-7 overnight.  -Ativan taper. Currently 1.5mg q4h   -CIWA q2  -thiamine, folate, B12, multivitamins  -SBIRT prior to d/c  -appreciate psych recs    #nausea/vomiting - patient states that she has been vomiting (reported 30+ episodes per day, mostly bilious, several episodes with some blood) since Friday, developing chest pain in mid-chest radiating down to epigastrium. Differential includes EtOH vs BZD withdrawal, UTI/pyelo, gastritis 2/2 alcohol use, esophagitis w/MW tear, hyperemesis gravidarum (less likely at this stage of pregnancy). Symptomatically improved today with no Zofran PRN overnight. GI, hepatology following  -Zofran 4mg q6 PRN  -pantoprazole 40mg BID   -Hgb at baseline, no endoscopy at this time  -CP less concerning for cardiac etiology given normal EKG and negative troponins.     #transaminitis. AST/ALT elevated compared to baseline (on discharge from prior admission); AST//292 up from 181/171. Likely due to EtOH use given AST > ALT vs viral hepatitis, chronic liver disease. Less likely HELLP given platelets> 100. However LDH elevated at 291. BP not elevated. RUQ US wnl. GI, hepatology following  -f/u CLD and viral hepatitis workup  -f/u bile acids   -clarify patency of hepatic and portal vein on RUQ US prior to US Doppler liver    #UTI/pyelo - suspect given R flank pain and increased frequency. Patient afebrile and denying dysuria. Pt had dirty UA x 2 with small leuk esterase and no nitrites. UCx showing >3 organisms, likely contaminant.  -ceftriaxone x 7 day  -IVF NS 75 cc/hr    #pregnancy:  (w/prior twin pregnancy) at 25w1d, KD 21. 3 prior C/S.   -PNV  -OBGYN following    #depression/anxiety/PTSD  -patient off medications (Paxil, prazosin, Remeron, Valium) since prior admission   -appreciate psych recs

## 2021-04-06 NOTE — PROGRESS NOTE ADULT - SUBJECTIVE AND OBJECTIVE BOX
Chief Complaint:  Patient is a 26y old  Female who presents with a chief complaint of Nausea, Vomiting, EtOH withdrawal (2021 09:57)      Interval Events:   - yesterday afternoon patient w/ 1 small episode of vomiting, none overnight  - this morning tolerated breakfast, no new complaints    Allergies:  No Known Allergies      Hospital Medications:  acetaminophen   Tablet .. 650 milliGRAM(s) Oral once PRN  cefTRIAXone   IVPB 1000 milliGRAM(s) IV Intermittent every 24 hours  cyanocobalamin 1000 MICROGram(s) Oral daily  enoxaparin Injectable 40 milliGRAM(s) SubCutaneous daily  folic acid 1 milliGRAM(s) Oral daily  LORazepam     Tablet 2 milliGRAM(s) Oral every 2 hours PRN  LORazepam   Injectable 1.5 milliGRAM(s) IV Push every 4 hours  LORazepam   Injectable   IV Push   LORazepam   Injectable 2 milliGRAM(s) IV Push every 1 hour PRN  ondansetron Injectable 4 milliGRAM(s) IV Push every 6 hours PRN  pantoprazole  Injectable 40 milliGRAM(s) IV Push two times a day  potassium chloride   Solution 40 milliEquivalent(s) Oral two times a day  potassium chloride  10 mEq/100 mL IVPB 10 milliEquivalent(s) IV Intermittent every 1 hour  prenatal multivitamin 1 Tablet(s) Oral daily  sodium chloride 0.9%. 1000 milliLiter(s) IV Continuous <Continuous>  thiamine Injectable 100 milliGRAM(s) IV Push daily        PHYSICAL EXAM:   Vital Signs:  Vital Signs Last 24 Hrs  T(C): 36.4 (2021 09:00), Max: 36.7 (2021 12:50)  T(F): 97.6 (2021 09:00), Max: 98.1 (2021 12:50)  HR: 85 (2021 09:00) (80 - 97)  BP: 93/68 (2021 09:00) (93/68 - 112/68)  BP(mean): --  RR: 16 (2021 09:00) (16 - 18)  SpO2: 100% (2021 09:00) (99% - 100%)  Daily     Daily     GENERAL:  No acute distress  HEENT:  NCAT, no scleral icterus   CHEST:  no respiratory distress  HEART:  Regular rate and rhythm  ABDOMEN:  Soft, non-tender, non-distended, normoactive bowel sounds,  no masses, no hepato-splenomegaly, no signs of chronic liver disease  EXTREMITIES: No edema  SKIN:  No rash/erythema/ecchymoses/petechiae/wounds/abscess/warm/dry  NEURO:  Alert and oriented x 3, no asterixis      LABS:                        9.5    4.29  )-----------( 172      ( 2021 06:45 )             28.6     Mean Cell Volume: 80.1 fL (- @ 06:45)        136  |  105  |  4<L>  ----------------------------<  83  2.8<LL>   |  20<L>  |  0.67    Ca    7.8<L>      2021 06:45  Phos  2.5       Mg     1.8         TPro  5.7<L>  /  Alb  3.2<L>  /  TBili  0.8  /  DBili  x   /  AST  330<H>  /  ALT  370<H>  /  AlkPhos  36<L>  -    LIVER FUNCTIONS - ( 2021 06:45 )  Alb: 3.2 g/dL / Pro: 5.7 g/dL / ALK PHOS: 36 U/L / ALT: 370 U/L / AST: 330 U/L / GGT: x             Urinalysis Basic - ( 2021 05:12 )    Color: Yellow / Appearance: Slightly Turbid / S.034 / pH: x  Gluc: x / Ketone: Moderate  / Bili: Small / Urobili: 6 mg/dL   Blood: x / Protein: 30 mg/dL / Nitrite: Negative   Leuk Esterase: Small / RBC: 13 /HPF / WBC 70 /HPF   Sq Epi: x / Non Sq Epi: >27 /HPF / Bacteria: Many            Imaging:

## 2021-04-06 NOTE — PROGRESS NOTE ADULT - ASSESSMENT
Impression:  27 yo F w/ PMHx currently 24 weeks pregnant, ETOH abuse (recent admission for intoxication), benzo w/d seizures, depression p/w nausea/vomiting/abd pain x 4 days, found to have hepatocellular liver injury    # nausea/vomiting - concerning for EtOH vs benzo withdrawal; reports last drink 3/29/21. May have underlying gastritis from EtOH use. Scant amount of blood in emesis likely esophagitis vs [place]. Hg above baseline which is reassuring. Will recommend empirically starting PPI (can use oral), treatment of ETOH withdrawal, and monitoring, now improving. No plans for endoscopy at this time. Will also appreciate OB/GYN input for further recommendations, though unlikely nausea/vomiting is related to pregnancy.     # hepatocellular liver injury - elevated AST/ALT compared to baseline, likely has underlying alcohol liver injury; no new drugs to suggest DILI, will rule out acute viral hepatitis (unlikely) and biliary pathology (also unlikely given normal cholestatic parameters). Acute fatty liver of pregnancy less likely, but will discuss w/ hepatology.     Recommendations:  - pantoprazole 40mg PO BID  - treatment of etoh withdrawal per primary team  - f/u hepatology recs  - check INR, lipase  - replete electrolytes   - antiemetics (zofran) prn in addition to benzos if ok w/ MFM  - f/u MFM recommendations  - no plans for endoscopy at this time  - rest of care per primary team    GI will sign off, please reconsult as necessary    Tomas Alberto, PGY4  Gastroenterology Fellow  Available on Microsoft Teams  [phone] ([facility] Short Range Pager)  [phone] (Long Range Pager)    After 5pm, please contact the on-call GI fellow. [phone]

## 2021-04-06 NOTE — PROGRESS NOTE ADULT - PROBLEM SELECTOR PLAN 4
#expected due date (dated per US) July 19th 2021, currently 25 weeks  - OB consulted, apprec recs  - prenatal vitamins  - #pt has hx of DT's, unclear when last drink was.   - c/w ativan taper  - monitor CWAS  - c/w thiamine, folate, b12, vitamin supplementation  - SBRT consult prior to d/c  - psych consult

## 2021-04-06 NOTE — PROGRESS NOTE ADULT - ASSESSMENT
Patient is a 25 y/o currently pregnant 25 weeks F with PMH of PTSD, anxiety, depression, sickle cell trait, alcohol abuse, seizures from BZD withdrawal, with recent admission for 3/30-4/1 for alcohol intoxication, suicidal ideation during pregnancy but left AMA, who presents to ED for nausea, blood tinged, emesis and chest pain and found to have elevated liver enzymes and hepatology has been called for the same.      # Elevated liver enzymes Hepatocellular pattern) in 3rd trimester.  DD includes alcoholic liver disease (AST>ALT, active drinking, last drink was 3/29/2021), Cannot calculate DF as no INR/PT  vs R/O Fatty liver of pregnancy  (Unlikely, INR pending, PLT N)  R/O viral hepatitis   Patient currently admitted with diagnosis of alcohol withdrawal vs BZD withdrawal.  US abdomen: liver normal, no biliary dilation  No significant cholestasis, AAOX3, no asterixis.   BP is stable, trace protein in UA.  Pruritis+  Patient also has H/O elevated liver enzymes in Jan 2021 (but ALT> AST at that time)  On Rocephin for UTI    Rec:  Alcohol cessation, alcohol rehab.  trend CMP, INR/PT daily.  get conjugated bilirubin, LDH.  Please f/u HAV IgM/IgG, HBsAg, HBsAb, HBcAb IgM/IgG, HCV Ab, Anti HEV, ANIBAL, SMA, immunoglobulins panel, AMA, Anti LK microsomal Ab, anti-soluble liver Ag.  get EBV serology, CMV IgM, HSV 1,2 IgM/igG, varicella IgM.  get US doppler of liver  get Bile acids.  consider carafate 1gm 4 times daily for possible gastritis and esophagitis.   consider advancing the diet as tolerated.  will follow up. Patient is a 27 y/o currently pregnant 25 weeks F with PMH of PTSD, anxiety, depression, sickle cell trait, alcohol abuse, seizures from BZD withdrawal, with recent admission for 3/30-4/1 for alcohol intoxication, suicidal ideation during pregnancy but left AMA, who presents to ED for nausea, blood tinged, emesis and chest pain and found to have elevated liver enzymes and hepatology has been called for the same.      # Elevated liver enzymes Hepatocellular pattern) in 3rd trimester.  DD includes alcoholic liver disease (AST>ALT, active drinking, last drink was 3/29/2021)  vs R/O Fatty liver of pregnancy  (INR N, PLT N)  R/O viral hepatitis   Patient currently admitted with diagnosis of alcohol withdrawal vs BZD withdrawal.  US abdomen: liver normal, no biliary dilation  No significant cholestasis, AAOX3, no asterixis.   BP is stable, trace protein in UA.  Pruritis+  Patient also has H/O elevated liver enzymes in Jan 2021 (but ALT> AST at that time)  On Rocephin for UTI    Rec:  Alcohol cessation, alcohol rehab.  trend CMP, INR/PT daily.  get conjugated bilirubin, LDH.  Please f/u HAV IgM/IgG, HBsAg, HBsAb, HBcAb IgM/IgG, HCV Ab, Anti HEV, ANIBAL, SMA, immunoglobulins panel, AMA, Anti LK microsomal Ab, anti-soluble liver Ag.  get EBV serology, CMV IgM, HSV 1,2 IgM/igG, varicella IgM.  get Bile acids.  Repeat UA analysis for check for protein  consider carafate 1gm 4 times daily for possible gastritis and esophagitis.   consider advancing the diet as tolerated.  will follow up.

## 2021-04-06 NOTE — PROGRESS NOTE ADULT - ASSESSMENT
27 yo F w/ PMHx currently 25w1d pregnant with hx of ETOH abuse (recent admission for intoxication), benzo w/d seizures, depression p/w nausea/vomiting/abd pain x 4 days.    # nausea/vomiting - concerning for EtOH vs benzo withdrawal; reports last drink 3/29/21  -GI following and appreciate recs  -Zofran, Reglan, pantoprazole are acceptable anti-emetics  -If clinical suspicion for withdrawal high, can give Ativan/benzodiazepine as needed     #transaminitis  -elevated AST/ALT compared to baseline  -GI following: will rule out acute viral hepatitis (unlikely) and biliary pathology    #FWB  ATU(4/5):complete breech, anterior, MVP 6.5, BPP 8/8  -MFM to follow    BRIDGETT Bain PGY-3  [id number]   27 yo F w/ PMHx currently 25w1d pregnant with hx of ETOH abuse (recent admission for intoxication), benzo w/d seizures, depression p/w nausea/vomiting/abd pain x 4 days.    # nausea/vomiting - concerning for EtOH vs benzo withdrawal; reports last drink 3/29/21  -GI following and appreciate recs  -Zofran, Reglan, pantoprazole are acceptable anti-emetics  -If clinical suspicion for withdrawal high, can give Ativan/benzodiazepine as needed     #transaminitis  -elevated AST/ALT compared to baseline  -GI following: will rule out acute viral hepatitis (unlikely) and biliary pathology    #FWB  ATU():complete breech, anterior, MVP 6.5, BPP   -MFM to follow    BRIDGETT Bain PGY-3  [phone]    ***MFM ADDENDUM***  Patient seen and evaluated at bedside. She reports feeling better overall since restarting ativan, she states her nausea and vomiting has improved as well and was able to tolerate juice and jello this morning w/o emesis.  Upon further questioning, patient states she is unsure if she had an alcoholic beverage last Friday after not being able to be admitted to [facility] rehab. It is clear the patient is a poor historian. Patient also states she has had liver problems over last few years and was not compliant with follow-up.  Vital signs and labs reviewed, potassium low, LFTs continue to increase, bile acids and repeat hepatitis panel pending.  Suspicion for pregnancy-related complication as etiology for transaminitis is low, hepatology and medicine management recommendations appreciated.  Will continue to monitor closely, fetal BPP yesterday , NST for today pending. We discussed that NICU consultation may be suggested should clinical status worsen or an obstetric-related condition arise that increases her risk of  birth.

## 2021-04-06 NOTE — BH CONSULTATION LIAISON ASSESSMENT NOTE - SUMMARY
25 yo female, 24 weeks pregnant, domiciled with domestic partner and 4 children with PMHx sickle cell trait, ETOH abuse, hx etoh withdrawal seizure, PPHx  PTSD, anxiety, depression, one prior inpatient alcohol detox 9/2020 [facility], one prior inpatient psychiatric admission @ [facility] for anxiety, depression, PTSD two years ago, denies hx si/sa, denies legal history, f/b Dr. Westbrook  [phone] at [facility] (prescribed Paxil 20mg daily, Prazosin 1mg hs, Remeron 15mg hs, Valium 5mg daily, 2.5mg hs) as well as therapist, Jessica, attends [facility] substance group 3x/week; patient presents to [facility] for c/o nausea, vomiting and middle chest pain radiating down to her epigastrium and mid abdominal pain, recent admission for 3/30-4/1 for alcohol intoxication and withdrawal, left AMA on 4/1. Patient admitted for etoh withdrawal, elevated LFT's, and pyelonephritis.    Chart reviewed.  psychiatry consulted for medication management. Staff deny any behavioral issues.    Patient seen, a&o, calm, cooperative, pleasant, reports mood is "discouraging," she states she left against medical advice because she was told there was an inpatient rehab bed at [facility] but there was not so she decided to come back to [facility]. She is expressing desire for ongoing alcohol treatment either inpatient or outpatient, she denies drinking after discharge d/t nausea and vomiting. She denies si/sa, denies ah, endorses seeing shadows.    Upon physical exam, very mild right hand tremor, no tongue fasciculation, no nystagmus noted, hr=96, ciwa=2.    Discussed recommendations below with primary team.    PLAN:  -DEFER obs status to primary team, if any changes in behavior adjust accordingly  -c/w standing Ativan taper-adjust accordingly depending on patient's response to treatment  -c/w Ativan prn-symptom triggered ciwa  -c/w Melatonin 3mg po hs  -B12, Folate supplement  -Optimize electrolytes  -SBIRT  - consult for rehab dispo

## 2021-04-06 NOTE — MEDICAL STUDENT PROGRESS NOTE(EDUCATION) - SUBJECTIVE AND OBJECTIVE BOX
Patient is a 26y old  Female  at ?27w2d who presents with a chief complaint of Nausea, Vomiting, EtOH withdrawal (2021 07:22)      SUBJECTIVE / OVERNIGHT EVENTS:  Overnight patient had low diastolic BPs in 50's (98/50, 95/51, 100/50) w/o complaints of pain, dizziness, or discomfort. She received PRN morphine 1mg around 9PM for ___ pain. CIWA scores were between 7 and 3.    Today she states ____        ADDITIONAL REVIEW OF SYSTEMS:    MEDICATIONS  (STANDING):  cefTRIAXone   IVPB 1000 milliGRAM(s) IV Intermittent every 24 hours  cyanocobalamin 1000 MICROGram(s) Oral daily  enoxaparin Injectable 40 milliGRAM(s) SubCutaneous daily  folic acid 1 milliGRAM(s) Oral daily  LORazepam   Injectable 1.5 milliGRAM(s) IV Push every 4 hours  LORazepam   Injectable   IV Push   pantoprazole  Injectable 40 milliGRAM(s) IV Push two times a day  prenatal multivitamin 1 Tablet(s) Oral daily  sodium chloride 0.9%. 1000 milliLiter(s) (75 mL/Hr) IV Continuous <Continuous>  thiamine Injectable 100 milliGRAM(s) IV Push daily    MEDICATIONS  (PRN):  acetaminophen   Tablet .. 650 milliGRAM(s) Oral once PRN Mild Pain (1 - 3), Moderate Pain (4 - 6), Severe Pain (7 - 10)  LORazepam     Tablet 2 milliGRAM(s) Oral every 2 hours PRN Symptom-triggered 2 point increase in CIWA-Ar  LORazepam   Injectable 2 milliGRAM(s) IV Push every 1 hour PRN Symptom-triggered: each CIWA -Ar score 8 or GREATER  ondansetron Injectable 4 milliGRAM(s) IV Push every 6 hours PRN Nausea and/or Vomiting      CAPILLARY BLOOD GLUCOSE        I&O's Summary      PHYSICAL EXAM:  Vital Signs Last 24 Hrs  T(C): 36.7 (2021 07:00), Max: 36.7 (2021 09:50)  T(F): 98 (2021 07:00), Max: 98.1 (2021 09:50)  HR: 80 (2021 07:00) (80 - 97)  BP: 100/50 (2021 07:00) (95/51 - 112/68)  BP(mean): --  RR: 18 (2021 07:00) (18 - 18)  SpO2: 99% (2021 07:00) (99% - 100%)    GENERAL: No acute distress, well-developed  HEAD:  Atraumatic, Normocephalic  EYES: EOMI, PERRLA, conjunctiva and sclera clear  NECK: Supple, no lymphadenopathy, no JVD  CHEST/LUNG: CTAB; No wheezes, rales, or rhonchi  HEART: Regular rate and rhythm; No murmurs, rubs, or gallops  ABDOMEN: Soft, non-tender, non-distended; normal bowel sounds, no organomegaly  EXTREMITIES:  2+ peripheral pulses b/l, No clubbing, cyanosis, or edema  NEUROLOGY: A&O x 3, no focal deficits  SKIN: No rashes or lesions    LABS:                        9.5    4.29  )-----------( 172      ( 2021 06:45 )             28.6     04-06    136  |  105  |  4<L>  ----------------------------<  83  x    |  20<L>  |  0.67    Ca    7.8<L>      2021 06:45  Phos  2.5     04-06  Mg     1.8     04-06    TPro  5.7<L>  /  Alb  3.2<L>  /  TBili  0.8  /  DBili  x   /  AST  330<H>  /  ALT  370<H>  /  AlkPhos  36<L>  04-06      CARDIAC MARKERS ( 2021 04:53 )  x     / x     / 39 U/L / x     / <1.0 ng/mL      Urinalysis Basic - ( 2021 05:12 )    Color: Yellow / Appearance: Slightly Turbid / S.034 / pH: x  Gluc: x / Ketone: Moderate  / Bili: Small / Urobili: 6 mg/dL   Blood: x / Protein: 30 mg/dL / Nitrite: Negative   Leuk Esterase: Small / RBC: 13 /HPF / WBC 70 /HPF   Sq Epi: x / Non Sq Epi: >27 /HPF / Bacteria: Many        Culture - Urine (collected 2021 17:36)  Source: .Urine Clean Catch (Midstream)  Final Report (2021 20:10):    >=3 organisms. Probable collection contamination.        RADIOLOGY & ADDITIONAL TESTS:  Results Reviewed:   Imaging Personally Reviewed:  Electrocardiogram Personally Reviewed:    COORDINATION OF CARE:  Care Discussed with Consultants/Other Providers [Y/N]:  Prior or Outpatient Records Reviewed [Y/N]:   Patient is a 26y old  Female  at 25w1d who presents with a chief complaint of Nausea, Vomiting, EtOH withdrawal (2021 07:22)      SUBJECTIVE / OVERNIGHT EVENTS:  Overnight patient had low diastolic BPs in 50's (98/50, 95/51, 100/50) w/o complaints of pain, dizziness, or discomfort. She received PRN morphine 1mg around 9PM for chest pain. CIWA scores were between 7 and 3.    Today, she states that she is felling okay. She is had trouble sleeping, but states that she has had insomnia for a number of years. She denies current nausea, most recently vomited last night around 8PM (yellow/green vomit, no blood), but was able to keep down some broth afterwards. She denies chest pain, frequency, dysuria, vaginal bleeding, loss of fluids, contractions. She endorses fetal movement, but less than before. No BMs. Her sweating and chills have improved, which she attributes to antibiotics.    Of note patient has COVID antibodies at 174.00, no h/o symptomatic infection nor vaccination. COVID PCR on admission negative.      ADDITIONAL REVIEW OF SYSTEMS:    MEDICATIONS  (STANDING):  cefTRIAXone   IVPB 1000 milliGRAM(s) IV Intermittent every 24 hours  cyanocobalamin 1000 MICROGram(s) Oral daily  enoxaparin Injectable 40 milliGRAM(s) SubCutaneous daily  folic acid 1 milliGRAM(s) Oral daily  LORazepam   Injectable 1.5 milliGRAM(s) IV Push every 4 hours  LORazepam   Injectable   IV Push   pantoprazole  Injectable 40 milliGRAM(s) IV Push two times a day  prenatal multivitamin 1 Tablet(s) Oral daily  sodium chloride 0.9%. 1000 milliLiter(s) (75 mL/Hr) IV Continuous <Continuous>  thiamine Injectable 100 milliGRAM(s) IV Push daily    MEDICATIONS  (PRN):  acetaminophen   Tablet .. 650 milliGRAM(s) Oral once PRN Mild Pain (1 - 3), Moderate Pain (4 - 6), Severe Pain (7 - 10)  LORazepam     Tablet 2 milliGRAM(s) Oral every 2 hours PRN Symptom-triggered 2 point increase in CIWA-Ar  LORazepam   Injectable 2 milliGRAM(s) IV Push every 1 hour PRN Symptom-triggered: each CIWA -Ar score 8 or GREATER  ondansetron Injectable 4 milliGRAM(s) IV Push every 6 hours PRN Nausea and/or Vomiting      CAPILLARY BLOOD GLUCOSE        I&O's Summary      PHYSICAL EXAM:  Vital Signs Last 24 Hrs  T(C): 36.7 (2021 07:00), Max: 36.7 (2021 09:50)  T(F): 98 (2021 07:00), Max: 98.1 (2021 09:50)  HR: 80 (2021 07:00) (80 - 97)  BP: 100/50 (2021 07:00) (95/51 - 112/68)  BP(mean): --  RR: 18 (2021 07:00) (18 - 18)  SpO2: 99% (2021 07:00) (99% - 100%)    GENERAL: No acute distress, well-developed  HEAD:  Atraumatic, Normocephalic  CHEST/LUNG: CTAB; No wheezes, rales, or rhonchi  HEART: Regular rate and rhythm; No murmurs, rubs, or gallops  ABDOMEN: Gravid, nontender, normal bowel sounds, no organomegaly  BACK: Bilateral "soreness" at CVA with R>L  EXTREMITIES:  2+ peripheral pulses b/l, No clubbing, cyanosis, or edema  NEUROLOGY: A&O x 3, no focal deficits. No tremors nor asterixis noted.  SKIN: No rashes or lesions    LABS:                        9.5    4.29  )-----------( 172      ( 2021 06:45 )             28.6     04-06    136  |  105  |  4<L>  ----------------------------<  83  x    |  20<L>  |  0.67    Ca    7.8<L>      2021 06:45  Phos  2.5     04-06  Mg     1.8     04-06    TPro  5.7<L>  /  Alb  3.2<L>  /  TBili  0.8  /  DBili  x   /  AST  330<H>  /  ALT  370<H>  /  AlkPhos  36<L>  04-06      CARDIAC MARKERS ( 2021 04:53 )  x     / x     / 39 U/L / x     / <1.0 ng/mL      Urinalysis Basic - ( 2021 05:12 )    Color: Yellow / Appearance: Slightly Turbid / S.034 / pH: x  Gluc: x / Ketone: Moderate  / Bili: Small / Urobili: 6 mg/dL   Blood: x / Protein: 30 mg/dL / Nitrite: Negative   Leuk Esterase: Small / RBC: 13 /HPF / WBC 70 /HPF   Sq Epi: x / Non Sq Epi: >27 /HPF / Bacteria: Many        Culture - Urine (collected 2021 17:36)  Source: .Urine Clean Catch (Midstream)  Final Report (2021 20:10):    >=3 organisms. Probable collection contamination.        RADIOLOGY & ADDITIONAL TESTS:  Results Reviewed:   Imaging Personally Reviewed:  Electrocardiogram Personally Reviewed:    COORDINATION OF CARE:  Care Discussed with Consultants/Other Providers [Y/N]:  Prior or Outpatient Records Reviewed [Y/N]:

## 2021-04-06 NOTE — BH CONSULTATION LIAISON ASSESSMENT NOTE - NSBHCONSULTRECOMMENDOTHER_PSY_A_CORE FT
[facility] ([facility])  [address] [facility] [place] [address]  [phone]    [facility]: [phone](9a-7p)  [facility]: [phone]

## 2021-04-06 NOTE — PROGRESS NOTE ADULT - SUBJECTIVE AND OBJECTIVE BOX
Patient is a 26y old  Female who presents with a chief complaint of Nausea, Vomiting, EtOH withdrawal (06 Apr 2021 07:54)       Admitted on: 04-04-21    We are following the patient for elevated liver enzymes in pregnancy     Interval History: Patient tolerated chicken broth yesterday. No BM, no vomiting. Had some nausea and abdominal pain overnight. Got Zofran 4mg IV and Morphine 1mg overnight.        PAST MEDICAL & SURGICAL HISTORY:  Depression    Anxiety    No pertinent past medical history    No significant past surgical history    No significant past surgical history        MEDICATIONS  (STANDING):  cefTRIAXone   IVPB 1000 milliGRAM(s) IV Intermittent every 24 hours  cyanocobalamin 1000 MICROGram(s) Oral daily  enoxaparin Injectable 40 milliGRAM(s) SubCutaneous daily  folic acid 1 milliGRAM(s) Oral daily  LORazepam   Injectable 1.5 milliGRAM(s) IV Push every 4 hours  LORazepam   Injectable   IV Push   pantoprazole  Injectable 40 milliGRAM(s) IV Push two times a day  potassium chloride   Solution 40 milliEquivalent(s) Oral two times a day  potassium chloride  10 mEq/100 mL IVPB 10 milliEquivalent(s) IV Intermittent every 1 hour  prenatal multivitamin 1 Tablet(s) Oral daily  sodium chloride 0.9%. 1000 milliLiter(s) (75 mL/Hr) IV Continuous <Continuous>  thiamine Injectable 100 milliGRAM(s) IV Push daily    MEDICATIONS  (PRN):  acetaminophen   Tablet .. 650 milliGRAM(s) Oral once PRN Mild Pain (1 - 3), Moderate Pain (4 - 6), Severe Pain (7 - 10)  LORazepam     Tablet 2 milliGRAM(s) Oral every 2 hours PRN Symptom-triggered 2 point increase in CIWA-Ar  LORazepam   Injectable 2 milliGRAM(s) IV Push every 1 hour PRN Symptom-triggered: each CIWA -Ar score 8 or GREATER  ondansetron Injectable 4 milliGRAM(s) IV Push every 6 hours PRN Nausea and/or Vomiting      Allergies  No Known Allergies      Review of Systems:   Cardiovascular:  No Chest Pain, No Palpitations  Respiratory:  No Cough, No Dyspnea  Gastrointestinal:  As described in HPI    Physical Examination:  T(C): 36.4 (04-06-21 @ 09:00), Max: 36.7 (04-05-21 @ 12:50)  HR: 85 (04-06-21 @ 09:00) (80 - 97)  BP: 93/68 (04-06-21 @ 09:00) (93/68 - 112/68)  RR: 16 (04-06-21 @ 09:00) (16 - 18)  SpO2: 100% (04-06-21 @ 09:00) (99% - 100%)      Constitutional: No acute distress.  Respiratory:  No signs of respiratory distress. Lung sounds are clear bilaterally.  Cardiovascular:  S1 S2, Regular rate and rhythm.  Abdominal: Abdomen is soft, symmetric, and non-tender with some distention (pregnant)  Skin: No rashes, No Jaundice.        Data: (reviewed by attending)                        9.5    4.29  )-----------( 172      ( 06 Apr 2021 06:45 )             28.6     Hgb trend:  9.5  04-06-21 @ 06:45  12.1  04-04-21 @ 17:24      04-06    136  |  105  |  4<L>  ----------------------------<  83  2.8<LL>   |  20<L>  |  0.67    Ca    7.8<L>      06 Apr 2021 06:45  Phos  2.5     04-06  Mg     1.8     04-06    TPro  5.7<L>  /  Alb  3.2<L>  /  TBili  0.8  /  DBili  x   /  AST  330<H>  /  ALT  370<H>  /  AlkPhos  36<L>  04-06    Liver panel trend:  TBili 0.8   /      /      /   AlkP 36   /   Tptn 5.7   /   Alb 3.2    /   DBili --      04-06  TBili 1.7   /      /      /   AlkP 46   /   Tptn 7.3   /   Alb 4.2    /   DBili --      04-05  TBili 1.8   /      /      /   AlkP 55   /   Tptn 8.8   /   Alb 5.0    /   DBili --      04-04  TBili 1.1   /   AST 52   /   ALT 77   /   AlkP 43   /   Tptn 7.1   /   Alb 4.1    /   DBili --      03-30          Culture - Urine (collected 04 Apr 2021 17:36)  Source: .Urine Clean Catch (Midstream)  Final Report (05 Apr 2021 20:10):    >=3 organisms. Probable collection contamination.         Radiology: (reviewed by attending)    US Abdomen Upper Quadrant Right:   EXAM:  US ABDOMEN RT UPR QUADRANT        PROCEDURE DATE:  Apr 5 2021         INTERPRETATION:  CLINICAL INFORMATION: Elevated liver enzymes.    COMPARISON: January 20, 2021.    TECHNIQUE: Sonography of the right upper quadrant.    FINDINGS:    Liver: Within normal limits.  Bile ducts: Normal caliber. Common bile duct measures 2 mm.  Gallbladder: Within normal limits.  Pancreas: Visualized portions are within normal limits.  Right kidney: 10.2 cm. No hydronephrosis.  Ascites: None.  IVC: Poorly visualized due to overlying bowel gas.    IMPRESSION:    Normal right upper quadrant abdominal ultrasound.              KHALIF JUAREZ MD; Attending Radiologist  This document has been electronically signed. Apr 5 2021  8:15AM (04-05-21 @ 08:11)

## 2021-04-06 NOTE — PROGRESS NOTE ADULT - PROBLEM SELECTOR PLAN 1
#pt has hx of DT's, unclear when last drink was.   - c/w ativan taper  - monitor CWAS  - c/w thiamine, folate, b12, vitamin supplementation  - SBRT consult prior to d/c  - psych consult #R/o pyelo - pt has R flank pain with dirty U/A, small leuk est and no nitrites but w symptoms concern for pyelonephritis  - c/w 7 days Ceftriaxone (started late on 4/4), ucx contaminated  - trend fever, wbc  - IVF  - repeat u/a

## 2021-04-06 NOTE — PROGRESS NOTE ADULT - SUBJECTIVE AND OBJECTIVE BOX
R3 Antepartum Progress Note    Patient seen and examined at bedside, no acute overnight events. No acute complaints, pain well controlled. Is tolerating very small amounts of regular diet. +FM      Vital Signs Last 24 Hours  T(C): 36.7 (04-06-21 @ 07:00), Max: 36.7 (04-05-21 @ 09:50)  HR: 80 (04-06-21 @ 07:00) (80 - 97)  BP: 100/50 (04-06-21 @ 07:00) (95/51 - 112/68)  RR: 18 (04-06-21 @ 07:00) (18 - 18)  SpO2: 99% (04-06-21 @ 07:00) (99% - 100%)    I&O's Detail      Physical Exam:  General: NAD  Abdomen: Soft, non-tender, non-distended, +BS  Ext: No pain or swelling    Labs:             9.5<L>  4.29  )-----------( 172      ( 04-06 @ 06:45 )             28.6<L>               12.1   5.46  )-----------( 243      ( 04-04 @ 17:24 )             36.9

## 2021-04-06 NOTE — PROGRESS NOTE ADULT - SUBJECTIVE AND OBJECTIVE BOX
PROGRESS NOTE:   Shyanne Marieelson  PGY2 Internal Medicine  Pager [phone]    Patient is a 26y old  Female who presents with a chief complaint of Nausea, Vomiting, EtOH withdrawal (2021 12:11)      SUBJECTIVE / OVERNIGHT EVENTS:    ADDITIONAL REVIEW OF SYSTEMS:    MEDICATIONS  (STANDING):  cefTRIAXone   IVPB 1000 milliGRAM(s) IV Intermittent every 24 hours  cyanocobalamin 1000 MICROGram(s) Oral daily  enoxaparin Injectable 40 milliGRAM(s) SubCutaneous daily  folic acid 1 milliGRAM(s) Oral daily  LORazepam   Injectable 1.5 milliGRAM(s) IV Push every 4 hours  LORazepam   Injectable   IV Push   pantoprazole  Injectable 40 milliGRAM(s) IV Push two times a day  prenatal multivitamin 1 Tablet(s) Oral daily  sodium chloride 0.9%. 1000 milliLiter(s) (75 mL/Hr) IV Continuous <Continuous>  thiamine Injectable 100 milliGRAM(s) IV Push daily    MEDICATIONS  (PRN):  acetaminophen   Tablet .. 650 milliGRAM(s) Oral once PRN Mild Pain (1 - 3), Moderate Pain (4 - 6), Severe Pain (7 - 10)  LORazepam     Tablet 2 milliGRAM(s) Oral every 2 hours PRN Symptom-triggered 2 point increase in CIWA-Ar  LORazepam   Injectable 2 milliGRAM(s) IV Push every 1 hour PRN Symptom-triggered: each CIWA -Ar score 8 or GREATER  ondansetron Injectable 4 milliGRAM(s) IV Push every 6 hours PRN Nausea and/or Vomiting      CAPILLARY BLOOD GLUCOSE        I&O's Summary      PHYSICAL EXAM:  Vital Signs Last 24 Hrs  T(C): 36.7 (2021 07:00), Max: 36.7 (2021 09:50)  T(F): 98 (2021 07:00), Max: 98.1 (2021 09:50)  HR: 80 (2021 07:00) (80 - 97)  BP: 100/50 (2021 07:00) (95/51 - 112/68)  BP(mean): --  RR: 18 (2021 07:00) (18 - 18)  SpO2: 99% (2021 07:00) (99% - 100%)    GENERAL: No acute distress, well-developed  HEAD:  Atraumatic, Normocephalic  EYES: EOMI, PERRLA, conjunctiva and sclera clear  NECK: Supple, no lymphadenopathy, no JVD  CHEST/LUNG: CTAB; No wheezes, rales, or rhonchi  HEART: Regular rate and rhythm; No murmurs, rubs, or gallops  ABDOMEN: Soft, non-tender, non-distended; normal bowel sounds, no organomegaly  EXTREMITIES:  2+ peripheral pulses b/l, No clubbing, cyanosis, or edema  NEUROLOGY: A&O x 3, no focal deficits  SKIN: No rashes or lesions    LABS:                        9.5    4.29  )-----------( 172      ( 2021 06:45 )             28.6     04-05    142  |  108<H>  |  4<L>  ----------------------------<  101<H>  3.2<L>   |  16<L>  |  0.59    Ca    8.7      2021 04:53  Phos  2.5     04-06  Mg     1.8     04-06    TPro  7.3  /  Alb  4.2  /  TBili  1.7<H>  /  DBili  x   /  AST  324<H>  /  ALT  292<H>  /  AlkPhos  46  04-05      CARDIAC MARKERS ( 2021 04:53 )  x     / x     / 39 U/L / x     / <1.0 ng/mL      Urinalysis Basic - ( 2021 05:12 )    Color: Yellow / Appearance: Slightly Turbid / S.034 / pH: x  Gluc: x / Ketone: Moderate  / Bili: Small / Urobili: 6 mg/dL   Blood: x / Protein: 30 mg/dL / Nitrite: Negative   Leuk Esterase: Small / RBC: 13 /HPF / WBC 70 /HPF   Sq Epi: x / Non Sq Epi: >27 /HPF / Bacteria: Many        Culture - Urine (collected 2021 17:36)  Source: .Urine Clean Catch (Midstream)  Final Report (2021 20:10):    >=3 organisms. Probable collection contamination.        RADIOLOGY & ADDITIONAL TESTS:  Results Reviewed:   Imaging Personally Reviewed:  Electrocardiogram Personally Reviewed:    COORDINATION OF CARE:  Care Discussed with Consultants/Other Providers [Y/N]:  Prior or Outpatient Records Reviewed [Y/N]:   PROGRESS NOTE:   Shyanne Dimitri  PGY2 Internal Medicine  Pager [phone]    Patient is a 26y old  Female who presents with a chief complaint of Nausea, Vomiting, EtOH withdrawal (2021 12:11)      SUBJECTIVE / OVERNIGHT EVENTS: overnight pt requested morphine for pain control. Pt feeling betting this AM with improved nausea and no emesis. Tolerating diet well, so diet advanced to solids.     ADDITIONAL REVIEW OF SYSTEMS: None    MEDICATIONS  (STANDING):  cefTRIAXone   IVPB 1000 milliGRAM(s) IV Intermittent every 24 hours  cyanocobalamin 1000 MICROGram(s) Oral daily  enoxaparin Injectable 40 milliGRAM(s) SubCutaneous daily  folic acid 1 milliGRAM(s) Oral daily  LORazepam   Injectable 1.5 milliGRAM(s) IV Push every 4 hours  LORazepam   Injectable   IV Push   pantoprazole  Injectable 40 milliGRAM(s) IV Push two times a day  prenatal multivitamin 1 Tablet(s) Oral daily  sodium chloride 0.9%. 1000 milliLiter(s) (75 mL/Hr) IV Continuous <Continuous>  thiamine Injectable 100 milliGRAM(s) IV Push daily    MEDICATIONS  (PRN):  acetaminophen   Tablet .. 650 milliGRAM(s) Oral once PRN Mild Pain (1 - 3), Moderate Pain (4 - 6), Severe Pain (7 - 10)  LORazepam     Tablet 2 milliGRAM(s) Oral every 2 hours PRN Symptom-triggered 2 point increase in CIWA-Ar  LORazepam   Injectable 2 milliGRAM(s) IV Push every 1 hour PRN Symptom-triggered: each CIWA -Ar score 8 or GREATER  ondansetron Injectable 4 milliGRAM(s) IV Push every 6 hours PRN Nausea and/or Vomiting      CAPILLARY BLOOD GLUCOSE        I&O's Summary      PHYSICAL EXAM:  Vital Signs Last 24 Hrs  T(C): 36.7 (2021 07:00), Max: 36.7 (2021 09:50)  T(F): 98 (2021 07:00), Max: 98.1 (2021 09:50)  HR: 80 (2021 07:00) (80 - 97)  BP: 100/50 (2021 07:00) (95/51 - 112/68)  BP(mean): --  RR: 18 (2021 07:00) (18 - 18)  SpO2: 99% (2021 07:00) (99% - 100%)    GENERAL: No acute distress, well-developed  HEAD:  Atraumatic, Normocephalic  EYES: EOMI, PERRLA, conjunctiva and sclera clear  NECK: Supple, no lymphadenopathy, no JVD  CHEST/LUNG: CTAB; No wheezes, rales, or rhonchi  Back: NO cva tenderness  HEART: Regular rate and rhythm; No murmurs, rubs, or gallops  ABDOMEN: Soft, non-tender, gravid; normal bowel sounds, no organomegaly  EXTREMITIES:  2+ peripheral pulses b/l, No clubbing, cyanosis, or edema  NEUROLOGY: A&O x 3, no focal deficits  SKIN: No rashes or lesions    LABS:                        9.5    4.29  )-----------( 172      ( 2021 06:45 )             28.6     04-05    142  |  108<H>  |  4<L>  ----------------------------<  101<H>  3.2<L>   |  16<L>  |  0.59    Ca    8.7      2021 04:53  Phos  2.5     04-06  Mg     1.8     04-06    TPro  7.3  /  Alb  4.2  /  TBili  1.7<H>  /  DBili  x   /  AST  324<H>  /  ALT  292<H>  /  AlkPhos  46  04-05      CARDIAC MARKERS ( 2021 04:53 )  x     / x     / 39 U/L / x     / <1.0 ng/mL      Urinalysis Basic - ( 2021 05:12 )    Color: Yellow / Appearance: Slightly Turbid / S.034 / pH: x  Gluc: x / Ketone: Moderate  / Bili: Small / Urobili: 6 mg/dL   Blood: x / Protein: 30 mg/dL / Nitrite: Negative   Leuk Esterase: Small / RBC: 13 /HPF / WBC 70 /HPF   Sq Epi: x / Non Sq Epi: >27 /HPF / Bacteria: Many        Culture - Urine (collected 2021 17:36)  Source: .Urine Clean Catch (Midstream)  Final Report (2021 20:10):    >=3 organisms. Probable collection contamination.        RADIOLOGY & ADDITIONAL TESTS:  Results Reviewed:   Imaging Personally Reviewed:  Electrocardiogram Personally Reviewed:    COORDINATION OF CARE:  Care Discussed with Consultants/Other Providers [Y/N]:  Prior or Outpatient Records Reviewed [Y/N]:

## 2021-04-07 LAB
ALBUMIN SERPL ELPH-MCNC: 3.6 G/DL — SIGNIFICANT CHANGE UP (ref 3.3–5)
ALP SERPL-CCNC: 39 U/L — LOW (ref 40–120)
ALT FLD-CCNC: 313 U/L — HIGH (ref 4–33)
ANA TITR SER: NEGATIVE — SIGNIFICANT CHANGE UP
ANION GAP SERPL CALC-SCNC: 11 MMOL/L — SIGNIFICANT CHANGE UP (ref 7–14)
APPEARANCE UR: ABNORMAL
APTT BLD: 24.4 SEC — LOW (ref 27–36.3)
AST SERPL-CCNC: 177 U/L — HIGH (ref 4–32)
BILIRUB SERPL-MCNC: 0.4 MG/DL — SIGNIFICANT CHANGE UP (ref 0.2–1.2)
BILIRUB UR-MCNC: NEGATIVE — SIGNIFICANT CHANGE UP
BUN SERPL-MCNC: 4 MG/DL — LOW (ref 7–23)
CALCIUM SERPL-MCNC: 7.9 MG/DL — LOW (ref 8.4–10.5)
CHLORIDE SERPL-SCNC: 106 MMOL/L — SIGNIFICANT CHANGE UP (ref 98–107)
CO2 SERPL-SCNC: 20 MMOL/L — LOW (ref 22–31)
COLOR SPEC: YELLOW — SIGNIFICANT CHANGE UP
CREAT SERPL-MCNC: 0.62 MG/DL — SIGNIFICANT CHANGE UP (ref 0.5–1.3)
DIFF PNL FLD: NEGATIVE — SIGNIFICANT CHANGE UP
GLUCOSE SERPL-MCNC: 86 MG/DL — SIGNIFICANT CHANGE UP (ref 70–99)
GLUCOSE UR QL: NEGATIVE — SIGNIFICANT CHANGE UP
HBV SURFACE AB SER-ACNC: REACTIVE
HCT VFR BLD CALC: 32.9 % — LOW (ref 34.5–45)
HGB BLD-MCNC: 10.7 G/DL — LOW (ref 11.5–15.5)
HSV1 IGG SER-ACNC: 27 INDEX — HIGH
HSV1 IGG SERPL QL IA: POSITIVE
HSV2 IGG FLD-ACNC: 0.92 INDEX — HIGH
HSV2 IGG SERPL QL IA: ABNORMAL
INR BLD: 1.04 RATIO — SIGNIFICANT CHANGE UP (ref 0.88–1.16)
KETONES UR-MCNC: NEGATIVE — SIGNIFICANT CHANGE UP
LEUKOCYTE ESTERASE UR-ACNC: NEGATIVE — SIGNIFICANT CHANGE UP
LKM AB SER-ACNC: <20.1 UNITS — SIGNIFICANT CHANGE UP (ref 0–20)
MAGNESIUM SERPL-MCNC: 1.5 MG/DL — LOW (ref 1.6–2.6)
MCHC RBC-ENTMCNC: 26.6 PG — LOW (ref 27–34)
MCHC RBC-ENTMCNC: 32.5 GM/DL — SIGNIFICANT CHANGE UP (ref 32–36)
MCV RBC AUTO: 81.6 FL — SIGNIFICANT CHANGE UP (ref 80–100)
MITOCHONDRIA AB SER-ACNC: SIGNIFICANT CHANGE UP
NITRITE UR-MCNC: NEGATIVE — SIGNIFICANT CHANGE UP
NRBC # BLD: 0 /100 WBCS — SIGNIFICANT CHANGE UP
NRBC # FLD: 0 K/UL — SIGNIFICANT CHANGE UP
PH UR: 6.5 — SIGNIFICANT CHANGE UP (ref 5–8)
PHOSPHATE SERPL-MCNC: 2 MG/DL — LOW (ref 2.5–4.5)
PLATELET # BLD AUTO: 172 K/UL — SIGNIFICANT CHANGE UP (ref 150–400)
POTASSIUM SERPL-MCNC: 3.4 MMOL/L — LOW (ref 3.5–5.3)
POTASSIUM SERPL-SCNC: 3.4 MMOL/L — LOW (ref 3.5–5.3)
PROT SERPL-MCNC: 6.1 G/DL — SIGNIFICANT CHANGE UP (ref 6–8.3)
PROT UR-MCNC: NEGATIVE — SIGNIFICANT CHANGE UP
PROTHROM AB SERPL-ACNC: 11.9 SEC — SIGNIFICANT CHANGE UP (ref 10.6–13.6)
RBC # BLD: 4.03 M/UL — SIGNIFICANT CHANGE UP (ref 3.8–5.2)
RBC # FLD: 14.8 % — HIGH (ref 10.3–14.5)
SODIUM SERPL-SCNC: 137 MMOL/L — SIGNIFICANT CHANGE UP (ref 135–145)
SP GR SPEC: 1.01 — SIGNIFICANT CHANGE UP (ref 1.01–1.02)
UROBILINOGEN FLD QL: ABNORMAL
WBC # BLD: 4.95 K/UL — SIGNIFICANT CHANGE UP (ref 3.8–10.5)
WBC # FLD AUTO: 4.95 K/UL — SIGNIFICANT CHANGE UP (ref 3.8–10.5)

## 2021-04-07 PROCEDURE — 99212 OFFICE O/P EST SF 10 MIN: CPT

## 2021-04-07 PROCEDURE — 99233 SBSQ HOSP IP/OBS HIGH 50: CPT | Mod: GC

## 2021-04-07 PROCEDURE — 99232 SBSQ HOSP IP/OBS MODERATE 35: CPT | Mod: GC

## 2021-04-07 RX ORDER — SERTRALINE 25 MG/1
50 TABLET, FILM COATED ORAL
Refills: 0 | Status: DISCONTINUED | OUTPATIENT
Start: 2021-04-08 | End: 2021-04-09

## 2021-04-07 RX ORDER — SODIUM CHLORIDE 9 MG/ML
1000 INJECTION, SOLUTION INTRAVENOUS ONCE
Refills: 0 | Status: COMPLETED | OUTPATIENT
Start: 2021-04-07 | End: 2021-04-07

## 2021-04-07 RX ORDER — MAGNESIUM SULFATE 500 MG/ML
2 VIAL (ML) INJECTION
Refills: 0 | Status: COMPLETED | OUTPATIENT
Start: 2021-04-07 | End: 2021-04-07

## 2021-04-07 RX ORDER — POLYETHYLENE GLYCOL 3350 17 G/17G
17 POWDER, FOR SOLUTION ORAL DAILY
Refills: 0 | Status: DISCONTINUED | OUTPATIENT
Start: 2021-04-07 | End: 2021-04-09

## 2021-04-07 RX ORDER — SODIUM CHLORIDE 9 MG/ML
500 INJECTION INTRAMUSCULAR; INTRAVENOUS; SUBCUTANEOUS ONCE
Refills: 0 | Status: COMPLETED | OUTPATIENT
Start: 2021-04-07 | End: 2021-04-07

## 2021-04-07 RX ORDER — SODIUM,POTASSIUM PHOSPHATES 278-250MG
1 POWDER IN PACKET (EA) ORAL
Refills: 0 | Status: COMPLETED | OUTPATIENT
Start: 2021-04-07 | End: 2021-04-07

## 2021-04-07 RX ADMIN — Medication 1 TABLET(S): at 12:51

## 2021-04-07 RX ADMIN — Medication 1 MILLIGRAM(S): at 08:05

## 2021-04-07 RX ADMIN — SODIUM CHLORIDE 333.33 MILLILITER(S): 9 INJECTION, SOLUTION INTRAVENOUS at 15:00

## 2021-04-07 RX ADMIN — PANTOPRAZOLE SODIUM 40 MILLIGRAM(S): 20 TABLET, DELAYED RELEASE ORAL at 06:22

## 2021-04-07 RX ADMIN — Medication 1 MILLIGRAM(S): at 12:50

## 2021-04-07 RX ADMIN — Medication 50 GRAM(S): at 10:47

## 2021-04-07 RX ADMIN — Medication 3 MILLIGRAM(S): at 21:07

## 2021-04-07 RX ADMIN — Medication 1 GRAM(S): at 06:22

## 2021-04-07 RX ADMIN — Medication 1 MILLIGRAM(S): at 18:50

## 2021-04-07 RX ADMIN — Medication 1 GRAM(S): at 12:51

## 2021-04-07 RX ADMIN — PREGABALIN 1000 MICROGRAM(S): 225 CAPSULE ORAL at 12:50

## 2021-04-07 RX ADMIN — PANTOPRAZOLE SODIUM 40 MILLIGRAM(S): 20 TABLET, DELAYED RELEASE ORAL at 17:25

## 2021-04-07 RX ADMIN — Medication 1 MILLIGRAM(S): at 04:25

## 2021-04-07 RX ADMIN — Medication 1 PACKET(S): at 17:26

## 2021-04-07 RX ADMIN — SODIUM CHLORIDE 5.56 MILLILITER(S): 9 INJECTION, SOLUTION INTRAVENOUS at 12:49

## 2021-04-07 RX ADMIN — Medication 50 GRAM(S): at 12:49

## 2021-04-07 RX ADMIN — Medication 1 GRAM(S): at 17:26

## 2021-04-07 RX ADMIN — SODIUM CHLORIDE 500 MILLILITER(S): 9 INJECTION INTRAMUSCULAR; INTRAVENOUS; SUBCUTANEOUS at 04:33

## 2021-04-07 RX ADMIN — Medication 1 MILLIGRAM(S): at 12:49

## 2021-04-07 RX ADMIN — Medication 1 PACKET(S): at 12:50

## 2021-04-07 RX ADMIN — ENOXAPARIN SODIUM 40 MILLIGRAM(S): 100 INJECTION SUBCUTANEOUS at 12:50

## 2021-04-07 RX ADMIN — Medication 105 MILLIGRAM(S): at 17:25

## 2021-04-07 RX ADMIN — CEFTRIAXONE 100 MILLIGRAM(S): 500 INJECTION, POWDER, FOR SOLUTION INTRAMUSCULAR; INTRAVENOUS at 21:07

## 2021-04-07 NOTE — PROGRESS NOTE ADULT - SUBJECTIVE AND OBJECTIVE BOX
R3 Antepartum Progress Note    Patient seen and examined at bedside. She ate Chinese food: chicken wings and rice last night and was able to keep it down. Only mild nausea. Is ambulating and voiding without difficulty. Denies abdominal pain. +FM.    Vital Signs Last 24 Hours  T(C): 36.6 (04-07-21 @ 08:00), Max: 36.7 (04-06-21 @ 15:00)  HR: 88 (04-07-21 @ 08:00) (84 - 103)  BP: 92/59 (04-07-21 @ 08:00) (88/53 - 101/61)  RR: 18 (04-07-21 @ 08:00) (16 - 18)  SpO2: 95% (04-07-21 @ 08:00) (95% - 100%)    I&O's Detail      Physical Exam:  General: NAD  Abdomen: Soft, non-tender, non-distended, gravid  NST reactive with BPP 8/8 yesterday  Ext: No pain or swelling    Labs:             10.7<L>  4.95  )-----------( 172      ( 04-07 @ 07:47 )             32.9<L>               9.5<L>  4.29  )-----------( 172      ( 04-06 @ 06:45 )             28.6<L>               12.1   5.46  )-----------( 243      ( 04-04 @ 17:24 )             36.9         MEDICATIONS  (STANDING):  cefTRIAXone   IVPB 1000 milliGRAM(s) IV Intermittent every 24 hours  cyanocobalamin 1000 MICROGram(s) Oral daily  enoxaparin Injectable 40 milliGRAM(s) SubCutaneous daily  folic acid 1 milliGRAM(s) Oral daily  LORazepam   Injectable 1 milliGRAM(s) IV Push every 4 hours  LORazepam   Injectable   IV Push   melatonin 3 milliGRAM(s) Oral at bedtime  pantoprazole  Injectable 40 milliGRAM(s) IV Push two times a day  prenatal multivitamin 1 Tablet(s) Oral daily  sodium chloride 0.9%. 1000 milliLiter(s) (75 mL/Hr) IV Continuous <Continuous>  sucralfate 1 Gram(s) Oral four times a day  thiamine IVPB 500 milliGRAM(s) IV Intermittent daily    MEDICATIONS  (PRN):  acetaminophen   Tablet .. 650 milliGRAM(s) Oral once PRN Mild Pain (1 - 3), Moderate Pain (4 - 6), Severe Pain (7 - 10)  LORazepam     Tablet 2 milliGRAM(s) Oral every 2 hours PRN Symptom-triggered 2 point increase in CIWA-Ar  LORazepam   Injectable 2 milliGRAM(s) IV Push every 1 hour PRN Symptom-triggered: each CIWA -Ar score 8 or GREATER  ondansetron Injectable 4 milliGRAM(s) IV Push every 6 hours PRN Nausea and/or Vomiting  polyethylene glycol 3350 17 Gram(s) Oral daily PRN Constipation

## 2021-04-07 NOTE — PROGRESS NOTE ADULT - SUBJECTIVE AND OBJECTIVE BOX
PROGRESS NOTE:   Shyanne Dimitri  PGY2 Internal Medicine  Pager [phone]    Patient is a 26y old  Female who presents with a chief complaint of Nausea, Vomiting, EtOH withdrawal (2021 10:11)      SUBJECTIVE / OVERNIGHT EVENTS:    ADDITIONAL REVIEW OF SYSTEMS:    MEDICATIONS  (STANDING):  cefTRIAXone   IVPB 1000 milliGRAM(s) IV Intermittent every 24 hours  cyanocobalamin 1000 MICROGram(s) Oral daily  enoxaparin Injectable 40 milliGRAM(s) SubCutaneous daily  folic acid 1 milliGRAM(s) Oral daily  LORazepam   Injectable 1 milliGRAM(s) IV Push every 4 hours  LORazepam   Injectable   IV Push   melatonin 3 milliGRAM(s) Oral at bedtime  pantoprazole  Injectable 40 milliGRAM(s) IV Push two times a day  prenatal multivitamin 1 Tablet(s) Oral daily  sodium chloride 0.9%. 1000 milliLiter(s) (75 mL/Hr) IV Continuous <Continuous>  sucralfate 1 Gram(s) Oral four times a day  thiamine IVPB 500 milliGRAM(s) IV Intermittent daily    MEDICATIONS  (PRN):  acetaminophen   Tablet .. 650 milliGRAM(s) Oral once PRN Mild Pain (1 - 3), Moderate Pain (4 - 6), Severe Pain (7 - 10)  LORazepam     Tablet 2 milliGRAM(s) Oral every 2 hours PRN Symptom-triggered 2 point increase in CIWA-Ar  LORazepam   Injectable 2 milliGRAM(s) IV Push every 1 hour PRN Symptom-triggered: each CIWA -Ar score 8 or GREATER  ondansetron Injectable 4 milliGRAM(s) IV Push every 6 hours PRN Nausea and/or Vomiting      CAPILLARY BLOOD GLUCOSE        I&O's Summary      PHYSICAL EXAM:  Vital Signs Last 24 Hrs  T(C): 36.6 (2021 04:00), Max: 36.7 (2021 15:00)  T(F): 97.9 (2021 04:00), Max: 98.1 (2021 00:00)  HR: 84 (2021 04:00) (84 - 103)  BP: 90/46 (2021 06:25) (88/53 - 101/61)  BP(mean): 61 (2021 04:00) (61 - 61)  RR: 17 (2021 04:00) (16 - 17)  SpO2: 100% (2021 04:00) (100% - 100%)    GENERAL: No acute distress, well-developed  HEAD:  Atraumatic, Normocephalic  EYES: EOMI, PERRLA, conjunctiva and sclera clear  NECK: Supple, no lymphadenopathy, no JVD  CHEST/LUNG: CTAB; No wheezes, rales, or rhonchi  HEART: Regular rate and rhythm; No murmurs, rubs, or gallops  ABDOMEN: Soft, non-tender, non-distended; normal bowel sounds, no organomegaly  EXTREMITIES:  2+ peripheral pulses b/l, No clubbing, cyanosis, or edema  NEUROLOGY: A&O x 3, no focal deficits  SKIN: No rashes or lesions    LABS:                        9.5    4.29  )-----------( 172      ( 2021 06:45 )             28.6     04-06    136  |  105  |  4<L>  ----------------------------<  83  2.8<LL>   |  20<L>  |  0.67    Ca    7.8<L>      2021 06:45  Phos  2.5     04-06  Mg     1.8     04-06    TPro  x   /  Alb  x   /  TBili  0.9  /  DBili  0.4<H>  /  AST  x   /  ALT  x   /  AlkPhos  x   04-06    PT/INR - ( 2021 03:53 )   PT: 11.9 sec;   INR: 1.04 ratio         PTT - ( 2021 03:53 )  PTT:24.4 sec      Urinalysis Basic - ( 2021 23:08 )    Color: Yellow / Appearance: Slightly Turbid / S.010 / pH: x  Gluc: x / Ketone: Negative  / Bili: Negative / Urobili: 3 mg/dL   Blood: x / Protein: Negative / Nitrite: Negative   Leuk Esterase: Negative / RBC: 0-2 /HPF / WBC 0-2 /HPF   Sq Epi: x / Non Sq Epi: x / Bacteria: x        Culture - Urine (collected 2021 17:36)  Source: .Urine Clean Catch (Midstream)  Final Report (2021 20:10):    >=3 organisms. Probable collection contamination.        RADIOLOGY & ADDITIONAL TESTS:  Results Reviewed:   Imaging Personally Reviewed:  Electrocardiogram Personally Reviewed:    COORDINATION OF CARE:  Care Discussed with Consultants/Other Providers [Y/N]:  Prior or Outpatient Records Reviewed [Y/N]:   PROGRESS NOTE:   Shyanne Mosley  PGY2 Internal Medicine  Pager [phone]    Patient is a 26y old  Female who presents with a chief complaint of Nausea, Vomiting, EtOH withdrawal (2021 10:11)      SUBJECTIVE / OVERNIGHT EVENTS: No overnight events. Pt tolerating PO well, with no further abdominal pain or emesis. Still with nausea and achiness    ADDITIONAL REVIEW OF SYSTEMS: none    MEDICATIONS  (STANDING):  cefTRIAXone   IVPB 1000 milliGRAM(s) IV Intermittent every 24 hours  cyanocobalamin 1000 MICROGram(s) Oral daily  enoxaparin Injectable 40 milliGRAM(s) SubCutaneous daily  folic acid 1 milliGRAM(s) Oral daily  LORazepam   Injectable 1 milliGRAM(s) IV Push every 4 hours  LORazepam   Injectable   IV Push   melatonin 3 milliGRAM(s) Oral at bedtime  pantoprazole  Injectable 40 milliGRAM(s) IV Push two times a day  prenatal multivitamin 1 Tablet(s) Oral daily  sodium chloride 0.9%. 1000 milliLiter(s) (75 mL/Hr) IV Continuous <Continuous>  sucralfate 1 Gram(s) Oral four times a day  thiamine IVPB 500 milliGRAM(s) IV Intermittent daily    MEDICATIONS  (PRN):  acetaminophen   Tablet .. 650 milliGRAM(s) Oral once PRN Mild Pain (1 - 3), Moderate Pain (4 - 6), Severe Pain (7 - 10)  LORazepam     Tablet 2 milliGRAM(s) Oral every 2 hours PRN Symptom-triggered 2 point increase in CIWA-Ar  LORazepam   Injectable 2 milliGRAM(s) IV Push every 1 hour PRN Symptom-triggered: each CIWA -Ar score 8 or GREATER  ondansetron Injectable 4 milliGRAM(s) IV Push every 6 hours PRN Nausea and/or Vomiting      CAPILLARY BLOOD GLUCOSE        I&O's Summary      PHYSICAL EXAM:  Vital Signs Last 24 Hrs  T(C): 36.6 (2021 04:00), Max: 36.7 (2021 15:00)  T(F): 97.9 (2021 04:00), Max: 98.1 (2021 00:00)  HR: 84 (2021 04:00) (84 - 103)  BP: 90/46 (2021 06:25) (88/53 - 101/61)  BP(mean): 61 (2021 04:00) (61 - 61)  RR: 17 (2021 04:00) (16 - 17)  SpO2: 100% (2021 04:00) (100% - 100%)    GENERAL: No acute distress, well-developed  HEAD:  Atraumatic, Normocephalic  EYES: EOMI, PERRLA, conjunctiva and sclera clear  NECK: Supple, no lymphadenopathy, no JVD  CHEST/LUNG: CTAB; No wheezes, rales, or rhonchi  HEART: Regular rate and rhythm; No murmurs, rubs, or gallops  ABDOMEN: Soft, non-tender, gravid; normal bowel sounds, no organomegaly  EXTREMITIES:  2+ peripheral pulses b/l, No clubbing, cyanosis, or edema  NEUROLOGY: A&O x 3, no focal deficits  SKIN: No rashes or lesions    LABS:                        9.5    4.29  )-----------( 172      ( 2021 06:45 )             28.6     04-06    136  |  105  |  4<L>  ----------------------------<  83  2.8<LL>   |  20<L>  |  0.67    Ca    7.8<L>      2021 06:45  Phos  2.5     04-06  Mg     1.8     04-06    TPro  x   /  Alb  x   /  TBili  0.9  /  DBili  0.4<H>  /  AST  x   /  ALT  x   /  AlkPhos  x   04-06    PT/INR - ( 2021 03:53 )   PT: 11.9 sec;   INR: 1.04 ratio         PTT - ( 2021 03:53 )  PTT:24.4 sec      Urinalysis Basic - ( 2021 23:08 )    Color: Yellow / Appearance: Slightly Turbid / S.010 / pH: x  Gluc: x / Ketone: Negative  / Bili: Negative / Urobili: 3 mg/dL   Blood: x / Protein: Negative / Nitrite: Negative   Leuk Esterase: Negative / RBC: 0-2 /HPF / WBC 0-2 /HPF   Sq Epi: x / Non Sq Epi: x / Bacteria: x        Culture - Urine (collected 2021 17:36)  Source: .Urine Clean Catch (Midstream)  Final Report (2021 20:10):    >=3 organisms. Probable collection contamination.        RADIOLOGY & ADDITIONAL TESTS:  Results Reviewed:   Imaging Personally Reviewed:  Electrocardiogram Personally Reviewed:    COORDINATION OF CARE:  Care Discussed with Consultants/Other Providers [Y/N]:  Prior or Outpatient Records Reviewed [Y/N]:

## 2021-04-07 NOTE — PROVIDER CONTACT NOTE (OTHER) - ACTION/TREATMENT ORDERED:
500ml NS Bolus ordered. OK to Give 1mg standing Ativan. Re-assess blood pressure once bolus is completed.

## 2021-04-07 NOTE — PROVIDER CONTACT NOTE (OTHER) - RECOMMENDATIONS
patient to be kept on the monitor for another 20 minutes per MD Uppalaptti due to nonreactive NST tracing.

## 2021-04-07 NOTE — PROGRESS NOTE ADULT - PROBLEM SELECTOR PLAN 1
#R/o pyelo - pt has R flank pain with dirty U/A, small leuk est and no nitrites but w symptoms concern for pyelonephritis  - c/w 7 days Ceftriaxone (started late on 4/4), ucx contaminated  - trend fever, wbc  - IVF  - repeat u/a #R/o pyelo - pt has R flank pain with dirty U/A, small leuk est and no nitrites but w symptoms concern for pyelonephritis  - c/w 5 days Ceftriaxone (started late on 4/4), ucx contaminated  - trend fever, wbc

## 2021-04-07 NOTE — PROGRESS NOTE ADULT - PROBLEM SELECTOR PLAN 4
#pt has hx of DT's, unclear when last drink was.   - c/w ativan taper  - monitor CWAS  - c/w thiamine, folate, b12, vitamin supplementation  - SBRT consult prior to d/c  - psych consult #pt has hx of DT's, unclear when last drink was.   - c/w ativan taper  - monitor CWAS  - c/w thiamine, folate, b12, vitamin supplementation  - SBRT consult prior to d/c  - psych consult  - social work consulted for inpatient rehab
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
## 2021-04-07 NOTE — PROGRESS NOTE ADULT - PROBLEM SELECTOR PLAN 3
#likely in setting of alcohol use, lower suspicion at this point for HELLP, VSS so low suspicion for preeclampsia, RUQ US neg  -hepatology following   - f/u hepatitis panel and other antibody labs recommended by hepatology  - f/u hemolysis labs  - f/u urine pro/cr  - dose meds accordingly  - sucralfate for abdominal pain

## 2021-04-07 NOTE — PROGRESS NOTE ADULT - ASSESSMENT
Patient is a 27 y/o currently pregnant 25 weeks F with PMH of PTSD, anxiety, depression, sickle cell trait, alcohol abuse, seizures from BZD withdrawal, with recent admission for 3/30-4/1 for alcohol intoxication, suicidal ideation during pregnancy but left AMA, who presents to ED for nausea, blood tinged, emesis and chest pain and found to have elevated liver enzymes and hepatology has been called for the same.      # Elevated liver enzymes Hepatocellular pattern) in 3rd trimester : Likely alcoholic liver disease (AST>ALT, active drinking, last drink was 3/29/2021, now trending down.  vs R/O cholestasis of pregnancy  (INR N, PLT N)  US abdomen: liver normal, no biliary dilation  No significant cholestasis, AAOX3, no asterixis.   BP is stable, trace protein in UA, but repeat neg  Pruritis resolved  work up: IgG normal, EBV, CMV, HAV, HBV, HCV are neg for acute infection.     Rec:  trend CMP, INR/PT daily.  Please f/u Anti HEV, ANIBAL, SMA, AMA, Anti LK microsomal Ab, anti-soluble liver Ag.  f/u HSV 1,2 IgM/igG, varicella IgM.  f/u Bile acids.  In view of her liver enzymes trending down it seems likely cause is alcohol, although follow up the above tests.  adequate nutrition, ambulation.  f/u with OBGYN  Alcohol cessation, alcohol rehab. Needs scheduling with the facility before DC.

## 2021-04-07 NOTE — PROGRESS NOTE ADULT - ASSESSMENT
25 yo F w/ PMHx currently 25w2d pregnant with hx of ETOH abuse (recent admission for intoxication), benzo w/d seizures, depression p/w nausea/vomiting/abd pain x 4 days.    # nausea/vomiting - concerning for EtOH vs benzo withdrawal; reports last drink 3/29/21 or 4/2 to different providers  -GI recs appreciated  -Zofran, Reglan, pantoprazole are acceptable anti-emetics  -If clinical suspicion for withdrawal high, can give Ativan/benzodiazepine as needed   -patient will need rehab follow up outpatient as she is motivated to quit alcohol use    #transaminitis  -elevated AST/ALT compared to baseline  -Hepatology following: will rule out acute viral hepatitis (unlikely) and biliary pathology    #FWB  ATU(4/5):complete breech, anterior, MVP 6.5, BPP 8/8  -MFM to follow    BRIDGETT Bain PGY-3  [id number]   27 yo F w/ PMHx currently 25w2d pregnant with hx of ETOH abuse (recent admission for intoxication), benzo w/d seizures, depression p/w nausea/vomiting/abd pain x 4 days.    # nausea/vomiting - concerning for EtOH vs benzo withdrawal; reports last drink 3/29/21 or 4/2 to different providers  -GI recs appreciated  -Zofran, Reglan, pantoprazole are acceptable anti-emetics  -If clinical suspicion for withdrawal high, can give Ativan/benzodiazepine as needed, currently on ativan taper  -patient will need rehab follow up outpatient as she is motivated to quit alcohol use    #transaminitis  -elevated AST/ALT compared to baseline, downtrending now  -Hepatology following: will rule out acute viral hepatitis (unlikely) and biliary pathology, abdominal ultrasound unremarkable and abdominal doppler did not show evidence of portal vein thrombosis    #FWB  ATU(4/5):complete breech, anterior, MVP 6.5, BPP 8/8  -MFM to follow    PRebel Bain PGY-3  [phone]    ***MFM ADDENDUM***  Patient seen at bedside w/ MFM attending Dr. Mendez. Agree with above.  Patient reports feeling better, able to tolerate PO w/o nausea or vomiting, no obstetric complaints.  We had a long discussion and provided extensive counseling regarding the importance of rehabilitation and alcohol cessation, as well as the utility of maintenance anxiolytics. [facility] [place] as she has moved to [place], will attempt to coordinate with social work and  for local care. She will continue prenatal care at our high risk pregnancy clinic.

## 2021-04-07 NOTE — PROGRESS NOTE ADULT - PROBLEM SELECTOR PLAN 6
#multiple possible etiologies: pregnancy (has hx of n/v with prescriptions in past of antiemetics), withdrawal, and pyelonephritis  - c/w zofran prn  - PPI  - IVF  - NPO for now, will advance as tolerated #multiple possible etiologies: pregnancy (has hx of n/v with prescriptions in past of antiemetics), withdrawal, and pyelonephritis  - c/w zofran prn  - PPI  - tolerating diet

## 2021-04-07 NOTE — PROGRESS NOTE ADULT - SUBJECTIVE AND OBJECTIVE BOX
Patient is a 26y old  Female who presents with a chief complaint of Nausea, Vomiting, EtOH withdrawal (07 Apr 2021 09:24)       Admitted on: 04-04-21    We are following the patient for elevated liver enzymes in pregnancy      Interval History: Patient denies abdominal pain, no vomiting, has some nausea, no pruritis. She had solid diet yesterday. No BM.        PAST MEDICAL & SURGICAL HISTORY:  Depression    Anxiety    No pertinent past medical history    No significant past surgical history    No significant past surgical history        MEDICATIONS  (STANDING):  cefTRIAXone   IVPB 1000 milliGRAM(s) IV Intermittent every 24 hours  cyanocobalamin 1000 MICROGram(s) Oral daily  enoxaparin Injectable 40 milliGRAM(s) SubCutaneous daily  folic acid 1 milliGRAM(s) Oral daily  LORazepam   Injectable 1  IV Push   LORazepam   Injectable 1 milliGRAM(s) IV Push every 6 hours  magnesium sulfate  IVPB 2 Gram(s) IV Intermittent every 2 hours  melatonin 3 milliGRAM(s) Oral at bedtime  pantoprazole  Injectable 40 milliGRAM(s) IV Push two times a day  potassium phosphate / sodium phosphate Powder (PHOS-NaK) 1 Packet(s) Oral every 3 hours  prenatal multivitamin 1 Tablet(s) Oral daily  sodium chloride 0.9%. 1000 milliLiter(s) (75 mL/Hr) IV Continuous <Continuous>  sucralfate 1 Gram(s) Oral four times a day  thiamine IVPB 500 milliGRAM(s) IV Intermittent daily    MEDICATIONS  (PRN):  acetaminophen   Tablet .. 650 milliGRAM(s) Oral once PRN Mild Pain (1 - 3), Moderate Pain (4 - 6), Severe Pain (7 - 10)  LORazepam     Tablet 2 milliGRAM(s) Oral every 2 hours PRN Symptom-triggered 2 point increase in CIWA-Ar  LORazepam   Injectable 2 milliGRAM(s) IV Push every 1 hour PRN Symptom-triggered: each CIWA -Ar score 8 or GREATER  ondansetron Injectable 4 milliGRAM(s) IV Push every 6 hours PRN Nausea and/or Vomiting  polyethylene glycol 3350 17 Gram(s) Oral daily PRN Constipation      Allergies  No Known Allergies      Review of Systems:   Cardiovascular:  No Chest Pain, No Palpitations  Respiratory:  No Cough, No Dyspnea  Gastrointestinal:  As described in HPI    Physical Examination:  T(C): 36.6 (04-07-21 @ 08:00), Max: 36.7 (04-06-21 @ 15:00)  HR: 88 (04-07-21 @ 08:00) (84 - 103)  BP: 92/59 (04-07-21 @ 08:00) (88/53 - 101/61)  RR: 18 (04-07-21 @ 08:00) (16 - 18)  SpO2: 95% (04-07-21 @ 08:00) (95% - 100%)      Constitutional: No acute distress.  Respiratory:  No signs of respiratory distress. Lung sounds are clear bilaterally.  Cardiovascular:  S1 S2, Regular rate and rhythm.  Abdominal: Abdomen is soft, symmetric, and non-tender with distention (pregnant)  Skin: No rashes, No Jaundice.        Data: (reviewed by attending)                        10.7   4.95  )-----------( 172      ( 07 Apr 2021 07:47 )             32.9     Hgb trend:  10.7  04-07-21 @ 07:47  9.5  04-06-21 @ 06:45  12.1  04-04-21 @ 17:24      04-07    137  |  106  |  4<L>  ----------------------------<  86  3.4<L>   |  20<L>  |  0.62    Ca    7.9<L>      07 Apr 2021 07:47  Phos  2.0     04-07  Mg     1.5     04-07    TPro  6.1  /  Alb  3.6  /  TBili  0.4  /  DBili  x   /  AST  177<H>  /  ALT  313<H>  /  AlkPhos  39<L>  04-07    Liver panel trend:  TBili 0.4   /      /      /   AlkP 39   /   Tptn 6.1   /   Alb 3.6    /   DBili --      04-07  TBili 0.9   /   AST --   /   ALT --   /   AlkP --   /   Tptn --   /   Alb --    /   DBili 0.4      04-06  TBili 0.8   /      /      /   AlkP 36   /   Tptn 5.7   /   Alb 3.2    /   DBili --      04-06  TBili 1.7   /      /      /   AlkP 46   /   Tptn 7.3   /   Alb 4.2    /   DBili --      04-05  TBili 1.8   /      /      /   AlkP 55   /   Tptn 8.8   /   Alb 5.0    /   DBili --      04-04  TBili 1.1   /   AST 52   /   ALT 77   /   AlkP 43   /   Tptn 7.1   /   Alb 4.1    /   DBili --      03-30      PT/INR - ( 07 Apr 2021 03:53 )   PT: 11.9 sec;   INR: 1.04 ratio         PTT - ( 07 Apr 2021 03:53 )  PTT:24.4 sec    Culture - Urine (collected 04 Apr 2021 17:36)  Source: .Urine Clean Catch (Midstream)  Final Report (05 Apr 2021 20:10):    >=3 organisms. Probable collection contamination.         Radiology: (reviewed by attending)    US Abdomen Doppler:   EXAM:  US DPLX ABDOMEN        PROCEDURE DATE:  Apr 6 2021         INTERPRETATION:  CLINICAL INFORMATION: 25 week pregnant with elevated LFTs. Evaluate portal venous system.    COMPARISON: None available.    TECHNIQUE: Color and spectral Doppler ultrasound were utilized for evaluation of the portal and hepatic vasculature.    FINDINGS:    Liver: Within normal limits. Normal echogenicity and echotexture, without evidence of focal liver lesion.    Doppler:  No evidence of portal venous thrombosis. The main, right, and left portal veins are patent, with normal direction of flow and normal waveforms. The hepatic veins are patent. The hepatic artery is patent, demonstrating a normal waveform.    Bile ducts: No biliary ductal dilatation.  Gallbladder: Survey views. Within normal limits.  Pancreas: Not visualized  Right kidney: Survey views.. No hydronephrosis.  Ascites: None.      IMPRESSION:  Liver sonographically within normal limits.  No evidence of portal venous thrombosis.                ROBERT MCCULLOUGH MD; Attending Radiologist  This document has been electronically signed. Apr 6 2021 11:56AM (04-06-21 @ 10:41)

## 2021-04-07 NOTE — CHART NOTE - NSCHARTNOTEFT_GEN_A_CORE
Internal Medicine Night Float: Event Note    Event: Called by RN for mild lightheadedness and dizziness with BP 89/53. Assessed patient at bedside. Patient was on cell phone, comfortable appearing. Not in distress. Patient was not bothered too much from dizziness. Discussed with nurse, will trial 500cc bolus if IVF as patient at that time was not on IVF. Otherwise patient stated she had no worsening of any symptoms.     T(C): 36.4 (04-06-21 @ 20:00), Max: 36.7 (04-06-21 @ 03:00)  T(F): 97.6 (04-06-21 @ 20:00)  HR: 103 (04-06-21 @ 20:00) (80 - 103)  BP: 89/53 (04-06-21 @ 20:00) (89/53 - 111/65)  BP(mean): --  RR: 16 (04-06-21 @ 20:00) (16 - 18)  SpO2: 100% (04-06-21 @ 20:00) (99% - 100%)    Cuate Chang PGY1  Medicine Night Float  Pager: [phone] NS, [id number] HAI
Case discussed with Kristie Pedroza NP, impression and plan discussed and agreed upon. Met with the patient this morning- . Calm, engaged in conversation. States that she feels discouraged that inpatient rehab did not accept her, and that she relapsed into alcohol drinking. She denies any mood symptoms today, denies any si or hi, denies any psychosis. She states that she wants to start an antidepressant so that it can help her with anxiety. States that Lexapro has been ineffective. Discussed trying Zoloft. She is in agreement. Discussed the risk of SSRI use in pregnancy and she is consenting.     - Continue CIWA. Not scoring on CIWA and BP is low. Can change Ativan dosing as follows- ativan 1mg q 6hrs IV- today, == Ativan 0.5mg q 6hrs IV; == Ativan 0.5mg BID IV.   - Start tomorrow -----Sertraline 50mg q 7AM PO. (ensure qtc<500)  - SW input--- would benefit from inpatient substance abuse rehab. Gave patient options for [facility] and she+ will call to establish appt. Also would benefit from referral to [facility]  clinic [phone].     Yasmeen Madera MD  Attending psychiatrist

## 2021-04-07 NOTE — PROVIDER CONTACT NOTE (OTHER) - ASSESSMENT
NST as follows:  145 bpm baseline  minimal variability  no accelerations noted  no decelerations noted NST as follows:  145 bpm baseline  minimal variability  no accelerations noted  no decelerations noted  no contractions noted  patient denies any contraction pain at this time

## 2021-04-07 NOTE — PROVIDER CONTACT NOTE (OTHER) - ASSESSMENT
Pt A&Ox4. Patient states that she has no dizziness or lightheadness and denies chest pain, palpitations and shortness of breath.

## 2021-04-07 NOTE — PROGRESS NOTE ADULT - PROBLEM SELECTOR PLAN 5
#expected due date (dated per US) July 19th 2021, currently 25 weeks  - OB consulted, apprec recs  - prenatal vitamins  - #expected due date (dated per US) July 19th 2021, currently 25 weeks  - OB consulted, apprec recs  - prenatal vitamins

## 2021-04-08 ENCOUNTER — TRANSCRIPTION ENCOUNTER (OUTPATIENT)
Age: 26
End: 2021-04-08

## 2021-04-08 LAB
ALBUMIN SERPL ELPH-MCNC: 3 G/DL — LOW (ref 3.3–5)
ALP SERPL-CCNC: 31 U/L — LOW (ref 40–120)
ALT FLD-CCNC: 187 U/L — HIGH (ref 4–33)
ANION GAP SERPL CALC-SCNC: 9 MMOL/L — SIGNIFICANT CHANGE UP (ref 7–14)
AST SERPL-CCNC: 73 U/L — HIGH (ref 4–32)
BASOPHILS # BLD AUTO: 0.03 K/UL — SIGNIFICANT CHANGE UP (ref 0–0.2)
BASOPHILS NFR BLD AUTO: 0.6 % — SIGNIFICANT CHANGE UP (ref 0–2)
BILE AC FLD-MCNC: 5.7 UMOL/L — SIGNIFICANT CHANGE UP (ref 0–10)
BILIRUB SERPL-MCNC: 0.2 MG/DL — SIGNIFICANT CHANGE UP (ref 0.2–1.2)
BUN SERPL-MCNC: 5 MG/DL — LOW (ref 7–23)
CALCIUM SERPL-MCNC: 7.7 MG/DL — LOW (ref 8.4–10.5)
CHLORIDE SERPL-SCNC: 105 MMOL/L — SIGNIFICANT CHANGE UP (ref 98–107)
CO2 SERPL-SCNC: 23 MMOL/L — SIGNIFICANT CHANGE UP (ref 22–31)
CREAT SERPL-MCNC: 0.54 MG/DL — SIGNIFICANT CHANGE UP (ref 0.5–1.3)
EOSINOPHIL # BLD AUTO: 0.05 K/UL — SIGNIFICANT CHANGE UP (ref 0–0.5)
EOSINOPHIL NFR BLD AUTO: 1 % — SIGNIFICANT CHANGE UP (ref 0–6)
GLUCOSE SERPL-MCNC: 93 MG/DL — SIGNIFICANT CHANGE UP (ref 70–99)
HCT VFR BLD CALC: 26.8 % — LOW (ref 34.5–45)
HGB BLD-MCNC: 8.9 G/DL — LOW (ref 11.5–15.5)
IANC: 2.26 K/UL — SIGNIFICANT CHANGE UP (ref 1.5–8.5)
IMM GRANULOCYTES NFR BLD AUTO: 3.6 % — HIGH (ref 0–1.5)
LYMPHOCYTES # BLD AUTO: 2.13 K/UL — SIGNIFICANT CHANGE UP (ref 1–3.3)
LYMPHOCYTES # BLD AUTO: 42.3 % — SIGNIFICANT CHANGE UP (ref 13–44)
MAGNESIUM SERPL-MCNC: 1.8 MG/DL — SIGNIFICANT CHANGE UP (ref 1.6–2.6)
MCHC RBC-ENTMCNC: 26.8 PG — LOW (ref 27–34)
MCHC RBC-ENTMCNC: 33.2 GM/DL — SIGNIFICANT CHANGE UP (ref 32–36)
MCV RBC AUTO: 80.7 FL — SIGNIFICANT CHANGE UP (ref 80–100)
MONOCYTES # BLD AUTO: 0.38 K/UL — SIGNIFICANT CHANGE UP (ref 0–0.9)
MONOCYTES NFR BLD AUTO: 7.6 % — SIGNIFICANT CHANGE UP (ref 2–14)
NEUTROPHILS # BLD AUTO: 2.26 K/UL — SIGNIFICANT CHANGE UP (ref 1.8–7.4)
NEUTROPHILS NFR BLD AUTO: 44.9 % — SIGNIFICANT CHANGE UP (ref 43–77)
NRBC # BLD: 0 /100 WBCS — SIGNIFICANT CHANGE UP
NRBC # FLD: 0 K/UL — SIGNIFICANT CHANGE UP
PHOSPHATE SERPL-MCNC: 2.9 MG/DL — SIGNIFICANT CHANGE UP (ref 2.5–4.5)
PLATELET # BLD AUTO: 142 K/UL — LOW (ref 150–400)
POTASSIUM SERPL-MCNC: 3.4 MMOL/L — LOW (ref 3.5–5.3)
POTASSIUM SERPL-SCNC: 3.4 MMOL/L — LOW (ref 3.5–5.3)
PROT SERPL-MCNC: 4.9 G/DL — LOW (ref 6–8.3)
RBC # BLD: 3.32 M/UL — LOW (ref 3.8–5.2)
RBC # FLD: 14.6 % — HIGH (ref 10.3–14.5)
SODIUM SERPL-SCNC: 137 MMOL/L — SIGNIFICANT CHANGE UP (ref 135–145)
SOLUBLE LIVER IGG SER IA-ACNC: 1 — SIGNIFICANT CHANGE UP (ref 0–20)
VZV IGM SER-ACNC: 0.98 INDEX — HIGH (ref 0–0.9)
WBC # BLD: 5.03 K/UL — SIGNIFICANT CHANGE UP (ref 3.8–10.5)
WBC # FLD AUTO: 5.03 K/UL — SIGNIFICANT CHANGE UP (ref 3.8–10.5)

## 2021-04-08 PROCEDURE — 99232 SBSQ HOSP IP/OBS MODERATE 35: CPT | Mod: GC

## 2021-04-08 PROCEDURE — 99233 SBSQ HOSP IP/OBS HIGH 50: CPT | Mod: GC

## 2021-04-08 PROCEDURE — 99232 SBSQ HOSP IP/OBS MODERATE 35: CPT

## 2021-04-08 PROCEDURE — 99233 SBSQ HOSP IP/OBS HIGH 50: CPT

## 2021-04-08 RX ORDER — POTASSIUM CHLORIDE 20 MEQ
20 PACKET (EA) ORAL ONCE
Refills: 0 | Status: COMPLETED | OUTPATIENT
Start: 2021-04-08 | End: 2021-04-08

## 2021-04-08 RX ORDER — POTASSIUM CHLORIDE 20 MEQ
40 PACKET (EA) ORAL ONCE
Refills: 0 | Status: DISCONTINUED | OUTPATIENT
Start: 2021-04-08 | End: 2021-04-08

## 2021-04-08 RX ADMIN — CEFTRIAXONE 100 MILLIGRAM(S): 500 INJECTION, POWDER, FOR SOLUTION INTRAMUSCULAR; INTRAVENOUS at 21:14

## 2021-04-08 RX ADMIN — Medication 1 GRAM(S): at 06:01

## 2021-04-08 RX ADMIN — Medication 1 GRAM(S): at 12:16

## 2021-04-08 RX ADMIN — ENOXAPARIN SODIUM 40 MILLIGRAM(S): 100 INJECTION SUBCUTANEOUS at 12:15

## 2021-04-08 RX ADMIN — Medication 1 MILLIGRAM(S): at 12:16

## 2021-04-08 RX ADMIN — Medication 20 MILLIEQUIVALENT(S): at 09:36

## 2021-04-08 RX ADMIN — Medication 1 GRAM(S): at 18:16

## 2021-04-08 RX ADMIN — Medication 0.5 MILLIGRAM(S): at 06:02

## 2021-04-08 RX ADMIN — Medication 105 MILLIGRAM(S): at 12:16

## 2021-04-08 RX ADMIN — Medication 0.5 MILLIGRAM(S): at 12:15

## 2021-04-08 RX ADMIN — Medication 1 GRAM(S): at 00:21

## 2021-04-08 RX ADMIN — Medication 1 TABLET(S): at 12:15

## 2021-04-08 RX ADMIN — SERTRALINE 50 MILLIGRAM(S): 25 TABLET, FILM COATED ORAL at 06:02

## 2021-04-08 RX ADMIN — PANTOPRAZOLE SODIUM 40 MILLIGRAM(S): 20 TABLET, DELAYED RELEASE ORAL at 18:16

## 2021-04-08 RX ADMIN — Medication 1 MILLIGRAM(S): at 00:20

## 2021-04-08 RX ADMIN — PREGABALIN 1000 MICROGRAM(S): 225 CAPSULE ORAL at 12:16

## 2021-04-08 RX ADMIN — Medication 0.5 MILLIGRAM(S): at 18:16

## 2021-04-08 RX ADMIN — Medication 3 MILLIGRAM(S): at 21:14

## 2021-04-08 RX ADMIN — PANTOPRAZOLE SODIUM 40 MILLIGRAM(S): 20 TABLET, DELAYED RELEASE ORAL at 05:42

## 2021-04-08 NOTE — PROVIDER CONTACT NOTE (OTHER) - ACTION/TREATMENT ORDERED:
provider notified, no further actions ordered at this time. As per provider OK to administer 1mg ativan that is scheduled.

## 2021-04-08 NOTE — DISCHARGE NOTE PROVIDER - NSDCFUSCHEDAPPT_GEN_ALL_CORE_FT
ALEJANDRO ALEGRIA ; 04/13/2021 ; [facility] ALEJANDRO ALEGRIA ; 04/12/2021 ; [facility]  ALEJANDRO ALEGRIA ; 04/13/2021 ; [facility]

## 2021-04-08 NOTE — BH CONSULTATION LIAISON PROGRESS NOTE - NSBHCHARTREVIEWVS_PSY_A_CORE FT
Vital Signs Last 24 Hrs  T(C): 36.8 (08 Apr 2021 16:00), Max: 36.8 (08 Apr 2021 08:00)  T(F): 98.2 (08 Apr 2021 16:00), Max: 98.3 (08 Apr 2021 08:00)  HR: 81 (08 Apr 2021 16:00) (77 - 93)  BP: 98/61 (08 Apr 2021 16:00) (95/57 - 105/40)  BP(mean): --  RR: 18 (08 Apr 2021 16:00) (18 - 18)  SpO2: 100% (08 Apr 2021 16:00) (98% - 100%)

## 2021-04-08 NOTE — PROVIDER CONTACT NOTE (OTHER) - ACTION/TREATMENT ORDERED:
provider notified, no further actions ordered at this time. As per provider OK to administer 0.5mg ativan that is scheduled.

## 2021-04-08 NOTE — PROGRESS NOTE ADULT - ASSESSMENT
Patient is a 27 y/o currently pregnant 25 weeks F with PMH of PTSD, anxiety, depression, sickle cell trait, alcohol abuse, seizures from BZD withdrawal, with recent admission for 3/30-4/1 for alcohol intoxication, suicidal ideation during pregnancy but left AMA, who presents to ED for nausea, blood tinged, emesis and chest pain and found to have elevated liver enzymes and hepatology has been called for the same.      # Elevated liver enzymes Hepatocellular pattern) in 3rd trimester : Likely alcoholic liver disease (AST>ALT, active drinking, last drink was 3/29/2021, now trending down) vs pre-eclampsia  vs R/O cholestasis of pregnancy  (INR N, PLT N)  US abdomen: liver normal, no biliary dilation  No significant cholestasis, AAOX3, no asterixis.   BP is stable, trace protein in UA, but repeat is neg, pre-eclampsia resolved  work up: IgG normal, EBV, CMV, HAV, HBV, HCV are neg for acute infection., ANIBAL, AMA, Microsomal are also neg    Rec:  Please f/u Anti HEV, Anti LK microsomal Ab  f/u HSV 1,2 IgM/igG, varicella IgM.  f/u Bile acids.  In view of her liver enzymes trending down it seems likely cause is alcohol.  Alcohol cessation, alcohol rehab. patient is scheduled for outpatient alcohol Rehab in [place], f/u with .  On DC needs to repeat liver enzymes and INR in 1-2 weeks with OB/GYN and if significantly rises please follow up in liver clinic.

## 2021-04-08 NOTE — PROGRESS NOTE ADULT - SUBJECTIVE AND OBJECTIVE BOX
R3 Antepartum Progress Note    Patient seen and examined at bedside, no acute overnight events. No acute complaints. She ate [employer] last night with no issue. Patient is ambulating, passing flatus, voiding spontaneously, and tolerating regular diet. Denies CP, SOB, N/V, fevers, and chills. Is hopeful about situation. Spoke to the  here about setting up outpatient rehab in [place] as she does not want to go to an inpatient center.    Vital Signs Last 24 Hours  T(C): 36.7 (04-08-21 @ 05:55), Max: 36.7 (04-07-21 @ 20:00)  HR: 77 (04-08-21 @ 05:55) (77 - 93)  BP: 98/53 (04-08-21 @ 05:55) (85/42 - 105/69)  RR: 18 (04-08-21 @ 05:55) (18 - 18)  SpO2: 98% (04-08-21 @ 05:55) (95% - 100%)    Physical Exam:  General: NAD  Abdomen: Soft, non-tender, gravid  NST AGA yesterday  Ext: No pain or swelling    Labs:             8.9<L>  5.03  )-----------( 142<L>    ( 04-08 @ 05:12 )             26.8<L>               10.7<L>  4.95  )-----------( 172      ( 04-07 @ 07:47 )             32.9<L>               9.5<L>  4.29  )-----------( 172      ( 04-06 @ 06:45 )             28.6<L>               12.1   5.46  )-----------( 243      ( 04-04 @ 17:24 )             36.9

## 2021-04-08 NOTE — BH CONSULTATION LIAISON PROGRESS NOTE - CURRENT MEDICATION
MEDICATIONS  (STANDING):  cefTRIAXone   IVPB 1000 milliGRAM(s) IV Intermittent every 24 hours  cyanocobalamin 1000 MICROGram(s) Oral daily  enoxaparin Injectable 40 milliGRAM(s) SubCutaneous daily  folic acid 1 milliGRAM(s) Oral daily  LORazepam   Injectable 0.5 milliGRAM(s) IV Push every 6 hours  LORazepam   Injectable 1  IV Push   melatonin 3 milliGRAM(s) Oral at bedtime  pantoprazole  Injectable 40 milliGRAM(s) IV Push two times a day  prenatal multivitamin 1 Tablet(s) Oral daily  sertraline 50 milliGRAM(s) Oral <User Schedule>  sucralfate 1 Gram(s) Oral four times a day    MEDICATIONS  (PRN):  acetaminophen   Tablet .. 650 milliGRAM(s) Oral once PRN Mild Pain (1 - 3), Moderate Pain (4 - 6), Severe Pain (7 - 10)  LORazepam     Tablet 2 milliGRAM(s) Oral every 2 hours PRN Symptom-triggered 2 point increase in CIWA-Ar  LORazepam   Injectable 2 milliGRAM(s) IV Push every 1 hour PRN Symptom-triggered: each CIWA -Ar score 8 or GREATER  ondansetron Injectable 4 milliGRAM(s) IV Push every 6 hours PRN Nausea and/or Vomiting  polyethylene glycol 3350 17 Gram(s) Oral daily PRN Constipation

## 2021-04-08 NOTE — DISCHARGE NOTE PROVIDER - DISCHARGE DATE
Omid Chris is a 19 year old male presenting with cough, congestion, sinus pain/pressure, and bilateral ear blockage for the last 2 days.    He was treated for bronchitis about 1 month ago and pink eye last week    Can leave a detailed message for Omid Chris at [phone] for urgent care visit on 2/25/2020         09-Apr-2021

## 2021-04-08 NOTE — PROGRESS NOTE ADULT - SUBJECTIVE AND OBJECTIVE BOX
PROGRESS NOTE:   Shyanne Dimitri  PGY2 Internal Medicine  Pager [phone]    Patient is a 26y old  Female who presents with a chief complaint of Nausea, Vomiting, EtOH withdrawal (2021 12:35)      SUBJECTIVE / OVERNIGHT EVENTS:    ADDITIONAL REVIEW OF SYSTEMS:    MEDICATIONS  (STANDING):  cefTRIAXone   IVPB 1000 milliGRAM(s) IV Intermittent every 24 hours  cyanocobalamin 1000 MICROGram(s) Oral daily  enoxaparin Injectable 40 milliGRAM(s) SubCutaneous daily  folic acid 1 milliGRAM(s) Oral daily  LORazepam   Injectable 1  IV Push   LORazepam   Injectable 0.5 milliGRAM(s) IV Push every 6 hours  melatonin 3 milliGRAM(s) Oral at bedtime  pantoprazole  Injectable 40 milliGRAM(s) IV Push two times a day  potassium chloride   Solution 40 milliEquivalent(s) Oral once  prenatal multivitamin 1 Tablet(s) Oral daily  sertraline 50 milliGRAM(s) Oral <User Schedule>  sucralfate 1 Gram(s) Oral four times a day  thiamine IVPB 500 milliGRAM(s) IV Intermittent daily    MEDICATIONS  (PRN):  acetaminophen   Tablet .. 650 milliGRAM(s) Oral once PRN Mild Pain (1 - 3), Moderate Pain (4 - 6), Severe Pain (7 - 10)  LORazepam     Tablet 2 milliGRAM(s) Oral every 2 hours PRN Symptom-triggered 2 point increase in CIWA-Ar  LORazepam   Injectable 2 milliGRAM(s) IV Push every 1 hour PRN Symptom-triggered: each CIWA -Ar score 8 or GREATER  ondansetron Injectable 4 milliGRAM(s) IV Push every 6 hours PRN Nausea and/or Vomiting  polyethylene glycol 3350 17 Gram(s) Oral daily PRN Constipation      CAPILLARY BLOOD GLUCOSE        I&O's Summary      PHYSICAL EXAM:  Vital Signs Last 24 Hrs  T(C): 36.7 (2021 05:55), Max: 36.7 (2021 20:00)  T(F): 98 (2021 05:55), Max: 98.1 (2021 20:00)  HR: 77 (2021 05:55) (77 - 93)  BP: 98/53 (2021 05:55) (85/42 - 105/69)  BP(mean): --  RR: 18 (2021 05:55) (18 - 18)  SpO2: 98% (2021 05:55) (95% - 100%)    GENERAL: No acute distress, well-developed  HEAD:  Atraumatic, Normocephalic  EYES: EOMI, PERRLA, conjunctiva and sclera clear  NECK: Supple, no lymphadenopathy, no JVD  CHEST/LUNG: CTAB; No wheezes, rales, or rhonchi  HEART: Regular rate and rhythm; No murmurs, rubs, or gallops  ABDOMEN: Soft, non-tender, non-distended; normal bowel sounds, no organomegaly  EXTREMITIES:  2+ peripheral pulses b/l, No clubbing, cyanosis, or edema  NEUROLOGY: A&O x 3, no focal deficits  SKIN: No rashes or lesions    LABS:                        8.9    5.03  )-----------( 142      ( 2021 05:12 )             26.8     04-08    137  |  105  |  5<L>  ----------------------------<  93  3.4<L>   |  23  |  0.54    Ca    7.7<L>      2021 05:12  Phos  2.9     04-08  Mg     1.8     04-08    TPro  4.9<L>  /  Alb  3.0<L>  /  TBili  0.2  /  DBili  x   /  AST  73<H>  /  ALT  187<H>  /  AlkPhos  31<L>  04-08    PT/INR - ( 2021 03:53 )   PT: 11.9 sec;   INR: 1.04 ratio         PTT - ( 2021 03:53 )  PTT:24.4 sec      Urinalysis Basic - ( 2021 23:08 )    Color: Yellow / Appearance: Slightly Turbid / S.010 / pH: x  Gluc: x / Ketone: Negative  / Bili: Negative / Urobili: 3 mg/dL   Blood: x / Protein: Negative / Nitrite: Negative   Leuk Esterase: Negative / RBC: 0-2 /HPF / WBC 0-2 /HPF   Sq Epi: x / Non Sq Epi: x / Bacteria: x          RADIOLOGY & ADDITIONAL TESTS:  Results Reviewed:   Imaging Personally Reviewed:  Electrocardiogram Personally Reviewed:    COORDINATION OF CARE:  Care Discussed with Consultants/Other Providers [Y/N]:  Prior or Outpatient Records Reviewed [Y/N]:   PROGRESS NOTE:   Shyanne Dimitri  PGY2 Internal Medicine  Pager [phone]    Patient is a 26y old  Female who presents with a chief complaint of Nausea, Vomiting, EtOH withdrawal (2021 12:35)      SUBJECTIVE / OVERNIGHT EVENTS: Overngiht bp soft with ativan administration but SBP >80 and pt asymptomatic. Pt states she is feeling better this AM. LCSW arranged for appointment tomorrow 21 via telehealth (patient to receive a phone call between 10am-1pm).    ADDITIONAL REVIEW OF SYSTEMS: No complaints    MEDICATIONS  (STANDING):  cefTRIAXone   IVPB 1000 milliGRAM(s) IV Intermittent every 24 hours  cyanocobalamin 1000 MICROGram(s) Oral daily  enoxaparin Injectable 40 milliGRAM(s) SubCutaneous daily  folic acid 1 milliGRAM(s) Oral daily  LORazepam   Injectable 1  IV Push   LORazepam   Injectable 0.5 milliGRAM(s) IV Push every 6 hours  melatonin 3 milliGRAM(s) Oral at bedtime  pantoprazole  Injectable 40 milliGRAM(s) IV Push two times a day  potassium chloride   Solution 40 milliEquivalent(s) Oral once  prenatal multivitamin 1 Tablet(s) Oral daily  sertraline 50 milliGRAM(s) Oral <User Schedule>  sucralfate 1 Gram(s) Oral four times a day  thiamine IVPB 500 milliGRAM(s) IV Intermittent daily    MEDICATIONS  (PRN):  acetaminophen   Tablet .. 650 milliGRAM(s) Oral once PRN Mild Pain (1 - 3), Moderate Pain (4 - 6), Severe Pain (7 - 10)  LORazepam     Tablet 2 milliGRAM(s) Oral every 2 hours PRN Symptom-triggered 2 point increase in CIWA-Ar  LORazepam   Injectable 2 milliGRAM(s) IV Push every 1 hour PRN Symptom-triggered: each CIWA -Ar score 8 or GREATER  ondansetron Injectable 4 milliGRAM(s) IV Push every 6 hours PRN Nausea and/or Vomiting  polyethylene glycol 3350 17 Gram(s) Oral daily PRN Constipation      CAPILLARY BLOOD GLUCOSE        I&O's Summary      PHYSICAL EXAM:  Vital Signs Last 24 Hrs  T(C): 36.7 (2021 05:55), Max: 36.7 (2021 20:00)  T(F): 98 (2021 05:55), Max: 98.1 (2021 20:00)  HR: 77 (2021 05:55) (77 - 93)  BP: 98/53 (2021 05:55) (85/42 - 105/69)  BP(mean): --  RR: 18 (2021 05:55) (18 - 18)  SpO2: 98% (2021 05:55) (95% - 100%)    GENERAL: No acute distress, well-developed  HEAD:  Atraumatic, Normocephalic  EYES: EOMI, PERRLA, conjunctiva and sclera clear  NECK: Supple, no lymphadenopathy, no JVD  CHEST/LUNG: CTAB; No wheezes, rales, or rhonchi  HEART: Regular rate and rhythm; No murmurs, rubs, or gallops  ABDOMEN: Soft, non-tender,gravid, normal bowel sounds, no organomegaly  EXTREMITIES:  2+ peripheral pulses b/l, No clubbing, cyanosis, or edema  NEUROLOGY: A&O x 3, no focal deficits  SKIN: No rashes or lesions    LABS:                        8.9    5.03  )-----------( 142      ( 2021 05:12 )             26.8     04-08    137  |  105  |  5<L>  ----------------------------<  93  3.4<L>   |  23  |  0.54    Ca    7.7<L>      2021 05:12  Phos  2.9     04-08  Mg     1.8     04-08    TPro  4.9<L>  /  Alb  3.0<L>  /  TBili  0.2  /  DBili  x   /  AST  73<H>  /  ALT  187<H>  /  AlkPhos  31<L>  04-08    PT/INR - ( 2021 03:53 )   PT: 11.9 sec;   INR: 1.04 ratio         PTT - ( 2021 03:53 )  PTT:24.4 sec      Urinalysis Basic - ( 2021 23:08 )    Color: Yellow / Appearance: Slightly Turbid / S.010 / pH: x  Gluc: x / Ketone: Negative  / Bili: Negative / Urobili: 3 mg/dL   Blood: x / Protein: Negative / Nitrite: Negative   Leuk Esterase: Negative / RBC: 0-2 /HPF / WBC 0-2 /HPF   Sq Epi: x / Non Sq Epi: x / Bacteria: x          RADIOLOGY & ADDITIONAL TESTS:  Results Reviewed:   Imaging Personally Reviewed:  Electrocardiogram Personally Reviewed:    COORDINATION OF CARE:  Care Discussed with Consultants/Other Providers [Y/N]:  Prior or Outpatient Records Reviewed [Y/N]:

## 2021-04-08 NOTE — PROGRESS NOTE ADULT - PROBLEM SELECTOR PLAN 5
#expected due date (dated per US) July 19th 2021, currently 25 weeks  - OB consulted, apprec recs  - prenatal vitamins
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
## 2021-04-08 NOTE — BH CONSULTATION LIAISON PROGRESS NOTE - NSBHCHARTREVIEWLAB_PSY_A_CORE FT
CBC Full  -  ( 08 Apr 2021 05:12 )  WBC Count : 5.03 K/uL  RBC Count : 3.32 M/uL  Hemoglobin : 8.9 g/dL  Hematocrit : 26.8 %  Platelet Count - Automated : 142 K/uL  Mean Cell Volume : 80.7 fL  Mean Cell Hemoglobin : 26.8 pg  Mean Cell Hemoglobin Concentration : 33.2 gm/dL  Auto Neutrophil # : 2.26 K/uL  Auto Lymphocyte # : 2.13 K/uL  Auto Monocyte # : 0.38 K/uL  Auto Eosinophil # : 0.05 K/uL  Auto Basophil # : 0.03 K/uL  Auto Neutrophil % : 44.9 %  Auto Lymphocyte % : 42.3 %  Auto Monocyte % : 7.6 %  Auto Eosinophil % : 1.0 %  Auto Basophil % : 0.6 %  04-08    137  |  105  |  5<L>  ----------------------------<  93  3.4<L>   |  23  |  0.54    Ca    7.7<L>      08 Apr 2021 05:12  Phos  2.9     04-08  Mg     1.8     04-08    TPro  4.9<L>  /  Alb  3.0<L>  /  TBili  0.2  /  DBili  x   /  AST  73<H>  /  ALT  187<H>  /  AlkPhos  31<L>  04-08
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
## 2021-04-08 NOTE — PROGRESS NOTE ADULT - PROBLEM SELECTOR PLAN 3
#likely in setting of alcohol use, lower suspicion at this point for HELLP, VSS so low suspicion for preeclampsia, RUQ US neg  -hepatology following   - f/u hepatitis panel and other antibody labs recommended by hepatology  - f/u hemolysis labs  - f/u urine pro/cr  - dose meds accordingly  - sucralfate for abdominal pain #2/2 due to alcohol use, low suspicion for HELLP, VSS so low suspicion for preeclampsia, RUQ US neg  -hepatology following   - f/u hepatitis panel and other antibody labs recommended by hepatology  - dose meds accordingly  - sucralfate for abdominal pain  - DOWNTRENDING

## 2021-04-08 NOTE — PROGRESS NOTE ADULT - PROBLEM SELECTOR PLAN 1
#R/o pyelo - pt has R flank pain with dirty U/A, small leuk est and no nitrites but w symptoms concern for pyelonephritis  - c/w 5 days Ceftriaxone (started late on 4/4), ucx contaminated  - trend fever, wbc #complicated UTI - pt has R flank pain with dirty U/A, small leuk est and no nitrites but w symptoms concern for pyelonephritis  - c/w 5 days Ceftriaxone (started late on 4/4), ucx contaminated  - trend fever, wbc

## 2021-04-08 NOTE — PROGRESS NOTE ADULT - PROBLEM SELECTOR PLAN 6
#multiple possible etiologies: pregnancy (has hx of n/v with prescriptions in past of antiemetics), withdrawal, and pyelonephritis  - c/w zofran prn  - PPI  - tolerating diet

## 2021-04-08 NOTE — DISCHARGE NOTE PROVIDER - NSDCFUADDAPPT_GEN_ALL_CORE_FT
One of your lab tests showed that you have low "IgA" levels. Please follow up with your primary care doctor for further investigation.  One of your lab tests showed that you have low "IgA" levels. Please follow up with your primary care doctor for further investigation.   Please follow up with your OBGYN appointment as scheduled.   Outpatient [facility]stance Abuse Clinic appointment at 3PM.  One of your lab tests showed that you have low "IgA" levels. Please follow up with your primary care doctor for further investigation.   Please follow up with your OBGYN appointment as scheduled.   Outpatient Substance Abuse Clinic appointment at 3PM.   [facility]  clinic [phone]. One of your lab tests showed that you have low "IgA" levels. Please follow up with your primary care doctor for further investigation.   Please follow up with your OBGYN appointment as scheduled.   Outpatient Substance Abuse Clinic at [facility] appointment at 3PM, Number is [phone]  [facility]  clinic [phone].

## 2021-04-08 NOTE — DISCHARGE NOTE PROVIDER - NSDCCPCAREPLAN_GEN_ALL_CORE_FT
PRINCIPAL DISCHARGE DIAGNOSIS  Diagnosis: Alcohol withdrawal  Assessment and Plan of Treatment: You were admitted because you drank a lot of alcohol and was in the process of withdrawing from alcohol. Alcohol withdrawal is dangerous and can cause seizures and even death if severe enought and not treated. We treated you with medications until you completed your withdrawal. We found that you had elevated liver enzymes and did a lot of blood tests to determine the reason why. The tests all indicated that the liver enzyme elevation, which indicate injury to the liver, was because you were drinking alcohol. Your liver enzyme levels are going down and will take time to get back to normal, but may worsen if you drink any more alcohol. Psychiatry also saw you during this admission to adjust your medications to sertraline 50mg daily, and you have an appointment at the outpatint rehab clinic at 3 PM.  Make sure to take your daily vitamins      SECONDARY DISCHARGE DIAGNOSES  Diagnosis: Complicated UTI (urinary tract infection)  Assessment and Plan of Treatment: We found that you had an infection in your bladder that was worsening and is possible that it went to your kidneys. We treated you with antibiotics and it got better. Continue to hydrate well with water, and go to the bathroom quickly when you feel the urge to pass urine. Holding urine in can lead to a repeat infection
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
## 2021-04-08 NOTE — BH CONSULTATION LIAISON PROGRESS NOTE - NSBHFUPINTERVALHXFT_PSY_A_CORE
Met with patient. Remains committed to working on sobriety. States that she and HAI BRUNO has been working on getting into an outpatient substance abuse clinic in [facility] (did not know the name). She states that she did an intake over the phone today, and is hopeful she can start the program soon.   She denies any mood symptoms, denies any si or hi, denies any a/vh or paranoia.

## 2021-04-08 NOTE — PROGRESS NOTE ADULT - SUBJECTIVE AND OBJECTIVE BOX
Patient is a 26y old  Female who presents with a chief complaint of Nausea, Vomiting, EtOH withdrawal (08 Apr 2021 08:48)       Admitted on: 04-04-21    We are following the patient for elevated liver enzymes in pregnancy      Interval History: Patient overall feels well and back to baseline. No complaints as of now.        PAST MEDICAL & SURGICAL HISTORY:  Depression    Anxiety    No pertinent past medical history    No significant past surgical history    No significant past surgical history        MEDICATIONS  (STANDING):  cefTRIAXone   IVPB 1000 milliGRAM(s) IV Intermittent every 24 hours  cyanocobalamin 1000 MICROGram(s) Oral daily  enoxaparin Injectable 40 milliGRAM(s) SubCutaneous daily  folic acid 1 milliGRAM(s) Oral daily  LORazepam   Injectable 1  IV Push   LORazepam   Injectable 0.5 milliGRAM(s) IV Push every 6 hours  melatonin 3 milliGRAM(s) Oral at bedtime  pantoprazole  Injectable 40 milliGRAM(s) IV Push two times a day  potassium chloride    Tablet ER 20 milliEquivalent(s) Oral once  prenatal multivitamin 1 Tablet(s) Oral daily  sertraline 50 milliGRAM(s) Oral <User Schedule>  sucralfate 1 Gram(s) Oral four times a day  thiamine IVPB 500 milliGRAM(s) IV Intermittent daily    MEDICATIONS  (PRN):  acetaminophen   Tablet .. 650 milliGRAM(s) Oral once PRN Mild Pain (1 - 3), Moderate Pain (4 - 6), Severe Pain (7 - 10)  LORazepam     Tablet 2 milliGRAM(s) Oral every 2 hours PRN Symptom-triggered 2 point increase in CIWA-Ar  LORazepam   Injectable 2 milliGRAM(s) IV Push every 1 hour PRN Symptom-triggered: each CIWA -Ar score 8 or GREATER  ondansetron Injectable 4 milliGRAM(s) IV Push every 6 hours PRN Nausea and/or Vomiting  polyethylene glycol 3350 17 Gram(s) Oral daily PRN Constipation      Allergies  No Known Allergies      Review of Systems:   Cardiovascular:  No Chest Pain, No Palpitations  Respiratory:  No Cough, No Dyspnea  Gastrointestinal:  As described in HPI    Physical Examination:  T(C): 36.8 (04-08-21 @ 08:00), Max: 36.8 (04-08-21 @ 08:00)  HR: 89 (04-08-21 @ 08:00) (77 - 93)  BP: 96/56 (04-08-21 @ 08:00) (85/42 - 105/69)  RR: 18 (04-08-21 @ 08:00) (18 - 18)  SpO2: 100% (04-08-21 @ 08:00) (98% - 100%)      Constitutional: No acute distress.  Respiratory:  No signs of respiratory distress. Lung sounds are clear bilaterally.  Cardiovascular:  S1 S2, Regular rate and rhythm.  Abdominal: Abdomen is soft, symmetric, and non-tender with some distention (pregnant)  Skin: No rashes, No Jaundice.        Data: (reviewed by attending)                        8.9    5.03  )-----------( 142      ( 08 Apr 2021 05:12 )             26.8     Hgb trend:  8.9  04-08-21 @ 05:12  10.7  04-07-21 @ 07:47  9.5  04-06-21 @ 06:45      04-08    137  |  105  |  5<L>  ----------------------------<  93  3.4<L>   |  23  |  0.54    Ca    7.7<L>      08 Apr 2021 05:12  Phos  2.9     04-08  Mg     1.8     04-08    TPro  4.9<L>  /  Alb  3.0<L>  /  TBili  0.2  /  DBili  x   /  AST  73<H>  /  ALT  187<H>  /  AlkPhos  31<L>  04-08    Liver panel trend:  TBili 0.2   /   AST 73   /      /   AlkP 31   /   Tptn 4.9   /   Alb 3.0    /   DBili --      04-08  TBili 0.4   /      /      /   AlkP 39   /   Tptn 6.1   /   Alb 3.6    /   DBili --      04-07  TBili 0.9   /   AST --   /   ALT --   /   AlkP --   /   Tptn --   /   Alb --    /   DBili 0.4      04-06  TBili 0.8   /      /      /   AlkP 36   /   Tptn 5.7   /   Alb 3.2    /   DBili --      04-06  TBili 1.7   /      /      /   AlkP 46   /   Tptn 7.3   /   Alb 4.2    /   DBili --      04-05  TBili 1.8   /      /      /   AlkP 55   /   Tptn 8.8   /   Alb 5.0    /   DBili --      04-04  TBili 1.1   /   AST 52   /   ALT 77   /   AlkP 43   /   Tptn 7.1   /   Alb 4.1    /   DBili --      03-30      PT/INR - ( 07 Apr 2021 03:53 )   PT: 11.9 sec;   INR: 1.04 ratio         PTT - ( 07 Apr 2021 03:53 )  PTT:24.4 sec       Radiology: (reviewed by attending)    US Abdomen Doppler:   EXAM:  US DPLX ABDOMEN        PROCEDURE DATE:  Apr 6 2021         INTERPRETATION:  CLINICAL INFORMATION: 25 week pregnant with elevated LFTs. Evaluate portal venous system.    COMPARISON: None available.    TECHNIQUE: Color and spectral Doppler ultrasound were utilized for evaluation of the portal and hepatic vasculature.    FINDINGS:    Liver: Within normal limits. Normal echogenicity and echotexture, without evidence of focal liver lesion.    Doppler:  No evidence of portal venous thrombosis. The main, right, and left portal veins are patent, with normal direction of flow and normal waveforms. The hepatic veins are patent. The hepatic artery is patent, demonstrating a normal waveform.    Bile ducts: No biliary ductal dilatation.  Gallbladder: Survey views. Within normal limits.  Pancreas: Not visualized  Right kidney: Survey views.. No hydronephrosis.  Ascites: None.      IMPRESSION:  Liver sonographically within normal limits.  No evidence of portal venous thrombosis.                ROBERT MCCULLOUGH MD; Attending Radiologist  This document has been electronically signed. Apr 6 2021 11:56AM (04-06-21 @ 10:41)

## 2021-04-09 ENCOUNTER — TRANSCRIPTION ENCOUNTER (OUTPATIENT)
Age: 26
End: 2021-04-09

## 2021-04-09 VITALS
DIASTOLIC BLOOD PRESSURE: 60 MMHG | RESPIRATION RATE: 18 BRPM | SYSTOLIC BLOOD PRESSURE: 104 MMHG | OXYGEN SATURATION: 95 % | HEART RATE: 86 BPM | TEMPERATURE: 98 F

## 2021-04-09 LAB
ALBUMIN SERPL ELPH-MCNC: 2.9 G/DL — LOW (ref 3.3–5)
ALP SERPL-CCNC: 34 U/L — LOW (ref 40–120)
ALT FLD-CCNC: 143 U/L — HIGH (ref 4–33)
ANION GAP SERPL CALC-SCNC: 9 MMOL/L — SIGNIFICANT CHANGE UP (ref 7–14)
AST SERPL-CCNC: 45 U/L — HIGH (ref 4–32)
BILIRUB SERPL-MCNC: 0.2 MG/DL — SIGNIFICANT CHANGE UP (ref 0.2–1.2)
BUN SERPL-MCNC: 8 MG/DL — SIGNIFICANT CHANGE UP (ref 7–23)
CALCIUM SERPL-MCNC: 8.2 MG/DL — LOW (ref 8.4–10.5)
CHLORIDE SERPL-SCNC: 104 MMOL/L — SIGNIFICANT CHANGE UP (ref 98–107)
CO2 SERPL-SCNC: 22 MMOL/L — SIGNIFICANT CHANGE UP (ref 22–31)
CREAT SERPL-MCNC: 0.62 MG/DL — SIGNIFICANT CHANGE UP (ref 0.5–1.3)
GLUCOSE SERPL-MCNC: 90 MG/DL — SIGNIFICANT CHANGE UP (ref 70–99)
HCT VFR BLD CALC: 28.1 % — LOW (ref 34.5–45)
HEV AB FLD QL: NEGATIVE — SIGNIFICANT CHANGE UP
HGB BLD-MCNC: 9.2 G/DL — LOW (ref 11.5–15.5)
MAGNESIUM SERPL-MCNC: 1.6 MG/DL — SIGNIFICANT CHANGE UP (ref 1.6–2.6)
MCHC RBC-ENTMCNC: 26.8 PG — LOW (ref 27–34)
MCHC RBC-ENTMCNC: 32.7 GM/DL — SIGNIFICANT CHANGE UP (ref 32–36)
MCV RBC AUTO: 81.9 FL — SIGNIFICANT CHANGE UP (ref 80–100)
NRBC # BLD: 0 /100 WBCS — SIGNIFICANT CHANGE UP
NRBC # FLD: 0 K/UL — SIGNIFICANT CHANGE UP
PHOSPHATE SERPL-MCNC: 2.9 MG/DL — SIGNIFICANT CHANGE UP (ref 2.5–4.5)
PLATELET # BLD AUTO: 174 K/UL — SIGNIFICANT CHANGE UP (ref 150–400)
POTASSIUM SERPL-MCNC: 3.6 MMOL/L — SIGNIFICANT CHANGE UP (ref 3.5–5.3)
POTASSIUM SERPL-SCNC: 3.6 MMOL/L — SIGNIFICANT CHANGE UP (ref 3.5–5.3)
PROT SERPL-MCNC: 5.3 G/DL — LOW (ref 6–8.3)
RBC # BLD: 3.43 M/UL — LOW (ref 3.8–5.2)
RBC # FLD: 15 % — HIGH (ref 10.3–14.5)
SODIUM SERPL-SCNC: 135 MMOL/L — SIGNIFICANT CHANGE UP (ref 135–145)
WBC # BLD: 5.98 K/UL — SIGNIFICANT CHANGE UP (ref 3.8–10.5)
WBC # FLD AUTO: 5.98 K/UL — SIGNIFICANT CHANGE UP (ref 3.8–10.5)

## 2021-04-09 PROCEDURE — 76825 ECHO EXAM OF FETAL HEART: CPT | Mod: 26

## 2021-04-09 PROCEDURE — 99212 OFFICE O/P EST SF 10 MIN: CPT

## 2021-04-09 PROCEDURE — 76827 ECHO EXAM OF FETAL HEART: CPT | Mod: 26

## 2021-04-09 PROCEDURE — 93325 DOPPLER ECHO COLOR FLOW MAPG: CPT | Mod: 26

## 2021-04-09 PROCEDURE — 99239 HOSP IP/OBS DSCHRG MGMT >30: CPT

## 2021-04-09 RX ORDER — SERTRALINE 25 MG/1
1 TABLET, FILM COATED ORAL
Qty: 60 | Refills: 0
Start: 2021-04-09

## 2021-04-09 RX ORDER — ONDANSETRON 8 MG/1
4 TABLET, FILM COATED ORAL EVERY 8 HOURS
Refills: 0 | Status: DISCONTINUED | OUTPATIENT
Start: 2021-04-09 | End: 2021-04-09

## 2021-04-09 RX ORDER — LANOLIN ALCOHOL/MO/W.PET/CERES
1 CREAM (GRAM) TOPICAL
Qty: 60 | Refills: 0
Start: 2021-04-09

## 2021-04-09 RX ADMIN — Medication 1 GRAM(S): at 06:16

## 2021-04-09 RX ADMIN — Medication 1 GRAM(S): at 17:34

## 2021-04-09 RX ADMIN — PANTOPRAZOLE SODIUM 40 MILLIGRAM(S): 20 TABLET, DELAYED RELEASE ORAL at 06:16

## 2021-04-09 RX ADMIN — SERTRALINE 50 MILLIGRAM(S): 25 TABLET, FILM COATED ORAL at 06:16

## 2021-04-09 RX ADMIN — Medication 1 GRAM(S): at 00:09

## 2021-04-09 RX ADMIN — PANTOPRAZOLE SODIUM 40 MILLIGRAM(S): 20 TABLET, DELAYED RELEASE ORAL at 17:33

## 2021-04-09 RX ADMIN — Medication 1 TABLET(S): at 11:11

## 2021-04-09 RX ADMIN — ENOXAPARIN SODIUM 40 MILLIGRAM(S): 100 INJECTION SUBCUTANEOUS at 11:11

## 2021-04-09 RX ADMIN — PREGABALIN 1000 MICROGRAM(S): 225 CAPSULE ORAL at 11:12

## 2021-04-09 RX ADMIN — Medication 1 GRAM(S): at 11:15

## 2021-04-09 RX ADMIN — Medication 0.5 MILLIGRAM(S): at 06:16

## 2021-04-09 RX ADMIN — Medication 0.5 MILLIGRAM(S): at 17:33

## 2021-04-09 RX ADMIN — Medication 1 MILLIGRAM(S): at 11:11

## 2021-04-09 NOTE — PROVIDER CONTACT NOTE (OTHER) - BACKGROUND
NST reviewed at 1650 by MD Stone
Patient is 26 year old female that is pregnant and admitted for alcohol withdrawal. PMH of anxiety and depression
Pt admitted with alcohol withdrawal and pregnancy.
26 year old female with admitting diagnosis of alcohol dependence with withdrawal and PMH of anxiety and depression
26 year old female with admitting diagnosis of alcohol dependence with withdrawal and PMH of anxiety and depression
Patient is 26 year old female that is pregnant and admitted for alcohol withdrawal. PMH of anxiety and depression
Patient is 26 year old female that is pregnant and admitted for alcohol withdrawal. PMH of anxiety and depression
pt , pt admitted for ETOH withdraw.
Patient is 26 year old female that is pregnant and admitted for alcohol withdrawal. PMH of anxiety and depression
Patient is 26 year old female that is pregnant and admitted for alcohol withdrawal. PMH of anxiety and depression
Pt admitted with alcohol withdrawal and pregnancy
Pt admitted with alcohol withdrawal and pregnancy.
Patient is 26 year old female that is pregnant and admitted for alcohol withdrawal. PMH of anxiety and depression
Pt admitted with alcohol withdrawal and pregnancy.
Pt admitted with alcohol withdrawal, 25 weeks pregnant
Patient is 26 year old female that is pregnant and admitted for alcohol withdrawal. PMH of anxiety and depression
Pt admitted with alcohol withdrawal and pregnancy.
Pt admitted with alcohol withdrawal and pregnancy.

## 2021-04-09 NOTE — PROGRESS NOTE ADULT - REASON FOR ADMISSION
Nausea, Vomiting, EtOH withdrawal

## 2021-04-09 NOTE — PROVIDER CONTACT NOTE (OTHER) - SITUATION
Patient hypotensive with blood pressure 105/40. Supposed to get dose of Ativan taper
Pt hypotensive
Patient refusing IV Thiamine due to pain from IV when flushing. Patient currently refusing placement of new IV despite education.
Patient's Blood pressure 95/51 (diastolic <60)
Patient hypotensive with blood pressure 85/42. Supposed to get dose of Ativan taper
Patient's Blood pressure 95/57 (diastolic <60)
Patient's Blood pressure 98/53 (diastolic <60)
pt seen for NST. FHR baseline 145, moderate variability with no accelerations or decelerations,
Patient's Blood pressure 100/50 (diastolic <60)
Patient's Blood pressure 98/50 (diastolic <60)
Patient hypotensive with blood pressure 88/53.
Pt hypotensive
patient received a 1 time dose of zofran around 1200. primary RN went on break and covering RN was unaware and gave the patient a 2nd dose of zofran that was ordered PRN
25 week pregnant patient admitted for alcohol withdrawal with review of daily NST
Correct CIWA Protocol not ordered
Patient hypotensive with blood pressure 89/53.
Patient's Blood pressure 96/52 (diastolic <60)
Patient's Blood pressure 97/48 (diastolic <60)

## 2021-04-09 NOTE — PROGRESS NOTE ADULT - PROBLEM SELECTOR PLAN 4
#pt has hx of DT's, unclear when last drink was.   - c/w ativan taper  - monitor CWAS  - c/w thiamine, folate, b12, vitamin supplementation  - SBRT consulted  - psych consult  - social work consulted for inpatient rehab, pt wants to follow up as outpatient, LCSW arranged for appointment tomorrow 4/8/21 via telehealth (patient to receive a phone call between 10am-1pm). #pt has hx of DT's, unclear when last drink was.   - c/w ativan taper  - monitor CWAS  - c/w thiamine, folate, b12, vitamin supplementation  - SBRT consulted  - psych consult  - social work consulted for inpatient rehab, pt wants to follow up as outpatient, LCSW arranged outpatient follow up [facility]

## 2021-04-09 NOTE — PROVIDER CONTACT NOTE (OTHER) - REASON
Patient hypotensive with blood pressure 88/53
Patient's Blood pressure 96/52 (diastolic <60)
Patient hypotensive with blood pressure 105/40
Patient's Blood pressure 97/48 (diastolic <60)
pt seen for NST
Patient's Blood pressure 100/50 (diastolic <60)
Patient hypotensive with blood pressure 89/53
Patient's Blood pressure 98/50 (diastolic <60)
Pt hypotensive
Overdosage of zofran
Correct CIWA Protocol not ordered
Patient refusing IV Thiamine
Pt hypotensive
review of NST
Patient hypotensive with blood pressure 85/42
Patient's Blood pressure 95/51 (diastolic <60)
Patient's Blood pressure 95/57 (diastolic <60)
Patient's Blood pressure 98/53 (diastolic <60)

## 2021-04-09 NOTE — PROGRESS NOTE ADULT - SUBJECTIVE AND OBJECTIVE BOX
PROGRESS NOTE:   Shyanne Marieelson  PGY2 Internal Medicine  Pager [phone]    Patient is a 26y old  Female who presents with a chief complaint of Nausea, Vomiting, EtOH withdrawal (08 Apr 2021 09:23)      SUBJECTIVE / OVERNIGHT EVENTS:    ADDITIONAL REVIEW OF SYSTEMS:    MEDICATIONS  (STANDING):  cyanocobalamin 1000 MICROGram(s) Oral daily  enoxaparin Injectable 40 milliGRAM(s) SubCutaneous daily  folic acid 1 milliGRAM(s) Oral daily  LORazepam   Injectable 0.5 milliGRAM(s) IV Push every 12 hours  LORazepam   Injectable 1  IV Push   melatonin 3 milliGRAM(s) Oral at bedtime  pantoprazole  Injectable 40 milliGRAM(s) IV Push two times a day  prenatal multivitamin 1 Tablet(s) Oral daily  sertraline 50 milliGRAM(s) Oral <User Schedule>  sucralfate 1 Gram(s) Oral four times a day    MEDICATIONS  (PRN):  acetaminophen   Tablet .. 650 milliGRAM(s) Oral once PRN Mild Pain (1 - 3), Moderate Pain (4 - 6), Severe Pain (7 - 10)  LORazepam     Tablet 2 milliGRAM(s) Oral every 2 hours PRN Symptom-triggered 2 point increase in CIWA-Ar  LORazepam   Injectable 2 milliGRAM(s) IV Push every 1 hour PRN Symptom-triggered: each CIWA -Ar score 8 or GREATER  ondansetron    Tablet 4 milliGRAM(s) Oral every 8 hours PRN Nausea and/or Vomiting  polyethylene glycol 3350 17 Gram(s) Oral daily PRN Constipation      CAPILLARY BLOOD GLUCOSE        I&O's Summary      PHYSICAL EXAM:  Vital Signs Last 24 Hrs  T(C): 36.7 (09 Apr 2021 06:15), Max: 36.9 (08 Apr 2021 20:00)  T(F): 98 (09 Apr 2021 06:15), Max: 98.4 (08 Apr 2021 20:00)  HR: 75 (09 Apr 2021 06:15) (75 - 89)  BP: 102/58 (09 Apr 2021 06:15) (93/55 - 104/68)  BP(mean): --  RR: 18 (09 Apr 2021 06:15) (18 - 18)  SpO2: 100% (09 Apr 2021 06:15) (100% - 100%)    GENERAL: No acute distress, well-developed  HEAD:  Atraumatic, Normocephalic  EYES: EOMI, PERRLA, conjunctiva and sclera clear  NECK: Supple, no lymphadenopathy, no JVD  CHEST/LUNG: CTAB; No wheezes, rales, or rhonchi  HEART: Regular rate and rhythm; No murmurs, rubs, or gallops  ABDOMEN: Soft, non-tender, non-distended; normal bowel sounds, no organomegaly  EXTREMITIES:  2+ peripheral pulses b/l, No clubbing, cyanosis, or edema  NEUROLOGY: A&O x 3, no focal deficits  SKIN: No rashes or lesions    LABS:                        8.9    5.03  )-----------( 142      ( 08 Apr 2021 05:12 )             26.8     04-08    137  |  105  |  5<L>  ----------------------------<  93  3.4<L>   |  23  |  0.54    Ca    7.7<L>      08 Apr 2021 05:12  Phos  2.9     04-08  Mg     1.8     04-08    TPro  4.9<L>  /  Alb  3.0<L>  /  TBili  0.2  /  DBili  x   /  AST  73<H>  /  ALT  187<H>  /  AlkPhos  31<L>  04-08                RADIOLOGY & ADDITIONAL TESTS:  Results Reviewed:   Imaging Personally Reviewed:  Electrocardiogram Personally Reviewed:    COORDINATION OF CARE:  Care Discussed with Consultants/Other Providers [Y/N]:  Prior or Outpatient Records Reviewed [Y/N]:   PROGRESS NOTE:   Shyanne Marieelson  PGY2 Internal Medicine  Pager [phone]    Patient is a 26y old  Female who presents with a chief complaint of Nausea, Vomiting, EtOH withdrawal (08 Apr 2021 09:23)      SUBJECTIVE / OVERNIGHT EVENTS: No overnight events. Pt feeling well this morning. Completed her antibiotic course, pending last dose of ativan taper prior ot d/c.     ADDITIONAL REVIEW OF SYSTEMS: no complaints    MEDICATIONS  (STANDING):  cyanocobalamin 1000 MICROGram(s) Oral daily  enoxaparin Injectable 40 milliGRAM(s) SubCutaneous daily  folic acid 1 milliGRAM(s) Oral daily  LORazepam   Injectable 0.5 milliGRAM(s) IV Push every 12 hours  LORazepam   Injectable 1  IV Push   melatonin 3 milliGRAM(s) Oral at bedtime  pantoprazole  Injectable 40 milliGRAM(s) IV Push two times a day  prenatal multivitamin 1 Tablet(s) Oral daily  sertraline 50 milliGRAM(s) Oral <User Schedule>  sucralfate 1 Gram(s) Oral four times a day    MEDICATIONS  (PRN):  acetaminophen   Tablet .. 650 milliGRAM(s) Oral once PRN Mild Pain (1 - 3), Moderate Pain (4 - 6), Severe Pain (7 - 10)  LORazepam     Tablet 2 milliGRAM(s) Oral every 2 hours PRN Symptom-triggered 2 point increase in CIWA-Ar  LORazepam   Injectable 2 milliGRAM(s) IV Push every 1 hour PRN Symptom-triggered: each CIWA -Ar score 8 or GREATER  ondansetron    Tablet 4 milliGRAM(s) Oral every 8 hours PRN Nausea and/or Vomiting  polyethylene glycol 3350 17 Gram(s) Oral daily PRN Constipation      CAPILLARY BLOOD GLUCOSE        I&O's Summary      PHYSICAL EXAM:  Vital Signs Last 24 Hrs  T(C): 36.7 (09 Apr 2021 06:15), Max: 36.9 (08 Apr 2021 20:00)  T(F): 98 (09 Apr 2021 06:15), Max: 98.4 (08 Apr 2021 20:00)  HR: 75 (09 Apr 2021 06:15) (75 - 89)  BP: 102/58 (09 Apr 2021 06:15) (93/55 - 104/68)  BP(mean): --  RR: 18 (09 Apr 2021 06:15) (18 - 18)  SpO2: 100% (09 Apr 2021 06:15) (100% - 100%)    GENERAL: No acute distress, well-developed  HEAD:  Atraumatic, Normocephalic  EYES: EOMI, PERRLA, conjunctiva and sclera clear  NECK: Supple, no lymphadenopathy, no JVD  CHEST/LUNG: CTAB; No wheezes, rales, or rhonchi  HEART: Regular rate and rhythm; No murmurs, rubs, or gallops  ABDOMEN: Soft, non-tender, gravid; normal bowel sounds, no organomegaly  EXTREMITIES:  2+ peripheral pulses b/l, No clubbing, cyanosis, or edema  NEUROLOGY: A&O x 3, no focal deficits  SKIN: No rashes or lesions    LABS:                        8.9    5.03  )-----------( 142      ( 08 Apr 2021 05:12 )             26.8     04-08    137  |  105  |  5<L>  ----------------------------<  93  3.4<L>   |  23  |  0.54    Ca    7.7<L>      08 Apr 2021 05:12  Phos  2.9     04-08  Mg     1.8     04-08    TPro  4.9<L>  /  Alb  3.0<L>  /  TBili  0.2  /  DBili  x   /  AST  73<H>  /  ALT  187<H>  /  AlkPhos  31<L>  04-08                RADIOLOGY & ADDITIONAL TESTS:  Results Reviewed:   Imaging Personally Reviewed:  Electrocardiogram Personally Reviewed:    COORDINATION OF CARE:  Care Discussed with Consultants/Other Providers [Y/N]:  Prior or Outpatient Records Reviewed [Y/N]:

## 2021-04-09 NOTE — PROGRESS NOTE ADULT - SUBJECTIVE AND OBJECTIVE BOX
R3 Antepartum Progress Note    Patient seen and examined at bedside, no acute overnight events. No acute complaints, eating food without nausea and vomiting. She is feeling well.     Vital Signs Last 24 Hours  T(C): 36.7 (04-09-21 @ 11:08), Max: 36.9 (04-08-21 @ 20:00)  HR: 87 (04-09-21 @ 11:08) (75 - 88)  BP: 95/54 (04-09-21 @ 11:08) (93/55 - 104/68)  RR: 17 (04-09-21 @ 11:08) (17 - 18)  SpO2: 100% (04-09-21 @ 11:08) (100% - 100%)    I&O's Detail    Physical Exam:  General: NAD  Abdomen: Soft, gravid, NT  Ext: No pain or swelling    Labs:             9.2<L>  5.98  )-----------( 174      ( 04-09 @ 06:47 )             28.1<L>               8.9<L>  5.03  )-----------( 142<L>    ( 04-08 @ 05:12 )             26.8<L>               10.7<L>  4.95  )-----------( 172      ( 04-07 @ 07:47 )             32.9<L>               9.5<L>  4.29  )-----------( 172      ( 04-06 @ 06:45 )             28.6<L>               12.1   5.46  )-----------( 243      ( 04-04 @ 17:24 )             36.9

## 2021-04-09 NOTE — PROGRESS NOTE ADULT - PROBLEM SELECTOR PLAN 1
#complicated UTI - pt has R flank pain with dirty U/A, small leuk est and no nitrites but w symptoms concern for pyelonephritis  - c/w 5 days Ceftriaxone (started late on 4/4), ucx contaminated  - trend fever, wbc #complicated UTI - pt has R flank pain with dirty U/A, small leuk est and no nitrites but w symptoms concern for pyelonephritis  - s/p 5 days Ceftriaxone (started late on 4/4), ucx contaminated  - trend fever, wbc

## 2021-04-09 NOTE — PROGRESS NOTE ADULT - NSICDXPILOT_GEN_ALL_CORE
[place]

## 2021-04-09 NOTE — PROGRESS NOTE ADULT - ASSESSMENT
25 yo F w/ PMHx currently 25w4d pregnant with hx of ETOH abuse (recent admission for intoxication), benzo w/d seizures, depression p/w nausea/vomiting/abd pain x 4 days.    # nausea/vomiting - concerning for EtOH vs benzo withdrawal; reports last drink 3/29/21 or 4/2 to different providers  -GI recs appreciated  -Zofran, Reglan, pantoprazole are acceptable anti-emetics  -can give Ativan/benzodiazepine as needed, currently on ativan taper  -patient planning to go outpatient rehab in [place]. She is motivated to quit. She would like to go to .    #transaminitis  -elevated AST/ALT compared to baseline, downtrending now 45/145 likely related to alcohol use  -Hepatology following: will rule out acute viral hepatitis (unlikely) and biliary pathology, abdominal ultrasound unremarkable and abdominal doppler did not show evidence of portal vein thrombosis    #FWB  ATU(4/5):complete breech, anterior, MVP 6.5, BPP 8/8  -MFM to follow    Discharge planning by primary team. Patient has appt in clinic on Monday    BRIDGETT Bain PGY-3  [phone]   25 yo F w/ PMHx currently 25w4d pregnant with hx of ETOH abuse (recent admission for intoxication), benzo w/d seizures, depression p/w nausea/vomiting/abd pain x 4 days.    # nausea/vomiting - concerning for EtOH vs benzo withdrawal; reports last drink 3/29/21 or 4/2 to different providers  -GI recs appreciated  -Zofran, Reglan, pantoprazole are acceptable anti-emetics  -can give Ativan/benzodiazepine as needed, currently on ativan taper  -patient planning to go outpatient rehab in [place]. She is motivated to quit. She would like to go to .    #transaminitis  -elevated AST/ALT compared to baseline, downtrending now 45/145 likely related to alcohol use  -Hepatology following: will rule out acute viral hepatitis (unlikely) and biliary pathology, abdominal ultrasound unremarkable and abdominal doppler did not show evidence of portal vein thrombosis    #FWB  ATU(4/5):complete breech, anterior, MVP 6.5, BPP 8/8  -MFM to follow    Discharge planning by primary team. Patient has appt in clinic on Monday    BRIDGETT Bain PGY-3  [phone]    ***MFM ADDENDUM***  Patient seen at bedside w/ MFM attending Dr. Mendez. Agree with above.  Patient reports feeling well, able to tolerate PO w/o nausea or vomiting, no obstetric complaints, reports normal fetal movements.  We had a long discussion and provided extensive counseling regarding the importance of rehabilitation and alcohol cessation, as well as the utility of maintenance anxiolytics. [facility] as she has moved to [place], she spoke with  and has outpatient rehabilitation set up for Monday at 3pm. She reports her ativan taper ends today and that the plan is for discharge today as well. [facility] appointment scheduled for f/u on Monday morning before rehab appointment. Precautions reviewed. The patient was able to have a fetal echocardiogram today secondary to prior paxil use in the pregnancy.
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
## 2021-04-09 NOTE — PROGRESS NOTE ADULT - PROBLEM SELECTOR PLAN 3
#2/2 due to alcohol use, low suspicion for HELLP, VSS so low suspicion for preeclampsia, RUQ US neg  -hepatology following   - f/u hepatitis panel and other antibody labs recommended by hepatology  - dose meds accordingly  - sucralfate for abdominal pain  - DOWNTRENDING #2/2 due to alcohol use, low suspicion for HELLP, VSS so low suspicion for preeclampsia, RUQ US neg  -hepatology following   - f/u labs recommended by hepatology  - dose meds accordingly  - sucralfate for abdominal pain  - DOWNTRENDING

## 2021-04-09 NOTE — DISCHARGE NOTE NURSING/CASE MANAGEMENT/SOCIAL WORK - NSDCFUADDAPPT_GEN_ALL_CORE_FT
One of your lab tests showed that you have low "IgA" levels. Please follow up with your primary care doctor for further investigation.   Please follow up with your OBGYN appointment as scheduled.   Outpatient Substance Abuse Clinic at [facility] appointment at 3PM, Number is [phone]  [facility]  clinic [phone].

## 2021-04-09 NOTE — DISCHARGE NOTE NURSING/CASE MANAGEMENT/SOCIAL WORK - PATIENT PORTAL LINK FT
You can access the FollowMyHealth Patient Portal offered by [facility] by registering at the following website: [email or web address]. By joining [facility] [facility]’s FollowMyHealth portal, you will also be able to view your health information using other applications (apps) compatible with our system.
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
## 2021-04-12 ENCOUNTER — NON-APPOINTMENT (OUTPATIENT)
Age: 26
End: 2021-04-12

## 2021-04-12 ENCOUNTER — TRANSCRIPTION ENCOUNTER (OUTPATIENT)
Age: 26
End: 2021-04-12

## 2021-04-12 ENCOUNTER — APPOINTMENT (OUTPATIENT)
Dept: OBGYN | Facility: HOSPITAL | Age: 26
End: 2021-04-12
Payer: MEDICAID

## 2021-04-12 ENCOUNTER — RESULT REVIEW (OUTPATIENT)
Age: 26
End: 2021-04-12

## 2021-04-12 ENCOUNTER — OUTPATIENT (OUTPATIENT)
Dept: OUTPATIENT SERVICES | Facility: HOSPITAL | Age: 26
LOS: 1 days | End: 2021-04-12

## 2021-04-12 VITALS
BODY MASS INDEX: 29.7 KG/M2 | TEMPERATURE: 97.2 F | WEIGHT: 196 LBS | DIASTOLIC BLOOD PRESSURE: 65 MMHG | SYSTOLIC BLOOD PRESSURE: 104 MMHG | HEART RATE: 96 BPM | HEIGHT: 68 IN

## 2021-04-12 DIAGNOSIS — N39.0 URINARY TRACT INFECTION, SITE NOT SPECIFIED: ICD-10-CM

## 2021-04-12 DIAGNOSIS — D64.9 ANEMIA, UNSPECIFIED: ICD-10-CM

## 2021-04-12 DIAGNOSIS — R74.01 ELEVATION OF LEVELS OF LIVER TRANSAMINASE LEVELS: ICD-10-CM

## 2021-04-12 DIAGNOSIS — F41.9 ANXIETY DISORDER, UNSPECIFIED: ICD-10-CM

## 2021-04-12 DIAGNOSIS — A49.9 URINARY TRACT INFECTION, SITE NOT SPECIFIED: ICD-10-CM

## 2021-04-12 LAB
ALBUMIN SERPL ELPH-MCNC: 4.1 G/DL — SIGNIFICANT CHANGE UP (ref 3.3–5)
ALP SERPL-CCNC: 34 U/L — LOW (ref 40–120)
ALT FLD-CCNC: 71 U/L — HIGH (ref 4–33)
ANION GAP SERPL CALC-SCNC: 11 MMOL/L — SIGNIFICANT CHANGE UP (ref 7–14)
AST SERPL-CCNC: 16 U/L — SIGNIFICANT CHANGE UP (ref 4–32)
BILIRUB SERPL-MCNC: 0.3 MG/DL — SIGNIFICANT CHANGE UP (ref 0.2–1.2)
BUN SERPL-MCNC: 9 MG/DL — SIGNIFICANT CHANGE UP (ref 7–23)
CALCIUM SERPL-MCNC: 8.8 MG/DL — SIGNIFICANT CHANGE UP (ref 8.4–10.5)
CHLORIDE SERPL-SCNC: 99 MMOL/L — SIGNIFICANT CHANGE UP (ref 98–107)
CO2 SERPL-SCNC: 23 MMOL/L — SIGNIFICANT CHANGE UP (ref 22–31)
CREAT SERPL-MCNC: 0.55 MG/DL — SIGNIFICANT CHANGE UP (ref 0.5–1.3)
GLUCOSE SERPL-MCNC: 88 MG/DL — SIGNIFICANT CHANGE UP (ref 70–99)
POTASSIUM SERPL-MCNC: 3.6 MMOL/L — SIGNIFICANT CHANGE UP (ref 3.5–5.3)
POTASSIUM SERPL-SCNC: 3.6 MMOL/L — SIGNIFICANT CHANGE UP (ref 3.5–5.3)
PROT SERPL-MCNC: 6.6 G/DL — SIGNIFICANT CHANGE UP (ref 6–8.3)
SODIUM SERPL-SCNC: 133 MMOL/L — LOW (ref 135–145)

## 2021-04-12 PROCEDURE — 36415 COLL VENOUS BLD VENIPUNCTURE: CPT | Mod: NC

## 2021-04-12 PROCEDURE — 99214 OFFICE O/P EST MOD 30 MIN: CPT | Mod: 25

## 2021-04-13 ENCOUNTER — APPOINTMENT (OUTPATIENT)
Dept: OBGYN | Facility: HOSPITAL | Age: 26
End: 2021-04-13

## 2021-04-13 DIAGNOSIS — N39.0 URINARY TRACT INFECTION, SITE NOT SPECIFIED: ICD-10-CM

## 2021-04-13 DIAGNOSIS — F41.9 ANXIETY DISORDER, UNSPECIFIED: ICD-10-CM

## 2021-04-13 DIAGNOSIS — F10.20 ALCOHOL DEPENDENCE, UNCOMPLICATED: ICD-10-CM

## 2021-04-13 DIAGNOSIS — R74.01 ELEVATION OF LEVELS OF LIVER TRANSAMINASE LEVELS: ICD-10-CM

## 2021-04-13 DIAGNOSIS — F43.10 POST-TRAUMATIC STRESS DISORDER, UNSPECIFIED: ICD-10-CM

## 2021-04-13 DIAGNOSIS — O09.90 SUPERVISION OF HIGH RISK PREGNANCY, UNSPECIFIED, UNSPECIFIED TRIMESTER: ICD-10-CM

## 2021-04-13 DIAGNOSIS — D64.9 ANEMIA, UNSPECIFIED: ICD-10-CM

## 2021-04-13 LAB
CULTURE RESULTS: SIGNIFICANT CHANGE UP
HEV IGM SER QL: SIGNIFICANT CHANGE UP
SPECIMEN SOURCE: SIGNIFICANT CHANGE UP

## 2021-04-21 ENCOUNTER — NON-APPOINTMENT (OUTPATIENT)
Age: 26
End: 2021-04-21

## 2021-04-26 ENCOUNTER — NON-APPOINTMENT (OUTPATIENT)
Age: 26
End: 2021-04-26

## 2021-04-26 ENCOUNTER — APPOINTMENT (OUTPATIENT)
Dept: ANTEPARTUM | Facility: CLINIC | Age: 26
End: 2021-04-26
Payer: MEDICAID

## 2021-04-26 ENCOUNTER — RESULT REVIEW (OUTPATIENT)
Age: 26
End: 2021-04-26

## 2021-04-26 ENCOUNTER — ASOB RESULT (OUTPATIENT)
Age: 26
End: 2021-04-26

## 2021-04-26 ENCOUNTER — OUTPATIENT (OUTPATIENT)
Dept: OUTPATIENT SERVICES | Facility: HOSPITAL | Age: 26
LOS: 1 days | End: 2021-04-26

## 2021-04-26 ENCOUNTER — APPOINTMENT (OUTPATIENT)
Dept: OBGYN | Facility: HOSPITAL | Age: 26
End: 2021-04-26
Payer: MEDICAID

## 2021-04-26 VITALS
BODY MASS INDEX: 30.01 KG/M2 | DIASTOLIC BLOOD PRESSURE: 72 MMHG | HEIGHT: 68 IN | SYSTOLIC BLOOD PRESSURE: 113 MMHG | TEMPERATURE: 97.4 F | WEIGHT: 198 LBS | HEART RATE: 101 BPM

## 2021-04-26 LAB
24R-OH-CALCIDIOL SERPL-MCNC: 12.3 NG/ML — LOW (ref 30–80)
BASOPHILS # BLD AUTO: 0.03 K/UL — SIGNIFICANT CHANGE UP (ref 0–0.2)
BASOPHILS NFR BLD AUTO: 0.6 % — SIGNIFICANT CHANGE UP (ref 0–2)
EOSINOPHIL # BLD AUTO: 0.13 K/UL — SIGNIFICANT CHANGE UP (ref 0–0.5)
EOSINOPHIL NFR BLD AUTO: 2.4 % — SIGNIFICANT CHANGE UP (ref 0–6)
GLUCOSE 1H P MEAL SERPL-MCNC: 114 MG/DL — SIGNIFICANT CHANGE UP (ref 70–134)
HCT VFR BLD CALC: 29.6 % — LOW (ref 34.5–45)
HGB BLD-MCNC: 9.7 G/DL — LOW (ref 11.5–15.5)
IANC: 3.39 K/UL — SIGNIFICANT CHANGE UP (ref 1.5–8.5)
IMM GRANULOCYTES NFR BLD AUTO: 1.3 % — SIGNIFICANT CHANGE UP (ref 0–1.5)
LYMPHOCYTES # BLD AUTO: 1.56 K/UL — SIGNIFICANT CHANGE UP (ref 1–3.3)
LYMPHOCYTES # BLD AUTO: 28.6 % — SIGNIFICANT CHANGE UP (ref 13–44)
MCHC RBC-ENTMCNC: 26.1 PG — LOW (ref 27–34)
MCHC RBC-ENTMCNC: 32.8 GM/DL — SIGNIFICANT CHANGE UP (ref 32–36)
MCV RBC AUTO: 79.6 FL — LOW (ref 80–100)
MONOCYTES # BLD AUTO: 0.27 K/UL — SIGNIFICANT CHANGE UP (ref 0–0.9)
MONOCYTES NFR BLD AUTO: 5 % — SIGNIFICANT CHANGE UP (ref 2–14)
NEUTROPHILS # BLD AUTO: 3.39 K/UL — SIGNIFICANT CHANGE UP (ref 1.8–7.4)
NEUTROPHILS NFR BLD AUTO: 62.1 % — SIGNIFICANT CHANGE UP (ref 43–77)
NRBC # BLD: 0 /100 WBCS — SIGNIFICANT CHANGE UP
NRBC # FLD: 0 K/UL — SIGNIFICANT CHANGE UP
PLATELET # BLD AUTO: 312 K/UL — SIGNIFICANT CHANGE UP (ref 150–400)
RBC # BLD: 3.72 M/UL — LOW (ref 3.8–5.2)
RBC # FLD: 13.9 % — SIGNIFICANT CHANGE UP (ref 10.3–14.5)
WBC # BLD: 5.45 K/UL — SIGNIFICANT CHANGE UP (ref 3.8–10.5)
WBC # FLD AUTO: 5.45 K/UL — SIGNIFICANT CHANGE UP (ref 3.8–10.5)

## 2021-04-26 PROCEDURE — 76819 FETAL BIOPHYS PROFIL W/O NST: CPT | Mod: 26

## 2021-04-26 PROCEDURE — 76816 OB US FOLLOW-UP PER FETUS: CPT | Mod: 26

## 2021-04-26 PROCEDURE — 99213 OFFICE O/P EST LOW 20 MIN: CPT | Mod: 25,GC

## 2021-04-26 PROCEDURE — 90471 IMMUNIZATION ADMIN: CPT | Mod: NC,GC

## 2021-04-27 LAB — T PALLIDUM AB TITR SER: NEGATIVE — SIGNIFICANT CHANGE UP

## 2021-04-28 DIAGNOSIS — Z00.00 ENCOUNTER FOR GENERAL ADULT MEDICAL EXAMINATION WITHOUT ABNORMAL FINDINGS: ICD-10-CM

## 2021-04-28 DIAGNOSIS — Z23 ENCOUNTER FOR IMMUNIZATION: ICD-10-CM

## 2021-05-03 ENCOUNTER — NON-APPOINTMENT (OUTPATIENT)
Age: 26
End: 2021-05-03

## 2021-05-10 ENCOUNTER — NON-APPOINTMENT (OUTPATIENT)
Age: 26
End: 2021-05-10

## 2021-05-11 ENCOUNTER — OUTPATIENT (OUTPATIENT)
Dept: OUTPATIENT SERVICES | Facility: HOSPITAL | Age: 26
LOS: 1 days | Discharge: HOME | End: 2021-05-11
Payer: MEDICAID

## 2021-05-13 ENCOUNTER — OUTPATIENT (OUTPATIENT)
Dept: OUTPATIENT SERVICES | Facility: HOSPITAL | Age: 26
LOS: 1 days | End: 2021-05-13
Payer: MEDICAID

## 2021-05-13 PROCEDURE — 90791 PSYCH DIAGNOSTIC EVALUATION: CPT | Mod: 95

## 2021-05-17 ENCOUNTER — APPOINTMENT (OUTPATIENT)
Dept: OBGYN | Facility: HOSPITAL | Age: 26
End: 2021-05-17
Payer: MEDICAID

## 2021-05-17 ENCOUNTER — RESULT REVIEW (OUTPATIENT)
Age: 26
End: 2021-05-17

## 2021-05-17 ENCOUNTER — NON-APPOINTMENT (OUTPATIENT)
Age: 26
End: 2021-05-17

## 2021-05-17 VITALS
TEMPERATURE: 97.3 F | HEIGHT: 68 IN | SYSTOLIC BLOOD PRESSURE: 111 MMHG | WEIGHT: 206 LBS | HEART RATE: 96 BPM | DIASTOLIC BLOOD PRESSURE: 71 MMHG | BODY MASS INDEX: 31.22 KG/M2

## 2021-05-17 LAB
APPEARANCE UR: CLEAR — SIGNIFICANT CHANGE UP
BILIRUB UR-MCNC: NEGATIVE — SIGNIFICANT CHANGE UP
COLOR SPEC: YELLOW — SIGNIFICANT CHANGE UP
DIFF PNL FLD: NEGATIVE — SIGNIFICANT CHANGE UP
GLUCOSE UR QL: NEGATIVE — SIGNIFICANT CHANGE UP
KETONES UR-MCNC: NEGATIVE — SIGNIFICANT CHANGE UP
LEUKOCYTE ESTERASE UR-ACNC: NEGATIVE — SIGNIFICANT CHANGE UP
NITRITE UR-MCNC: NEGATIVE — SIGNIFICANT CHANGE UP
PH UR: 7 — SIGNIFICANT CHANGE UP (ref 5–8)
PROT UR-MCNC: NEGATIVE — SIGNIFICANT CHANGE UP
SP GR SPEC: 1.02 — SIGNIFICANT CHANGE UP (ref 1.01–1.02)
UROBILINOGEN FLD QL: SIGNIFICANT CHANGE UP

## 2021-05-17 PROCEDURE — 36415 COLL VENOUS BLD VENIPUNCTURE: CPT | Mod: 26,NC,GC

## 2021-05-17 PROCEDURE — 99213 OFFICE O/P EST LOW 20 MIN: CPT | Mod: 25,GC

## 2021-05-18 DIAGNOSIS — F31.4 BIPOLAR DISORDER, CURRENT EPISODE DEPRESSED, SEVERE, WITHOUT PSYCHOTIC FEATURES: ICD-10-CM

## 2021-05-18 LAB
CULTURE RESULTS: SIGNIFICANT CHANGE UP
GAMMA INTERFERON BACKGROUND BLD IA-ACNC: 0.02 IU/ML — SIGNIFICANT CHANGE UP
M TB IFN-G BLD-IMP: NEGATIVE — SIGNIFICANT CHANGE UP
M TB IFN-G CD4+ BCKGRND COR BLD-ACNC: 0 IU/ML — SIGNIFICANT CHANGE UP
M TB IFN-G CD4+CD8+ BCKGRND COR BLD-ACNC: 0.01 IU/ML — SIGNIFICANT CHANGE UP
QUANT TB PLUS MITOGEN MINUS NIL: 9.88 IU/ML — SIGNIFICANT CHANGE UP
SPECIMEN SOURCE: SIGNIFICANT CHANGE UP

## 2021-05-19 ENCOUNTER — APPOINTMENT (OUTPATIENT)
Dept: OTHER | Facility: CLINIC | Age: 26
End: 2021-05-19
Payer: MEDICAID

## 2021-05-19 PROCEDURE — 99203 OFFICE O/P NEW LOW 30 MIN: CPT | Mod: 95

## 2021-05-21 DIAGNOSIS — O99.313 ALCOHOL USE COMPLICATING PREGNANCY, THIRD TRIMESTER: ICD-10-CM

## 2021-05-21 DIAGNOSIS — F41.9 ANXIETY DISORDER, UNSPECIFIED: ICD-10-CM

## 2021-05-21 DIAGNOSIS — F33.1 MAJOR DEPRESSIVE DISORDER, RECURRENT, MODERATE: ICD-10-CM

## 2021-05-24 ENCOUNTER — APPOINTMENT (OUTPATIENT)
Dept: ANTEPARTUM | Facility: CLINIC | Age: 26
End: 2021-05-24
Payer: MEDICAID

## 2021-05-24 ENCOUNTER — ASOB RESULT (OUTPATIENT)
Age: 26
End: 2021-05-24

## 2021-05-24 PROCEDURE — 76816 OB US FOLLOW-UP PER FETUS: CPT | Mod: 26

## 2021-05-24 PROCEDURE — 76819 FETAL BIOPHYS PROFIL W/O NST: CPT | Mod: 26

## 2021-05-26 ENCOUNTER — NON-APPOINTMENT (OUTPATIENT)
Age: 26
End: 2021-05-26

## 2021-06-01 ENCOUNTER — APPOINTMENT (OUTPATIENT)
Dept: OBGYN | Facility: HOSPITAL | Age: 26
End: 2021-06-01

## 2021-06-07 ENCOUNTER — NON-APPOINTMENT (OUTPATIENT)
Age: 26
End: 2021-06-07

## 2021-06-07 ENCOUNTER — APPOINTMENT (OUTPATIENT)
Dept: OBGYN | Facility: HOSPITAL | Age: 26
End: 2021-06-07
Payer: MEDICAID

## 2021-06-07 ENCOUNTER — OUTPATIENT (OUTPATIENT)
Dept: OUTPATIENT SERVICES | Facility: HOSPITAL | Age: 26
LOS: 1 days | End: 2021-06-07

## 2021-06-07 VITALS
TEMPERATURE: 97.3 F | HEIGHT: 68 IN | WEIGHT: 207 LBS | HEART RATE: 86 BPM | DIASTOLIC BLOOD PRESSURE: 70 MMHG | BODY MASS INDEX: 31.37 KG/M2 | SYSTOLIC BLOOD PRESSURE: 110 MMHG

## 2021-06-07 PROCEDURE — 99212 OFFICE O/P EST SF 10 MIN: CPT | Mod: 25,GC

## 2021-06-08 ENCOUNTER — NON-APPOINTMENT (OUTPATIENT)
Age: 26
End: 2021-06-08

## 2021-06-08 DIAGNOSIS — O99.313 ALCOHOL USE COMPLICATING PREGNANCY, THIRD TRIMESTER: ICD-10-CM

## 2021-06-08 DIAGNOSIS — O09.93 SUPERVISION OF HIGH RISK PREGNANCY, UNSPECIFIED, THIRD TRIMESTER: ICD-10-CM

## 2021-06-14 ENCOUNTER — NON-APPOINTMENT (OUTPATIENT)
Age: 26
End: 2021-06-14

## 2021-06-14 ENCOUNTER — RESULT REVIEW (OUTPATIENT)
Age: 26
End: 2021-06-14

## 2021-06-14 ENCOUNTER — OUTPATIENT (OUTPATIENT)
Dept: OUTPATIENT SERVICES | Facility: HOSPITAL | Age: 26
LOS: 1 days | End: 2021-06-14

## 2021-06-14 ENCOUNTER — APPOINTMENT (OUTPATIENT)
Dept: OBGYN | Facility: HOSPITAL | Age: 26
End: 2021-06-14
Payer: MEDICAID

## 2021-06-14 VITALS
SYSTOLIC BLOOD PRESSURE: 118 MMHG | WEIGHT: 204.44 LBS | HEART RATE: 93 BPM | TEMPERATURE: 98.3 F | BODY MASS INDEX: 30.98 KG/M2 | DIASTOLIC BLOOD PRESSURE: 71 MMHG | HEIGHT: 68 IN

## 2021-06-14 LAB
ALBUMIN SERPL ELPH-MCNC: 3.8 G/DL — SIGNIFICANT CHANGE UP (ref 3.3–5)
ALP SERPL-CCNC: 65 U/L — SIGNIFICANT CHANGE UP (ref 40–120)
ALT FLD-CCNC: 87 U/L — HIGH (ref 4–33)
ANION GAP SERPL CALC-SCNC: 14 MMOL/L — SIGNIFICANT CHANGE UP (ref 7–14)
AST SERPL-CCNC: 63 U/L — HIGH (ref 4–32)
BASOPHILS # BLD AUTO: 0.02 K/UL — SIGNIFICANT CHANGE UP (ref 0–0.2)
BASOPHILS NFR BLD AUTO: 0.5 % — SIGNIFICANT CHANGE UP (ref 0–2)
BILIRUB SERPL-MCNC: 0.5 MG/DL — SIGNIFICANT CHANGE UP (ref 0.2–1.2)
BUN SERPL-MCNC: 6 MG/DL — LOW (ref 7–23)
CALCIUM SERPL-MCNC: 8.6 MG/DL — SIGNIFICANT CHANGE UP (ref 8.4–10.5)
CHLORIDE SERPL-SCNC: 101 MMOL/L — SIGNIFICANT CHANGE UP (ref 98–107)
CO2 SERPL-SCNC: 19 MMOL/L — LOW (ref 22–31)
CREAT SERPL-MCNC: 0.67 MG/DL — SIGNIFICANT CHANGE UP (ref 0.5–1.3)
EOSINOPHIL # BLD AUTO: 0.02 K/UL — SIGNIFICANT CHANGE UP (ref 0–0.5)
EOSINOPHIL NFR BLD AUTO: 0.5 % — SIGNIFICANT CHANGE UP (ref 0–6)
GLUCOSE SERPL-MCNC: 89 MG/DL — SIGNIFICANT CHANGE UP (ref 70–99)
HCT VFR BLD CALC: 31.7 % — LOW (ref 34.5–45)
HGB BLD-MCNC: 10.5 G/DL — LOW (ref 11.5–15.5)
HIV 1+2 AB+HIV1 P24 AG SERPL QL IA: SIGNIFICANT CHANGE UP
IANC: 2.4 K/UL — SIGNIFICANT CHANGE UP (ref 1.5–8.5)
IMM GRANULOCYTES NFR BLD AUTO: 0.5 % — SIGNIFICANT CHANGE UP (ref 0–1.5)
LYMPHOCYTES # BLD AUTO: 1.47 K/UL — SIGNIFICANT CHANGE UP (ref 1–3.3)
LYMPHOCYTES # BLD AUTO: 34.8 % — SIGNIFICANT CHANGE UP (ref 13–44)
MCHC RBC-ENTMCNC: 25.5 PG — LOW (ref 27–34)
MCHC RBC-ENTMCNC: 33.1 GM/DL — SIGNIFICANT CHANGE UP (ref 32–36)
MCV RBC AUTO: 76.9 FL — LOW (ref 80–100)
MONOCYTES # BLD AUTO: 0.3 K/UL — SIGNIFICANT CHANGE UP (ref 0–0.9)
MONOCYTES NFR BLD AUTO: 7.1 % — SIGNIFICANT CHANGE UP (ref 2–14)
NEUTROPHILS # BLD AUTO: 2.4 K/UL — SIGNIFICANT CHANGE UP (ref 1.8–7.4)
NEUTROPHILS NFR BLD AUTO: 56.6 % — SIGNIFICANT CHANGE UP (ref 43–77)
NRBC # BLD: 0 /100 WBCS — SIGNIFICANT CHANGE UP
NRBC # FLD: 0 K/UL — SIGNIFICANT CHANGE UP
PLATELET # BLD AUTO: 270 K/UL — SIGNIFICANT CHANGE UP (ref 150–400)
POTASSIUM SERPL-MCNC: 3.3 MMOL/L — LOW (ref 3.5–5.3)
POTASSIUM SERPL-SCNC: 3.3 MMOL/L — LOW (ref 3.5–5.3)
PROT SERPL-MCNC: 6.8 G/DL — SIGNIFICANT CHANGE UP (ref 6–8.3)
RBC # BLD: 4.12 M/UL — SIGNIFICANT CHANGE UP (ref 3.8–5.2)
RBC # FLD: 14.1 % — SIGNIFICANT CHANGE UP (ref 10.3–14.5)
SODIUM SERPL-SCNC: 134 MMOL/L — LOW (ref 135–145)
WBC # BLD: 4.23 K/UL — SIGNIFICANT CHANGE UP (ref 3.8–10.5)
WBC # FLD AUTO: 4.23 K/UL — SIGNIFICANT CHANGE UP (ref 3.8–10.5)

## 2021-06-14 PROCEDURE — 36415 COLL VENOUS BLD VENIPUNCTURE: CPT | Mod: NC

## 2021-06-14 PROCEDURE — 99213 OFFICE O/P EST LOW 20 MIN: CPT | Mod: 25

## 2021-06-15 LAB
COVID-19 SPIKE DOMAIN AB INTERP: POSITIVE
COVID-19 SPIKE DOMAIN ANTIBODY RESULT: 168 U/ML — HIGH
CULTURE RESULTS: SIGNIFICANT CHANGE UP
SARS-COV-2 IGG+IGM SERPL QL IA: 168 U/ML — HIGH
SARS-COV-2 IGG+IGM SERPL QL IA: POSITIVE
SPECIMEN SOURCE: SIGNIFICANT CHANGE UP

## 2021-06-21 ENCOUNTER — NON-APPOINTMENT (OUTPATIENT)
Age: 26
End: 2021-06-21

## 2021-06-21 ENCOUNTER — OUTPATIENT (OUTPATIENT)
Dept: OUTPATIENT SERVICES | Facility: HOSPITAL | Age: 26
LOS: 1 days | End: 2021-06-21

## 2021-06-21 ENCOUNTER — APPOINTMENT (OUTPATIENT)
Dept: ANTEPARTUM | Facility: CLINIC | Age: 26
End: 2021-06-21
Payer: MEDICAID

## 2021-06-21 ENCOUNTER — RESULT REVIEW (OUTPATIENT)
Age: 26
End: 2021-06-21

## 2021-06-21 ENCOUNTER — ASOB RESULT (OUTPATIENT)
Age: 26
End: 2021-06-21

## 2021-06-21 ENCOUNTER — APPOINTMENT (OUTPATIENT)
Dept: OBGYN | Facility: HOSPITAL | Age: 26
End: 2021-06-21
Payer: MEDICAID

## 2021-06-21 VITALS
WEIGHT: 208 LBS | DIASTOLIC BLOOD PRESSURE: 72 MMHG | BODY MASS INDEX: 31.52 KG/M2 | HEART RATE: 77 BPM | HEIGHT: 68 IN | TEMPERATURE: 97.2 F | SYSTOLIC BLOOD PRESSURE: 113 MMHG

## 2021-06-21 PROCEDURE — 76816 OB US FOLLOW-UP PER FETUS: CPT | Mod: 26

## 2021-06-21 PROCEDURE — 76819 FETAL BIOPHYS PROFIL W/O NST: CPT | Mod: 26

## 2021-06-21 PROCEDURE — 99212 OFFICE O/P EST SF 10 MIN: CPT | Mod: GE,25

## 2021-06-22 ENCOUNTER — TRANSCRIPTION ENCOUNTER (OUTPATIENT)
Age: 26
End: 2021-06-22

## 2021-06-22 ENCOUNTER — NON-APPOINTMENT (OUTPATIENT)
Age: 26
End: 2021-06-22

## 2021-06-22 ENCOUNTER — INPATIENT (INPATIENT)
Facility: HOSPITAL | Age: 26
LOS: 0 days | Discharge: HOME | End: 2021-06-22
Attending: SPECIALIST | Admitting: SPECIALIST

## 2021-06-22 VITALS
HEART RATE: 77 BPM | TEMPERATURE: 98 F | WEIGHT: 207.9 LBS | DIASTOLIC BLOOD PRESSURE: 59 MMHG | SYSTOLIC BLOOD PRESSURE: 107 MMHG | RESPIRATION RATE: 18 BRPM | HEIGHT: 68 IN

## 2021-06-22 VITALS
SYSTOLIC BLOOD PRESSURE: 118 MMHG | DIASTOLIC BLOOD PRESSURE: 75 MMHG | TEMPERATURE: 99 F | RESPIRATION RATE: 16 BRPM | HEART RATE: 80 BPM

## 2021-06-22 DIAGNOSIS — Z3A.00 WEEKS OF GESTATION OF PREGNANCY NOT SPECIFIED: ICD-10-CM

## 2021-06-22 DIAGNOSIS — O09.93 SUPERVISION OF HIGH RISK PREGNANCY, UNSPECIFIED, THIRD TRIMESTER: ICD-10-CM

## 2021-06-22 DIAGNOSIS — O26.899 OTHER SPECIFIED PREGNANCY RELATED CONDITIONS, UNSPECIFIED TRIMESTER: ICD-10-CM

## 2021-06-22 DIAGNOSIS — Z98.891 HISTORY OF UTERINE SCAR FROM PREVIOUS SURGERY: Chronic | ICD-10-CM

## 2021-06-22 LAB
APPEARANCE UR: CLEAR — SIGNIFICANT CHANGE UP
BILIRUB UR-MCNC: NEGATIVE — SIGNIFICANT CHANGE UP
COLOR SPEC: SIGNIFICANT CHANGE UP
DIFF PNL FLD: NEGATIVE — SIGNIFICANT CHANGE UP
GLUCOSE UR QL: NEGATIVE — SIGNIFICANT CHANGE UP
KETONES UR-MCNC: NEGATIVE — SIGNIFICANT CHANGE UP
LEUKOCYTE ESTERASE UR-ACNC: NEGATIVE — SIGNIFICANT CHANGE UP
NITRITE UR-MCNC: NEGATIVE — SIGNIFICANT CHANGE UP
PH UR: 6.5 — SIGNIFICANT CHANGE UP (ref 5–8)
PROT UR-MCNC: NEGATIVE — SIGNIFICANT CHANGE UP
SARS-COV-2 RNA SPEC QL NAA+PROBE: SIGNIFICANT CHANGE UP
SP GR SPEC: 1.01 — LOW (ref 1.01–1.02)
UROBILINOGEN FLD QL: SIGNIFICANT CHANGE UP

## 2021-06-22 RX ORDER — CITRIC ACID/SODIUM CITRATE 300-500 MG
30 SOLUTION, ORAL ORAL ONCE
Refills: 0 | Status: DISCONTINUED | OUTPATIENT
Start: 2021-06-22 | End: 2021-06-22

## 2021-06-22 RX ORDER — SODIUM CHLORIDE 9 MG/ML
1000 INJECTION, SOLUTION INTRAVENOUS ONCE
Refills: 0 | Status: COMPLETED | OUTPATIENT
Start: 2021-06-22 | End: 2021-06-22

## 2021-06-22 RX ORDER — SERTRALINE 25 MG/1
50 TABLET, FILM COATED ORAL DAILY
Refills: 0 | Status: DISCONTINUED | OUTPATIENT
Start: 2021-06-22 | End: 2021-06-22

## 2021-06-22 RX ORDER — SODIUM CHLORIDE 9 MG/ML
50 INJECTION, SOLUTION INTRAVENOUS ONCE
Refills: 0 | Status: DISCONTINUED | OUTPATIENT
Start: 2021-06-22 | End: 2021-06-22

## 2021-06-22 RX ORDER — SODIUM CHLORIDE 9 MG/ML
1000 INJECTION, SOLUTION INTRAVENOUS
Refills: 0 | Status: DISCONTINUED | OUTPATIENT
Start: 2021-06-22 | End: 2021-06-22

## 2021-06-22 RX ORDER — SODIUM CHLORIDE 9 MG/ML
500 INJECTION, SOLUTION INTRAVENOUS ONCE
Refills: 0 | Status: COMPLETED | OUTPATIENT
Start: 2021-06-22 | End: 2021-06-22

## 2021-06-22 RX ORDER — FAMOTIDINE 10 MG/ML
20 INJECTION INTRAVENOUS ONCE
Refills: 0 | Status: DISCONTINUED | OUTPATIENT
Start: 2021-06-22 | End: 2021-06-22

## 2021-06-22 RX ORDER — OXYTOCIN 10 UNIT/ML
333.33 VIAL (ML) INJECTION
Qty: 20 | Refills: 0 | Status: DISCONTINUED | OUTPATIENT
Start: 2021-06-22 | End: 2021-06-22

## 2021-06-22 RX ORDER — ACETAMINOPHEN 500 MG
1000 TABLET ORAL ONCE
Refills: 0 | Status: COMPLETED | OUTPATIENT
Start: 2021-06-22 | End: 2021-06-22

## 2021-06-22 RX ORDER — SODIUM CHLORIDE 9 MG/ML
1000 INJECTION, SOLUTION INTRAVENOUS ONCE
Refills: 0 | Status: DISCONTINUED | OUTPATIENT
Start: 2021-06-22 | End: 2021-06-22

## 2021-06-22 RX ADMIN — SODIUM CHLORIDE 1000 MILLILITER(S): 9 INJECTION, SOLUTION INTRAVENOUS at 13:20

## 2021-06-22 RX ADMIN — Medication 1000 MILLIGRAM(S): at 18:12

## 2021-06-22 RX ADMIN — Medication 400 MILLIGRAM(S): at 14:40

## 2021-06-22 RX ADMIN — SODIUM CHLORIDE 1500 MILLILITER(S): 9 INJECTION, SOLUTION INTRAVENOUS at 14:40

## 2021-06-22 NOTE — DISCHARGE NOTE ANTEPARTUM - PATIENT PORTAL LINK FT
You can access the FollowMyHealth Patient Portal offered by [facility] by registering at the following website: [email or web address]. By joining [employer]’s FollowMyHealth portal, you will also be able to view your health information using other applications (apps) compatible with our system.

## 2021-06-22 NOTE — OB PROVIDER H&P - NS_OBGYNHISTORY_OBGYN_ALL_OB_FT
9/7/2013    40 week  pLTCS 2/2 cat II FHR 8 lbs  7/10/2014  38 week  rLTCS 8lbs  9/22/2019  38 week  rLTCS TIUP, 7lbs twin A, 7lbs twin B

## 2021-06-22 NOTE — OB PROVIDER TRIAGE NOTE - NSMATERNALFETALCONCERNS_OBGYN_ALL_OB_FT
MATERNAL FETAL ALERT  C/S X3   history of anxiety and depression  treated with Paxil , suicidal ideation and alcohol use in pregnancy.  Admitted x2 in this pregnancy ,  (3/30/2021 and 4/4/2021)   alert SW , NICU

## 2021-06-22 NOTE — DISCHARGE NOTE ANTEPARTUM - CARE PLAN
Principal Discharge DX:	H/O:   Goal:	no  labor  Assessment and plan of treatment:	Patient evaluated for  labor with no cervical change noted and no sign of infection. Patient not feeling contractions painfully.

## 2021-06-22 NOTE — OB PROVIDER H&P - ATTENDING COMMENTS
Patient eliud at 36+ weeks with c/s x3. Admit to observe patient since had 3 prior c/s and still uncomfortable after hydration

## 2021-06-22 NOTE — DISCHARGE NOTE ANTEPARTUM - ADDITIONAL INSTRUCTIONS
Please return to the hospital if you develop worsening abdominal pain, nausea and vomiting, fevers or decreased fetal movements. Please keep all your appointments.

## 2021-06-22 NOTE — OB PROVIDER TRIAGE NOTE - NS_CHIEFCOMPLAINTOTHER_OBGYN_ALL_OB_FT
c/o uterine contractions every 10 minutes since this AM c/o uterine contractions every 10 minutes since this AM Quick Look @ 1035 FH- 136 bpm

## 2021-06-22 NOTE — DISCHARGE NOTE ANTEPARTUM - CARE PROVIDER_API CALL
[facility],   Basement floor  Oncology Building  [facility]  Phone: [phone]  Fax: (   )    -  Follow Up Time:

## 2021-06-22 NOTE — OB PROVIDER TRIAGE NOTE - NSOBPROVIDERNOTE_OBGYN_ALL_OB_FT
a/p: 26 y.o.  KD 2021 @ 36.1 weeks r/o PTL    UA pending    NICHOLAS See a/p: 26 y.o.  KD 2021 @ 36.1 weeks r/o PTL    UA pending    2147  Discussed with Dr Solorzano, service attending. Plan for IV bolus.     Mayi NP a/p: 26 y.o.  KD 2021 @ 36.1 weeks r/o PTL    UA pending    1245  Discussed with Dr Solorzano, service attending. Plan for IV bolus.     1417  UA negative  repeat VE no change /3/I    Discussed with Dr Motta PGY-4, awaiting plan of care.    Mayi, NP a/p: 26 y.o.  KD 2021 @ 36.1 weeks r/o PTL    UA pending    1245  Discussed with Dr Solorzano, service attending. Plan for IV bolus.     1417  UA negative  repeat VE no change /3/I    Discussed with Dr Motta PGY-4, awaiting plan of care.    1422  Plan for 500 mL IVB and IV tylenol for pain management    Mayi, NP a/p: 26 y.o.  KD 2021 @ 36.1 weeks r/o PTL    UA pending    1245  Discussed with Dr Solorzano, service attending. Plan for IV bolus.     1417  UA negative  repeat VE no change 1/30/-3/I    Discussed with Dr Motta PGY-4, awaiting plan of care.    1422  Plan for 500 mL IVB and IV tylenol for pain management  1537  repeat VE no change 3/I  pt states pain now 10 from 8/10  nst cat I  toco: ctx q 2 mins        Mayi, NP a/p: 26 y.o.  KD 2021 @ 36.1 weeks r/o PTL    UA pending    1245  Discussed with Dr Solorzano, service attending. Plan for IV bolus.     1417  UA negative  repeat VE no change 1/30/-3/I    Discussed with Dr Motta PGY-4, awaiting plan of care.    1422  Plan for 500 mL IVB and IV tylenol for pain management  1537  repeat VE no change 1/30/-3/I  pt states pain now 4/10 from 8/10  nst cat I  toco: ctx q 2 mins  Discussed with Dr Motta, PGY4. Awaiting plan of care.      Mayi, NP a/p: 26 y.o.  KD 2021 @ 36.1 weeks r/o PTL    UA pending    1245  Discussed with Dr Solorzano, service attending. Plan for IV bolus.     1417  UA negative  repeat VE no change 1/30/-3/I    Discussed with Dr Motta PGY-4, awaiting plan of care.    1422  Plan for 500 mL IVB and IV tylenol for pain management  1537  repeat VE no change 1/30/-3/I  pt states pain now 4/10 from 8/10  nst cat I  toco: ctx q 2 mins  Discussed with Dr Motta, PGY4. Awaiting plan of care.    1700  Discussed with Dr Solorzano. Plan for admission and observation.   routine pre-op orders  covid 19 swab collected and pending  continuous monitoring    NICHOLAS See

## 2021-06-22 NOTE — OB RN TRIAGE NOTE - PMH
<<----- Click to add NO pertinent Past Medical History No pertinent past medical history   Anxiety    Depression    No pertinent past medical history

## 2021-06-22 NOTE — DISCHARGE NOTE ANTEPARTUM - PLAN OF CARE
no  labor Patient evaluated for  labor with no cervical change noted and no sign of infection. Patient not feeling contractions painfully.

## 2021-06-22 NOTE — DISCHARGE NOTE ANTEPARTUM - HOSPITAL COURSE
Patient is a 25yo  at 36w1d admitted with painful contractions with a history of 3 prior sections. Patient was examined over the course of the day multiple times and noted to be the same exam 1/30/-3. Patient has no fundal tenderness and UA wnl. Pain improved with Tylenol. Patient still has irregular contractions on the monitor but is not feeling them as painfully. She is discharged home with close follow up . Fetal status is reassuring with a Cat 1 tracing.

## 2021-06-22 NOTE — OB PROVIDER H&P - ASSESSMENT
a/p: 26 y.o.  KD 2021 @ 36.1 weeks previous cs x 3 , r/o PTL    Discussed with Dr Solorzano. Plan for admission and observation.   routine pre-op orders, coag panel  covid 19 swab collected and pending  continuous monitoring    NICHOLAS See a/p: 26 y.o.  KD 2021 @ 36.1 weeks previous cs x 3 , r/o PTL    Discussed with Dr Solorzano. Plan for admission and observation.   routine pre-op orders, coag panel  covid 19 swab collected and pending  continuous monitoring  continue zoloft 50 mg daily    NICHOLAS See a/p: 26 y.o.  DK 2021 @ 36.1 weeks previous cs x 3 , r/o PTL    Discussed with Dr Solorzano. Plan for admission and observation.   routine pre-op orders, coag panel  covid 19 swab collected and pending  continuous monitoring  continue zoloft 50 mg daily  2 units PRBC on hold    NICHOLAS See

## 2021-06-22 NOTE — OB RN TRIAGE NOTE - MENTAL HEALTH CONDITIONS/SYMPTOMS, PROFILE
anxiety disorder/depression/post-traumatic stress disorder sees therapist biweekly-pt states she hasn't had any alcohol since 3/2021, and has never attempted suicide but has had suicidal ideations/anxiety disorder/depression/post-traumatic stress disorder

## 2021-06-22 NOTE — OB RN TRIAGE NOTE - NS_PRENATALCARE_OBGYN_ALL_OB
[facility] communication note:    Patient currently receiving [facility] of PT and OT though OT had not started prior to hospitalization for ambulation, transfers, balance.     Home Health plan of care is available in EPIC. Certification Period 4/29/21-6/27/21.     Home Care goals have not been met. Please place orders to resume services upon discharge if appropriate. Liaison will continue to follow until discharge.     Thank You.    Candace Mejias RN, BSN [facility] Liaison ([facility])    Cell: [phone]       No

## 2021-06-22 NOTE — DISCHARGE NOTE ANTEPARTUM - PROVIDER TOKENS
FREE:[LAST:[[facility]],PHONE:[phone]],FAX:[(   [phone]    -],ADDRESS:[[facility]  Oncology [facility]]]

## 2021-06-22 NOTE — OB PROVIDER TRIAGE NOTE - HISTORY OF PRESENT ILLNESS
26 y.o.  KD 2021 @ 36.1 weeks presents with c/o ctx q 10 mins 7/10 pain. Pt denies LOF, VB, dysuria. States +FM. Pt is scheduled for repeat cs 2021.     allergies:  NKDA  medications:  prenatal vitamins  zoloft 50 mg PO HS    med/surg hx:  cs x 3  OBGYN hx:  2013   pLTCS 2/2 cat II FHR 8 lbs  7/10/2014 rLTCS 8lbs  2019 rLTCS TIUP, 7lbs twin A, 7lbs twin B    psychosocial hx:  history of anxiety and depression  treated with Paxil , suicidal ideation and alcohol use in pregnancy.  Admitted x2 in this pregnancy ,  (3/30/2021 and 2021) - currently on zoloft 50 mg PO HS 26 y.o.  KD 2021 @ 36.1 weeks presents with c/o ctx q 10 mins 7/10 pain. Pt denies LOF, VB, dysuria. States +FM. Pt is scheduled for repeat cs 2021.     allergies:  NKDA  medications:  prenatal vitamins  zoloft 50 mg PO HS    med/surg hx:  cs x 3    OBGYN hx:  2013    40 week  pLTCS 2/2 cat II FHR 8 lbs  7/10/2014  38 week  rLTCS 8lbs  2019  38 week  rLTCS TIUP, 7lbs twin A, 7lbs twin B    psychosocial hx:  history of anxiety and depression  treated with Paxil , suicidal ideation and alcohol use in pregnancy.  Admitted x2 in this pregnancy ,  (3/30/2021 and 2021) - currently on zoloft 50 mg PO HS 26 y.o.  KD 2021 @ 36.1 weeks presents with c/o ctx q 10 mins 7/10 pain. Pt denies LOF, VB, dysuria. States +FM. Pt is scheduled for repeat cs 2021.     allergies:  NKDA  medications:  prenatal vitamins  zoloft 50 mg PO HS    med/surg hx:  cs x 3    OBGYN hx:  2013    40 week  pLTCS 2/2 cat II FHR 8 lbs  7/10/2014  38 week  rLTCS 8lbs  2019  38 week  rLTCS TIUP, 7lbs twin A, 7lbs twin B    psychosocial hx:  history of anxiety and depression  treated with Paxil , suicidal ideation and alcohol use in pregnancy.  Admitted x2 in this pregnancy ,  (3/30/2021 and 2021) - currently on zoloft 50 mg PO HS, counseling twice weekly

## 2021-06-22 NOTE — OB PROVIDER TRIAGE NOTE - NS_OBGYNHISTORY_OBGYN_ALL_OB_FT
2013 c/s cat2 8#  2014 rpt c/s 8#  2019 rpt c/s tiup 7#/7# 9/7/2013    40 week  pLTCS 2/2 cat II FHR 8 lbs  7/10/2014  38 week  rLTCS 8lbs  9/22/2019  38 week  rLTCS TIUP, 7lbs twin A, 7lbs twin B

## 2021-06-22 NOTE — CHART NOTE - NSCHARTNOTEFT_GEN_A_CORE
Patient noted to be the same exam and feeling comfortable. Has no fundal or abdominal tenderness. Does feels contractions but the severity has decreased significantly since admission. She is hungry. No rupture of membranes.    Discussed patient's course at [facility] safety rounds  Patient cleared for d/c with close follow up with HRC and discharge precautions    BRIDGETT Bain PGY-4 Patient noted to be the same exam and feeling comfortable. Has no fundal or abdominal tenderness. Does feels contractions but the severity has decreased significantly since admission. She is hungry. No rupture of membranes.    Discussed patient's course at [facility] safety rounds  Patient cleared for d/c with close follow up with HRC and discharge precautions    BRIDGETT Bain PGY-4      [facility] Fellow Addendum:  Reviewed patient at [facility] safety rounds this evening. patient presented with  contractions, no cervical change. Symptomatically improved s/p ofirmev and IV Fluids. Per Dr. Bain, no uterine tenderness or clinical signs of uterine rupture. Tracing reviewed, reactive. Contractions spaced out since initial presentation. OK for discharge to home. Patient to have strict precautions regarding indications to return (worsening contractions, vaginal bleeding, loss of fluid or decreased fetal movement... severe pain, dizziness, lightheadedness, chest pain, shortness of breath).     Maricel Lundberg MD  Fellow, Maternal Fetal Medicine.

## 2021-06-22 NOTE — DISCHARGE NOTE ANTEPARTUM - MEDICATION SUMMARY - MEDICATIONS TO TAKE
I will START or STAY ON the medications listed below when I get home from the hospital:    sertraline 50 mg oral tablet  -- 1 tab(s) by mouth once a day   -- Indication: For Home med    Iron 100 Plus oral tablet  -- 1 tab(s) by mouth once a day  -- Indication: For Home med    Prenatal Multivitamins with Folic Acid 1 mg oral capsule  -- 1 cap(s) by mouth once a day  -- Indication: For Home med    melatonin 3 mg oral tablet  -- 1 tab(s) by mouth once a day (at bedtime)  -- Indication: For Home med    Vitamin D3 50,000 intl units (1250 mcg) oral capsule  -- Indication: For Home med    thiamine 100 mg oral tablet  -- 1 tab(s) by mouth once a day  -- Indication: For Home med    cyanocobalamin 1000 mcg oral tablet  -- 1 tab(s) by mouth once a day  -- Indication: For Home med    folic acid 1 mg oral tablet  -- 1 tab(s) by mouth once a day  -- Indication: For Home med

## 2021-06-22 NOTE — OB RN PATIENT PROFILE - MENTAL HEALTH CONDITIONS/SYMPTOMS, PROFILE
sees therapist biweekly-pt states she hasn't had any alcohol since 3/2021, and has never attempted suicide but has had suicidal ideations/anxiety disorder/depression/post-traumatic stress disorder

## 2021-06-23 DIAGNOSIS — Z34.90 ENCOUNTER FOR SUPERVISION OF NORMAL PREGNANCY, UNSPECIFIED, UNSPECIFIED TRIMESTER: ICD-10-CM

## 2021-06-23 LAB
C TRACH RRNA SPEC QL NAA+PROBE: SIGNIFICANT CHANGE UP
N GONORRHOEA RRNA SPEC QL NAA+PROBE: SIGNIFICANT CHANGE UP
SPECIMEN SOURCE: SIGNIFICANT CHANGE UP

## 2021-06-24 ENCOUNTER — NON-APPOINTMENT (OUTPATIENT)
Age: 26
End: 2021-06-24

## 2021-06-24 ENCOUNTER — OUTPATIENT (OUTPATIENT)
Dept: INPATIENT UNIT | Facility: HOSPITAL | Age: 26
LOS: 1 days | Discharge: HOME | End: 2021-06-24
Payer: MEDICAID

## 2021-06-24 VITALS — SYSTOLIC BLOOD PRESSURE: 84 MMHG | DIASTOLIC BLOOD PRESSURE: 52 MMHG | HEART RATE: 90 BPM

## 2021-06-24 VITALS — TEMPERATURE: 98 F

## 2021-06-24 DIAGNOSIS — Z3A.00 WEEKS OF GESTATION OF PREGNANCY NOT SPECIFIED: ICD-10-CM

## 2021-06-24 DIAGNOSIS — O26.899 OTHER SPECIFIED PREGNANCY RELATED CONDITIONS, UNSPECIFIED TRIMESTER: ICD-10-CM

## 2021-06-24 DIAGNOSIS — Z98.891 HISTORY OF UTERINE SCAR FROM PREVIOUS SURGERY: Chronic | ICD-10-CM

## 2021-06-24 LAB
CULTURE RESULTS: SIGNIFICANT CHANGE UP
SPECIMEN SOURCE: SIGNIFICANT CHANGE UP

## 2021-06-24 PROCEDURE — 76818 FETAL BIOPHYS PROFILE W/NST: CPT | Mod: 26

## 2021-06-24 PROCEDURE — 99214 OFFICE O/P EST MOD 30 MIN: CPT | Mod: 25

## 2021-06-24 NOTE — OB PROVIDER TRIAGE NOTE - NS_OBGYNHISTORY_OBGYN_ALL_OB_FT
9/7/2013    40 week  pLTCS 2/2 cat II FHR 8 lbs  7/10/2014  38 week  rLTCS 8lbs  9/22/2019  38 week  rLTCS TIUP, 7lbs twin A, 7lbs twin B    Admitted 3/30/2021 and 4/4/2021 r/t depression.

## 2021-06-24 NOTE — OB PROVIDER TRIAGE NOTE - HISTORY OF PRESENT ILLNESS
26 y.o.  KD 2021 @ 36.3 weeks presents with c/o ctx q 10 mins 6/10 pain on ambulation and activity. Pt denies LOF, VB, dysuria. States +FM. Pt was previously admitted to r/o ptl 21. Pt is scheduled for repeat cs 2021.     allergies:  NKDA  medications:  prenatal vitamins  zoloft 50 mg PO HS    med/surg hx:  cs x 3    OBGYN hx:  2013    40 week  pLTCS 2/2 cat II FHR 8 lbs  7/10/2014  38 week  rLTCS 8lbs  2019  38 week  rLTCS TIUP, 7lbs twin A, 7lbs twin B    psychosocial hx:  history of anxiety and depression  treated with Paxil , suicidal ideation and alcohol use in pregnancy.  Admitted x2 in this pregnancy ,  (3/30/2021 and 2021) - currently on zoloft 50 mg PO HS, counseling twice weekly

## 2021-06-24 NOTE — OB PROVIDER TRIAGE NOTE - ADDITIONAL INSTRUCTIONS
You may use a maternity belt/band. Please drink 1-2 liters of fluid per day. Please go to your next scheduled prenatal appointment tomorrow 6/25/2021. You may use a maternity belt/band. Please drink 1-2 liters of fluid per day. Please go to your next scheduled prenatal appointment Monday 6/28/2021.

## 2021-06-24 NOTE — OB PROVIDER TRIAGE NOTE - NSOBPROVIDERNOTE_OBGYN_ALL_OB_FT
a/p: 26 y.o.  KD 2021 @ 36.3 weeks r/o PTL    no evidence of PTL at this time    1717  discussed with Dr Edwards. Plan for discharge home with PTL precautions/fetal kick counts reviewed. Encouraged use of maternity belt. Pt to follow up with next scheduled prenatal PCAP appointment tomorrow 2021.    Mayi, NP a/p: 26 y.o.  KD 2021 @ 36.3 weeks r/o PTL    no evidence of PTL at this time    1717  discussed with Dr Edwards. Plan for discharge home with PTL precautions/fetal kick counts reviewed. Encouraged use of maternity belt. Pt to follow up with next scheduled prenatal PCAP appointment 2021.     Mayi, NP

## 2021-06-24 NOTE — OB PROVIDER TRIAGE NOTE - NSHPPHYSICALEXAM_GEN_ALL_CORE
abdomen soft, nontender  taus: sliup, cephalic presentation, posterior placenta, bpp 8/8, larissa 13.3  sve no change from 6/22/21 1/30/-3/I  nst reactive  toco: occ ctx noted

## 2021-06-24 NOTE — OB RN TRIAGE NOTE - NS_OBGYNHISTORY_OBGYN_ALL_OB_FT
2013 c/s cat2 8# FT (M)   2014 rpt c/s 8#  FT (M)   2019 rpt c/s tiup 7#/7# FT (M/M)    Admitted 3/30/2021 and 4/4/2021 r/t depression.

## 2021-06-28 ENCOUNTER — OUTPATIENT (OUTPATIENT)
Dept: OUTPATIENT SERVICES | Facility: HOSPITAL | Age: 26
LOS: 1 days | End: 2021-06-28

## 2021-06-28 ENCOUNTER — NON-APPOINTMENT (OUTPATIENT)
Age: 26
End: 2021-06-28

## 2021-06-28 ENCOUNTER — APPOINTMENT (OUTPATIENT)
Dept: OBGYN | Facility: HOSPITAL | Age: 26
End: 2021-06-28
Payer: MEDICAID

## 2021-06-28 ENCOUNTER — RESULT REVIEW (OUTPATIENT)
Age: 26
End: 2021-06-28

## 2021-06-28 VITALS
SYSTOLIC BLOOD PRESSURE: 116 MMHG | HEART RATE: 88 BPM | BODY MASS INDEX: 31.52 KG/M2 | WEIGHT: 208 LBS | HEIGHT: 68 IN | TEMPERATURE: 97.3 F | DIASTOLIC BLOOD PRESSURE: 71 MMHG

## 2021-06-28 DIAGNOSIS — Z98.891 HISTORY OF UTERINE SCAR FROM PREVIOUS SURGERY: Chronic | ICD-10-CM

## 2021-06-28 LAB
ALBUMIN SERPL ELPH-MCNC: 3.9 G/DL — SIGNIFICANT CHANGE UP (ref 3.3–5)
ALP SERPL-CCNC: 72 U/L — SIGNIFICANT CHANGE UP (ref 40–120)
ALT FLD-CCNC: 33 U/L — SIGNIFICANT CHANGE UP (ref 4–33)
ANION GAP SERPL CALC-SCNC: 13 MMOL/L — SIGNIFICANT CHANGE UP (ref 7–14)
AST SERPL-CCNC: 17 U/L — SIGNIFICANT CHANGE UP (ref 4–32)
BILIRUB SERPL-MCNC: 0.4 MG/DL — SIGNIFICANT CHANGE UP (ref 0.2–1.2)
BUN SERPL-MCNC: 7 MG/DL — SIGNIFICANT CHANGE UP (ref 7–23)
CALCIUM SERPL-MCNC: 9.1 MG/DL — SIGNIFICANT CHANGE UP (ref 8.4–10.5)
CHLORIDE SERPL-SCNC: 102 MMOL/L — SIGNIFICANT CHANGE UP (ref 98–107)
CO2 SERPL-SCNC: 20 MMOL/L — LOW (ref 22–31)
CREAT SERPL-MCNC: 0.58 MG/DL — SIGNIFICANT CHANGE UP (ref 0.5–1.3)
GLUCOSE SERPL-MCNC: 79 MG/DL — SIGNIFICANT CHANGE UP (ref 70–99)
POTASSIUM SERPL-MCNC: 3.7 MMOL/L — SIGNIFICANT CHANGE UP (ref 3.5–5.3)
POTASSIUM SERPL-SCNC: 3.7 MMOL/L — SIGNIFICANT CHANGE UP (ref 3.5–5.3)
PROT SERPL-MCNC: 6.9 G/DL — SIGNIFICANT CHANGE UP (ref 6–8.3)
SODIUM SERPL-SCNC: 135 MMOL/L — SIGNIFICANT CHANGE UP (ref 135–145)

## 2021-06-28 PROCEDURE — 36415 COLL VENOUS BLD VENIPUNCTURE: CPT | Mod: 26,NC,GC

## 2021-06-28 PROCEDURE — 99212 OFFICE O/P EST SF 10 MIN: CPT | Mod: 25,GC

## 2021-06-29 DIAGNOSIS — O09.93 SUPERVISION OF HIGH RISK PREGNANCY, UNSPECIFIED, THIRD TRIMESTER: ICD-10-CM

## 2021-06-29 DIAGNOSIS — F10.11 ALCOHOL ABUSE, IN REMISSION: ICD-10-CM

## 2021-06-30 ENCOUNTER — NON-APPOINTMENT (OUTPATIENT)
Age: 26
End: 2021-06-30

## 2021-07-02 ENCOUNTER — ASOB RESULT (OUTPATIENT)
Age: 26
End: 2021-07-02

## 2021-07-02 ENCOUNTER — APPOINTMENT (OUTPATIENT)
Dept: ANTEPARTUM | Facility: CLINIC | Age: 26
End: 2021-07-02
Payer: MEDICAID

## 2021-07-02 ENCOUNTER — NON-APPOINTMENT (OUTPATIENT)
Age: 26
End: 2021-07-02

## 2021-07-02 PROCEDURE — 76819 FETAL BIOPHYS PROFIL W/O NST: CPT | Mod: 26

## 2021-07-06 ENCOUNTER — NON-APPOINTMENT (OUTPATIENT)
Age: 26
End: 2021-07-06

## 2021-07-06 ENCOUNTER — OUTPATIENT (OUTPATIENT)
Dept: OUTPATIENT SERVICES | Facility: HOSPITAL | Age: 26
LOS: 1 days | End: 2021-07-06

## 2021-07-06 ENCOUNTER — APPOINTMENT (OUTPATIENT)
Dept: OBGYN | Facility: HOSPITAL | Age: 26
End: 2021-07-06

## 2021-07-06 VITALS
DIASTOLIC BLOOD PRESSURE: 70 MMHG | TEMPERATURE: 98 F | RESPIRATION RATE: 18 BRPM | HEART RATE: 93 BPM | OXYGEN SATURATION: 99 % | SYSTOLIC BLOOD PRESSURE: 110 MMHG

## 2021-07-06 VITALS
HEART RATE: 94 BPM | RESPIRATION RATE: 20 BRPM | TEMPERATURE: 97.3 F | SYSTOLIC BLOOD PRESSURE: 108 MMHG | BODY MASS INDEX: 31.47 KG/M2 | DIASTOLIC BLOOD PRESSURE: 72 MMHG | WEIGHT: 207 LBS

## 2021-07-06 DIAGNOSIS — O09.90 SUPERVISION OF HIGH RISK PREGNANCY, UNSPECIFIED, UNSPECIFIED TRIMESTER: ICD-10-CM

## 2021-07-06 DIAGNOSIS — F43.10 POST-TRAUMATIC STRESS DISORDER, UNSPECIFIED: ICD-10-CM

## 2021-07-06 DIAGNOSIS — Z98.890 OTHER SPECIFIED POSTPROCEDURAL STATES: ICD-10-CM

## 2021-07-06 DIAGNOSIS — Z98.891 HISTORY OF UTERINE SCAR FROM PREVIOUS SURGERY: Chronic | ICD-10-CM

## 2021-07-06 DIAGNOSIS — F10.20 ALCOHOL DEPENDENCE, UNCOMPLICATED: ICD-10-CM

## 2021-07-06 LAB
APPEARANCE UR: CLEAR — SIGNIFICANT CHANGE UP
BILIRUB UR-MCNC: NEGATIVE — SIGNIFICANT CHANGE UP
BLD GP AB SCN SERPL QL: NEGATIVE — SIGNIFICANT CHANGE UP
COLOR SPEC: YELLOW — SIGNIFICANT CHANGE UP
DIFF PNL FLD: NEGATIVE — SIGNIFICANT CHANGE UP
GLUCOSE UR QL: NEGATIVE — SIGNIFICANT CHANGE UP
HCT VFR BLD CALC: 33.5 % — LOW (ref 34.5–45)
HGB BLD-MCNC: 11 G/DL — LOW (ref 11.5–15.5)
KETONES UR-MCNC: NEGATIVE — SIGNIFICANT CHANGE UP
LEUKOCYTE ESTERASE UR-ACNC: NEGATIVE — SIGNIFICANT CHANGE UP
MCHC RBC-ENTMCNC: 25.2 PG — LOW (ref 27–34)
MCHC RBC-ENTMCNC: 32.8 GM/DL — SIGNIFICANT CHANGE UP (ref 32–36)
MCV RBC AUTO: 76.8 FL — LOW (ref 80–100)
NITRITE UR-MCNC: NEGATIVE — SIGNIFICANT CHANGE UP
NRBC # BLD: 0 /100 WBCS — SIGNIFICANT CHANGE UP
NRBC # FLD: 0 K/UL — SIGNIFICANT CHANGE UP
PH UR: 6.5 — SIGNIFICANT CHANGE UP (ref 5–8)
PLATELET # BLD AUTO: 318 K/UL — SIGNIFICANT CHANGE UP (ref 150–400)
PROT UR-MCNC: ABNORMAL
RBC # BLD: 4.36 M/UL — SIGNIFICANT CHANGE UP (ref 3.8–5.2)
RBC # FLD: 14.7 % — HIGH (ref 10.3–14.5)
RH IG SCN BLD-IMP: POSITIVE — SIGNIFICANT CHANGE UP
SP GR SPEC: 1.02 — SIGNIFICANT CHANGE UP (ref 1.01–1.02)
UROBILINOGEN FLD QL: SIGNIFICANT CHANGE UP
WBC # BLD: 6.21 K/UL — SIGNIFICANT CHANGE UP (ref 3.8–10.5)
WBC # FLD AUTO: 6.21 K/UL — SIGNIFICANT CHANGE UP (ref 3.8–10.5)

## 2021-07-06 RX ORDER — UBIDECARENONE/VIT E ACET 100MG-5
50 MCG CAPSULE ORAL
Qty: 60 | Refills: 3 | Status: ACTIVE | COMMUNITY
Start: 2021-07-06 | End: 1900-01-01

## 2021-07-06 RX ORDER — CHOLECALCIFEROL (VITAMIN D3) 125 MCG
0 CAPSULE ORAL
Qty: 0 | Refills: 0 | DISCHARGE

## 2021-07-06 NOTE — OB PST NOTE - FAMILY HISTORY
Father  Still living? Unknown  FH: diabetes mellitus, Age at diagnosis: Age Unknown  Family history of hypertension, Age at diagnosis: Age Unknown     Mother  Still living? Unknown  FH: diabetes mellitus, Age at diagnosis: Age Unknown  Family history of hypertension, Age at diagnosis: Age Unknown

## 2021-07-06 NOTE — OB PST NOTE - NSHPREVIEWOFSYSTEMS_GEN_ALL_CORE
General: No fever, chills, sweating, anorexia, weight loss or weight gain. No polyphagia, polyuria, polydipsia, malaise, or fatigue    Skin: No rashes, itching, or dryness. No change in size/color of moles. No tumors, brittle nails, pitted nails, or hair loss    Breast: No tenderness, lumps, or nipple discharge      Ophthalmologic: No diplopia, photophobia, lacrimation, blurred Vision , or eye discharge    ENMT Symptoms: No hearing difficulty, ear pain, tinnitus, or vertigo. No sinus symptoms, nasal congestion, nasal discharge, or nasal obstruction    Respiratory and Thorax: No wheezing, dyspnea, cough, hemoptysis, or pleuritic chest Pain     Cardiovascular: No chest pain, palpitations, dyspnea on exertion, orthopnea, paroxysmal nocturnal dyspnea, peripheral edema, or claudication    Gastrointestinal: No nausea, vomiting, diarrhea, constipation, change in bowel habits, flatulence, abdominal pain, or melena    Genitourinary/ Pelvis: No hematuria, renal colic, or flank pain.  No urine discoloration, incontinence, dysuria, or urinary hesitancy. Normal urinary frequency. No nocturia, abnormal vaginal bleeding, vaginal discharge, spotting, pelvic pain, or vaginal leakage    Musculoskeletal: lower back pain during pregnancy, No arthralgia, arthritis, joint swelling, muscle cramping, muscle weakness, neck pain, arm pain, or leg pain    Neurological: No transient paralysis, weakness, paresthesias, or seizures. No syncope, tremors, vertigo, loss of sensation, difficulty walking, loss of consciousness, hemiparesis, confusion, or facial palsy    Psychiatric: followed by Psychiatrist monthly symptoms controlled on medication, No suicidal ideation, anxiety, insomnia, memory loss, paranoia, mood swings, agitation, hallucinations, or hyperactivity    Hematology: No gum bleeding, nose bleeding, or skin lumps    Lymphatic: No enlarged or tender lymph nodes. No extremity swelling    Endocrine: No heat or cold intolerance    Immunologic: No recurrent or persistent infections General: No fever, chills, sweating, anorexia, weight loss or weight gain. No polyphagia, polyuria, polydipsia, malaise, or fatigue    Skin: No rashes, itching, or dryness. No change in size/color of moles. No tumors, brittle nails, pitted nails, or hair loss    Breast: No tenderness, lumps, or nipple discharge      Ophthalmologic: No diplopia, photophobia, lacrimation, blurred Vision , or eye discharge    ENMT Symptoms: No hearing difficulty, ear pain, tinnitus, or vertigo. No sinus symptoms, nasal congestion, nasal discharge, or nasal obstruction    Respiratory and Thorax: No wheezing, dyspnea, cough, hemoptysis, or pleuritic chest Pain     Cardiovascular: No chest pain, palpitations, dyspnea on exertion, orthopnea, paroxysmal nocturnal dyspnea, peripheral edema, or claudication    Gastrointestinal: No nausea, vomiting, diarrhea, constipation, change in bowel habits, flatulence, abdominal pain, or melena    Genitourinary/ Pelvis: No hematuria, renal colic, or flank pain.  No urine discoloration, incontinence, dysuria, or urinary hesitancy. Normal urinary frequency. No nocturia, abnormal vaginal bleeding, vaginal discharge, spotting, pelvic pain, or vaginal leakage    Musculoskeletal: lower back pain during pregnancy, No arthralgia, arthritis, joint swelling, muscle cramping, muscle weakness, neck pain, arm pain, or leg pain    Neurological: No transient paralysis, weakness, paresthesias, or seizures. No syncope, tremors, vertigo, loss of sensation, difficulty walking, loss of consciousness, hemiparesis, confusion, or facial palsy    Psychiatric: Anxiety, Depression on Zoloft followed by Psychiatrist monthly symptoms controlled on medication, No suicidal ideation, insomnia, memory loss, paranoia, mood swings, agitation, hallucinations, or hyperactivity    Hematology: No gum bleeding, nose bleeding, or skin lumps    Lymphatic: No enlarged or tender lymph nodes. No extremity swelling    Endocrine: No heat or cold intolerance    Immunologic: No recurrent or persistent infections

## 2021-07-06 NOTE — OB PST NOTE - PROBLEM SELECTOR PLAN 1
Patient scheduled for repeat  section on 2021  Written & verbal preop instructions given, instructions on all medications including Zoloft as per OBGYN, Pt verbalized good understanding.  Surgical soap given, teach back done on surgical soap instructions.   Patient aware of need for COVID testing prior to procedure and advised to co ordinate with surgeon.

## 2021-07-06 NOTE — OB PST NOTE - HISTORY OF PRESENT ILLNESS
25 yo female at 38 weeks pregnant presents to PST for preop evaluation for repeat  section.  Patient denies vagina bleeding, spotting, discharge or leakage of fluid, positive fetal movement today.  Patient with history of anxiety and depression on Zoloft, per patient symptoms controlled on medication, followed by Psychiatrist monthly.  As per scheduled  worksheet, KD 2021/.

## 2021-07-06 NOTE — OB PST NOTE - PMH
<<----- Click to add NO pertinent Past Medical History Anxiety    Depression    No pertinent past medical history     Anemia, unspecified type    Anxiety    Depression    Other specified postprocedural states

## 2021-07-06 NOTE — OB PST NOTE - NSHPPHYSICALEXAM_GEN_ALL_CORE
Height: 5'7     Weight:  205lbs    Constitutional: Well Developed, Well Groomed, Well Nourished, No Distress    Eyes: PERRL, EOMI, conjunctiva clear    Ears: Normal    Mouth & Gums: Normal, moist    Pharynx: No tenderness, discharge, or peritonsillar abscess    Tonsils: No Redness, discharge, tenderness, or swelling    Neck: Supple, no JVD, normal thyroid glands, no carotid bruits, no cervical vertebral or paraspinal tenderness    Breast: Normal shape, no masses, no tenderness, nipples normal, no nipple discharge    Back: Normal shape, ROM intact, strength intact, no vertebral tenderness    Respiratory: Airway patent, breath sounds equal, good air movement, respiration non-labored, clear to auscultation bilateral, no chest wall tenderness, no intercostal retractions, no rales, no wheezes, no rhonchi, no subcutaneous emphysema    Cardiovascular:  Regular rate and rhythm, no rubs or murmur, normal PMI    Gastrointestinal: Soft, non-tender, non distention, no masses palpable, bowel sound normal, no bruit, no rebound tenderness    Extremities: No clubbing, cyanosis, or pedal edema    Vascular:  Carotid Pulse normal , Radial Pulse normal, Femoral Pulse normal, DP pulse normal, PT pulse normal    Neurological: alert & oriented x 3, sensation intact, deep reflexes intact, cranial nerve intact, normal strength    Skin: warm and dry, normal color    Lymph Nodes: normal posterior cervical lymph node, normal anterior cervical lymph node, normal supraclavicular lymph node, normal axillary lymph node, normal inguinal lymph node, normal femoral lymph node    Musculoskeletal: ROM intact, no joint swelling, warmth, or calf tenderness. Normal strength    Psychiatric: normal affect, normal behavior Height: 5'7     Weight:  205lbs    Constitutional: Well Developed, Well Groomed, Well Nourished, No Distress    Eyes: PERRL, EOMI, conjunctiva clear    Ears: Normal    Mouth & Gums: Normal, moist    Pharynx: No tenderness, discharge, or peritonsillar abscess    Tonsils: No Redness, discharge, tenderness, or swelling    Neck: Supple, no JVD, normal thyroid glands, no carotid bruits, no cervical vertebral or paraspinal tenderness    Breast: Normal shape, no masses, no tenderness, nipples normal, no nipple discharge    Back: Normal shape, ROM intact, strength intact, no vertebral tenderness    Respiratory: Airway patent, breath sounds equal, good air movement, respiration non-labored, clear to auscultation bilateral, no chest wall tenderness, no intercostal retractions, no rales, no wheezes, no rhonchi, no subcutaneous emphysema    Cardiovascular:  Regular rate and rhythm, no rubs or murmur, normal PMI    Gastrointestinal: 38 weeks pregnant, bowel sound normal, no bruit, no rebound tenderness    Extremities: No clubbing, cyanosis, or pedal edema    Vascular:  Carotid Pulse normal , Radial Pulse normal, Femoral Pulse normal, DP pulse normal, PT pulse normal    Neurological: alert & oriented x 3, sensation intact, deep reflexes intact, cranial nerve intact, normal strength    Skin: warm and dry, normal color    Lymph Nodes: normal posterior cervical lymph node, normal anterior cervical lymph node, normal supraclavicular lymph node, normal axillary lymph node, normal inguinal lymph node, normal femoral lymph node    Musculoskeletal: ROM intact, no joint swelling, warmth, or calf tenderness. Normal strength    Psychiatric: normal affect, normal behavior

## 2021-07-06 NOTE — OB PST NOTE - NSANTHOSAYNRD_GEN_A_CORE
Attending Attestation (For Attendings USE Only)... No. EVA screening performed.  STOP BANG Legend: 0-2 = LOW Risk; 3-4 = INTERMEDIATE Risk; 5-8 = HIGH Risk

## 2021-07-07 ENCOUNTER — NON-APPOINTMENT (OUTPATIENT)
Age: 26
End: 2021-07-07

## 2021-07-07 DIAGNOSIS — Z01.818 ENCOUNTER FOR OTHER PREPROCEDURAL EXAMINATION: ICD-10-CM

## 2021-07-08 DIAGNOSIS — D64.9 ANEMIA, UNSPECIFIED: ICD-10-CM

## 2021-07-08 DIAGNOSIS — F10.20 ALCOHOL DEPENDENCE, UNCOMPLICATED: ICD-10-CM

## 2021-07-08 DIAGNOSIS — Z98.891 HISTORY OF UTERINE SCAR FROM PREVIOUS SURGERY: ICD-10-CM

## 2021-07-08 DIAGNOSIS — F43.10 POST-TRAUMATIC STRESS DISORDER, UNSPECIFIED: ICD-10-CM

## 2021-07-08 DIAGNOSIS — O09.90 SUPERVISION OF HIGH RISK PREGNANCY, UNSPECIFIED, UNSPECIFIED TRIMESTER: ICD-10-CM

## 2021-07-09 ENCOUNTER — APPOINTMENT (OUTPATIENT)
Dept: ANTEPARTUM | Facility: CLINIC | Age: 26
End: 2021-07-09

## 2021-07-09 ENCOUNTER — APPOINTMENT (OUTPATIENT)
Dept: DISASTER EMERGENCY | Facility: CLINIC | Age: 26
End: 2021-07-09

## 2021-07-09 ENCOUNTER — NON-APPOINTMENT (OUTPATIENT)
Age: 26
End: 2021-07-09

## 2021-07-09 LAB — SARS-COV-2 N GENE NPH QL NAA+PROBE: NOT DETECTED

## 2021-07-11 ENCOUNTER — TRANSCRIPTION ENCOUNTER (OUTPATIENT)
Age: 26
End: 2021-07-11

## 2021-07-12 ENCOUNTER — NON-APPOINTMENT (OUTPATIENT)
Age: 26
End: 2021-07-12

## 2021-07-12 ENCOUNTER — INPATIENT (INPATIENT)
Facility: HOSPITAL | Age: 26
LOS: 2 days | Discharge: HOME | End: 2021-07-15
Attending: SPECIALIST | Admitting: SPECIALIST
Payer: MEDICAID

## 2021-07-12 VITALS
SYSTOLIC BLOOD PRESSURE: 108 MMHG | WEIGHT: 293 LBS | TEMPERATURE: 98 F | HEART RATE: 80 BPM | HEIGHT: 68 IN | RESPIRATION RATE: 17 BRPM | DIASTOLIC BLOOD PRESSURE: 65 MMHG

## 2021-07-12 DIAGNOSIS — Z98.890 OTHER SPECIFIED POSTPROCEDURAL STATES: ICD-10-CM

## 2021-07-12 DIAGNOSIS — Z98.891 HISTORY OF UTERINE SCAR FROM PREVIOUS SURGERY: Chronic | ICD-10-CM

## 2021-07-12 LAB
BASOPHILS # BLD AUTO: 0.05 K/UL — SIGNIFICANT CHANGE UP (ref 0–0.2)
BASOPHILS NFR BLD AUTO: 0.8 % — SIGNIFICANT CHANGE UP (ref 0–2)
BLD GP AB SCN SERPL QL: NEGATIVE — SIGNIFICANT CHANGE UP
COVID-19 SPIKE DOMAIN AB INTERP: POSITIVE
COVID-19 SPIKE DOMAIN ANTIBODY RESULT: 167 U/ML — HIGH
EOSINOPHIL # BLD AUTO: 0.09 K/UL — SIGNIFICANT CHANGE UP (ref 0–0.5)
EOSINOPHIL NFR BLD AUTO: 1.4 % — SIGNIFICANT CHANGE UP (ref 0–6)
HCT VFR BLD CALC: 32.6 % — LOW (ref 34.5–45)
HGB BLD-MCNC: 10.7 G/DL — LOW (ref 11.5–15.5)
IANC: 3.97 K/UL — SIGNIFICANT CHANGE UP (ref 1.5–8.5)
IMM GRANULOCYTES NFR BLD AUTO: 1.4 % — SIGNIFICANT CHANGE UP (ref 0–1.5)
LYMPHOCYTES # BLD AUTO: 1.83 K/UL — SIGNIFICANT CHANGE UP (ref 1–3.3)
LYMPHOCYTES # BLD AUTO: 28.2 % — SIGNIFICANT CHANGE UP (ref 13–44)
MCHC RBC-ENTMCNC: 25.1 PG — LOW (ref 27–34)
MCHC RBC-ENTMCNC: 32.8 GM/DL — SIGNIFICANT CHANGE UP (ref 32–36)
MCV RBC AUTO: 76.5 FL — LOW (ref 80–100)
MONOCYTES # BLD AUTO: 0.46 K/UL — SIGNIFICANT CHANGE UP (ref 0–0.9)
MONOCYTES NFR BLD AUTO: 7.1 % — SIGNIFICANT CHANGE UP (ref 2–14)
NEUTROPHILS # BLD AUTO: 3.97 K/UL — SIGNIFICANT CHANGE UP (ref 1.8–7.4)
NEUTROPHILS NFR BLD AUTO: 61.1 % — SIGNIFICANT CHANGE UP (ref 43–77)
NRBC # BLD: 0 /100 WBCS — SIGNIFICANT CHANGE UP
NRBC # FLD: 0 K/UL — SIGNIFICANT CHANGE UP
PLATELET # BLD AUTO: 254 K/UL — SIGNIFICANT CHANGE UP (ref 150–400)
RBC # BLD: 4.26 M/UL — SIGNIFICANT CHANGE UP (ref 3.8–5.2)
RBC # FLD: 14.9 % — HIGH (ref 10.3–14.5)
RH IG SCN BLD-IMP: POSITIVE — SIGNIFICANT CHANGE UP
SARS-COV-2 IGG+IGM SERPL QL IA: 167 U/ML — HIGH
SARS-COV-2 IGG+IGM SERPL QL IA: POSITIVE
WBC # BLD: 6.49 K/UL — SIGNIFICANT CHANGE UP (ref 3.8–10.5)
WBC # FLD AUTO: 6.49 K/UL — SIGNIFICANT CHANGE UP (ref 3.8–10.5)

## 2021-07-12 RX ORDER — SENNA PLUS 8.6 MG/1
1 TABLET ORAL
Refills: 0 | Status: DISCONTINUED | OUTPATIENT
Start: 2021-07-12 | End: 2021-07-15

## 2021-07-12 RX ORDER — TETANUS TOXOID, REDUCED DIPHTHERIA TOXOID AND ACELLULAR PERTUSSIS VACCINE, ADSORBED 5; 2.5; 8; 8; 2.5 [IU]/.5ML; [IU]/.5ML; UG/.5ML; UG/.5ML; UG/.5ML
0.5 SUSPENSION INTRAMUSCULAR ONCE
Refills: 0 | Status: DISCONTINUED | OUTPATIENT
Start: 2021-07-12 | End: 2021-07-15

## 2021-07-12 RX ORDER — SODIUM CHLORIDE 9 MG/ML
1000 INJECTION, SOLUTION INTRAVENOUS
Refills: 0 | Status: DISCONTINUED | OUTPATIENT
Start: 2021-07-12 | End: 2021-07-12

## 2021-07-12 RX ORDER — DEXAMETHASONE 0.5 MG/5ML
4 ELIXIR ORAL EVERY 6 HOURS
Refills: 0 | Status: DISCONTINUED | OUTPATIENT
Start: 2021-07-12 | End: 2021-07-13

## 2021-07-12 RX ORDER — DIPHENHYDRAMINE HCL 50 MG
25 CAPSULE ORAL EVERY 6 HOURS
Refills: 0 | Status: DISCONTINUED | OUTPATIENT
Start: 2021-07-12 | End: 2021-07-15

## 2021-07-12 RX ORDER — SODIUM CHLORIDE 9 MG/ML
1000 INJECTION, SOLUTION INTRAVENOUS
Refills: 0 | Status: DISCONTINUED | OUTPATIENT
Start: 2021-07-12 | End: 2021-07-13

## 2021-07-12 RX ORDER — OXYCODONE HYDROCHLORIDE 5 MG/1
5 TABLET ORAL
Refills: 0 | Status: DISCONTINUED | OUTPATIENT
Start: 2021-07-12 | End: 2021-07-13

## 2021-07-12 RX ORDER — KETOROLAC TROMETHAMINE 30 MG/ML
30 SYRINGE (ML) INJECTION EVERY 6 HOURS
Refills: 0 | Status: DISCONTINUED | OUTPATIENT
Start: 2021-07-12 | End: 2021-07-13

## 2021-07-12 RX ORDER — ACETAMINOPHEN 500 MG
1000 TABLET ORAL ONCE
Refills: 0 | Status: COMPLETED | OUTPATIENT
Start: 2021-07-12 | End: 2021-07-12

## 2021-07-12 RX ORDER — IBUPROFEN 200 MG
600 TABLET ORAL EVERY 6 HOURS
Refills: 0 | Status: COMPLETED | OUTPATIENT
Start: 2021-07-12 | End: 2022-06-10

## 2021-07-12 RX ORDER — OXYCODONE HYDROCHLORIDE 5 MG/1
10 TABLET ORAL
Refills: 0 | Status: DISCONTINUED | OUTPATIENT
Start: 2021-07-12 | End: 2021-07-13

## 2021-07-12 RX ORDER — OXYTOCIN 10 UNIT/ML
333.33 VIAL (ML) INJECTION
Qty: 20 | Refills: 0 | Status: DISCONTINUED | OUTPATIENT
Start: 2021-07-12 | End: 2021-07-12

## 2021-07-12 RX ORDER — LANOLIN
1 OINTMENT (GRAM) TOPICAL EVERY 6 HOURS
Refills: 0 | Status: DISCONTINUED | OUTPATIENT
Start: 2021-07-12 | End: 2021-07-15

## 2021-07-12 RX ORDER — HEPARIN SODIUM 5000 [USP'U]/ML
5000 INJECTION INTRAVENOUS; SUBCUTANEOUS EVERY 12 HOURS
Refills: 0 | Status: DISCONTINUED | OUTPATIENT
Start: 2021-07-12 | End: 2021-07-15

## 2021-07-12 RX ORDER — OXYCODONE HYDROCHLORIDE 5 MG/1
5 TABLET ORAL ONCE
Refills: 0 | Status: DISCONTINUED | OUTPATIENT
Start: 2021-07-12 | End: 2021-07-15

## 2021-07-12 RX ORDER — SIMETHICONE 80 MG/1
80 TABLET, CHEWABLE ORAL EVERY 4 HOURS
Refills: 0 | Status: DISCONTINUED | OUTPATIENT
Start: 2021-07-12 | End: 2021-07-15

## 2021-07-12 RX ORDER — MAGNESIUM HYDROXIDE 400 MG/1
30 TABLET, CHEWABLE ORAL
Refills: 0 | Status: DISCONTINUED | OUTPATIENT
Start: 2021-07-12 | End: 2021-07-15

## 2021-07-12 RX ORDER — SODIUM CHLORIDE 9 MG/ML
1000 INJECTION, SOLUTION INTRAVENOUS ONCE
Refills: 0 | Status: DISCONTINUED | OUTPATIENT
Start: 2021-07-12 | End: 2021-07-12

## 2021-07-12 RX ORDER — SERTRALINE 25 MG/1
50 TABLET, FILM COATED ORAL DAILY
Refills: 0 | Status: DISCONTINUED | OUTPATIENT
Start: 2021-07-12 | End: 2021-07-12

## 2021-07-12 RX ORDER — ONDANSETRON 8 MG/1
4 TABLET, FILM COATED ORAL EVERY 6 HOURS
Refills: 0 | Status: DISCONTINUED | OUTPATIENT
Start: 2021-07-12 | End: 2021-07-13

## 2021-07-12 RX ORDER — OXYTOCIN 10 UNIT/ML
333.33 VIAL (ML) INJECTION
Qty: 20 | Refills: 0 | Status: DISCONTINUED | OUTPATIENT
Start: 2021-07-12 | End: 2021-07-13

## 2021-07-12 RX ORDER — ONDANSETRON 8 MG/1
4 TABLET, FILM COATED ORAL ONCE
Refills: 0 | Status: DISCONTINUED | OUTPATIENT
Start: 2021-07-12 | End: 2021-07-13

## 2021-07-12 RX ORDER — SODIUM CHLORIDE 9 MG/ML
1000 INJECTION, SOLUTION INTRAVENOUS ONCE
Refills: 0 | Status: COMPLETED | OUTPATIENT
Start: 2021-07-12 | End: 2021-07-12

## 2021-07-12 RX ORDER — NALOXONE HYDROCHLORIDE 4 MG/.1ML
0.1 SPRAY NASAL
Refills: 0 | Status: DISCONTINUED | OUTPATIENT
Start: 2021-07-12 | End: 2021-07-13

## 2021-07-12 RX ORDER — OXYCODONE HYDROCHLORIDE 5 MG/1
5 TABLET ORAL
Refills: 0 | Status: COMPLETED | OUTPATIENT
Start: 2021-07-12 | End: 2021-07-19

## 2021-07-12 RX ORDER — CITRIC ACID/SODIUM CITRATE 300-500 MG
30 SOLUTION, ORAL ORAL ONCE
Refills: 0 | Status: COMPLETED | OUTPATIENT
Start: 2021-07-12 | End: 2021-07-12

## 2021-07-12 RX ORDER — NALBUPHINE HYDROCHLORIDE 10 MG/ML
2.5 INJECTION, SOLUTION INTRAMUSCULAR; INTRAVENOUS; SUBCUTANEOUS EVERY 6 HOURS
Refills: 0 | Status: DISCONTINUED | OUTPATIENT
Start: 2021-07-12 | End: 2021-07-13

## 2021-07-12 RX ORDER — SERTRALINE 25 MG/1
50 TABLET, FILM COATED ORAL AT BEDTIME
Refills: 0 | Status: DISCONTINUED | OUTPATIENT
Start: 2021-07-12 | End: 2021-07-13

## 2021-07-12 RX ORDER — FAMOTIDINE 10 MG/ML
20 INJECTION INTRAVENOUS ONCE
Refills: 0 | Status: COMPLETED | OUTPATIENT
Start: 2021-07-12 | End: 2021-07-12

## 2021-07-12 RX ORDER — CITRIC ACID/SODIUM CITRATE 300-500 MG
30 SOLUTION, ORAL ORAL ONCE
Refills: 0 | Status: DISCONTINUED | OUTPATIENT
Start: 2021-07-12 | End: 2021-07-12

## 2021-07-12 RX ORDER — ACETAMINOPHEN 500 MG
975 TABLET ORAL
Refills: 0 | Status: DISCONTINUED | OUTPATIENT
Start: 2021-07-12 | End: 2021-07-15

## 2021-07-12 RX ORDER — FERROUS SULFATE 325(65) MG
325 TABLET ORAL DAILY
Refills: 0 | Status: DISCONTINUED | OUTPATIENT
Start: 2021-07-12 | End: 2021-07-15

## 2021-07-12 RX ADMIN — SIMETHICONE 80 MILLIGRAM(S): 80 TABLET, CHEWABLE ORAL at 23:17

## 2021-07-12 RX ADMIN — SODIUM CHLORIDE 75 MILLILITER(S): 9 INJECTION, SOLUTION INTRAVENOUS at 21:12

## 2021-07-12 RX ADMIN — Medication 30 MILLIGRAM(S): at 23:20

## 2021-07-12 RX ADMIN — OXYCODONE HYDROCHLORIDE 5 MILLIGRAM(S): 5 TABLET ORAL at 23:17

## 2021-07-12 RX ADMIN — Medication 400 MILLIGRAM(S): at 21:12

## 2021-07-12 RX ADMIN — FAMOTIDINE 20 MILLIGRAM(S): 10 INJECTION INTRAVENOUS at 12:15

## 2021-07-12 RX ADMIN — SODIUM CHLORIDE 2000 MILLILITER(S): 9 INJECTION, SOLUTION INTRAVENOUS at 12:01

## 2021-07-12 RX ADMIN — Medication 30 MILLILITER(S): at 12:00

## 2021-07-12 NOTE — OB RN PREOPERATIVE CHECKLIST - NS_PREOPMEDADMIN_OBGYN_ALL_OB
Patient here after she scratched both her arms with a pencil. She states she was bullied yesterday at the library and today kids at school told her she should kill herself. Patient states she was frustrated and was not trying to harm herself. Dad brought her here as precautionary measure.    Yes, see eMAR

## 2021-07-12 NOTE — OB RN PATIENT PROFILE - NSMATERNALFETALCONCERNS_OBGYN_ALL_OB_FT
MATERNAL FETAL ALERT  C/S X3   history of anxiety and depression  treated with Paxil , suicidal ideation and alcohol use in early  pregnancy.  Admitted x2 in this pregnancy ,  (3/30/2021 and 4/4/2021)   alert SW , NICU  s/p NICU consult

## 2021-07-12 NOTE — OB RN DELIVERY SUMMARY - NSSELHIDDEN_OBGYN_ALL_OB_FT
[[email or web address]_ALL_OB_FT:[id number]],[NS_DeliveryAssist1_OBGYN_ALL_OB_FT:[id number]],[NS_DeliveryRN_OBGYN_ALL_OB_FT:[id number]]

## 2021-07-12 NOTE — OB RN PATIENT PROFILE - NS_DATEOFLASTVISIT_OBGYN_ALL_OB_DT
----- Message from Riley Garza sent at 11/3/2017  3:53 PM CDT -----  Contact: self [phone]  Type: Sooner appointment than  is able to schedule    When is the first available appointment? 01/16/17    What is the nature of the appointment? EP     What appointment type: follow up      Comments: please advise , Thanks !   06-Jul-2021 <<----- Click to add NO significant Past Surgical History

## 2021-07-12 NOTE — OB PROVIDER H&P - HISTORY OF PRESENT ILLNESS
26 y.o.  KD 2021 at 39w0d presents for scheduled repeat CS.   Pt denies LOF, VB, dysuria. States +FM.     GBS: neg  EFW: 3200  LUCIA: pasta and porkchop 9p last night, gatorade at 930a    PNC: [facility]  - alcohol use disorder, hospitalized 3/30/2021 for withdrawal  - paxil use in pregnancy s/p overdose 2021 requiring admission. Normal fetal echo . Transitioned to zoloft  - transaminitis, resolved. Likely 2/2 alcohol use    OBH: no PPH, VTE, PEC, SSI  - 2013    40 week  pLTCS 2/2 cat II FHR 8 lbs  - 7/10/2014  38 week  rLTCS 8lbs  - 2019  38 week  rLTCS TIUP, 7lbs twin A, 7lbs twin B  PMSH: CS x 3  All: NKDA  Meds: prenatal vitamins, zoloft 50 mg PO HS  psychosocial hx:  history of anxiety and depression  treated with Paxil , suicidal ideation and alcohol use in pregnancy.  Admitted x2 in this pregnancy ,  (3/30/2021 and 2021) - currently on zoloft 50 mg PO HS, counseling twice weekly

## 2021-07-12 NOTE — OB NEONATOLOGY/PEDIATRICIAN DELIVERY SUMMARY - NSPEDSNEONOTESA_OBGYN_ALL_OB_FT
Called by Dr. Berumen to attend  repeat C- section  after delivery due to persistent grunting and retracting of baby . Baby is  product of a 39 week gestation born to a G 4     28 year old female   Maternal labs include Blood Type  O+ , HIV - , RPR - , Hep B[ - ], GBS - , rest is unremarkable. Maternal history is significant for anxiety, depression w/ suicidal ideation (on zoloft) . Pregnancy was complicated by alcohol use in early pregnancy .  AROM at time of delivery .  Resuscitation included: w/d/s/s. AT 8 MOL baby began showing work of breathing including persistent grunting, nasal flaring, retracting and O2 Sat in low 80s. CPAP5 20% FiO2 was started at 10MOL x 2 mins, when weaned to RA and grunting improved and sats stabilized.   Apgars were: 8/9 . EOS score 0.03. Mom to initiate breast/bottle feeding. Consents Hep B vaccine. Admit to [facility] . Called by Dr. Berumen to attend  repeat C- section  after delivery due to persistent grunting and retracting of baby . Baby is  product of a 39 week gestation born to a G 4     28 year old female   Maternal labs include Blood Type  O+ , HIV - , RPR - , Hep B[ - ], GBS - , rest is unremarkable. Maternal history is significant for anxiety, depression w/ suicidal ideation (on zoloft) . Pregnancy was complicated by alcohol use in early pregnancy .  AROM at time of delivery .  Resuscitation included: w/d/s/s. At 5 MOL baby began showing work of breathing including persistent grunting, nasal flaring, retracting and O2 Sat in low 80s. CPAP5 20% FiO2 was started at 10MOL x 2 mins, when weaned to RA and grunting improved and sats stabilized.   Apgars were: 8/9 . EOS score 0.03. Mom to initiate breast/bottle feeding. Consents Hep B vaccine. Admit to [facility] . Called by Dr. Berumen to attend  repeat C- section  after delivery due to persistent grunting and retracting of baby . Baby is  product of a 39 week gestation born to a G 4    28 year old female   Maternal labs include Blood Type  O+ , HIV - , RPR - , Hep B[ - ], GBS - , rest is unremarkable. Maternal history is significant for anxiety, depression w/ suicidal ideation (on zoloft) . Pregnancy was complicated by alcohol use in early pregnancy .  AROM at time of delivery .  Resuscitation included: w/d/s/s. Peds called to OR Delivery at 5 MOL by RN and OB secondary to baby began showing increased work of breathing including persistent grunting, nasal flaring, retracting and O2 Sat in low 80s. CPAP5 20% FiO2 was started at 10 MOL and continued x 2 mins. Infant weaned to RA with grunting improved and oxygen saturations stable and within normal range for MOL. Apgars were: 8/9 . EOS score 0.03. Mom to initiate breast/bottle feeding. Consents Hep B vaccine. Admit to [facility] .

## 2021-07-12 NOTE — OB RN DELIVERY SUMMARY - NS_SEROLOGYDONE_OBGYN_ALL_OB
Patient : Renny Rodriguez Age: 17 year old Sex: male   MRN: [id number] Encounter Date: 6/17/2021      History     Chief Complaint   Patient presents with   • Abdominal Pain   • Nausea   • Rectal Bleeding     Renny Rodriguez is a 17 year old male with a PMHx of anxiety and IBS accompanied by his dad who presents to the ED for evaluation of abdominal pain and bloody diarrhea. He was diagnosed with C.Diff on Wednesday (06/09/2021), he started his vancomycin and has been taking it, although he says that he is only taking it 2-3 times per day instead of the prescribed 4 times daily. He reports associated fever, noting that he took Tylenol. He also tried to take anti-diarrhea medication, noting that he continues to experience diarrhea 2-3x a day, with two episodes of bloody diarrhea today. The abdominal pain has awakened him from his sleep. He rates the pain 6-7/10 presently, it is a 8-9/10 at it's worse. The pain is intermittent. He characterizes the pain as \"Cramping\". He has taken dicylcomine, but reports that it does not provide relief. He has tried to eat chicken noodle soup. States that he is not eating much overall.     Denies tobacco use, alcohol use or illicit drug use  Has not been vaccinated against Covid  He follows with Dr. Frankel of GI    [facility] medications are sent to [facility] - [id number] [facility]          No Known Allergies    Discharge Medication List as of 6/17/2021  4:42 AM      Prior to Admission Medications    Details   vancomycin (VANCOCIN) 125 MG capsule Take 1 capsule by mouth 4 times daily for 14 days.Eprescribe, Disp-56 capsule, R-0      dicyclomine (BENTYL) 10 MG capsule Take 1 capsule by mouth every 6 hours as needed (cramping).Eprescribe, Disp-90 capsule, R-11      escitalopram (LEXAPRO) 10 MG tablet Take 1 tablet by mouth daily.Eprescribe, Disp-30 tablet,R-2      Dextromethorphan HBr 15 MG Cap Take 15 mg by mouth.Historical Med      loratadine (CLARITIN) 10 MG tablet Take 10  mg by mouth daily.Historical Med         New Prescriptions    Details   traMADol (ULTRAM) 50 MG tablet Take 1 tablet by mouth every 4 hours as needed for Pain.Eprescribe, Disp-10 tablet, R-0             Past Medical History:   Diagnosis Date   • Anxiety    • Irritable bowel        Past Surgical History:   Procedure Laterality Date   • DENTAL SURGERY     • HB TREATMENT OF ARM FRACTURE Left        Family History   Problem Relation Age of Onset   • Thyroid Mother    • Patient is unaware of any medical problems Father    • Diabetes Paternal Grandmother    • Cancer, Lung Paternal Grandmother    • Patient is unaware of any medical problems Paternal Grandfather        Social History     Tobacco Use   • Smoking status: Never Smoker   • Smokeless tobacco: Never Used   Vaping Use   • Vaping Use: never used   Substance Use Topics   • Alcohol use: Never   • Drug use: Never       Review of Systems   Constitutional: Positive for appetite change and fever. Negative for chills, diaphoresis and fatigue.   HENT: Negative for congestion, ear pain, rhinorrhea and sore throat.    Eyes: Negative for pain, redness and visual disturbance.   Respiratory: Negative for cough and shortness of breath.    Cardiovascular: Negative for chest pain, palpitations and leg swelling.   Gastrointestinal: Positive for abdominal pain, blood in stool and diarrhea. Negative for constipation, nausea and vomiting.   Endocrine: Negative for polydipsia and polyuria.   Genitourinary: Negative for dysuria, frequency, hematuria and urgency.   Musculoskeletal: Negative for back pain, myalgias and neck pain.   Skin: Negative for rash.   Allergic/Immunologic: Negative for immunocompromised state.   Neurological: Negative for dizziness, tremors, speech difficulty, weakness, numbness and headaches.   Hematological: Does not bruise/bleed easily.   Psychiatric/Behavioral: Negative for suicidal ideas.       Physical Exam     ED Triage Vitals [06/16/21 2124]   ED Triage  Vitals Group      Temp 98.7 °F (37.1 °C)      Heart Rate 90      Resp 16      /68      SpO2 97 %      EtCO2 mmHg       Height       Weight 120 lb (54.4 kg)      Weight Scale Used ED Stated      BMI (Calculated)       IBW/kg (Calculated)        Physical Exam  Vitals and nursing note reviewed.   Constitutional:       General: He is not in acute distress.     Appearance: He is not ill-appearing.   HENT:      Head: Normocephalic and atraumatic.      Nose: Nose normal.      Mouth/Throat:      Mouth: Mucous membranes are moist.      Pharynx: No oropharyngeal exudate or posterior oropharyngeal erythema.   Eyes:      Extraocular Movements: Extraocular movements intact.      Conjunctiva/sclera: Conjunctivae normal.      Pupils: Pupils are equal, round, and reactive to light.   Cardiovascular:      Rate and Rhythm: Normal rate and regular rhythm.      Pulses: Normal pulses.   Pulmonary:      Effort: Pulmonary effort is normal. No respiratory distress.      Breath sounds: Normal breath sounds. No wheezing.   Chest:      Chest wall: No tenderness.   Abdominal:      General: There is no distension.      Palpations: Abdomen is soft.      Tenderness: There is no abdominal tenderness. There is no guarding or rebound.   Musculoskeletal:         General: No swelling, tenderness or deformity. Normal range of motion.      Cervical back: Normal range of motion and neck supple. No rigidity. No muscular tenderness.      Right lower leg: No edema.      Left lower leg: No edema.   Skin:     General: Skin is warm and dry.      Capillary Refill: Capillary refill takes less than 2 seconds.      Findings: No rash.   Neurological:      Mental Status: He is alert and oriented to person, place, and time.      Cranial Nerves: Cranial nerves are intact.      Sensory: Sensation is intact.      Motor: Motor function is intact.      Coordination: Coordination is intact. Romberg sign negative. Finger-Nose-Finger Test and Heel to Shin Test normal.       Gait: Gait is intact.   Psychiatric:         Mood and Affect: Mood normal.         Behavior: Behavior normal.         ED Course     Procedures    Lab Results     Results for orders placed or performed during the hospital encounter of 06/17/21   Comprehensive Metabolic Panel   Result Value Ref Range    Fasting Status      Sodium 140 135 - 145 mmol/L    Potassium 4.0 3.4 - 5.1 mmol/L    Chloride 103 98 - 107 mmol/L    Carbon Dioxide 30 21 - 32 mmol/L    Anion Gap 11 10 - 20 mmol/L    Glucose 101 (H) 65 - 99 mg/dL    BUN 13 6 - 20 mg/dL    Creatinine 0.71 0.67 - 1.17 mg/dL    Glomerular Filtration Rate >90 >90 mL/min/1.73m2    BUN/ Creatinine Ratio 18 7 - 25    Calcium 8.6 8.4 - 10.2 mg/dL    Bilirubin, Total 1.0 0.2 - 1.0 mg/dL    GOT/AST 46 (H) <=37 Units/L    GPT/ALT 53 <64 Units/L    Alkaline Phosphatase 109 55 - 220 Units/L    Albumin 3.2 (L) 3.6 - 5.1 g/dL    Protein, Total 7.8 6.4 - 8.2 g/dL    Globulin 4.6 (H) 2.0 - 4.0 g/dL    A/G Ratio 0.7 (L) 1.0 - 2.4   Lipase   Result Value Ref Range    Lipase <50 (L) 73 - 393 Units/L   Urinalysis & Reflex Microscopy With Culture If Indicated   Result Value Ref Range    COLOR, URINALYSIS Yellow     APPEARANCE, URINALYSIS Clear     GLUCOSE, URINALYSIS Negative Negative mg/dL    BILIRUBIN, URINALYSIS Negative Negative    KETONES, URINALYSIS Trace (A) Negative mg/dL    SPECIFIC GRAVITY, URINALYSIS >1.030 (H) 1.005 - 1.030    OCCULT BLOOD, URINALYSIS Negative Negative    PH, URINALYSIS 5.5 5.0 - 7.0    PROTEIN, URINALYSIS 30  (A) Negative mg/dL    UROBILINOGEN, URINALYSIS 0.2 0.2, 1.0 mg/dL    NITRITE, URINALYSIS Negative Negative    LEUKOCYTE ESTERASE, URINALYSIS Negative Negative    SQUAMOUS EPITHELIAL, URINALYSIS None Seen None Seen, 1 to 5 /hpf    ERYTHROCYTES, URINALYSIS None Seen None Seen, 1 to 2 /hpf    LEUKOCYTES, URINALYSIS None Seen None Seen, 1 to 5 /hpf    BACTERIA, URINALYSIS None Seen None Seen /hpf    HYALINE CASTS, URINALYSIS None Seen None Seen, 1 to 5  /lpf    CALCIUM OXALATE CRYSTALS Present    CBC with Automated Differential (performable only)   Result Value Ref Range    WBC 10.5 4.2 - 11.0 K/mcL    RBC 4.91 4.50 - 5.90 mil/mcL    HGB 14.6 13.0 - 17.0 g/dL    HCT 42.9 39.0 - 51.0 %    MCV 87.4 78.0 - 100.0 fl    MCH 29.7 26.0 - 34.0 pg    MCHC 34.0 32.0 - 36.5 g/dL    RDW-CV 12.7 11.0 - 15.0 %    RDW-SD 40.3 39.0 - 50.0 fL     140 - 450 K/mcL    NRBC 0 <=0 /100 WBC    Neutrophil, Percent 54 %    Lymphocytes, Percent 23 %    Mono, Percent 15 %    Eosinophils, Percent 7 %    Basophils, Percent 1 %    Immature Granulocytes 0 %    Absolute Neutrophils 5.7 1.8 - 8.0 K/mcL    Absolute Lymphocytes 2.5 1.2 - 5.2 K/mcL    Absolute Monocytes 1.5 (H) 0.3 - 0.9 K/mcL    Absolute Eosinophils  0.8 (H) 0.0 - 0.5 K/mcL    Absolute Basophils 0.1 0.0 - 0.3 K/mcL    Absolute Immmature Granulocytes 0.0 0.0 - 0.2 K/mcL       EKG Results       Radiology Results     Imaging Results    None         ED Medication Orders (From admission, onward)    Ordered Start     Status Ordering Provider    06/17/21 0437 06/17/21 0438  traMADol (ULTRAM) tablet 50 mg  ONCE      Last MAR action: Uvaldo HORVATH ZIA LEDEZMA  is a 17 year old male with a PMHx of anxiety and IBS accompanied by his dad who presents to the ED for evaluation of abdominal pain and bloody diarrhea.  On exam, patient is well-appearing, no acute distress.  He has normal vitals.  Normal cardiopulmonary exam.  Abdomen is soft and nontender to palpation.  He has moist mucous membranes.  Labs are within a normal range including normal white blood cell count, normal electrolytes.  Stressed to patient the importance of taking his antibiotics as prescribed, specifically he needs to be taking the vancomycin 4 times daily.  Also stressed the importance of good hydration.  Advised him to continue to take the Bentyl as needed for abdominal pain and will also add a course of tramadol for breakthrough pain.  Advised  follow-up with his gastroenterologist.  Return precautions given.  Patient expressed understanding of and agreement with plan.    Clinical Impression     ED Diagnosis   1. C. difficile diarrhea         Disposition        Discharge 6/17/2021  4:41 AM  Renny Rodriguez discharge to home/self care.              Charting performed by ED Mayito Rodney for Dr. Paz    I have reviewed the scribe's charting and agree that the final signed note accurately reflects my personal performance of the history, physical exam, hospital course, and assessment and plan.             Loida Paz MD  06/17/21 0640     Yes

## 2021-07-12 NOTE — OB PROVIDER H&P - ASSESSMENT
26y  at 39w0d presents for scheduled  section.  - NPO, IVF, anesthesia consult  - c/w home zoloft in-house, plan for PP social work consult  - CS scheduled for 1230p  - preop meds    Rebecca Cordero PGY4

## 2021-07-12 NOTE — OB RN INTRAOPERATIVE NOTE - NSSELHIDDEN_OBGYN_ALL_OB_FT
[NS_[email or web address]_ALL_OB_FT:[id number]],[NS_DeliveryAssist1_OBGYN_ALL_OB_FT:[id number]],[NS_DeliveryRN_OBGYN_ALL_OB_FT:[email or web address]]

## 2021-07-12 NOTE — OB PROVIDER DELIVERY SUMMARY - NSPROVIDERDELIVERYNOTE_OBGYN_ALL_OB_FT
IVF 3300      Viable female infant, apgars 8/9, 3280g, cephalic presentation  Grossly normal uterus, tubes, ovaries  Hysterotomy closed in a single layer  Bladder backfilled with 300cc methylene blue--no extravasation and bladder edge clear from hysterotomy repair

## 2021-07-12 NOTE — OB PROVIDER H&P - NSHPPHYSICALEXAM_GEN_ALL_CORE
GEN: NAD  RESP: CTABL  CV: RRR  Back: no CVAT  Abd: soft, nontender, gravid. No RUQ tenderness  Ext: b/l symmetric  SVE: [facility]  EFM: pending  TOCO: pending  BSS: pending    ATU Sono (6/21): vtx, anterior placenta, GRISELDA 16.5, EFW 2696g (37%)

## 2021-07-12 NOTE — OB PROVIDER DELIVERY SUMMARY - NSMATERNALFETALCONCERNS_OBGYN_ALL_OB_FT
MATERNAL FETAL ALERT  C/S X3   history of anxiety and depression  treated with zoloft , suicidal ideation and alcohol use in early  pregnancy.  Admitted x2 in this pregnancy ,  (3/30/2021 and 4/4/2021)   alert SW , NICU  s/p NICU consult

## 2021-07-12 NOTE — OB NEONATOLOGY/PEDIATRICIAN DELIVERY SUMMARY - NSPEDSCALLREASONOTHER_OBGYN_ALL_OB_FT
grunting and retracting with oxygen saturation in the 80s grunting and retracting with oxygen saturation in the 80s at 5  MOL in the OR

## 2021-07-12 NOTE — OB PROVIDER DELIVERY SUMMARY - NSSELHIDDEN_OBGYN_ALL_OB_FT
[NS_DeliveryAttending1_OBGYN_ALL_OB_FT:[id number]],[NS_DeliveryAssist1_OBGYN_ALL_OB_FT:[id number]=],[NS_DeliveryRN_OBGYN_ALL_OB_FT:[email or web address]]

## 2021-07-12 NOTE — OB PROVIDER H&P - NS_SCHEDCS_OBGYN_ALL_OB
Prior  Chonodrocutaneous Helical Advancement Flap Text: The defect edges were debeveled with a #15 scalpel blade.  Given the location of the defect and the proximity to free margins a chondrocutaneous helical advancement flap was deemed most appropriate.  Using a sterile surgical marker, the appropriate advancement flap was drawn incorporating the defect and placing the expected incisions within the relaxed skin tension lines where possible.    The area thus outlined was incised deep to adipose tissue with a #15 scalpel blade.  The skin margins were undermined to an appropriate distance in all directions utilizing iris scissors.

## 2021-07-12 NOTE — OB NEONATOLOGY/PEDIATRICIAN DELIVERY SUMMARY - NSMATERNALFETALCONCERNS_OBGYN_ALL_OB_FT
MATERNAL FETAL ALERT  C/S X3   history of anxiety and depression  treated with Paxil , suicidal ideation and alcohol use in early  pregnancy.  Admitted x2 in this pregnancy ,  (3/30/2021 and 4/4/2021)   alert SW , NICU  s/p NICU consult     MATERNAL FETAL ALERT  C/S X3   history of anxiety and depression  treated with zoloft , suicidal ideation and alcohol use in early  pregnancy.  Admitted x2 in this pregnancy ,  (3/30/2021 and 4/4/2021)   alert SW , NICU  s/p NICU consult

## 2021-07-12 NOTE — OB RN PATIENT PROFILE - NS_SOCIALWORKCONS_OBGYN_ALL_OB
Patient: Prudence Alvarado  : 1940  MRN:   [id number]   Date: 2017 8:01 AM  Attending: Herbert Mcclain MD  Financial : [id number]               HPI: 78 yo man seen in clinic in  for dysphagia and history of colon polyps. (see scanned note for details).    Past Medical History:   Diagnosis Date   • Arthritis     shoulders and knees   • BPH (benign prostatic hypertrophy)    • Hyperlipidemia    • Iron deficiency anemia    • Tendinitis          No current facility-administered medications on file prior to encounter.   Current Outpatient Prescriptions on File Prior to Encounter:  rOPINIRole (REQUIP) 0.25 MG tablet   acetaminophen (TYLENOL) 325 MG tablet   aspirin 81 MG tablet   Psyllium (METAMUCIL FIBER PO)   guaiFENesin (MUCINEX) 600 MG 12 hr tablet       ALLERGIES:  No Known Allergies    Social History     Social History   • Marital status:      Spouse name: N/A   • Number of children: 3   • Years of education: N/A     Occupational History   • Not on file.     Social History Main Topics   • Smoking status: Former Smoker     Packs/day: 1.00     Years: 30.00     Types: Cigarettes     Quit date: 1977   • Smokeless tobacco: Never Used   • Alcohol use 2.4 oz/week     4 Standard drinks or equivalent per week   • Drug use: No   • Sexual activity: Not on file     Other Topics Concern   • Seat Belt Yes     Social History Narrative   • No narrative on file       Family History   Problem Relation Age of Onset   • Diabetes Mother      adult onset   • Heart disease Mother      pacemaker   • Hypertension Mother    • Arthritis Mother    • Heart disease Father    • Arthritis Father    • Hypertension Brother    • Arthritis Sister    • Arthritis Brother    • Arthritis Other      self       PHYSICAL EXAMINATION:  VITALS:   Visit Vitals  /67   Pulse 69   Temp 97.5 °F (36.4 °C) (Temporal Artery)   Resp 16   Ht 6' 2\" (1.88 m)   Wt 95.3 kg   SpO2 95%   BMI 26.96 kg/m²     NECK:  Without bruits or  lymphadenopathy.  LUNGS:  Clear to auscultation.  HEART:  Sounds are regular without obvious murmur.  No peripheral edema or  signs of ischemia.  ABD: soft, nontender, good bowel sounds.    A/P: Proceed with EGD and Colonoscopy.   Yes

## 2021-07-13 ENCOUNTER — TRANSCRIPTION ENCOUNTER (OUTPATIENT)
Age: 26
End: 2021-07-13

## 2021-07-13 LAB
BASOPHILS # BLD AUTO: 0.05 K/UL — SIGNIFICANT CHANGE UP (ref 0–0.2)
BASOPHILS NFR BLD AUTO: 0.4 % — SIGNIFICANT CHANGE UP (ref 0–2)
EOSINOPHIL # BLD AUTO: 0.03 K/UL — SIGNIFICANT CHANGE UP (ref 0–0.5)
EOSINOPHIL NFR BLD AUTO: 0.2 % — SIGNIFICANT CHANGE UP (ref 0–6)
HCT VFR BLD CALC: 32.4 % — LOW (ref 34.5–45)
HGB BLD-MCNC: 10.6 G/DL — LOW (ref 11.5–15.5)
IANC: 10.85 K/UL — HIGH (ref 1.5–8.5)
IMM GRANULOCYTES NFR BLD AUTO: 0.7 % — SIGNIFICANT CHANGE UP (ref 0–1.5)
LYMPHOCYTES # BLD AUTO: 1.64 K/UL — SIGNIFICANT CHANGE UP (ref 1–3.3)
LYMPHOCYTES # BLD AUTO: 12.3 % — LOW (ref 13–44)
MCHC RBC-ENTMCNC: 25.2 PG — LOW (ref 27–34)
MCHC RBC-ENTMCNC: 32.7 GM/DL — SIGNIFICANT CHANGE UP (ref 32–36)
MCV RBC AUTO: 77 FL — LOW (ref 80–100)
MONOCYTES # BLD AUTO: 0.72 K/UL — SIGNIFICANT CHANGE UP (ref 0–0.9)
MONOCYTES NFR BLD AUTO: 5.4 % — SIGNIFICANT CHANGE UP (ref 2–14)
NEUTROPHILS # BLD AUTO: 10.85 K/UL — HIGH (ref 1.8–7.4)
NEUTROPHILS NFR BLD AUTO: 81 % — HIGH (ref 43–77)
NRBC # BLD: 0 /100 WBCS — SIGNIFICANT CHANGE UP
NRBC # FLD: 0 K/UL — SIGNIFICANT CHANGE UP
PLATELET # BLD AUTO: 244 K/UL — SIGNIFICANT CHANGE UP (ref 150–400)
RBC # BLD: 4.21 M/UL — SIGNIFICANT CHANGE UP (ref 3.8–5.2)
RBC # FLD: 14.6 % — HIGH (ref 10.3–14.5)
T PALLIDUM AB TITR SER: NEGATIVE — SIGNIFICANT CHANGE UP
WBC # BLD: 13.38 K/UL — HIGH (ref 3.8–10.5)
WBC # FLD AUTO: 13.38 K/UL — HIGH (ref 3.8–10.5)

## 2021-07-13 PROCEDURE — 90792 PSYCH DIAG EVAL W/MED SRVCS: CPT

## 2021-07-13 RX ORDER — DIPHENHYDRAMINE HCL 50 MG
25 CAPSULE ORAL EVERY 6 HOURS
Refills: 0 | Status: DISCONTINUED | OUTPATIENT
Start: 2021-07-13 | End: 2021-07-15

## 2021-07-13 RX ORDER — IBUPROFEN 200 MG
600 TABLET ORAL EVERY 6 HOURS
Refills: 0 | Status: DISCONTINUED | OUTPATIENT
Start: 2021-07-13 | End: 2021-07-15

## 2021-07-13 RX ORDER — OXYCODONE HYDROCHLORIDE 5 MG/1
5 TABLET ORAL
Refills: 0 | Status: DISCONTINUED | OUTPATIENT
Start: 2021-07-13 | End: 2021-07-15

## 2021-07-13 RX ORDER — SERTRALINE 25 MG/1
50 TABLET, FILM COATED ORAL AT BEDTIME
Refills: 0 | Status: DISCONTINUED | OUTPATIENT
Start: 2021-07-13 | End: 2021-07-15

## 2021-07-13 RX ADMIN — Medication 325 MILLIGRAM(S): at 11:44

## 2021-07-13 RX ADMIN — OXYCODONE HYDROCHLORIDE 5 MILLIGRAM(S): 5 TABLET ORAL at 13:05

## 2021-07-13 RX ADMIN — Medication 975 MILLIGRAM(S): at 07:00

## 2021-07-13 RX ADMIN — OXYCODONE HYDROCHLORIDE 5 MILLIGRAM(S): 5 TABLET ORAL at 06:20

## 2021-07-13 RX ADMIN — HEPARIN SODIUM 5000 UNIT(S): 5000 INJECTION INTRAVENOUS; SUBCUTANEOUS at 05:36

## 2021-07-13 RX ADMIN — OXYCODONE HYDROCHLORIDE 10 MILLIGRAM(S): 5 TABLET ORAL at 02:50

## 2021-07-13 RX ADMIN — SENNA PLUS 1 TABLET(S): 8.6 TABLET ORAL at 21:06

## 2021-07-13 RX ADMIN — Medication 1 TABLET(S): at 11:43

## 2021-07-13 RX ADMIN — Medication 30 MILLIGRAM(S): at 06:05

## 2021-07-13 RX ADMIN — SERTRALINE 50 MILLIGRAM(S): 25 TABLET, FILM COATED ORAL at 01:57

## 2021-07-13 RX ADMIN — HEPARIN SODIUM 5000 UNIT(S): 5000 INJECTION INTRAVENOUS; SUBCUTANEOUS at 17:20

## 2021-07-13 RX ADMIN — OXYCODONE HYDROCHLORIDE 5 MILLIGRAM(S): 5 TABLET ORAL at 13:42

## 2021-07-13 RX ADMIN — SENNA PLUS 1 TABLET(S): 8.6 TABLET ORAL at 05:35

## 2021-07-13 RX ADMIN — Medication 30 MILLIGRAM(S): at 12:05

## 2021-07-13 RX ADMIN — OXYCODONE HYDROCHLORIDE 5 MILLIGRAM(S): 5 TABLET ORAL at 22:41

## 2021-07-13 RX ADMIN — Medication 975 MILLIGRAM(S): at 06:20

## 2021-07-13 RX ADMIN — OXYCODONE HYDROCHLORIDE 5 MILLIGRAM(S): 5 TABLET ORAL at 00:15

## 2021-07-13 RX ADMIN — Medication 25 MILLIGRAM(S): at 02:06

## 2021-07-13 RX ADMIN — Medication 30 MILLIGRAM(S): at 17:40

## 2021-07-13 RX ADMIN — OXYCODONE HYDROCHLORIDE 10 MILLIGRAM(S): 5 TABLET ORAL at 02:05

## 2021-07-13 RX ADMIN — Medication 975 MILLIGRAM(S): at 23:00

## 2021-07-13 RX ADMIN — Medication 30 MILLIGRAM(S): at 05:35

## 2021-07-13 RX ADMIN — OXYCODONE HYDROCHLORIDE 5 MILLIGRAM(S): 5 TABLET ORAL at 07:00

## 2021-07-13 RX ADMIN — Medication 975 MILLIGRAM(S): at 13:06

## 2021-07-13 RX ADMIN — Medication 30 MILLIGRAM(S): at 17:20

## 2021-07-13 RX ADMIN — Medication 30 MILLIGRAM(S): at 11:43

## 2021-07-13 RX ADMIN — SERTRALINE 50 MILLIGRAM(S): 25 TABLET, FILM COATED ORAL at 22:41

## 2021-07-13 RX ADMIN — Medication 30 MILLIGRAM(S): at 00:15

## 2021-07-13 RX ADMIN — Medication 975 MILLIGRAM(S): at 13:42

## 2021-07-13 RX ADMIN — Medication 975 MILLIGRAM(S): at 21:05

## 2021-07-13 NOTE — DISCHARGE NOTE OB - PLAN OF CARE
Recovery After discharge, please stay on pelvic rest for 6 weeks, meaning no sexual intercourse, no tampons and no douching.  No driving for 2 weeks as women can loose a lot of blood during delivery and there is a possibility of being lightheaded/fainting.  No lifting objects heavier than a gallon of milk for two weeks.  Expect to have vaginal bleeding/spotting for up to six weeks.  The bleeding should get lighter and more white/light brown with time.  For bleeding soaking more than a pad an hour or passing clots greater than the size of your fist, come in to the emergency department.    Follow up in clinic in 2 weeks for incision check.  Call clinic for noticeable increase in redness or swelling at incision, discharge from incision, or opening of skin at incision site.

## 2021-07-13 NOTE — BH CONSULTATION LIAISON ASSESSMENT NOTE - NSBHCHARTREVIEWVS_PSY_A_CORE FT
Vital Signs Last 24 Hrs  T(C): 37 (13 Jul 2021 10:05), Max: 37 (13 Jul 2021 10:05)  T(F): 98.6 (13 Jul 2021 10:05), Max: 98.6 (13 Jul 2021 10:05)  HR: 78 (13 Jul 2021 10:05) (72 - 87)  BP: 97/58 (13 Jul 2021 10:05) (89/72 - 114/72)  BP(mean): 83 (13 Jul 2021 05:38) (63 - 83)  RR: 17 (13 Jul 2021 10:05) (5 - 21)  SpO2: 100% (13 Jul 2021 10:05) (99% - 100%)

## 2021-07-13 NOTE — BH CONSULTATION LIAISON ASSESSMENT NOTE - SUMMARY
27yo unemployed, female, domiciled with domestic partner and 4 sons with PMHx sickle cell trait, etoh abuse/dependence, one prior inpatient alcohol detox 2020 [facility], PPHx anxiety, depression, PTSD, no hx si/sa, no hx psychosis, one prior inpatient psychiatric admission [facility] for anxiety, depression, PTSD 2 years ago, currently attends outpatient program @ [facility], f/b Dr. Back [phone] as well as therapist, plan in 2 weeks to transition to [facility] admitted to [facility] for scheduled .     Patient seen, baby in room, patient calm, cooperative, smiling, reports she feels, "level" she denies any feelings of anxiety and or depression, she is appropriate during interview, smiling at times, patient's nurse reports patient is appropriate with baby. Patient denies si/sa, denies ah/vh. She reports she has continued taking Zoloft 50mg since last admission and feels it has a good effect. She reports she has been sober as well and still attends outpatient services at [facility] as well as f/u with Dr. Back and therapist.     Patient reports she is very happy to have a daughter. She plans on bottle feeding alternating with breast feeding. She reports nursery is ready for baby and she has no thoughts to harm baby.     Discussed with patient s/s postpartum depression as well as need for proper care of self including nutrition, rest, she verbalizes understanding and states her partner is not working and will be home to help as well as family members. 25yo unemployed, female, domiciled with domestic partner and 4 sons with PMHx sickle cell trait, etoh abuse/dependence, one prior inpatient alcohol detox 2020 [facility], PPHx anxiety, depression, PTSD, no hx si/sa, no hx psychosis, one prior inpatient psychiatric admission [facility] for anxiety, depression, PTSD 2 years ago, currently attends outpatient program @ [facility], f/b Dr. Back [phone] as well as therapist, plan in 2 weeks to transition to [facility] admitted to [facility] for scheduled .     Patient seen, baby in room, patient calm, cooperative, smiling, reports she feels, "level" she denies any feelings of anxiety and or depression, she is appropriate during interview, smiling at times, patient's nurse reports patient is appropriate with baby. Patient denies si/sa, denies ah/vh. She reports she has continued taking Zoloft 50mg since last admission and feels it has a good effect. She reports she has been sober as well and still attends outpatient services at [facility] as well as f/u with Dr. Back and therapist.     Patient reports she is very happy to have a daughter. She plans on bottle feeding alternating with breast feeding. She reports nursery is ready for baby and she has no thoughts to harm baby.     Discussed with patient s/s postpartum depression as well as need for proper care of self including nutrition, rest, she verbalizes understanding and states her partner is not working and will be home to help as well as family members.    PLAN;  Continue Zoloft as written.

## 2021-07-13 NOTE — DISCHARGE NOTE OB - CARE PROVIDERS DIRECT ADDRESSES
Burow's Advancement Flap Text: The defect edges were debeveled with a #15 scalpel blade.  Given the location of the defect and the proximity to free margins a Burow's advancement flap was deemed most appropriate.  Using a sterile surgical marker, the appropriate advancement flap was drawn incorporating the defect and placing the expected incisions within the relaxed skin tension lines where possible.    The area thus outlined was incised deep to adipose tissue with a #15 scalpel blade.  The skin margins were undermined to an appropriate distance in all directions utilizing iris scissors. ,DirectAddress_Unknown

## 2021-07-13 NOTE — DISCHARGE NOTE OB - MEDICATION SUMMARY - MEDICATIONS TO TAKE
I will START or STAY ON the medications listed below when I get home from the hospital:    acetaminophen 325 mg oral tablet  -- 3 tab(s) by mouth   -- Indication: For for pain    ibuprofen 600 mg oral tablet  -- 1 tab(s) by mouth every 6 hours  -- Indication: For for pain    Prenatal Multivitamins with Folic Acid 1 mg oral capsule  -- 1 cap(s) by mouth once a day  -- Indication: For home med    senna oral tablet  -- 1 tab(s) by mouth 2 times a day  -- Indication: For for constipation   I will START or STAY ON the medications listed below when I get home from the hospital:    acetaminophen 325 mg oral tablet  -- 3 tab(s) by mouth   -- Indication: For for pain    ibuprofen 600 mg oral tablet  -- 1 tab(s) by mouth every 6 hours  -- Indication: For for pain    oxyCODONE 5 mg oral tablet  -- 1 tab(s) by mouth every 6 hours, As Needed -for severe pain MDD:4  -- Caution federal law prohibits the transfer of this drug to any person other  than the person for whom it was prescribed.  It is very important that you take or use this exactly as directed.  Do not skip doses or discontinue unless directed by your doctor.  May cause drowsiness or dizziness.  This prescription cannot be refilled.  Using more of this medication than prescribed may cause serious breathing problems.    -- Indication: For severe pain    Prenatal Multivitamins with Folic Acid 1 mg oral capsule  -- 1 cap(s) by mouth once a day  -- Indication: For home med    senna oral tablet  -- 1 tab(s) by mouth 2 times a day  -- Indication: For for constipation

## 2021-07-13 NOTE — DISCHARGE NOTE OB - PROVIDER TOKENS
FREE:[LAST:[HAI FORTE],PHONE:[phone]],FAX:[(   )    -],ADDRESS:[[facility], [place] [address]]]

## 2021-07-13 NOTE — PROGRESS NOTE ADULT - ATTENDING COMMENTS
Associate Chief of L & D ( late entry)     I have met this patient for the first time today.  She was admitted by Dr Berumen and delivered by her as well.  Upon review of the chart, she was noted to have multiple admissions for ETOH abuse and suicidal ideation  during her pregnancy    OB Progress Note:  Delivery, POD#1    S: 27yo POD#1 s/p R-LTCS .  Patient reports that she is receives care for her anxiety and depression from a DR Browne at [facility] and that she denies any complaints at this time     O:   Vital Signs Last 24 Hrs  T(C): 37 (2021 10:05), Max: 37 (2021 10:05)  T(F): 98.6 (2021 10:05), Max: 98.6 (2021 10:05)  HR: 78 (2021 10:05) (72 - 87)  BP: 97/58 (2021 10:05) (89/72 - 114/72)  BP(mean): 83 (2021 05:38) (63 - 83)  RR: 17 (2021 10:05) (5 - 21)  SpO2: 100% (2021 10:05) (99% - 100%)    Labs:  Blood type: A Positive  Rubella IgG: Positive ( @ 09:21)  RPR: Negative                          10.6<L>   13.38<H> >-----------< 244    (  @ 07:37 )             32.4<L>                        10.7<L>   6.49 >-----------< 254    (  @ 10:58 )             32.6<L>          PE:    Abdomen: Mildly distended, appropriately tender, incision c/d/i.  Extremities: No erythema, no pitting edema    A/P: 27yo POD#1 s/p R-LTCS.  Patient with substance abuse disorder(ETOH-abuse), suicidal attempts in the past during this pregnancy, anxiety, depression and urinary retention    - Continue regular diet.  - Increase ambulation.  - Continue motrin, tylenol, oxycodone PRN for pain control.  vs. continue PCEA for pain.  - F/u AM CBC  - SW-PHQ-9, There is an open ACS case  _ consider a psychiatry consult  - Continue Zoloft  - Consider discontinuing the follow- spoke with DR Cordero chief resident and made her aware    Reshma Kong M.D., M.B.A., M.S.

## 2021-07-13 NOTE — BH CONSULTATION LIAISON ASSESSMENT NOTE - CASE SUMMARY
Chart reviewed, staff consulted, pt. seen and evaluated with Kristie Pedroza NP, I agree with above assessment and plan, pt. AAOX4, calm, polite and cooperative, thought process is linear,  denies feeling depressed or anxious, denies acute psychotic or manic sxs, adamantly denies passive or active SI/I/P, denies HI/I/P, denies thoughts of harming the baby, reports compliance with Zoloft and amenable to continue Zoloft, collateral obtained from patient's domestic partner, Mr. Sha Hall with patient's permission, see above for details, plan as above.  Chart reviewed, staff consulted, pt. seen and evaluated with Kristie Pedroza NP, I agree with above assessment and plan, pt. AAOX4, calm, polite and cooperative, thought process is linear,  denies feeling depressed or anxious, denies acute psychotic or manic sxs, adamantly denies passive or active SI/I/P, denies HI/I/P, denies thoughts of harming the baby, reports compliance with Zoloft and amenable to continue Zoloft, psychoeducation provided regrading post partum depression, as per pt.'s nurse, pt. has been bonding well with the baby, collateral obtained from patient's domestic partner, Mr. Sha Hall with patient's permission, see above for details, plan as above.  Chart reviewed, staff consulted, pt. seen and evaluated with Kristie Pedroza NP, I agree with above assessment and plan, pt. AAOX4, calm, polite, engaging well,  and cooperative, thought process is linear,  denies feeling depressed or anxious, denies acute psychotic or manic sxs, adamantly denies passive or active SI/I/P, denies HI/I/P, denies thoughts of harming the baby, pt. denies h/o SA in the past, reports compliance with Zoloft and amenable to continue Zoloft, psychoeducation provided regarding post partum depression, as per pt.'s nurse, pt. has been bonding well with the baby, collateral obtained from patient's domestic partner, Mr. Sha Hall with patient's permission, see above for details, plan as above.

## 2021-07-13 NOTE — PROVIDER CONTACT NOTE (OTHER) - ACTION/TREATMENT ORDERED:
NP ordered to do bladder scan. Results notified of >952. Aceves inserted as per NP & MD. Will continue to monitor.
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
## 2021-07-13 NOTE — DISCHARGE NOTE OB - PATIENT PORTAL LINK FT
You can access the FollowMyHealth Patient Portal offered by [facility] by registering at the following website: [email or web address]. By joining [email or web address] FollowMyHealth portal, you will also be able to view your health information using other applications (apps) compatible with our system.

## 2021-07-13 NOTE — DISCHARGE NOTE OB - HOSPITAL COURSE
Patient had a repeat low transverse  section with delivery of a liveborn female. During postpartum course patient's vitals were stable, vaginal bleeding appropriate, and pain well controlled. Post-operation Day #1 hematocrit was appropriate. On the day of discharge patient was ambulating, with pain controlled with oral medications, having adequate oral intake, and voiding freely. During admission, psych was consulted for patient's past medical history of anxiety and depression and substance use disorder during the pregnancy, and cleared the patient, who has consistent outpatient follow-up plans. Discharge instructions and precautions were given.  Will return to clinic in 2 weeks for incision check.  Postpartum birth control plan is progesterone only pills to cover to Mirena insertion at postpartum appointment.

## 2021-07-13 NOTE — DISCHARGE NOTE OB - MATERIALS PROVIDED
[facility] [place] Screening Program/[place]  Immunization Record/Breastfeeding Log/Breastfeeding Mother’s Support Group Information/Guide to Postpartum Care/[facility] Hearing Screen Program/Back To Sleep Handout/Shaken Baby Prevention Handout/Breastfeeding Guide and Packet/Birth Certificate Instructions/Discharge Medication Information for Patients and Families Pocket Guide

## 2021-07-13 NOTE — BH CONSULTATION LIAISON ASSESSMENT NOTE - NSBHCHARTREVIEWLAB_PSY_A_CORE FT
CBC Full  -  ( 13 Jul 2021 07:37 )  WBC Count : 13.38 K/uL  RBC Count : 4.21 M/uL  Hemoglobin : 10.6 g/dL  Hematocrit : 32.4 %  Platelet Count - Automated : 244 K/uL  Mean Cell Volume : 77.0 fL  Mean Cell Hemoglobin : 25.2 pg  Mean Cell Hemoglobin Concentration : 32.7 gm/dL  Auto Neutrophil # : 10.85 K/uL  Auto Lymphocyte # : 1.64 K/uL  Auto Monocyte # : 0.72 K/uL  Auto Eosinophil # : 0.03 K/uL  Auto Basophil # : 0.05 K/uL  Auto Neutrophil % : 81.0 %  Auto Lymphocyte % : 12.3 %  Auto Monocyte % : 5.4 %  Auto Eosinophil % : 0.2 %  Auto Basophil % : 0.4 %

## 2021-07-13 NOTE — PROGRESS NOTE ADULT - ASSESSMENT
25y/o  POD#1 from repeat  section complicated by substance use disorder, anxiety, and depression. PMH significant for substance use disorder, anxiety, and depression - patient feeling well and on home dose of Zoloft 50 mgqhs. H/H today 10.6/32.4 from 10.7/32.5 on . Patient is in stable condition, currently monitoring UOP through bey catheter.

## 2021-07-13 NOTE — PROVIDER CONTACT NOTE (OTHER) - SITUATION
NP and MD made aware Patient stated "can't pee and feeling a lot of pressure". Pt in a lot of discomfort

## 2021-07-13 NOTE — DISCHARGE NOTE OB - CARE PLAN
Principal Discharge DX:	 delivery delivered  Goal:	Recovery  Assessment and plan of treatment:	After discharge, please stay on pelvic rest for 6 weeks, meaning no sexual intercourse, no tampons and no douching.  No driving for 2 weeks as women can loose a lot of blood during delivery and there is a possibility of being lightheaded/fainting.  No lifting objects heavier than a gallon of milk for two weeks.  Expect to have vaginal bleeding/spotting for up to six weeks.  The bleeding should get lighter and more white/light brown with time.  For bleeding soaking more than a pad an hour or passing clots greater than the size of your fist, come in to the emergency department.    Follow up in clinic in 2 weeks for incision check.  Call clinic for noticeable increase in redness or swelling at incision, discharge from incision, or opening of skin at incision site.

## 2021-07-13 NOTE — BH CONSULTATION LIAISON ASSESSMENT NOTE - NSBHCONSULTRECOMMENDOTHER_PSY_A_CORE FT
[facility]  Clinic [phone]  Postpartum Resource Center [phone]    [facility] Outpatient Walk In Crisis Clinic  [address] [place] [address]  [phone] () Continue to follow up at outpatient program @ [facility], f/b Dr. Back [phone]    Additional referrals:     [facility]  Clinic [phone]  Postpartum Resource Center [phone]    [facility] Outpatient Walk In Crisis Clinic  [address] [place] [address]  [phone] (9a-7p)    [facility] Substance Abuse Outpatient Program ([facility]): [phone]

## 2021-07-13 NOTE — DISCHARGE NOTE OB - LOOK AT INCISION DAILY FOR SIGNS OF INFECTION (INCREASED PAIN, REDNESS, DRAINING, WARMTH TO AND AROUND INCISION.)
Removed patients chance drain bag and replaced with leg bag. Gave patient patient belonging bag and drain bag for home use. Statement Selected

## 2021-07-13 NOTE — BH CONSULTATION LIAISON ASSESSMENT NOTE - CURRENT MEDICATION
MEDICATIONS  (STANDING):  acetaminophen   Tablet .. 975 milliGRAM(s) Oral <User Schedule>  diphtheria/tetanus/pertussis (acellular) Vaccine (ADAcel) 0.5 milliLiter(s) IntraMuscular once  ferrous    sulfate 325 milliGRAM(s) Oral daily  heparin   Injectable 5000 Unit(s) SubCutaneous every 12 hours  ibuprofen  Tablet. 600 milliGRAM(s) Oral every 6 hours  ketorolac   Injectable 30 milliGRAM(s) IV Push every 6 hours  lactated ringers. 1000 milliLiter(s) (125 mL/Hr) IV Continuous <Continuous>  prenatal multivitamin 1 Tablet(s) Oral daily  senna 1 Tablet(s) Oral two times a day  sertraline 50 milliGRAM(s) Oral at bedtime    MEDICATIONS  (PRN):  dexAMETHasone  Injectable 4 milliGRAM(s) IV Push every 6 hours PRN Nausea  diphenhydrAMINE 25 milliGRAM(s) Oral every 6 hours PRN Pruritus  diphenhydrAMINE 25 milliGRAM(s) Oral every 6 hours PRN Rash and/or Itching  lanolin Ointment 1 Application(s) Topical every 6 hours PRN Sore Nipples  magnesium hydroxide Suspension 30 milliLiter(s) Oral two times a day PRN Constipation  nalbuphine Injectable 2.5 milliGRAM(s) IV Push every 6 hours PRN Pruritus  naloxone Injectable 0.1 milliGRAM(s) IV Push every 3 minutes PRN For ANY of the following changes in patient status:  A. Breaths Per Minute LESS THAN 10, B. Oxygen saturation LESS THAN 90%, C. Sedation score of 6 for Stop After: 4 Times  ondansetron Injectable 4 milliGRAM(s) IV Push every 6 hours PRN Nausea  oxyCODONE    IR 5 milliGRAM(s) Oral every 3 hours PRN Mild Pain (1 - 3)  oxyCODONE    IR 10 milliGRAM(s) Oral every 3 hours PRN Moderate Pain (4 - 6)  oxyCODONE    IR 5 milliGRAM(s) Oral every 3 hours PRN Moderate to Severe Pain (4-10)  oxyCODONE    IR 5 milliGRAM(s) Oral once PRN Moderate to Severe Pain (4-10)  simethicone 80 milliGRAM(s) Chew every 4 hours PRN Gas

## 2021-07-13 NOTE — DISCHARGE NOTE OB - COMMUNITY RESOURCE NAME:
Patient instructed to make a follow up appointment at [facility] at [facility], [phone] for 2-3 weeks from delivery date. Patient also instructed to make a follow up appointment for the baby at [facility], Division of General Pediatrics, [phone] for 1-2 days from discharge date

## 2021-07-13 NOTE — BH CONSULTATION LIAISON ASSESSMENT NOTE - HPI (INCLUDE ILLNESS QUALITY, SEVERITY, DURATION, TIMING, CONTEXT, MODIFYING FACTORS, ASSOCIATED SIGNS AND SYMPTOMS)
27yo unemployed, female, domiciled with domestic partner and 4 sons with PMHx sickle cell trait, etoh abuse/dependence, one prior inpatient alcohol detox 2020 [facility], PPHx anxiety, depression, PTSD, no hx si/sa, no hx psychosis, one prior inpatient psychiatric admission [facility] for anxiety, depression, PTSD 2 years ago, currently attends outpatient program @ [facility], f/b Dr. Back [phone] as well as therapist, plan in 2 weeks to transition to [facility] admitted to [facility] for scheduled .     Patient seen, baby in room, patient calm, cooperative, smiling, reports she feels, "level" she denies any feelings of anxiety and or depression, she is appropriate during interview, smiling at times, patient's nurse reports patient is appropriate with baby. Patient denies si/sa, denies ah/vh. She reports she has continued taking Zoloft 50mg since last admission and feels it has a good effect. She reports she has been sober as well and still attends outpatient services at [facility] as well as f/u with Dr. Back and therapist.     Patient reports she is very happy to have a daughter. She plans on bottle feeding alternating with breast feeding. She reports nursery is ready for baby and she has no thoughts to harm baby.     Discussed with patient s/s postpartum depression as well as need for proper care of self including nutrition, rest, she verbalizes understanding and states her partner is not working and will be home to help as well as family members. 25yo unemployed, female, domiciled with domestic partner and 4 sons with PMHx sickle cell trait, etoh abuse/dependence, one prior inpatient alcohol detox 2020 [facility], PPHx anxiety, depression, PTSD, no hx si/sa, no hx psychosis, one prior inpatient psychiatric admission [facility] for anxiety, depression, PTSD 2 years ago, currently attends outpatient program @ [facility], f/b Dr. Back [phone] as well as therapist, plan in 2 weeks to transition to [facility] admitted to [facility] for scheduled . Consulted for  h/o anxiety/depression    Patient seen, baby in room, patient calm, cooperative, smiling, reports she feels, "level" she denies any feelings of anxiety and or depression, she is appropriate during interview, smiling at times, patient's nurse reports patient is appropriate with baby. Patient denies si/sa, denies ah/vh. She reports she has continued taking Zoloft 50mg since last admission and feels it has a good effect. She reports she has been sober as well and still attends outpatient services at [facility] as well as f/u with Dr. Back and therapist.     Patient reports she is very happy to have a daughter. She plans on bottle feeding alternating with breast feeding. She reports nursery is ready for baby and she has no thoughts to harm baby. Indication/risk/benefits of using Zoloft while breast feeding discussed, pt. verbalized understanding.     Discussed with patient s/s postpartum depression as well as need for proper care of self including nutrition, rest, she verbalizes understanding and states her partner is not working and will be home to help as well as family members.    Collateral obtained from patient's domestic partner, Sha Hall with patient's permission: He reported that patient has been doing well at home, compliant with her antidepressant, denies any acute psychiatric safety concerns, educated him about post partum depression, importance of medication compliance and outpt. follow up, he verbalized understanding. He further stated that he will be monitoring her closely and will also make sure that pt. will go for her outpt follow sups.

## 2021-07-14 RX ADMIN — Medication 600 MILLIGRAM(S): at 00:04

## 2021-07-14 RX ADMIN — OXYCODONE HYDROCHLORIDE 5 MILLIGRAM(S): 5 TABLET ORAL at 15:28

## 2021-07-14 RX ADMIN — Medication 600 MILLIGRAM(S): at 05:46

## 2021-07-14 RX ADMIN — Medication 975 MILLIGRAM(S): at 21:30

## 2021-07-14 RX ADMIN — HEPARIN SODIUM 5000 UNIT(S): 5000 INJECTION INTRAVENOUS; SUBCUTANEOUS at 18:09

## 2021-07-14 RX ADMIN — SERTRALINE 50 MILLIGRAM(S): 25 TABLET, FILM COATED ORAL at 22:17

## 2021-07-14 RX ADMIN — Medication 975 MILLIGRAM(S): at 20:28

## 2021-07-14 RX ADMIN — OXYCODONE HYDROCHLORIDE 5 MILLIGRAM(S): 5 TABLET ORAL at 16:20

## 2021-07-14 RX ADMIN — Medication 600 MILLIGRAM(S): at 18:09

## 2021-07-14 RX ADMIN — SENNA PLUS 1 TABLET(S): 8.6 TABLET ORAL at 18:09

## 2021-07-14 RX ADMIN — Medication 975 MILLIGRAM(S): at 16:20

## 2021-07-14 RX ADMIN — MAGNESIUM HYDROXIDE 30 MILLILITER(S): 400 TABLET, CHEWABLE ORAL at 12:32

## 2021-07-14 RX ADMIN — SIMETHICONE 80 MILLIGRAM(S): 80 TABLET, CHEWABLE ORAL at 15:28

## 2021-07-14 RX ADMIN — Medication 975 MILLIGRAM(S): at 15:27

## 2021-07-14 RX ADMIN — Medication 1 TABLET(S): at 12:33

## 2021-07-14 RX ADMIN — HEPARIN SODIUM 5000 UNIT(S): 5000 INJECTION INTRAVENOUS; SUBCUTANEOUS at 05:45

## 2021-07-14 RX ADMIN — Medication 600 MILLIGRAM(S): at 13:30

## 2021-07-14 RX ADMIN — Medication 600 MILLIGRAM(S): at 06:50

## 2021-07-14 RX ADMIN — OXYCODONE HYDROCHLORIDE 5 MILLIGRAM(S): 5 TABLET ORAL at 23:30

## 2021-07-14 RX ADMIN — SENNA PLUS 1 TABLET(S): 8.6 TABLET ORAL at 06:02

## 2021-07-14 RX ADMIN — Medication 975 MILLIGRAM(S): at 08:46

## 2021-07-14 RX ADMIN — OXYCODONE HYDROCHLORIDE 5 MILLIGRAM(S): 5 TABLET ORAL at 22:37

## 2021-07-14 RX ADMIN — Medication 975 MILLIGRAM(S): at 09:40

## 2021-07-14 RX ADMIN — OXYCODONE HYDROCHLORIDE 5 MILLIGRAM(S): 5 TABLET ORAL at 08:47

## 2021-07-14 RX ADMIN — Medication 600 MILLIGRAM(S): at 01:00

## 2021-07-14 RX ADMIN — SIMETHICONE 80 MILLIGRAM(S): 80 TABLET, CHEWABLE ORAL at 08:47

## 2021-07-14 RX ADMIN — Medication 325 MILLIGRAM(S): at 12:33

## 2021-07-14 RX ADMIN — Medication 600 MILLIGRAM(S): at 19:00

## 2021-07-14 RX ADMIN — OXYCODONE HYDROCHLORIDE 5 MILLIGRAM(S): 5 TABLET ORAL at 09:45

## 2021-07-14 RX ADMIN — Medication 600 MILLIGRAM(S): at 12:33

## 2021-07-14 NOTE — PROGRESS NOTE ADULT - ATTENDING COMMENTS
Associate Chief of L & D ( late entry)     I have met this patient for the first time today.  She was admitted by Dr Berumen and delivered by her as well.  Upon review of the chart, she was noted to have multiple admissions for ETOH abuse and suicidal ideation  during her pregnancy    OB Progress Note:  Delivery, POD#1    S: 25yo POD#1 s/p R-LTCS .  Patient reports that she is receives care for her anxiety and depression from a DR Browne at [facility] and that she denies any complaints at this time     O:   Vital Signs Last 24 Hrs  T(C): 37 (2021 10:05), Max: 37 (2021 10:05)  T(F): 98.6 (2021 10:05), Max: 98.6 (2021 10:05)  HR: 78 (2021 10:05) (72 - 87)  BP: 97/58 (2021 10:05) (89/72 - 114/72)  BP(mean): 83 (2021 05:38) (63 - 83)  RR: 17 (2021 10:05) (5 - 21)  SpO2: 100% (2021 10:05) (99% - 100%)    Labs:  Blood type: A Positive  Rubella IgG: Positive ( @ 09:21)  RPR: Negative                          10.6<L>   13.38<H> >-----------< 244    (  @ 07:37 )             32.4<L>                        10.7<L>   6.49 >-----------< 254    (  @ 10:58 )             32.6<L>          PE:    Abdomen: Mildly distended, appropriately tender, incision c/d/i.  Extremities: No erythema, no pitting edema    A/P: 25yo POD#1 s/p R-LTCS.  Patient with substance abuse disorder(ETOH-abuse), suicidal attempts in the past during this pregnancy, anxiety, depression and urinary retention    - Continue regular diet.  - Increase ambulation.  - Continue motrin, tylenol, oxycodone PRN for pain control.  vs. continue PCEA for pain.  - F/u AM CBC  - SW-PHQ-9, There is an open ACS case  _ consider a psychiatry consult  - Continue Zoloft  - Consider discontinuing the follow- spoke with DR Cordero chief resident and made her aware    Reshma Kong M.D., M.B.A., M.S. Associate Chief of L & D ( late entry)     I have met this patient for the first time today.  She was admitted by Dr Berumen and delivered by her as well.  Upon review of the chart, she was noted to have multiple admissions for ETOH abuse and suicidal ideation  during her pregnancy    OB Progress Note:  Delivery, POD#2    S: 25yo POD#2 s/p R-LTCS .  Patient denies any complaints this morning    O:   Vital Signs Last 24 Hrs  T(C): 36.7 (2021 06:20), Max: 36.9 (2021 14:29)  T(F): 98.1 (2021 06:20), Max: 98.4 (2021 14:29)  HR: 68 (2021 06:20) (68 - 96)  BP: 104/79 (2021 06:20) (100/67 - 119/79)  RR: 18 (2021 06:20) (18 - 18)  SpO2: 100% (2021 06:20) (99% - 100%)    Labs:  Blood type: A Positive  Rubella IgG: Positive ( @ 09:21)  RPR: Negative    LABS:                          10.6<L>   13.38<H> >-----------< 244    (  @ 07:37 )             32.4<L>                        10.7<L>   6.49 >-----------< 254    (  @ 10:58 )             32.6<L>          PE:    Abdomen: soft fundus firm, mildly distended, mildly tender  incision c/d/i.  Extremities: No erythema, +1 edema    A/P: 25yo POD#2  s/p R-LTCS.  Patient with substance abuse disorder(ETOH-abuse), suicidal attempts in the past during this pregnancy, anxiety, depression and urinary retention resolved     - Continue regular diet.  - Increase ambulation.  - Continue Motrin, Tylenol, oxycodone PRN for pain control.  vs. continue PCEA for pain.  _ consider a psychiatry consult  - Continue Zoloft  - SW - ACS clearance needed for discharge     Reshma Kong M.D., M.B.A., M.S.

## 2021-07-14 NOTE — PROGRESS NOTE ADULT - ASSESSMENT
27y/o  POD#2 from repeat  section. PMH significant for substance use disorder, anxiety, depression. H/H 10.6/32.4 on . Patient is currently in stable condition. 27y/o  POD#2 from repeat  section. PMH significant for substance use disorder, anxiety, depression. H/H 10.6/32.4 on . Patient is currently in stable condition, voiding adequately

## 2021-07-15 VITALS
TEMPERATURE: 98 F | SYSTOLIC BLOOD PRESSURE: 121 MMHG | OXYGEN SATURATION: 99 % | DIASTOLIC BLOOD PRESSURE: 78 MMHG | RESPIRATION RATE: 16 BRPM | HEART RATE: 76 BPM

## 2021-07-15 RX ORDER — IBUPROFEN 200 MG
1 TABLET ORAL
Qty: 0 | Refills: 0 | DISCHARGE
Start: 2021-07-15

## 2021-07-15 RX ORDER — ACETAMINOPHEN 500 MG
3 TABLET ORAL
Qty: 168 | Refills: 2
Start: 2021-07-15 | End: 2021-08-25

## 2021-07-15 RX ORDER — OXYCODONE HYDROCHLORIDE 5 MG/1
1 TABLET ORAL
Qty: 5 | Refills: 0
Start: 2021-07-15

## 2021-07-15 RX ORDER — IBUPROFEN 200 MG
1 TABLET ORAL
Qty: 56 | Refills: 0
Start: 2021-07-15 | End: 2021-07-28

## 2021-07-15 RX ORDER — SENNA PLUS 8.6 MG/1
1 TABLET ORAL
Qty: 30 | Refills: 0
Start: 2021-07-15 | End: 2021-07-29

## 2021-07-15 RX ORDER — BENZOYL PEROXIDE MICRONIZED 5.8 %
1 TOWELETTE (EA) TOPICAL
Qty: 0 | Refills: 0 | DISCHARGE

## 2021-07-15 RX ORDER — ACETAMINOPHEN 500 MG
3 TABLET ORAL
Qty: 0 | Refills: 0 | DISCHARGE
Start: 2021-07-15

## 2021-07-15 RX ORDER — SENNA PLUS 8.6 MG/1
1 TABLET ORAL
Qty: 0 | Refills: 0 | DISCHARGE
Start: 2021-07-15

## 2021-07-15 RX ADMIN — HEPARIN SODIUM 5000 UNIT(S): 5000 INJECTION INTRAVENOUS; SUBCUTANEOUS at 18:10

## 2021-07-15 RX ADMIN — Medication 600 MILLIGRAM(S): at 01:43

## 2021-07-15 RX ADMIN — OXYCODONE HYDROCHLORIDE 5 MILLIGRAM(S): 5 TABLET ORAL at 11:30

## 2021-07-15 RX ADMIN — SENNA PLUS 1 TABLET(S): 8.6 TABLET ORAL at 18:10

## 2021-07-15 RX ADMIN — Medication 1 TABLET(S): at 15:13

## 2021-07-15 RX ADMIN — Medication 975 MILLIGRAM(S): at 10:27

## 2021-07-15 RX ADMIN — Medication 975 MILLIGRAM(S): at 16:50

## 2021-07-15 RX ADMIN — OXYCODONE HYDROCHLORIDE 5 MILLIGRAM(S): 5 TABLET ORAL at 10:31

## 2021-07-15 RX ADMIN — Medication 600 MILLIGRAM(S): at 06:58

## 2021-07-15 RX ADMIN — Medication 975 MILLIGRAM(S): at 03:45

## 2021-07-15 RX ADMIN — SIMETHICONE 80 MILLIGRAM(S): 80 TABLET, CHEWABLE ORAL at 10:31

## 2021-07-15 RX ADMIN — Medication 600 MILLIGRAM(S): at 13:45

## 2021-07-15 RX ADMIN — Medication 975 MILLIGRAM(S): at 15:58

## 2021-07-15 RX ADMIN — OXYCODONE HYDROCHLORIDE 5 MILLIGRAM(S): 5 TABLET ORAL at 16:01

## 2021-07-15 RX ADMIN — Medication 600 MILLIGRAM(S): at 05:59

## 2021-07-15 RX ADMIN — OXYCODONE HYDROCHLORIDE 5 MILLIGRAM(S): 5 TABLET ORAL at 17:00

## 2021-07-15 RX ADMIN — SIMETHICONE 80 MILLIGRAM(S): 80 TABLET, CHEWABLE ORAL at 15:58

## 2021-07-15 RX ADMIN — Medication 325 MILLIGRAM(S): at 12:51

## 2021-07-15 RX ADMIN — Medication 600 MILLIGRAM(S): at 18:10

## 2021-07-15 RX ADMIN — HEPARIN SODIUM 5000 UNIT(S): 5000 INJECTION INTRAVENOUS; SUBCUTANEOUS at 05:58

## 2021-07-15 RX ADMIN — Medication 600 MILLIGRAM(S): at 00:40

## 2021-07-15 RX ADMIN — MAGNESIUM HYDROXIDE 30 MILLILITER(S): 400 TABLET, CHEWABLE ORAL at 10:31

## 2021-07-15 RX ADMIN — Medication 600 MILLIGRAM(S): at 18:50

## 2021-07-15 RX ADMIN — OXYCODONE HYDROCHLORIDE 5 MILLIGRAM(S): 5 TABLET ORAL at 06:00

## 2021-07-15 RX ADMIN — Medication 975 MILLIGRAM(S): at 04:30

## 2021-07-15 RX ADMIN — Medication 975 MILLIGRAM(S): at 11:20

## 2021-07-15 RX ADMIN — Medication 600 MILLIGRAM(S): at 12:51

## 2021-07-15 NOTE — PROGRESS NOTE ADULT - ATTENDING COMMENTS
Associate Chief of L & D ( late entry)     I have met this patient for the first time today.  She was admitted by Dr Berumen and delivered by her as well.  Upon review of the chart, she was noted to have multiple admissions for ETOH abuse and suicidal ideation  during her pregnancy    OB Progress Note:  Delivery, POD#3    S: 25yo POD#3 s/p R-LTCS .  Patient denies any complaints this morning states that she is a little upset that ACS has not given an answer yet regarding disposition    O:   Vital Signs Last 24 Hrs  T(C): 36.6 (15 Jul 2021 06:01), Max: 37 (2021 14:06)  T(F): 97.8 (15 Jul 2021 06:01), Max: 98.6 (2021 14:06)  HR: 77 (15 Jul 2021 06:01) (77 - 78)  BP: 107/67 (15 Jul 2021 06:01) (104/65 - 113/83)  RR: 17 (15 Jul 2021 06:01) (16 - 17)  SpO2: 100% (15 Jul 2021 06:01) (99% - 100%)    Labs:  Blood type: A Positive  Rubella IgG: Positive ( @ 09:21)  RPR: Negative    LABS:                          10.6<L>   13.38<H> >-----------< 244    (  @ 07:37 )             32.4<L>                        10.7<L>   6.49 >-----------< 254    (  @ 10:58 )             32.6<L>    PE:    Abdomen: soft fundus firm, mildly distended, mildly tender  incision c/d/i.  Extremities: No erythema, trace  edema    A/P: 25yo POD#3  s/p R-LTCS.  Patient with substance abuse disorder(ETOH-abuse), suicidal attempts in the past during this pregnancy, anxiety, depression and urinary retention resolved     - Continue regular diet.  - Increase ambulation.  - Continue Motrin, Tylenol, oxycodone PRN for pain control.  vs. continue PCEA for pain.  - Continue Zoloft  - SW - awaiting ACS clearance  for discharge     Reshma Kong M.D., M.B.A., M.S.

## 2021-07-15 NOTE — PROGRESS NOTE ADULT - PROBLEM SELECTOR PLAN 1
- Continue with pain management  - Increase ambulation  - Continue regular diet  - Aceves in place, monitor UOP to determine when to be removed  - Incision dressing removed POD1  - Continue home dose of Zoloft 50 mg qhs    Kristina Rodriguez  PGY-1
- Continue with pain management  - Increase ambulation  - Continue regular diet  - D/c [place] today AM    Kristina Rodriguez  PGY-1
- Continue with pain management  - Increase ambulation  - Continue regular diet  - Follow up with ACS for patient clearance    Kristina Rodriguez  PGY-1

## 2021-07-15 NOTE — PROGRESS NOTE ADULT - SUBJECTIVE AND OBJECTIVE BOX
Patient seen and examined at bedside, no acute overnight events. No acute complaints, pain well controlled. Patient is ambulating and tolerating regular diet. Passing flatus, having bowel movements, voiding on own without any problems. Patient states that she did well overnight, had no problems.    Vital Signs Last 24 Hours  T(C): 36.6 (07-15-21 @ 06:01), Max: 37 (07-14-21 @ 14:06)  HR: 77 (07-15-21 @ 06:01) (77 - 78)  BP: 107/67 (07-15-21 @ 06:01) (104/65 - 113/83)  RR: 17 (07-15-21 @ 06:01) (16 - 17)  SpO2: 100% (07-15-21 @ 06:01) (99% - 100%)    I&O's Summary    14 Jul 2021 07:01  -  15 Jul 2021 07:00  --------------------------------------------------------  IN: 0 mL / OUT: 1200 mL / NET: -1200 mL        Physical Exam:  General: NAD  Abdomen: Soft, non-tender, non-distended, fundus firm  Incision: Pfannenstiel incision CDI, subcuticular suture closure  Pelvic: Lochia wnl    Labs:    Blood Type: A Positive  Antibody Screen: Negative  RPR: Negative               10.6   13.38 )-----------( 244      ( 07-13 @ 07:37 )             32.4                10.7   6.49  )-----------( 254      ( 07-12 @ 10:58 )             32.6                11.0   6.21  )-----------( 318      ( 07-06 @ 20:03 )             33.5         MEDICATIONS  (STANDING):  acetaminophen   Tablet .. 975 milliGRAM(s) Oral <User Schedule>  diphtheria/tetanus/pertussis (acellular) Vaccine (ADAcel) 0.5 milliLiter(s) IntraMuscular once  ferrous    sulfate 325 milliGRAM(s) Oral daily  heparin   Injectable 5000 Unit(s) SubCutaneous every 12 hours  ibuprofen  Tablet. 600 milliGRAM(s) Oral every 6 hours  prenatal multivitamin 1 Tablet(s) Oral daily  senna 1 Tablet(s) Oral two times a day  sertraline 50 milliGRAM(s) Oral at bedtime    MEDICATIONS  (PRN):  diphenhydrAMINE 25 milliGRAM(s) Oral every 6 hours PRN Pruritus  diphenhydrAMINE 25 milliGRAM(s) Oral every 6 hours PRN Rash and/or Itching  lanolin Ointment 1 Application(s) Topical every 6 hours PRN Sore Nipples  magnesium hydroxide Suspension 30 milliLiter(s) Oral two times a day PRN Constipation  oxyCODONE    IR 5 milliGRAM(s) Oral once PRN Moderate to Severe Pain (4-10)  oxyCODONE    IR 5 milliGRAM(s) Oral every 3 hours PRN Moderate to Severe Pain (4-10)  simethicone 80 milliGRAM(s) Chew every 4 hours PRN Gas  
Patient seen and examined at bedside, no acute overnight events. No acute complaints, pain well controlled. Patient is ambulating and tolerating regular diet. Passed flatus. Patient states that she felt she could not void last night, so catheter was placed by nurse. States that something similar happened in previous pregnancy where catheter was removed around 2 days after giving birth. Denies CP, SOB, N/V, dizziness with standing. States that she is doing well, denies any suicidal ideation at this time. Denies any feelings of loss of interest, guilt, decreased energy, decreased appetite, psychomotor slowing, concentration issues.    Vital Signs Last 24 Hours  T(C): 36.8 (07-13-21 @ 05:38), Max: 36.9 (07-12-21 @ 11:06)  HR: 84 (07-13-21 @ 05:38) (72 - 87)  BP: 106/77 (07-13-21 @ 05:38) (89/72 - 114/72)  RR: 18 (07-13-21 @ 05:38) (5 - 21)  SpO2: 99% (07-13-21 @ 05:38) (99% - 100%)    I&O's Summary    12 Jul 2021 07:01  -  13 Jul 2021 07:00  --------------------------------------------------------  IN: 4100 mL / OUT: 4194 mL / NET: -94 mL        Physical Exam:  General: NAD  Abdomen: Soft, non-tender, non-distended, fundus firm  Incision: Pfannenstiel incision CDI, subcuticular suture closure  Pelvic: Lochia wnl    Labs:    Blood Type: A Positive  Antibody Screen: Negative  RPR: Negative               10.6   x     )-----------( 244      ( 07-13 @ 07:37 )             32.4                10.7   6.49  )-----------( 254      ( 07-12 @ 10:58 )             32.6                11.0   6.21  )-----------( 318      ( 07-06 @ 20:03 )             33.5         MEDICATIONS  (STANDING):  acetaminophen   Tablet .. 975 milliGRAM(s) Oral <User Schedule>  diphtheria/tetanus/pertussis (acellular) Vaccine (ADAcel) 0.5 milliLiter(s) IntraMuscular once  ferrous    sulfate 325 milliGRAM(s) Oral daily  heparin   Injectable 5000 Unit(s) SubCutaneous every 12 hours  ibuprofen  Tablet. 600 milliGRAM(s) Oral every 6 hours  ketorolac   Injectable 30 milliGRAM(s) IV Push every 6 hours  lactated ringers. 1000 milliLiter(s) (125 mL/Hr) IV Continuous <Continuous>  oxytocin Infusion 333.333 milliUNIT(s)/Min (1000 mL/Hr) IV Continuous <Continuous>  prenatal multivitamin 1 Tablet(s) Oral daily  senna 1 Tablet(s) Oral two times a day  sertraline 50 milliGRAM(s) Oral at bedtime    MEDICATIONS  (PRN):  dexAMETHasone  Injectable 4 milliGRAM(s) IV Push every 6 hours PRN Nausea  diphenhydrAMINE 25 milliGRAM(s) Oral every 6 hours PRN Pruritus  diphenhydrAMINE 25 milliGRAM(s) Oral every 6 hours PRN Rash and/or Itching  lanolin Ointment 1 Application(s) Topical every 6 hours PRN Sore Nipples  magnesium hydroxide Suspension 30 milliLiter(s) Oral two times a day PRN Constipation  nalbuphine Injectable 2.5 milliGRAM(s) IV Push every 6 hours PRN Pruritus  naloxone Injectable 0.1 milliGRAM(s) IV Push every 3 minutes PRN For ANY of the following changes in patient status:  A. Breaths Per Minute LESS THAN 10, B. Oxygen saturation LESS THAN 90%, C. Sedation score of 6 for Stop After: 4 Times  ondansetron Injectable 4 milliGRAM(s) IV Push every 6 hours PRN Nausea  ondansetron Injectable 4 milliGRAM(s) IV Push once PRN Nausea and/or Vomiting  oxyCODONE    IR 5 milliGRAM(s) Oral every 3 hours PRN Mild Pain (1 - 3)  oxyCODONE    IR 10 milliGRAM(s) Oral every 3 hours PRN Moderate Pain (4 - 6)  oxyCODONE    IR 5 milliGRAM(s) Oral every 3 hours PRN Moderate to Severe Pain (4-10)  oxyCODONE    IR 5 milliGRAM(s) Oral once PRN Moderate to Severe Pain (4-10)  simethicone 80 milliGRAM(s) Chew every 4 hours PRN Gas  
Patient seen and examined at bedside, no acute overnight events. No acute complaints, pain well controlled. Patient is ambulating and tolerating regular diet. Passing flatus. Aceves is still in place. Denies CP, SOB, N/V, HA. Denies any changes in sleep, loss of interest, feelings of guilt, changes in energy or appetite or concentration, psychomotor slowing, SI or HI. States that her mood is "fine" and that she is doing well.    Vital Signs Last 24 Hours  T(C): 36.7 (07-14-21 @ 06:20), Max: 37 (07-13-21 @ 10:05)  HR: 68 (07-14-21 @ 06:20) (68 - 96)  BP: 104/79 (07-14-21 @ 06:20) (97/58 - 119/79)  RR: 18 (07-14-21 @ 06:20) (17 - 18)  SpO2: 100% (07-14-21 @ 06:20) (99% - 100%)    I&O's Summary    13 Jul 2021 07:01  -  14 Jul 2021 07:00  --------------------------------------------------------  IN: 0 mL / OUT: 3200 mL / NET: -3200 mL        Physical Exam:  General: NAD  Abdomen: Soft, appropriate tenderness to palpation around incision, non-distended, fundus firm  Incision: Pfannenstiel incision CDI, subcuticular suture closure  Pelvic: Lochia wnl    Labs:    Blood Type: A Positive  Antibody Screen: Negative  RPR: Negative               10.6   13.38 )-----------( 244      ( 07-13 @ 07:37 )             32.4                10.7   6.49  )-----------( 254      ( 07-12 @ 10:58 )             32.6                11.0   6.21  )-----------( 318      ( 07-06 @ 20:03 )             33.5         MEDICATIONS  (STANDING):  acetaminophen   Tablet .. 975 milliGRAM(s) Oral <User Schedule>  diphtheria/tetanus/pertussis (acellular) Vaccine (ADAcel) 0.5 milliLiter(s) IntraMuscular once  ferrous    sulfate 325 milliGRAM(s) Oral daily  heparin   Injectable 5000 Unit(s) SubCutaneous every 12 hours  ibuprofen  Tablet. 600 milliGRAM(s) Oral every 6 hours  prenatal multivitamin 1 Tablet(s) Oral daily  senna 1 Tablet(s) Oral two times a day  sertraline 50 milliGRAM(s) Oral at bedtime    MEDICATIONS  (PRN):  diphenhydrAMINE 25 milliGRAM(s) Oral every 6 hours PRN Pruritus  diphenhydrAMINE 25 milliGRAM(s) Oral every 6 hours PRN Rash and/or Itching  lanolin Ointment 1 Application(s) Topical every 6 hours PRN Sore Nipples  magnesium hydroxide Suspension 30 milliLiter(s) Oral two times a day PRN Constipation  oxyCODONE    IR 5 milliGRAM(s) Oral once PRN Moderate to Severe Pain (4-10)  oxyCODONE    IR 5 milliGRAM(s) Oral every 3 hours PRN Moderate to Severe Pain (4-10)  simethicone 80 milliGRAM(s) Chew every 4 hours PRN Gas

## 2021-07-15 NOTE — PROGRESS NOTE ADULT - ASSESSMENT
25y/o  POD#3 from repeat low transverse  section. PMH significant for substance use disorder, anxiety, and depression. Patient is stable at this time.

## 2021-07-16 ENCOUNTER — NON-APPOINTMENT (OUTPATIENT)
Age: 26
End: 2021-07-16

## 2021-07-17 ENCOUNTER — EMERGENCY (EMERGENCY)
Facility: HOSPITAL | Age: 26
LOS: 1 days | Discharge: HOME | End: 2021-07-17
Attending: EMERGENCY MEDICINE | Admitting: EMERGENCY MEDICINE
Payer: MEDICAID

## 2021-07-17 VITALS
OXYGEN SATURATION: 100 % | HEART RATE: 82 BPM | HEIGHT: 68 IN | SYSTOLIC BLOOD PRESSURE: 118 MMHG | DIASTOLIC BLOOD PRESSURE: 84 MMHG | TEMPERATURE: 98 F | RESPIRATION RATE: 16 BRPM

## 2021-07-17 DIAGNOSIS — Z98.891 HISTORY OF UTERINE SCAR FROM PREVIOUS SURGERY: Chronic | ICD-10-CM

## 2021-07-17 PROBLEM — Z98.890 OTHER SPECIFIED POSTPROCEDURAL STATES: Chronic | Status: ACTIVE | Noted: 2021-07-06

## 2021-07-17 PROBLEM — D64.9 ANEMIA, UNSPECIFIED: Chronic | Status: ACTIVE | Noted: 2021-07-06

## 2021-07-17 LAB
ALBUMIN SERPL ELPH-MCNC: 3.3 G/DL — SIGNIFICANT CHANGE UP (ref 3.3–5)
ALP SERPL-CCNC: 58 U/L — SIGNIFICANT CHANGE UP (ref 40–120)
ALT FLD-CCNC: 19 U/L — SIGNIFICANT CHANGE UP (ref 4–33)
ANION GAP SERPL CALC-SCNC: 13 MMOL/L — SIGNIFICANT CHANGE UP (ref 7–14)
AST SERPL-CCNC: 13 U/L — SIGNIFICANT CHANGE UP (ref 4–32)
BILIRUB SERPL-MCNC: 0.2 MG/DL — SIGNIFICANT CHANGE UP (ref 0.2–1.2)
BUN SERPL-MCNC: 5 MG/DL — LOW (ref 7–23)
CALCIUM SERPL-MCNC: 8.5 MG/DL — SIGNIFICANT CHANGE UP (ref 8.4–10.5)
CHLORIDE SERPL-SCNC: 107 MMOL/L — SIGNIFICANT CHANGE UP (ref 98–107)
CO2 SERPL-SCNC: 20 MMOL/L — LOW (ref 22–31)
CREAT SERPL-MCNC: 0.65 MG/DL — SIGNIFICANT CHANGE UP (ref 0.5–1.3)
GLUCOSE SERPL-MCNC: 86 MG/DL — SIGNIFICANT CHANGE UP (ref 70–99)
HCT VFR BLD CALC: 32.3 % — LOW (ref 34.5–45)
HGB BLD-MCNC: 10.4 G/DL — LOW (ref 11.5–15.5)
MCHC RBC-ENTMCNC: 24.9 PG — LOW (ref 27–34)
MCHC RBC-ENTMCNC: 32.2 GM/DL — SIGNIFICANT CHANGE UP (ref 32–36)
MCV RBC AUTO: 77.5 FL — LOW (ref 80–100)
NRBC # BLD: 0 /100 WBCS — SIGNIFICANT CHANGE UP
NRBC # FLD: 0 K/UL — SIGNIFICANT CHANGE UP
PLATELET # BLD AUTO: 330 K/UL — SIGNIFICANT CHANGE UP (ref 150–400)
POTASSIUM SERPL-MCNC: 3.5 MMOL/L — SIGNIFICANT CHANGE UP (ref 3.5–5.3)
POTASSIUM SERPL-SCNC: 3.5 MMOL/L — SIGNIFICANT CHANGE UP (ref 3.5–5.3)
PROT SERPL-MCNC: 6.4 G/DL — SIGNIFICANT CHANGE UP (ref 6–8.3)
RBC # BLD: 4.17 M/UL — SIGNIFICANT CHANGE UP (ref 3.8–5.2)
RBC # FLD: 14.8 % — HIGH (ref 10.3–14.5)
SODIUM SERPL-SCNC: 140 MMOL/L — SIGNIFICANT CHANGE UP (ref 135–145)
WBC # BLD: 6.5 K/UL — SIGNIFICANT CHANGE UP (ref 3.8–10.5)
WBC # FLD AUTO: 6.5 K/UL — SIGNIFICANT CHANGE UP (ref 3.8–10.5)

## 2021-07-17 PROCEDURE — 99284 EMERGENCY DEPT VISIT MOD MDM: CPT

## 2021-07-17 PROCEDURE — 59514 CESAREAN DELIVERY ONLY: CPT | Mod: U9,UB,GC

## 2021-07-17 RX ORDER — OXYCODONE AND ACETAMINOPHEN 5; 325 MG/1; MG/1
1 TABLET ORAL ONCE
Refills: 0 | Status: DISCONTINUED | OUTPATIENT
Start: 2021-07-17 | End: 2021-07-17

## 2021-07-17 RX ORDER — IBUPROFEN 200 MG
600 TABLET ORAL ONCE
Refills: 0 | Status: COMPLETED | OUTPATIENT
Start: 2021-07-17 | End: 2021-07-17

## 2021-07-17 RX ADMIN — Medication 600 MILLIGRAM(S): at 16:09

## 2021-07-17 RX ADMIN — OXYCODONE AND ACETAMINOPHEN 1 TABLET(S): 5; 325 TABLET ORAL at 16:08

## 2021-07-17 NOTE — ED ADULT TRIAGE NOTE - CHIEF COMPLAINT QUOTE
Patient had a c section on July 12th , c/o incisional pain and generalized abdominal pain with passing blood clots.

## 2021-07-17 NOTE — ED PROVIDER NOTE - PATIENT PORTAL LINK FT
You can access the FollowMyHealth Patient Portal offered by [facility] by registering at the following website: [email or web address]. By joining [employer]’s FollowMyHealth portal, you will also be able to view your health information using other applications (apps) compatible with our system.

## 2021-07-17 NOTE — ED PROVIDER NOTE - NSFOLLOWUPINSTRUCTIONS_ED_ALL_ED_FT
You presented to the ER with postoperative pain. You rblood work was normal. You were seen by OBGYN who have recommended you continue with your Tylenol and Ibuprofen around the clock as directed. We have renewed your Oxycodone prescription today. You may take this every 6 hours as needed for moderate to severe pain despite Tylenol/Ibuprofen. Do not drive or drink alcohol while taking Oxycodone as this can potentiate drowsiness/sedation. Use hot packs to the abdomen to assist with pain control. If you have new or worsening pain, redness or drainage at the incision site, vaginal bleeding or discharge, nausea/vomiting, abdominal or back pain, return to the ER immediately.

## 2021-07-17 NOTE — ED PROVIDER NOTE - PSYCHIATRIC, MLM
.Patient here for voiding trial.  Courtney catheter drained and then using a 60 cc Layton syringe, 300ml sterile water was instilled per gravity, balloon deflated using 10 cc syringe, and catheter removed.   Pt up to bathroom and voided 300mL,  Patient passes
Alert and oriented to person, place, time/situation. normal mood and affect. no apparent risk to self or others.

## 2021-07-17 NOTE — ED PROVIDER NOTE - ATTENDING CONTRIBUTION TO CARE
agree with PA note    "26F  s/p   at this facility p/w chief c/o pain at the incisional site. Patient reports having pain in the lower abdomen and incisional site since the procedure that has been originally controlled with Oxycodone but she has now run out. Patient reports 9/10 pain at the site today. Pain makes it difficult for her to perform ADLs including position changes for toileting and bathing. She reports passage of vaginal blood clots since the procedure that is now decreasing in quantity. She denies F/C, vag discharge, n/v/d, CP, SOB, dizziness or weakness during my encounter. She is still tolerating PO intake and has BM/urine at baseline."    States 3 prior C-sections to this but still painful now (5 days out).  Worse with movement (getting up, shifting positions).  Denies fever, discharge.  States initially vag bleeding now resolved    PE: well appearing; VSS: CTAB/L; s1 s2 no m/r/g abd:  scar C/D/I; no tenderness    Imp: likely round ligament pain; no tenderness to suspect endometritis; will get labs, UA, pain control, OB consult

## 2021-07-17 NOTE — ED PROVIDER NOTE - OBJECTIVE STATEMENT
26F  s/p   at this facility p/w chief c/o pain at the incisional site. Patient reports having pain in the lower abdomen and incisional site since the procedure that has been originally controlled with Oxycodone but she has now run out. Patient reports 9/10 pain at the site today. Pain makes it difficult for her to perform ADLs including position changes for toileting and bathing. She reports passage of vaginal blood clots since the procedure that is now decreasing in quantity. She denies F/C, vag discharge, n/v/d, CP, SOB, dizziness or weakness during my encounter. She is still tolerating PO intake and has BM/urine at baseline.

## 2021-07-17 NOTE — CONSULT NOTE ADULT - ASSESSMENT
26[facility] with persistent incisional pain. Abdominal exam benign. Labs wnl. No concern for endometritis, incisional infection, or bleeding. Likely normal post-operative pain.     Recs:   - continue tylenol 975mg, motrin 600mg around the clock with strict scheduling  - oxycodone 5mg PRN is okay for breakthrough pain   - recommend activity limitation to patient's comfort level   - f/u in 1 week for incision check in clinic or sooner PRN     Queenie Franco MD PGY2  d/w Dr. Castillo  26y P4 POD#5 s/p rLTCS presenting to ED with persistent incisional pain. Abdominal exam benign. Labs wnl. No concern for endometritis, incisional infection, or bleeding. Likely normal post-operative pain with need for more regimented pain control.     Recs:   - continue tylenol 975mg, motrin 600mg around the clock with strict scheduling  - oxycodone 5mg PRN is okay for breakthrough pain   - recommend activity limitation to patient's comfort level   - f/u in 1 week for incision check in clinic or sooner PRN     Queenie Franco MD PGY2  d/w Dr. Castillo

## 2021-07-17 NOTE — ED PROVIDER NOTE - PROGRESS NOTE DETAILS
murali SIMS- Labs noted. Seen by OB, low concern for endometritis, likely post op pain. Patient pain controlled with medication in the ED. Will DC home with renewed Oxycodone Rx and instructions to continue NSAIDs/Tylenol ATC and f/u with GYN outpt. Counseled on signs/symptoms of which to return .

## 2021-07-17 NOTE — CONSULT NOTE ADULT - SUBJECTIVE AND OBJECTIVE BOX
***Incomplete Note***** ALEJANDRO ALEGRIA  26y  Female [id number]    HPI:  27yo P4 POD#5 s/p rLTCS presenting to ED with persistent incisional pain. Patient states that pain was previously well controlled at home with motrin/tylenol and oxycodone. Pt ran out of oxycodone this AM and pain worsened. Pain is achy, 9/10, localized superficially around incision. Otherwise feeling well. Denies fevers, chills, HA, blurry vision, CP, SOB, N/V, dysuria, constipation, diarrhea.        Name of GYN Physician: [facility]    POB: C/S x4   Pgyn: Denies fibroids, cysts, endometriosis, STI's, Abnormal pap smears   PMHX: Denies  PSHx: C/S x4   Meds: Tylenol, Motrin, Zoloft   All: NKDA   Social History:  Denies smoking use, drug use. Hx of ETOH abuse     Vital Signs Last 24 Hrs  T(C): 36.7 (17 Jul 2021 13:59), Max: 36.7 (17 Jul 2021 13:59)  T(F): 98.1 (17 Jul 2021 13:59), Max: 98.1 (17 Jul 2021 13:59)  HR: 82 (17 Jul 2021 13:59) (82 - 82)  BP: 118/84 (17 Jul 2021 13:59) (118/84 - 118/84)  BP(mean): --  RR: 16 (17 Jul 2021 13:59) (16 - 16)  SpO2: 100% (17 Jul 2021 13:59) (100% - 100%)    Physical Exam:   General: sitting comftorably in bed, NAD   CV: RR S1S2 no m/r/g  Lungs: CTA b/l, good air flow b/l   Abd: Soft, non-tender, non-distended.  Bowel sounds present. Incision well healing, clean/dry/intact with steri strips in place. No edema, erythema, drainage.   :  No bleeding on pad   Ext: non-tender b/l, no edema     LABS:                          10.4   6.50  )-----------( 330      ( 17 Jul 2021 15:58 )             32.3     07-17    140  |  107  |  5<L>  ----------------------------<  86  3.5   |  20<L>  |  0.65    Ca    8.5      17 Jul 2021 15:58    TPro  6.4  /  Alb  3.3  /  TBili  0.2  /  DBili  x   /  AST  13  /  ALT  19  /  AlkPhos  58  07-17

## 2021-07-17 NOTE — ED PROVIDER NOTE - GASTROINTESTINAL, MLM
+Transverse lower abdominal surgical incision c/d/i with steri strips in place. Appears to be healing well. No e/o erythema or purulence from incision. +Mild TTP across lower abdomen and surrounding incision site. No R/G.

## 2021-07-17 NOTE — ED ADULT NURSE NOTE - OBJECTIVE STATEMENT
Pt received s/p  4 days ago. Pt c/o pain at incision site. incision area is clean and dry, no, swelling, redness, drainage or warmth noted. Denies CP, denies SOB, respirations even and unlabored, no apparent distress noted. 20g PIV placed in L arm, labs drawn as ordered. Continue to monitor.

## 2021-07-17 NOTE — ED PROVIDER NOTE - CLINICAL SUMMARY MEDICAL DECISION MAKING FREE TEXT BOX
26F s/p C-sec presenting POD #5 with persistent pain at surgical incision site. Jude check labs, medicate, OB evaluation.

## 2021-07-19 ENCOUNTER — NON-APPOINTMENT (OUTPATIENT)
Age: 26
End: 2021-07-19

## 2021-07-20 ENCOUNTER — OUTPATIENT (OUTPATIENT)
Dept: OUTPATIENT SERVICES | Facility: HOSPITAL | Age: 26
LOS: 1 days | End: 2021-07-20

## 2021-07-20 ENCOUNTER — APPOINTMENT (OUTPATIENT)
Dept: OBGYN | Facility: HOSPITAL | Age: 26
End: 2021-07-20

## 2021-07-20 ENCOUNTER — NON-APPOINTMENT (OUTPATIENT)
Age: 26
End: 2021-07-20

## 2021-07-20 VITALS
BODY MASS INDEX: 30.92 KG/M2 | HEIGHT: 68 IN | TEMPERATURE: 98.3 F | SYSTOLIC BLOOD PRESSURE: 121 MMHG | WEIGHT: 204 LBS | DIASTOLIC BLOOD PRESSURE: 80 MMHG | HEART RATE: 94 BPM

## 2021-07-20 DIAGNOSIS — Z98.891 HISTORY OF UTERINE SCAR FROM PREVIOUS SURGERY: Chronic | ICD-10-CM

## 2021-07-20 NOTE — HISTORY OF PRESENT ILLNESS
[Postpartum Follow Up] : postpartum follow up [Complications:___] : complications include: [unfilled] [Last Pap Date: ___] : Last Pap Date: [unfilled] [Delivery Date: ___] : on [unfilled] [Repeat C/S] : delivered by  section (repeat) [Female] : Delivery History: baby girl [Wt. ___] : weighing [unfilled] [Rhogam] : Rhogam was not administered [Rubella Vaccine] : Rubella vaccine was not administered [Pertussis Vaccine] : Pertussis vaccine administered [BTL] : no tubal ligation [Breastfeeding] : currently nursing [Discharge HCT: ___] : hematocrit level was [unfilled] [Discharge HGB: ___] : hemoglobin level was [unfilled] [Resumed Menses] : has not resumed her menses [Resumed [place]] : has not resumed intercourse [Intended Contraception] : Intended Contraception: [IUD] : intrauterine device [S/Sx PP Depression] : no signs/symptoms of postpartum depression [Back to Normal] : is back to normal in size [None] : no vaginal bleeding [Normal] : the vagina was normal [Examination Of The Breasts] : breasts are normal [Doing Well] : is doing well [FreeTextEntry8] : Here for wound check [de-identified] : Breastfeeding/happy with baby. Hx Depression/anxiety- on Zoloft. Seen by SW today. To keep incision clean and dry- no heavy lifting. Contraceptive counseling- wants IUD. Taking Prenatal vit. RTC 3-4 weeks for postpartum visit. MHegarty NP

## 2021-07-21 DIAGNOSIS — Z98.891 HISTORY OF UTERINE SCAR FROM PREVIOUS SURGERY: ICD-10-CM

## 2021-07-21 DIAGNOSIS — F32.9 MAJOR DEPRESSIVE DISORDER, SINGLE EPISODE, UNSPECIFIED: ICD-10-CM

## 2021-07-21 DIAGNOSIS — Z51.89 ENCOUNTER FOR OTHER SPECIFIED AFTERCARE: ICD-10-CM

## 2021-07-23 ENCOUNTER — NON-APPOINTMENT (OUTPATIENT)
Age: 26
End: 2021-07-23

## 2021-08-01 ENCOUNTER — TRANSCRIPTION ENCOUNTER (OUTPATIENT)
Age: 26
End: 2021-08-01

## 2021-08-01 ENCOUNTER — EMERGENCY (EMERGENCY)
Facility: HOSPITAL | Age: 26
LOS: 0 days | Discharge: HOME | End: 2021-08-02
Attending: STUDENT IN AN ORGANIZED HEALTH CARE EDUCATION/TRAINING PROGRAM
Payer: MEDICAID

## 2021-08-01 ENCOUNTER — EMERGENCY (EMERGENCY)
Facility: HOSPITAL | Age: 26
LOS: 0 days | Discharge: HOME | End: 2021-08-01
Attending: EMERGENCY MEDICINE
Payer: MEDICAID

## 2021-08-01 VITALS
HEART RATE: 152 BPM | WEIGHT: 199.96 LBS | TEMPERATURE: 98 F | HEIGHT: 68 IN | RESPIRATION RATE: 17 BRPM | DIASTOLIC BLOOD PRESSURE: 79 MMHG | OXYGEN SATURATION: 99 % | SYSTOLIC BLOOD PRESSURE: 122 MMHG

## 2021-08-01 VITALS
SYSTOLIC BLOOD PRESSURE: 117 MMHG | HEIGHT: 68 IN | OXYGEN SATURATION: 100 % | HEART RATE: 108 BPM | TEMPERATURE: 98 F | WEIGHT: 199.96 LBS | DIASTOLIC BLOOD PRESSURE: 76 MMHG | RESPIRATION RATE: 16 BRPM

## 2021-08-01 DIAGNOSIS — F41.9 ANXIETY DISORDER, UNSPECIFIED: ICD-10-CM

## 2021-08-01 DIAGNOSIS — F10.129 ALCOHOL ABUSE WITH INTOXICATION, UNSPECIFIED: ICD-10-CM

## 2021-08-01 DIAGNOSIS — D64.9 ANEMIA, UNSPECIFIED: ICD-10-CM

## 2021-08-01 DIAGNOSIS — Z98.891 HISTORY OF UTERINE SCAR FROM PREVIOUS SURGERY: Chronic | ICD-10-CM

## 2021-08-01 DIAGNOSIS — Z48.89 ENCOUNTER FOR OTHER SPECIFIED SURGICAL AFTERCARE: ICD-10-CM

## 2021-08-01 DIAGNOSIS — F32.9 MAJOR DEPRESSIVE DISORDER, SINGLE EPISODE, UNSPECIFIED: ICD-10-CM

## 2021-08-01 DIAGNOSIS — Z00.00 ENCOUNTER FOR GENERAL ADULT MEDICAL EXAMINATION WITHOUT ABNORMAL FINDINGS: ICD-10-CM
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
## 2021-08-01 PROCEDURE — 99284 EMERGENCY DEPT VISIT MOD MDM: CPT

## 2021-08-01 PROCEDURE — 99283 EMERGENCY DEPT VISIT LOW MDM: CPT

## 2021-08-01 RX ORDER — IBUPROFEN 200 MG
1 TABLET ORAL
Qty: 20 | Refills: 0
Start: 2021-08-01 | End: 2021-08-05

## 2021-08-01 NOTE — ED ADULT NURSE REASSESSMENT NOTE - NS ED NURSE REASSESS COMMENT FT1
arrived to  pt cleared for discharged and released by Ashanti
pt awake speaking with Dr Pollock Pt aware needs to stay 3 more hours to become more sober
pt more awake heard fighting ith someone on phone
 spoke with  via phone pt to be observed till Pts  can come and get

## 2021-08-01 NOTE — ED ADULT NURSE NOTE - OBJECTIVE STATEMENT
called EMS for wife for drinking alcohol and taking valium. Pt denies states needs sutures removed from c-sect on 7/12 suture line clean and dry with no sutures some tenderness rt lower suture line  called EMS for wife for drinking alcohol and taking a valium. Pt denies states needs sutures removed from c-sect on 7/12 suture line clean and dry with one suture RLQ some tenderness rt lower suture line

## 2021-08-01 NOTE — ED PROVIDER NOTE - PATIENT PORTAL LINK FT
You can access the FollowMyHealth Patient Portal offered by [facility] by registering at the following website: [email or web address]. By joining [employer]’s FollowMyHealth portal, you will also be able to view your health information using other applications (apps) compatible with our system.

## 2021-08-01 NOTE — ED PROVIDER NOTE - CLINICAL SUMMARY MEDICAL DECISION MAKING FREE TEXT BOX
Pt with slight suture discomfort s/p . Otherwise, surrounding area appears to be non-infected. Pt is intoxicated and spoke to  who states he would like some info regarding rehab. Will provide info regarding this and discharge pt to . For sutures, appears to not be infected and pt will be instructed to follow up with OB. Suture likely to dissolve in next 2-3 weeks. Pt with slight suture discomfort s/p . Otherwise, surrounding area appears to be non-infected. Pt is intoxicated and spoke to  who states he would like some info regarding rehab. Will provide info regarding this and discharge pt to . For sutures, appears to not be infected and pt will be instructed to follow up with OB. Suture likely to dissolve in next 2-3 weeks.    Contacted [employer] to help care for patient as well -

## 2021-08-01 NOTE — ED PROVIDER NOTE - CONSTITUTIONAL, MLM
normal... Well appearing, awake, alert but with slightly slurred speech with alcohol in breath noted, pt admits to drinking alcohol

## 2021-08-01 NOTE — ED PROVIDER NOTE - GASTROINTESTINAL, MLM
Abdomen soft, no guarding, subcuticular suture appears to be in place with no erythem around skin, noted small suture thread protruding on left side of the abdomen at end of , appears to be non-erythematous with no surrounding swelling.

## 2021-08-01 NOTE — ED ADULT TRIAGE NOTE - CHIEF COMPLAINT QUOTE
Painful c sec site , c sec 7/12, speech slurring at triage denies drugs or alcohol, spouse states alcohol abuse and vaps

## 2021-08-01 NOTE — ED PROVIDER NOTE - OBJECTIVE STATEMENT
Pt is a 25 y/o female with PMH of c section x4 presenting to the ED for evaluation of suture discomfort. Pt has dissolvable subcuticular sutures in place s/p . Pt presents intoxicated in ED and requests removal of sutures.

## 2021-08-01 NOTE — ED ADULT NURSE NOTE - NSIMPLEMENTINTERV_GEN_ALL_ED
Implemented All Fall Risk Interventions:  [place] to call system. Call bell, personal items and telephone within reach. Instruct patient to call for assistance. Room bathroom lighting operational. Non-slip footwear when patient is off stretcher. Physically safe environment: no spills, clutter or unnecessary equipment. Stretcher in lowest position, wheels locked, appropriate side rails in place. Provide visual cue, wrist band, yellow gown, etc. Monitor gait and stability. Monitor for mental status changes and reorient to person, place, and time. Review medications for side effects contributing to fall risk. Reinforce activity limits and safety measures with patient and family.

## 2021-08-02 ENCOUNTER — NON-APPOINTMENT (OUTPATIENT)
Age: 26
End: 2021-08-02

## 2021-08-02 VITALS
HEART RATE: 97 BPM | DIASTOLIC BLOOD PRESSURE: 74 MMHG | SYSTOLIC BLOOD PRESSURE: 109 MMHG | OXYGEN SATURATION: 97 % | RESPIRATION RATE: 18 BRPM | TEMPERATURE: 98 F

## 2021-08-02 LAB
ALBUMIN SERPL ELPH-MCNC: 3.9 G/DL — SIGNIFICANT CHANGE UP (ref 3.3–5)
ALP SERPL-CCNC: 53 U/L — SIGNIFICANT CHANGE UP (ref 40–120)
ALT FLD-CCNC: 62 U/L — SIGNIFICANT CHANGE UP (ref 12–78)
ANION GAP SERPL CALC-SCNC: 12 MMOL/L — SIGNIFICANT CHANGE UP (ref 5–17)
ANISOCYTOSIS BLD QL: SLIGHT — SIGNIFICANT CHANGE UP
APAP SERPL-MCNC: < 2 UG/ML (ref 10–30)
AST SERPL-CCNC: 60 U/L — HIGH (ref 15–37)
BASOPHILS # BLD AUTO: 0.13 K/UL — SIGNIFICANT CHANGE UP (ref 0–0.2)
BASOPHILS NFR BLD AUTO: 2.2 % — HIGH (ref 0–2)
BILIRUB SERPL-MCNC: 1.6 MG/DL — HIGH (ref 0.2–1.2)
BUN SERPL-MCNC: 10 MG/DL — SIGNIFICANT CHANGE UP (ref 7–23)
CALCIUM SERPL-MCNC: 8.1 MG/DL — LOW (ref 8.5–10.1)
CHLORIDE SERPL-SCNC: 111 MMOL/L — HIGH (ref 96–108)
CO2 SERPL-SCNC: 21 MMOL/L — LOW (ref 22–31)
CREAT SERPL-MCNC: 0.78 MG/DL — SIGNIFICANT CHANGE UP (ref 0.5–1.3)
ELLIPTOCYTES BLD QL SMEAR: SLIGHT — SIGNIFICANT CHANGE UP
EOSINOPHIL # BLD AUTO: 0.1 K/UL — SIGNIFICANT CHANGE UP (ref 0–0.5)
EOSINOPHIL NFR BLD AUTO: 1.7 % — SIGNIFICANT CHANGE UP (ref 0–6)
ETHANOL SERPL-MCNC: 255 MG/DL — HIGH (ref 0–10)
GLUCOSE SERPL-MCNC: 64 MG/DL — LOW (ref 70–99)
HCG SERPL-ACNC: 2 MIU/ML — SIGNIFICANT CHANGE UP
HCT VFR BLD CALC: 36.9 % — SIGNIFICANT CHANGE UP (ref 34.5–45)
HGB BLD-MCNC: 12.3 G/DL — SIGNIFICANT CHANGE UP (ref 11.5–15.5)
IMM GRANULOCYTES NFR BLD AUTO: 0.2 % — SIGNIFICANT CHANGE UP (ref 0–1.5)
LYMPHOCYTES # BLD AUTO: 3.26 K/UL — SIGNIFICANT CHANGE UP (ref 1–3.3)
LYMPHOCYTES # BLD AUTO: 56 % — HIGH (ref 13–44)
MANUAL SMEAR VERIFICATION: SIGNIFICANT CHANGE UP
MCHC RBC-ENTMCNC: 24.3 PG — LOW (ref 27–34)
MCHC RBC-ENTMCNC: 33.3 GM/DL — SIGNIFICANT CHANGE UP (ref 32–36)
MCV RBC AUTO: 72.8 FL — LOW (ref 80–100)
MONOCYTES # BLD AUTO: 0.31 K/UL — SIGNIFICANT CHANGE UP (ref 0–0.9)
MONOCYTES NFR BLD AUTO: 5.3 % — SIGNIFICANT CHANGE UP (ref 2–14)
NEUTROPHILS # BLD AUTO: 2.01 K/UL — SIGNIFICANT CHANGE UP (ref 1.8–7.4)
NEUTROPHILS NFR BLD AUTO: 34.6 % — LOW (ref 43–77)
NRBC # BLD: 0 /100 WBCS — SIGNIFICANT CHANGE UP (ref 0–0)
OVALOCYTES BLD QL SMEAR: SLIGHT — SIGNIFICANT CHANGE UP
PLAT MORPH BLD: NORMAL — SIGNIFICANT CHANGE UP
PLATELET # BLD AUTO: 524 K/UL — HIGH (ref 150–400)
POTASSIUM SERPL-MCNC: 3.5 MMOL/L — SIGNIFICANT CHANGE UP (ref 3.5–5.3)
POTASSIUM SERPL-SCNC: 3.5 MMOL/L — SIGNIFICANT CHANGE UP (ref 3.5–5.3)
PROT SERPL-MCNC: 8 GM/DL — SIGNIFICANT CHANGE UP (ref 6–8.3)
RBC # BLD: 5.07 M/UL — SIGNIFICANT CHANGE UP (ref 3.8–5.2)
RBC # FLD: 15.1 % — HIGH (ref 10.3–14.5)
RBC BLD AUTO: SIGNIFICANT CHANGE UP
SALICYLATES SERPL-MCNC: <1.7 MG/DL — LOW (ref 2.8–20)
SCHISTOCYTES BLD QL AUTO: SLIGHT — SIGNIFICANT CHANGE UP
SODIUM SERPL-SCNC: 144 MMOL/L — SIGNIFICANT CHANGE UP (ref 135–145)
WBC # BLD: 5.82 K/UL — SIGNIFICANT CHANGE UP (ref 3.8–10.5)
WBC # FLD AUTO: 5.82 K/UL — SIGNIFICANT CHANGE UP (ref 3.8–10.5)

## 2021-08-02 PROCEDURE — 70450 CT HEAD/BRAIN W/O DYE: CPT | Mod: 26,MA

## 2021-08-02 PROCEDURE — 93010 ELECTROCARDIOGRAM REPORT: CPT

## 2021-08-02 RX ORDER — SODIUM CHLORIDE 9 MG/ML
1000 INJECTION INTRAMUSCULAR; INTRAVENOUS; SUBCUTANEOUS ONCE
Refills: 0 | Status: COMPLETED | OUTPATIENT
Start: 2021-08-02 | End: 2021-08-02

## 2021-08-02 NOTE — ED PROVIDER NOTE - PATIENT PORTAL LINK FT
You can access the FollowMyHealth Patient Portal offered by [facility] by registering at the following website: [email or web address]. By joining [employer]’s FollowMyHealth portal, you will also be able to view your health information using other applications (apps) compatible with our system.

## 2021-08-02 NOTE — ED PROVIDER NOTE - OBJECTIVE STATEMENT
26F presented to the ED for intoxication. Pt denied any other complaints on arrival including headache, chest pain, difficulty breathing. Stated only that she had come to the ED to "rest".

## 2021-08-02 NOTE — ED PROVIDER NOTE - NSFOLLOWUPINSTRUCTIONS_ED_ALL_ED_FT
1) Please follow-up with your primary care doctor within the next 3 days.  If you cannot follow-up with your doctor(s), please return to the ED for any urgent issues.  2) If you have any worsening of symptoms or any other concerns please return to the ED immediately.  3) Please continue taking your home medications as directed.  4) You may have been given a copy of your labs and/or imaging.  Please go over these with your primary care doctor.     Alcohol Abuse    Alcohol intoxication occurs when the amount of alcohol that a person has consumed impairs his or her ability to mentally and physically function. Chronic alcohol consumption can also lead to a variety of health issues including neurological disease, stomach disease, heart disease, liver disease, etc. Do not drive after drinking alcohol. Drinking enough alcohol to end up in an Emergency Room suggests you may have an alcohol abuse problem. Seek help at a drug addiction center.    SEEK IMMEDIATE MEDICAL CARE IF YOU HAVE ANY OF THE FOLLOWING SYMPTOMS: seizures, vomiting blood, blood in your stool, lightheadedness/dizziness, or becoming shaky to tremulous when you stop drinking.

## 2021-08-02 NOTE — ED PROVIDER NOTE - CLINICAL SUMMARY MEDICAL DECISION MAKING FREE TEXT BOX
presented to the ED for alcohol intoxication. No signs of trauma were appreciated. Her vitals were stable. CTH without evidence of traumatic injury. Pt was clinically sober appearing at time of re-eval and released from the ED.

## 2021-08-02 NOTE — ED ADULT NURSE NOTE - NSIMPLEMENTINTERV_GEN_ALL_ED
Implemented All Fall with Harm Risk Interventions:  [place] to call system. Call bell, personal items and telephone within reach. Instruct patient to call for assistance. Room bathroom lighting operational. Non-slip footwear when patient is off stretcher. Physically safe environment: no spills, clutter or unnecessary equipment. Stretcher in lowest position, wheels locked, appropriate side rails in place. Provide visual cue, wrist band, yellow gown, etc. Monitor gait and stability. Monitor for mental status changes and reorient to person, place, and time. Review medications for side effects contributing to fall risk. Reinforce activity limits and safety measures with patient and family. Provide visual clues: red socks.

## 2021-08-03 ENCOUNTER — EMERGENCY (EMERGENCY)
Facility: HOSPITAL | Age: 26
LOS: 0 days | Discharge: HOME | End: 2021-08-03
Attending: STUDENT IN AN ORGANIZED HEALTH CARE EDUCATION/TRAINING PROGRAM
Payer: MEDICAID

## 2021-08-03 VITALS
OXYGEN SATURATION: 99 % | WEIGHT: 220.02 LBS | RESPIRATION RATE: 20 BRPM | HEIGHT: 68 IN | DIASTOLIC BLOOD PRESSURE: 102 MMHG | HEART RATE: 108 BPM | SYSTOLIC BLOOD PRESSURE: 138 MMHG | TEMPERATURE: 98 F

## 2021-08-03 VITALS
RESPIRATION RATE: 16 BRPM | DIASTOLIC BLOOD PRESSURE: 67 MMHG | SYSTOLIC BLOOD PRESSURE: 130 MMHG | HEART RATE: 105 BPM | TEMPERATURE: 98 F | OXYGEN SATURATION: 96 %

## 2021-08-03 DIAGNOSIS — Z83.3 FAMILY HISTORY OF DIABETES MELLITUS: ICD-10-CM

## 2021-08-03 DIAGNOSIS — Z98.891 HISTORY OF UTERINE SCAR FROM PREVIOUS SURGERY: Chronic | ICD-10-CM

## 2021-08-03 DIAGNOSIS — F41.8 OTHER SPECIFIED ANXIETY DISORDERS: ICD-10-CM

## 2021-08-03 DIAGNOSIS — Z87.59 PERSONAL HISTORY OF OTHER COMPLICATIONS OF PREGNANCY, CHILDBIRTH AND THE PUERPERIUM: ICD-10-CM

## 2021-08-03 DIAGNOSIS — F10.120 ALCOHOL ABUSE WITH INTOXICATION, UNCOMPLICATED: ICD-10-CM

## 2021-08-03 DIAGNOSIS — R46.2 STRANGE AND INEXPLICABLE BEHAVIOR: ICD-10-CM
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
## 2021-08-03 DIAGNOSIS — Z86.2 PERSONAL HISTORY OF DISEASES OF THE BLOOD AND BLOOD-FORMING ORGANS AND CERTAIN DISORDERS INVOLVING THE IMMUNE MECHANISM: ICD-10-CM

## 2021-08-03 DIAGNOSIS — Z79.1 LONG TERM (CURRENT) USE OF NON-STEROIDAL ANTI-INFLAMMATORIES (NSAID): ICD-10-CM

## 2021-08-03 DIAGNOSIS — Z91.5 PERSONAL HISTORY OF SELF-HARM: ICD-10-CM

## 2021-08-03 DIAGNOSIS — Z82.49 FAMILY HISTORY OF ISCHEMIC HEART DISEASE AND OTHER DISEASES OF THE CIRCULATORY SYSTEM: ICD-10-CM

## 2021-08-03 DIAGNOSIS — I10 ESSENTIAL (PRIMARY) HYPERTENSION: ICD-10-CM

## 2021-08-03 LAB — ETHANOL SERPL-MCNC: 350 MG/DL — HIGH (ref 0–10)

## 2021-08-03 PROCEDURE — 99283 EMERGENCY DEPT VISIT LOW MDM: CPT

## 2021-08-03 NOTE — ED ADULT NURSE NOTE - NSIMPLEMENTINTERV_GEN_ALL_ED
Implemented All Fall with Harm Risk Interventions:  [place] to call system. Call bell, personal items and telephone within reach. Instruct patient to call for assistance. Room bathroom lighting operational. Non-slip footwear when patient is off stretcher. Physically safe environment: no spills, clutter or unnecessary equipment. Stretcher in lowest position, wheels locked, appropriate side rails in place. Provide visual cue, wrist band, yellow gown, etc. Monitor gait and stability. Monitor for mental status changes and reorient to person, place, and time. Review medications for side effects contributing to fall risk. Reinforce activity limits and safety measures with patient and family. Provide visual clues: red socks.
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
## 2021-08-03 NOTE — ED PROVIDER NOTE - NSICDXFAMILYHX_GEN_ALL_CORE_FT
FAMILY HISTORY:  Father  Still living? Unknown  Family history of hypertension, Age at diagnosis: Age Unknown  FH: diabetes mellitus, Age at diagnosis: Age Unknown    Mother  Still living? Unknown  Family history of hypertension, Age at diagnosis: Age Unknown  FH: diabetes mellitus, Age at diagnosis: Age Unknown

## 2021-08-03 NOTE — ED PROVIDER NOTE - CLINICAL SUMMARY MEDICAL DECISION MAKING FREE TEXT BOX
pt presented today intoxicated, has known h/o alcohol abuse, pt observed for sobriety and discharged, given counseling centers outpt to follow up with, told to also follow up with her pmd

## 2021-08-03 NOTE — ED ADULT NURSE NOTE - OBJECTIVE STATEMENT
pt is a 26 year old female, AAX4, was picked up from home with  present, admits to alcohol intake, post partum for 2 weeks now, pt is asking to go AMA. states she does not have a PMH

## 2021-08-03 NOTE — ED PROVIDER NOTE - PATIENT PORTAL LINK FT
You can access the FollowMyHealth Patient Portal offered by [facility] by registering at the following website: [email or web address]. By joining [facility]’s FollowMyHealth portal, you will also be able to view your health information using other applications (apps) compatible with our system.

## 2021-08-03 NOTE — ED PROVIDER NOTE - NSICDXPASTMEDICALHX_GEN_ALL_CORE_FT
PAST MEDICAL HISTORY:  Anemia, unspecified type     Anxiety     Depression     Other specified postprocedural states

## 2021-08-03 NOTE — ED PROVIDER NOTE - OBJECTIVE STATEMENT
presented intoxicated  known h/o alcohol abuse   no trauma 26 year old female with h/o HTN, anxiety, depression, anemia and alcohol abuse presented intoxicated, pt known h/o alcohol abuse, pt denies trauma, pt has no physical signs of trauma

## 2021-08-04 ENCOUNTER — NON-APPOINTMENT (OUTPATIENT)
Age: 26
End: 2021-08-04

## 2021-08-12 ENCOUNTER — HOSPITAL ENCOUNTER (INPATIENT)
Dept: HOSPITAL 74 - YASAS | Age: 26
LOS: 4 days | Discharge: OTHER INSTITUTION | End: 2021-08-16
Attending: ALLERGY & IMMUNOLOGY | Admitting: ALLERGY & IMMUNOLOGY
Payer: COMMERCIAL

## 2021-08-12 VITALS — BODY MASS INDEX: 30.1 KG/M2

## 2021-08-12 DIAGNOSIS — Z86.69: ICD-10-CM

## 2021-08-12 DIAGNOSIS — Z56.0: ICD-10-CM

## 2021-08-12 DIAGNOSIS — F17.210: ICD-10-CM

## 2021-08-12 DIAGNOSIS — F32.9: ICD-10-CM

## 2021-08-12 DIAGNOSIS — F10.230: Primary | ICD-10-CM

## 2021-08-12 DIAGNOSIS — F41.9: ICD-10-CM

## 2021-08-12 DIAGNOSIS — Z62.810: ICD-10-CM

## 2021-08-12 DIAGNOSIS — R74.01: ICD-10-CM

## 2021-08-12 DIAGNOSIS — F43.10: ICD-10-CM

## 2021-08-12 DIAGNOSIS — Z59.0: ICD-10-CM

## 2021-08-12 LAB
ALBUMIN SERPL-MCNC: 4.3 G/DL (ref 3.4–5)
ALP SERPL-CCNC: 72 U/L (ref 45–117)
ALT SERPL-CCNC: 143 U/L (ref 13–61)
ANION GAP SERPL CALC-SCNC: 13 MMOL/L (ref 8–16)
AST SERPL-CCNC: 130 U/L (ref 15–37)
BILIRUB SERPL-MCNC: 1.3 MG/DL (ref 0.2–1)
BUN SERPL-MCNC: 5.7 MG/DL (ref 7–18)
CALCIUM SERPL-MCNC: 7.9 MG/DL (ref 8.5–10.1)
CHLORIDE SERPL-SCNC: 99 MMOL/L (ref 98–107)
CO2 SERPL-SCNC: 27 MMOL/L (ref 21–32)
CREAT SERPL-MCNC: 1 MG/DL (ref 0.55–1.3)
DEPRECATED RDW RBC AUTO: 15.7 % (ref 11.6–15.6)
GLUCOSE SERPL-MCNC: 107 MG/DL (ref 74–106)
HCT VFR BLD CALC: 37.7 % (ref 32.4–45.2)
HGB BLD-MCNC: 12.5 GM/DL (ref 10.7–15.3)
MCH RBC QN AUTO: 24.6 PG (ref 25.7–33.7)
MCHC RBC AUTO-ENTMCNC: 33 G/DL (ref 32–36)
MCV RBC: 74.7 FL (ref 80–96)
PLATELET # BLD AUTO: 213 10^3/UL (ref 134–434)
PMV BLD: 9.3 FL (ref 7.5–11.1)
PROT SERPL-MCNC: 7.7 G/DL (ref 6.4–8.2)
RBC # BLD AUTO: 5.05 M/MM3 (ref 3.6–5.2)
SODIUM SERPL-SCNC: 139 MMOL/L (ref 136–145)
WBC # BLD AUTO: 4.6 K/MM3 (ref 4–10)

## 2021-08-12 PROCEDURE — HZ2ZZZZ DETOXIFICATION SERVICES FOR SUBSTANCE ABUSE TREATMENT: ICD-10-PCS | Performed by: ALLERGY & IMMUNOLOGY

## 2021-08-12 PROCEDURE — U0005 INFEC AGEN DETEC AMPLI PROBE: HCPCS

## 2021-08-12 PROCEDURE — C9803 HOPD COVID-19 SPEC COLLECT: HCPCS

## 2021-08-12 PROCEDURE — U0003 INFECTIOUS AGENT DETECTION BY NUCLEIC ACID (DNA OR RNA); SEVERE ACUTE RESPIRATORY SYNDROME CORONAVIRUS 2 (SARS-COV-2) (CORONAVIRUS DISEASE [COVID-19]), AMPLIFIED PROBE TECHNIQUE, MAKING USE OF HIGH THROUGHPUT TECHNOLOGIES AS DESCRIBED BY CMS-2020-01-R: HCPCS

## 2021-08-12 RX ADMIN — POTASSIUM CHLORIDE SCH MEQ: 1500 TABLET, EXTENDED RELEASE ORAL at 22:29

## 2021-08-12 RX ADMIN — Medication SCH MG: at 22:29

## 2021-08-12 RX ADMIN — ONDANSETRON PRN MG: 4 TABLET, ORALLY DISINTEGRATING ORAL at 18:37

## 2021-08-12 RX ADMIN — POTASSIUM CHLORIDE SCH MEQ: 1500 TABLET, EXTENDED RELEASE ORAL at 15:19

## 2021-08-12 SDOH — ECONOMIC STABILITY - HOUSING INSECURITY: HOMELESSNESS: Z59.0

## 2021-08-12 SDOH — ECONOMIC STABILITY - INCOME SECURITY: UNEMPLOYMENT, UNSPECIFIED: Z56.0

## 2021-08-13 LAB
ALBUMIN SERPL-MCNC: 3.7 G/DL (ref 3.4–5)
ALP SERPL-CCNC: 64 U/L (ref 45–117)
ALT SERPL-CCNC: 110 U/L (ref 13–61)
ANION GAP SERPL CALC-SCNC: 7 MMOL/L (ref 8–16)
AST SERPL-CCNC: 91 U/L (ref 15–37)
BILIRUB SERPL-MCNC: 1.3 MG/DL (ref 0.2–1)
BUN SERPL-MCNC: 4.2 MG/DL (ref 7–18)
CALCIUM SERPL-MCNC: 7.6 MG/DL (ref 8.5–10.1)
CHLORIDE SERPL-SCNC: 107 MMOL/L (ref 98–107)
CO2 SERPL-SCNC: 25 MMOL/L (ref 21–32)
CREAT SERPL-MCNC: 1 MG/DL (ref 0.55–1.3)
DEPRECATED RDW RBC AUTO: 15.6 % (ref 11.6–15.6)
GLUCOSE SERPL-MCNC: 106 MG/DL (ref 74–106)
HCT VFR BLD CALC: 35.7 % (ref 32.4–45.2)
HGB BLD-MCNC: 11.8 GM/DL (ref 10.7–15.3)
MCH RBC QN AUTO: 25 PG (ref 25.7–33.7)
MCHC RBC AUTO-ENTMCNC: 33.2 G/DL (ref 32–36)
MCV RBC: 75.5 FL (ref 80–96)
PLATELET # BLD AUTO: 182 10^3/UL (ref 134–434)
PMV BLD: 9.4 FL (ref 7.5–11.1)
PROT SERPL-MCNC: 6.8 G/DL (ref 6.4–8.2)
RBC # BLD AUTO: 4.72 M/MM3 (ref 3.6–5.2)
SODIUM SERPL-SCNC: 140 MMOL/L (ref 136–145)
WBC # BLD AUTO: 2.5 K/MM3 (ref 4–10)

## 2021-08-13 RX ADMIN — Medication SCH MG: at 22:13

## 2021-08-13 RX ADMIN — Medication SCH TAB: at 10:13

## 2021-08-14 RX ADMIN — Medication SCH MG: at 22:08

## 2021-08-14 RX ADMIN — Medication SCH TAB: at 10:05

## 2021-08-14 RX ADMIN — Medication SCH MG: at 22:09

## 2021-08-14 RX ADMIN — HYDROXYZINE PAMOATE PRN MG: 25 CAPSULE ORAL at 20:52

## 2021-08-15 RX ADMIN — HYDROXYZINE PAMOATE PRN MG: 25 CAPSULE ORAL at 22:11

## 2021-08-15 RX ADMIN — Medication SCH: at 22:10

## 2021-08-15 RX ADMIN — SERTRALINE HYDROCHLORIDE SCH MG: 50 TABLET ORAL at 10:20

## 2021-08-15 RX ADMIN — Medication SCH TAB: at 10:20

## 2021-08-15 RX ADMIN — Medication SCH MG: at 22:09

## 2021-08-16 ENCOUNTER — HOSPITAL ENCOUNTER (INPATIENT)
Dept: HOSPITAL 74 - YASAS | Age: 26
LOS: 14 days | Discharge: HOME | DRG: 772 | End: 2021-08-30
Attending: ALLERGY & IMMUNOLOGY | Admitting: ALLERGY & IMMUNOLOGY
Payer: COMMERCIAL

## 2021-08-16 VITALS — HEART RATE: 64 BPM | DIASTOLIC BLOOD PRESSURE: 83 MMHG | SYSTOLIC BLOOD PRESSURE: 118 MMHG | TEMPERATURE: 96.4 F

## 2021-08-16 DIAGNOSIS — F41.9: ICD-10-CM

## 2021-08-16 DIAGNOSIS — Z59.0: ICD-10-CM

## 2021-08-16 DIAGNOSIS — Z86.69: ICD-10-CM

## 2021-08-16 DIAGNOSIS — F43.10: ICD-10-CM

## 2021-08-16 DIAGNOSIS — F17.210: ICD-10-CM

## 2021-08-16 DIAGNOSIS — Z56.0: ICD-10-CM

## 2021-08-16 DIAGNOSIS — F10.20: Primary | ICD-10-CM

## 2021-08-16 DIAGNOSIS — F32.9: ICD-10-CM

## 2021-08-16 DIAGNOSIS — Z62.810: ICD-10-CM

## 2021-08-16 PROCEDURE — HZ42ZZZ GROUP COUNSELING FOR SUBSTANCE ABUSE TREATMENT, COGNITIVE-BEHAVIORAL: ICD-10-PCS | Performed by: ALLERGY & IMMUNOLOGY

## 2021-08-16 RX ADMIN — Medication SCH MG: at 21:10

## 2021-08-16 RX ADMIN — ONDANSETRON PRN MG: 4 TABLET, ORALLY DISINTEGRATING ORAL at 09:56

## 2021-08-16 RX ADMIN — SERTRALINE HYDROCHLORIDE SCH MG: 50 TABLET ORAL at 09:56

## 2021-08-16 RX ADMIN — HYDROXYZINE PAMOATE PRN MG: 25 CAPSULE ORAL at 21:10

## 2021-08-16 RX ADMIN — Medication SCH TAB: at 09:55

## 2021-08-16 SDOH — ECONOMIC STABILITY - INCOME SECURITY: UNEMPLOYMENT, UNSPECIFIED: Z56.0

## 2021-08-16 SDOH — ECONOMIC STABILITY - HOUSING INSECURITY: HOMELESSNESS: Z59.0

## 2021-08-17 RX ADMIN — Medication SCH MG: at 21:16

## 2021-08-17 RX ADMIN — NICOTINE SCH: 7 PATCH TRANSDERMAL at 09:37

## 2021-08-17 RX ADMIN — MIRTAZAPINE SCH MG: 30 TABLET, FILM COATED ORAL at 21:16

## 2021-08-17 RX ADMIN — HYDROXYZINE PAMOATE PRN MG: 25 CAPSULE ORAL at 09:36

## 2021-08-17 RX ADMIN — Medication SCH TAB: at 09:36

## 2021-08-17 RX ADMIN — GABAPENTIN SCH MG: 100 CAPSULE ORAL at 12:20

## 2021-08-18 RX ADMIN — GABAPENTIN SCH MG: 100 CAPSULE ORAL at 09:49

## 2021-08-18 RX ADMIN — Medication SCH: at 21:18

## 2021-08-18 RX ADMIN — Medication SCH TAB: at 09:49

## 2021-08-18 RX ADMIN — HYDROXYZINE PAMOATE PRN MG: 25 CAPSULE ORAL at 09:50

## 2021-08-18 RX ADMIN — SERTRALINE HYDROCHLORIDE SCH MG: 50 TABLET ORAL at 09:50

## 2021-08-18 RX ADMIN — Medication SCH MG: at 21:18

## 2021-08-18 RX ADMIN — NICOTINE SCH: 7 PATCH TRANSDERMAL at 09:50

## 2021-08-18 RX ADMIN — MIRTAZAPINE SCH MG: 30 TABLET, FILM COATED ORAL at 21:18

## 2021-08-19 LAB
ALBUMIN SERPL-MCNC: 3.7 G/DL (ref 3.4–5)
ALP SERPL-CCNC: 53 U/L (ref 45–117)
ALT SERPL-CCNC: 153 U/L (ref 13–61)
ANION GAP SERPL CALC-SCNC: 5 MMOL/L (ref 8–16)
AST SERPL-CCNC: 76 U/L (ref 15–37)
BASOPHILS # BLD: 1.3 % (ref 0–2)
BILIRUB SERPL-MCNC: 0.9 MG/DL (ref 0.2–1)
BUN SERPL-MCNC: 11.3 MG/DL (ref 7–18)
CALCIUM SERPL-MCNC: 9 MG/DL (ref 8.5–10.1)
CHLORIDE SERPL-SCNC: 106 MMOL/L (ref 98–107)
CO2 SERPL-SCNC: 28 MMOL/L (ref 21–32)
CREAT SERPL-MCNC: 0.9 MG/DL (ref 0.55–1.3)
DEPRECATED RDW RBC AUTO: 16 % (ref 11.6–15.6)
EOSINOPHIL # BLD: 1.8 % (ref 0–4.5)
GLUCOSE SERPL-MCNC: 118 MG/DL (ref 74–106)
HCT VFR BLD CALC: 32.9 % (ref 32.4–45.2)
HGB BLD-MCNC: 10.8 GM/DL (ref 10.7–15.3)
LYMPHOCYTES # BLD: 45.2 % (ref 8–40)
MCH RBC QN AUTO: 25.1 PG (ref 25.7–33.7)
MCHC RBC AUTO-ENTMCNC: 32.7 G/DL (ref 32–36)
MCV RBC: 76.8 FL (ref 80–96)
MONOCYTES # BLD AUTO: 7.3 % (ref 3.8–10.2)
NEUTROPHILS # BLD: 44.4 % (ref 42.8–82.8)
PLATELET # BLD AUTO: 232 10^3/UL (ref 134–434)
PMV BLD: 9.1 FL (ref 7.5–11.1)
PROT SERPL-MCNC: 6.9 G/DL (ref 6.4–8.2)
RBC # BLD AUTO: 4.28 M/MM3 (ref 3.6–5.2)
SODIUM SERPL-SCNC: 139 MMOL/L (ref 136–145)
WBC # BLD AUTO: 4.3 K/MM3 (ref 4–10)

## 2021-08-19 RX ADMIN — SERTRALINE HYDROCHLORIDE SCH MG: 50 TABLET ORAL at 09:47

## 2021-08-19 RX ADMIN — NICOTINE SCH: 7 PATCH TRANSDERMAL at 09:48

## 2021-08-19 RX ADMIN — GABAPENTIN SCH MG: 100 CAPSULE ORAL at 09:47

## 2021-08-19 RX ADMIN — Medication SCH MG: at 21:45

## 2021-08-19 RX ADMIN — Medication SCH TAB: at 09:47

## 2021-08-19 RX ADMIN — MIRTAZAPINE SCH MG: 30 TABLET, FILM COATED ORAL at 21:45

## 2021-08-20 RX ADMIN — Medication SCH MG: at 21:59

## 2021-08-20 RX ADMIN — SERTRALINE HYDROCHLORIDE SCH MG: 50 TABLET ORAL at 10:26

## 2021-08-20 RX ADMIN — GABAPENTIN SCH MG: 100 CAPSULE ORAL at 10:25

## 2021-08-20 RX ADMIN — Medication SCH TAB: at 10:26

## 2021-08-20 RX ADMIN — NICOTINE SCH: 7 PATCH TRANSDERMAL at 10:26

## 2021-08-20 RX ADMIN — MIRTAZAPINE SCH MG: 30 TABLET, FILM COATED ORAL at 21:59

## 2021-08-20 RX ADMIN — BACITRACIN ZINC SCH GM: 500 OINTMENT TOPICAL at 16:59

## 2021-08-20 RX ADMIN — Medication SCH MG: at 22:00

## 2021-08-21 RX ADMIN — BACITRACIN ZINC SCH GM: 500 OINTMENT TOPICAL at 10:45

## 2021-08-21 RX ADMIN — MIRTAZAPINE SCH MG: 30 TABLET, FILM COATED ORAL at 21:44

## 2021-08-21 RX ADMIN — NICOTINE SCH: 7 PATCH TRANSDERMAL at 10:45

## 2021-08-21 RX ADMIN — Medication SCH TAB: at 10:44

## 2021-08-21 RX ADMIN — SERTRALINE HYDROCHLORIDE SCH MG: 50 TABLET ORAL at 10:44

## 2021-08-21 RX ADMIN — GABAPENTIN SCH MG: 100 CAPSULE ORAL at 10:44

## 2021-08-21 RX ADMIN — Medication SCH MG: at 21:44

## 2021-08-22 RX ADMIN — Medication SCH MG: at 22:10

## 2021-08-22 RX ADMIN — MIRTAZAPINE SCH MG: 30 TABLET, FILM COATED ORAL at 22:10

## 2021-08-22 RX ADMIN — HYDROXYZINE PAMOATE PRN MG: 25 CAPSULE ORAL at 10:40

## 2021-08-22 RX ADMIN — SERTRALINE HYDROCHLORIDE SCH MG: 50 TABLET ORAL at 10:40

## 2021-08-22 RX ADMIN — BACITRACIN ZINC SCH GM: 500 OINTMENT TOPICAL at 10:41

## 2021-08-22 RX ADMIN — NICOTINE SCH: 7 PATCH TRANSDERMAL at 10:40

## 2021-08-22 RX ADMIN — GABAPENTIN SCH MG: 100 CAPSULE ORAL at 10:39

## 2021-08-22 RX ADMIN — Medication SCH: at 10:40

## 2021-08-23 RX ADMIN — GABAPENTIN SCH MG: 100 CAPSULE ORAL at 10:57

## 2021-08-23 RX ADMIN — MIRTAZAPINE SCH MG: 30 TABLET, FILM COATED ORAL at 21:17

## 2021-08-23 RX ADMIN — Medication SCH MG: at 21:17

## 2021-08-23 RX ADMIN — SERTRALINE HYDROCHLORIDE SCH MG: 50 TABLET ORAL at 10:58

## 2021-08-23 RX ADMIN — HYDROXYZINE PAMOATE PRN MG: 25 CAPSULE ORAL at 21:17

## 2021-08-23 RX ADMIN — BACITRACIN ZINC SCH GM: 500 OINTMENT TOPICAL at 10:57

## 2021-08-23 RX ADMIN — IBUPROFEN PRN MG: 400 TABLET, FILM COATED ORAL at 21:17

## 2021-08-23 RX ADMIN — HYDROXYZINE PAMOATE PRN MG: 25 CAPSULE ORAL at 10:58

## 2021-08-23 RX ADMIN — Medication SCH TAB: at 10:58

## 2021-08-23 RX ADMIN — NICOTINE SCH: 7 PATCH TRANSDERMAL at 10:58

## 2021-08-24 RX ADMIN — SERTRALINE HYDROCHLORIDE SCH MG: 50 TABLET ORAL at 09:41

## 2021-08-24 RX ADMIN — BACITRACIN ZINC SCH GM: 500 OINTMENT TOPICAL at 09:41

## 2021-08-24 RX ADMIN — Medication SCH MG: at 21:15

## 2021-08-24 RX ADMIN — NICOTINE SCH: 7 PATCH TRANSDERMAL at 09:42

## 2021-08-24 RX ADMIN — IBUPROFEN PRN MG: 400 TABLET, FILM COATED ORAL at 21:16

## 2021-08-24 RX ADMIN — HYDROXYZINE PAMOATE PRN MG: 25 CAPSULE ORAL at 21:16

## 2021-08-24 RX ADMIN — GABAPENTIN SCH MG: 100 CAPSULE ORAL at 09:41

## 2021-08-24 RX ADMIN — Medication SCH TAB: at 09:41

## 2021-08-24 RX ADMIN — HYDROXYZINE PAMOATE PRN MG: 25 CAPSULE ORAL at 09:41

## 2021-08-24 RX ADMIN — MIRTAZAPINE SCH MG: 30 TABLET, FILM COATED ORAL at 21:15

## 2021-08-25 RX ADMIN — Medication SCH MG: at 21:10

## 2021-08-25 RX ADMIN — HYDROXYZINE PAMOATE PRN MG: 25 CAPSULE ORAL at 10:28

## 2021-08-25 RX ADMIN — IBUPROFEN PRN MG: 400 TABLET, FILM COATED ORAL at 10:28

## 2021-08-25 RX ADMIN — BACITRACIN ZINC SCH GM: 500 OINTMENT TOPICAL at 10:27

## 2021-08-25 RX ADMIN — MIRTAZAPINE SCH MG: 30 TABLET, FILM COATED ORAL at 21:11

## 2021-08-25 RX ADMIN — GABAPENTIN SCH MG: 100 CAPSULE ORAL at 10:27

## 2021-08-25 RX ADMIN — NICOTINE SCH: 7 PATCH TRANSDERMAL at 10:28

## 2021-08-25 RX ADMIN — SERTRALINE HYDROCHLORIDE SCH MG: 50 TABLET ORAL at 10:28

## 2021-08-25 RX ADMIN — Medication SCH TAB: at 10:27

## 2021-08-26 VITALS — DIASTOLIC BLOOD PRESSURE: 84 MMHG | SYSTOLIC BLOOD PRESSURE: 119 MMHG | TEMPERATURE: 95.3 F | HEART RATE: 75 BPM

## 2021-08-26 RX ADMIN — HYDROXYZINE PAMOATE PRN MG: 25 CAPSULE ORAL at 10:33

## 2021-08-26 RX ADMIN — MIRTAZAPINE SCH MG: 30 TABLET, FILM COATED ORAL at 21:42

## 2021-08-26 RX ADMIN — Medication SCH TAB: at 10:33

## 2021-08-26 RX ADMIN — SERTRALINE HYDROCHLORIDE SCH MG: 50 TABLET ORAL at 10:33

## 2021-08-26 RX ADMIN — NICOTINE SCH: 7 PATCH TRANSDERMAL at 10:34

## 2021-08-26 RX ADMIN — IBUPROFEN PRN MG: 400 TABLET, FILM COATED ORAL at 21:42

## 2021-08-26 RX ADMIN — GABAPENTIN SCH MG: 100 CAPSULE ORAL at 10:33

## 2021-08-26 RX ADMIN — BACITRACIN ZINC SCH GM: 500 OINTMENT TOPICAL at 10:33

## 2021-08-26 RX ADMIN — Medication SCH MG: at 21:42

## 2021-08-26 RX ADMIN — HYDROXYZINE PAMOATE PRN MG: 25 CAPSULE ORAL at 21:42

## 2021-08-26 RX ADMIN — Medication SCH: at 23:50

## 2021-08-27 RX ADMIN — GABAPENTIN SCH MG: 100 CAPSULE ORAL at 10:34

## 2021-08-27 RX ADMIN — MIRTAZAPINE SCH MG: 30 TABLET, FILM COATED ORAL at 21:27

## 2021-08-27 RX ADMIN — SERTRALINE HYDROCHLORIDE SCH MG: 50 TABLET ORAL at 10:33

## 2021-08-27 RX ADMIN — Medication SCH MG: at 21:27

## 2021-08-27 RX ADMIN — Medication SCH: at 21:27

## 2021-08-27 RX ADMIN — NICOTINE PRN MG: 4 INHALANT RESPIRATORY (INHALATION) at 10:34

## 2021-08-27 RX ADMIN — BACITRACIN ZINC SCH: 500 OINTMENT TOPICAL at 10:33

## 2021-08-27 RX ADMIN — Medication SCH TAB: at 10:33

## 2021-08-27 RX ADMIN — NICOTINE SCH: 7 PATCH TRANSDERMAL at 10:34

## 2021-08-27 RX ADMIN — HYDROXYZINE PAMOATE PRN MG: 25 CAPSULE ORAL at 11:05

## 2021-08-27 RX ADMIN — HYDROXYZINE PAMOATE PRN MG: 25 CAPSULE ORAL at 21:27

## 2021-08-28 RX ADMIN — NICOTINE SCH: 7 PATCH TRANSDERMAL at 10:18

## 2021-08-28 RX ADMIN — Medication SCH TAB: at 10:17

## 2021-08-28 RX ADMIN — Medication SCH MG: at 21:43

## 2021-08-28 RX ADMIN — HYDROXYZINE PAMOATE PRN MG: 25 CAPSULE ORAL at 21:44

## 2021-08-28 RX ADMIN — HYDROXYZINE PAMOATE PRN MG: 25 CAPSULE ORAL at 10:19

## 2021-08-28 RX ADMIN — MIRTAZAPINE SCH MG: 30 TABLET, FILM COATED ORAL at 21:43

## 2021-08-28 RX ADMIN — NICOTINE PRN MG: 4 INHALANT RESPIRATORY (INHALATION) at 10:18

## 2021-08-28 RX ADMIN — GABAPENTIN SCH MG: 100 CAPSULE ORAL at 10:18

## 2021-08-28 RX ADMIN — SERTRALINE HYDROCHLORIDE SCH MG: 50 TABLET ORAL at 10:18

## 2021-08-28 RX ADMIN — BACITRACIN ZINC SCH GM: 500 OINTMENT TOPICAL at 10:17

## 2021-08-28 RX ADMIN — IBUPROFEN PRN MG: 400 TABLET, FILM COATED ORAL at 21:45

## 2021-08-29 RX ADMIN — BACITRACIN ZINC SCH GM: 500 OINTMENT TOPICAL at 10:06

## 2021-08-29 RX ADMIN — MIRTAZAPINE SCH MG: 30 TABLET, FILM COATED ORAL at 21:38

## 2021-08-29 RX ADMIN — Medication SCH MG: at 21:38

## 2021-08-29 RX ADMIN — NICOTINE PRN MG: 4 INHALANT RESPIRATORY (INHALATION) at 10:06

## 2021-08-29 RX ADMIN — Medication SCH TAB: at 10:07

## 2021-08-29 RX ADMIN — HYDROXYZINE PAMOATE PRN MG: 25 CAPSULE ORAL at 10:07

## 2021-08-29 RX ADMIN — HYDROXYZINE PAMOATE PRN MG: 25 CAPSULE ORAL at 21:39

## 2021-08-29 RX ADMIN — NICOTINE SCH: 7 PATCH TRANSDERMAL at 10:07

## 2021-08-29 RX ADMIN — GABAPENTIN SCH MG: 100 CAPSULE ORAL at 10:07

## 2021-08-29 RX ADMIN — SERTRALINE HYDROCHLORIDE SCH MG: 50 TABLET ORAL at 10:07

## 2021-08-29 RX ADMIN — NICOTINE PRN MG: 4 INHALANT RESPIRATORY (INHALATION) at 21:39

## 2021-08-30 RX ADMIN — HYDROXYZINE PAMOATE PRN MG: 25 CAPSULE ORAL at 09:32

## 2021-08-30 RX ADMIN — Medication SCH TAB: at 09:32

## 2021-08-30 RX ADMIN — NICOTINE SCH: 7 PATCH TRANSDERMAL at 09:33

## 2021-08-30 RX ADMIN — BACITRACIN ZINC SCH: 500 OINTMENT TOPICAL at 09:33

## 2021-08-30 RX ADMIN — GABAPENTIN SCH MG: 100 CAPSULE ORAL at 09:32

## 2021-08-30 RX ADMIN — SERTRALINE HYDROCHLORIDE SCH MG: 50 TABLET ORAL at 09:32

## 2021-09-01 NOTE — PROGRESS NOTE ADULT - PROBLEM/PLAN-2
For information on Fall & injury Prevention, visit [email or web address]
DISPLAY PLAN FREE TEXT
DISPLAY PLAN FREE TEXT

## 2021-09-11 NOTE — ED ADULT TRIAGE NOTE - HEIGHT IN CM
notified of lactic of 2.6; also notified of results of small bowel follow thru  
House officer paged about patient's request to pull out NG tube. Per house officer, NG tube to stay in place until all imaging done.  
172.72

## 2021-11-12 NOTE — PROGRESS NOTE ADULT - ATTENDING COMMENTS
0 = independent
PAtient seen and examined  Pain much improved and no vommiting  Will cancel imaging and can discharge patient as she is tolerating oral fluid well
PAtient seen and examined  No complains was able to eat today but then vommited once  Abdomen was soft   Patient scheduled for follow up[ in clinic  Will be discharged home with home care

## 2021-11-17 NOTE — ED ADULT TRIAGE NOTE - CODE STROKE ACTIVE YN
Impression: Nonexudative macular degeneration, intermediate dry stage, right eye: H35.3112. Plan: Macular degeneration, dry type with stable vision. Will continue to monitor vision and the patient has been instructed to call with any vision changes.
Impression: Presence of intraocular lens: Z96.1. Plan: Lenses are doing well. Cont to monitor.
No

## 2021-12-09 NOTE — OB RN PATIENT PROFILE - NS PRO DEPRESSION SCREENING Y/N1
PT CALLED/ GAVE VERBAL TO SPK TO HER DAUGHTER.  SAID SOMEONE CALLED TO GET HER SCHED FOR APPT SHE WAS SEEN IN OCT OF 1920 BY  I GAVE THEM DR ROSS IN [facility] AND THEY WERE GOING TO CALL    yes

## 2021-12-23 NOTE — BH CONSULTATION LIAISON ASSESSMENT NOTE - RISK ASSESSMENT
Per ems pt was walking and fell down with positive LOC. Pt is on thinner, with ams and does not remember falling.  Pt in c collar on arrival. Risk Factors: hx etoh abuse/dependence, hx depression, anxiety, PTSD, hx one prior in patient psy admission, as well as outpatient services for etoh use  Protective Factors: residential stability, supportive family, denies si/sa, denies ah/vh, engaged in positive therapeutic program as well as relationship, no access to firearms Risk Factors: hx etoh abuse/dependence, hx depression, anxiety, PTSD, hx one prior in patient psy admission, as well as outpatient services for etoh use  Protective Factors: residential stability, supportive family, denies si/sa, denies h/o SA, denies ah/vh, engaged in positive therapeutic program as well as relationship, no access to firearms, hopeful, treatment seeking, future oriented    Patient is not at acute risk of harm to self or others at this time and does not meet criteria for involuntary admission at this time.

## 2022-01-06 ENCOUNTER — TRANSCRIPTION ENCOUNTER (OUTPATIENT)
Age: 27
End: 2022-01-06

## 2022-06-30 NOTE — DISCHARGE NOTE OB - BREAST MILK SUPPORTS STABLE NEWBORN BLOOD SUGAR
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 Please advise    [place] Press Statement Selected

## 2022-09-06 NOTE — OB RN DELIVERY SUMMARY - BABY A: STOOL IN DELIVERY
yes [place] Counseling:  I discussed with the patient the risks of imiquimod including but not limited to erythema, scaling, itching, weeping, crusting, and pain.  Patient understands that the inflammatory response to imiquimod is variable from person to person and was educated regarded proper titration schedule.  If flu-like symptoms develop, patient knows to discontinue the medication and contact us.

## 2022-10-11 NOTE — ED PROVIDER NOTE - CARDIOVASCULAR [-], MLM
normal/clear to auscultation bilaterally/no wheezes/no rales/no rhonchi no chest pain/no peripheral edema

## 2022-10-31 NOTE — DISCHARGE NOTE ANTEPARTUM - CARE PROVIDER_API CALL
with patient [facility],   [address]  [facility] level of Oncology building  Phone: [phone]  Fax: (   )    -  Follow Up Time:

## 2023-01-18 NOTE — BH CONSULTATION LIAISON ASSESSMENT NOTE - NSBHCONSULTMEDPRN_PSY_A_CORE
Call pt to f/u on his BP after recent med change. Should have stopped losartan/hydrochlorothiazide and only on losartan 100 mg and amlopdipine 10 mg (increased from 5 mg) daily. What is his BP today? Remind him to get daily Bps and log them.
[place] Counseling: I discussed with the patient the risks of guselkumab including but not limited to immunosuppression, serious infections, and drug reactions.  The patient understands that monitoring is required including a PPD at baseline and must alert us or the primary physician if symptoms of infection or other concerning signs are noted.
no

## 2023-03-25 ENCOUNTER — INPATIENT (INPATIENT)
Facility: HOSPITAL | Age: 28
LOS: 1 days | Discharge: HOME | End: 2023-03-27
Attending: STUDENT IN AN ORGANIZED HEALTH CARE EDUCATION/TRAINING PROGRAM | Admitting: STUDENT IN AN ORGANIZED HEALTH CARE EDUCATION/TRAINING PROGRAM
Payer: MEDICAID

## 2023-03-25 VITALS
RESPIRATION RATE: 18 BRPM | HEART RATE: 110 BPM | HEIGHT: 68 IN | TEMPERATURE: 98 F | OXYGEN SATURATION: 97 % | SYSTOLIC BLOOD PRESSURE: 118 MMHG | DIASTOLIC BLOOD PRESSURE: 84 MMHG | WEIGHT: 179.9 LBS

## 2023-03-25 DIAGNOSIS — Z98.891 HISTORY OF UTERINE SCAR FROM PREVIOUS SURGERY: Chronic | ICD-10-CM

## 2023-03-25 LAB
ALBUMIN SERPL ELPH-MCNC: 4.1 G/DL — SIGNIFICANT CHANGE UP (ref 3.3–5)
ALP SERPL-CCNC: 47 U/L — SIGNIFICANT CHANGE UP (ref 40–120)
ALT FLD-CCNC: 64 U/L — SIGNIFICANT CHANGE UP (ref 12–78)
ANION GAP SERPL CALC-SCNC: 9 MMOL/L — SIGNIFICANT CHANGE UP (ref 5–17)
AST SERPL-CCNC: 99 U/L — HIGH (ref 15–37)
BASOPHILS # BLD AUTO: 0.02 K/UL — SIGNIFICANT CHANGE UP (ref 0–0.2)
BASOPHILS NFR BLD AUTO: 0.5 % — SIGNIFICANT CHANGE UP (ref 0–2)
BILIRUB SERPL-MCNC: 1.1 MG/DL — SIGNIFICANT CHANGE UP (ref 0.2–1.2)
BUN SERPL-MCNC: 5 MG/DL — LOW (ref 7–23)
CALCIUM SERPL-MCNC: 8.6 MG/DL — SIGNIFICANT CHANGE UP (ref 8.5–10.1)
CHLORIDE SERPL-SCNC: 92 MMOL/L — LOW (ref 96–108)
CO2 SERPL-SCNC: 34 MMOL/L — HIGH (ref 22–31)
CREAT SERPL-MCNC: 0.91 MG/DL — SIGNIFICANT CHANGE UP (ref 0.5–1.3)
EGFR: 87 ML/MIN/1.73M2 — SIGNIFICANT CHANGE UP
EOSINOPHIL # BLD AUTO: 0.03 K/UL — SIGNIFICANT CHANGE UP (ref 0–0.5)
EOSINOPHIL NFR BLD AUTO: 0.7 % — SIGNIFICANT CHANGE UP (ref 0–6)
GLUCOSE SERPL-MCNC: 100 MG/DL — HIGH (ref 70–99)
HCG SERPL-ACNC: <1 MIU/ML — SIGNIFICANT CHANGE UP
HCT VFR BLD CALC: 37.8 % — SIGNIFICANT CHANGE UP (ref 34.5–45)
HGB BLD-MCNC: 12.8 G/DL — SIGNIFICANT CHANGE UP (ref 11.5–15.5)
IMM GRANULOCYTES NFR BLD AUTO: 0 % — SIGNIFICANT CHANGE UP (ref 0–0.9)
LYMPHOCYTES # BLD AUTO: 1.83 K/UL — SIGNIFICANT CHANGE UP (ref 1–3.3)
LYMPHOCYTES # BLD AUTO: 42.1 % — SIGNIFICANT CHANGE UP (ref 13–44)
MAGNESIUM SERPL-MCNC: 1.5 MG/DL — LOW (ref 1.6–2.6)
MCHC RBC-ENTMCNC: 26 PG — LOW (ref 27–34)
MCHC RBC-ENTMCNC: 33.9 G/DL — SIGNIFICANT CHANGE UP (ref 32–36)
MCV RBC AUTO: 76.7 FL — LOW (ref 80–100)
MONOCYTES # BLD AUTO: 0.4 K/UL — SIGNIFICANT CHANGE UP (ref 0–0.9)
MONOCYTES NFR BLD AUTO: 9.2 % — SIGNIFICANT CHANGE UP (ref 2–14)
NEUTROPHILS # BLD AUTO: 2.07 K/UL — SIGNIFICANT CHANGE UP (ref 1.8–7.4)
NEUTROPHILS NFR BLD AUTO: 47.5 % — SIGNIFICANT CHANGE UP (ref 43–77)
NRBC # BLD: 0 /100 WBCS — SIGNIFICANT CHANGE UP (ref 0–0)
PLATELET # BLD AUTO: 324 K/UL — SIGNIFICANT CHANGE UP (ref 150–400)
POTASSIUM SERPL-MCNC: 1.9 MMOL/L — CRITICAL LOW (ref 3.5–5.3)
POTASSIUM SERPL-SCNC: 1.9 MMOL/L — CRITICAL LOW (ref 3.5–5.3)
PROT SERPL-MCNC: 7.7 GM/DL — SIGNIFICANT CHANGE UP (ref 6–8.3)
RBC # BLD: 4.93 M/UL — SIGNIFICANT CHANGE UP (ref 3.8–5.2)
RBC # FLD: 16 % — HIGH (ref 10.3–14.5)
SODIUM SERPL-SCNC: 135 MMOL/L — SIGNIFICANT CHANGE UP (ref 135–145)
WBC # BLD: 4.35 K/UL — SIGNIFICANT CHANGE UP (ref 3.8–10.5)
WBC # FLD AUTO: 4.35 K/UL — SIGNIFICANT CHANGE UP (ref 3.8–10.5)

## 2023-03-25 PROCEDURE — 93010 ELECTROCARDIOGRAM REPORT: CPT

## 2023-03-25 PROCEDURE — 99291 CRITICAL CARE FIRST HOUR: CPT

## 2023-03-25 PROCEDURE — 99223 1ST HOSP IP/OBS HIGH 75: CPT

## 2023-03-25 RX ORDER — MAGNESIUM SULFATE 500 MG/ML
2 VIAL (ML) INJECTION ONCE
Refills: 0 | Status: COMPLETED | OUTPATIENT
Start: 2023-03-25 | End: 2023-03-25

## 2023-03-25 RX ORDER — THIAMINE MONONITRATE (VIT B1) 100 MG
500 TABLET ORAL ONCE
Refills: 0 | Status: COMPLETED | OUTPATIENT
Start: 2023-03-25 | End: 2023-03-25

## 2023-03-25 RX ORDER — POTASSIUM CHLORIDE 20 MEQ
40 PACKET (EA) ORAL ONCE
Refills: 0 | Status: COMPLETED | OUTPATIENT
Start: 2023-03-25 | End: 2023-03-25

## 2023-03-25 RX ORDER — POTASSIUM CHLORIDE 20 MEQ
10 PACKET (EA) ORAL
Refills: 0 | Status: COMPLETED | OUTPATIENT
Start: 2023-03-25 | End: 2023-03-26

## 2023-03-25 RX ADMIN — Medication 75 MILLIGRAM(S): at 23:07

## 2023-03-25 RX ADMIN — Medication 40 MILLIEQUIVALENT(S): at 22:59

## 2023-03-25 RX ADMIN — Medication 100 MILLIEQUIVALENT(S): at 22:59

## 2023-03-25 NOTE — ED PROVIDER NOTE - CLINICAL SUMMARY MEDICAL DECISION MAKING FREE TEXT BOX
hypokalemia. critically low. I read ekg nsr rate 95, no st elevation, st depression v4, qtc 487, narrow qrs, normal axis.   suspect pt is using etoh daily or at least more frequently than she is letting on. favor librium for prophylaxis and thiamine. hypokalemia. critically low. I read ekg nsr rate 95, no st elevation, st depression v4, qtc 487, narrow qrs, normal axis, U waves in v3,v4.   suspect pt is using etoh daily or at least more frequently than she is letting on. favor librium for prophylaxis and thiamine.

## 2023-03-25 NOTE — ED PROVIDER NOTE - OBJECTIVE STATEMENT
28F hx depression, anxiety, and etoh use disorder (says she doesn't drink everyday but admitted to drinking yesterday). she notes she has had previous bouts of hypok in the past but no one has known why. she notes she comes in because her bl hands felt numb and tingly. the symptoms have mostly resolved at this point

## 2023-03-25 NOTE — ED ADULT TRIAGE NOTE - CHIEF COMPLAINT QUOTE
BIBEMS from home c/o bilateral hand cramping, pt states her hand contracted since 3 pm and cant open hands, capillary refills and sensation intact. pt states she take diazepam x one week denies injury or trauma

## 2023-03-25 NOTE — ED ADULT TRIAGE NOTE - NSSEPSISSUSPECTED_ED_A_ED
"Yuliana Mattson was seen 01/31/2022 for an audiological evaluation. Pertinent complaints today include sudden hearing loss AS on 1/28/22 for 10 hours and "whooshing" tinnitus AD at the same time as the hearing loss. Pt denies family Hx of HL and loud noise exposure.     Results reveal a normal hearing for the right ear, and  normal-to-moderate sensorineural hearing loss for the left ear.    Speech Reception Thresholds were  10 dBHL for the right ear and 15 dBHL for the left ear.    Word recognition scores were excellent for the right ear and excellent for the left ear.   Tympanograms were Type A for the right ear and Type A for the left ear.    Audiogram results were reviewed in detail with patient and all questions were answered. Results will be reviewed by ENT at the completion of this note. Recommend further medical evaluation with an ENT provider for the asymmetry, binaural amplification pending medical clearance, repeat hearing testing following Tx and bilateral hearing protection with either muffs or in-ear protection in loud noises.            "
No

## 2023-03-25 NOTE — ED PROVIDER NOTE - IV ALTEPLASE INCLUSION HIDDEN
Patient: Krystina Henley    Procedure Summary     Date:  07/31/20 Room / Location:  [facility] ENDOSCOPY 2 / [facility] ENDOSCOPY    Anesthesia Start:  0944 Anesthesia Stop:  1048    Procedure:  COLONOSCOPY WITH BIOPSY AND HOT SNARE POLYPECTOMY (N/A Anus) Diagnosis:       Diarrhea, unspecified type      BRBPR (bright red blood per rectum)      Left lower quadrant abdominal tenderness without rebound tenderness      (Diarrhea, unspecified type [R19.7])      (BRBPR (bright red blood per rectum) [K62.5])      (Left lower quadrant abdominal tenderness without rebound tenderness [R10.814])    Surgeon:  Juan Coreas MD Provider:  Gloria Wu CRNA    Anesthesia Type:  MAC ASA Status:  2          Anesthesia Type: MAC    Vitals  Vitals Value Taken Time   /75 7/31/2020 10:58 AM   Temp 97 °F (36.1 °C) 7/31/2020 10:58 AM   Pulse 64 7/31/2020 10:58 AM   Resp 16 7/31/2020 10:58 AM   SpO2 94 % 7/31/2020 10:58 AM           Post Anesthesia Care and Evaluation    Patient location during evaluation: PHASE II  Patient participation: complete - patient participated  Level of consciousness: awake and alert  Pain score: 0  Pain management: satisfactory to patient  Airway patency: patent  Anesthetic complications: No anesthetic complications  PONV Status: none  Cardiovascular status: acceptable and stable  Respiratory status: acceptable  Hydration status: acceptable      
show

## 2023-03-25 NOTE — ED PROVIDER NOTE - PHYSICAL EXAMINATION
Gen: Alert, NAD  Head: NC, AT   Eyes: PERRL, EOMI, normal lids/conjunctiva  ENT: normal hearing, patent oropharynx without erythema/exudate, uvula midline  Neck: supple, no tenderness, Trachea midline  Pulm: Bilateral BS, normal resp effort, no wheeze/stridor/retractions  CV: RRR, no M/R/G, 2+ radial and dp pulses bl, no edema  Abd: soft, NT/ND, +BS, no hepatosplenomegaly  Mskel: extremities x4 with normal ROM and no joint effusions. no ctl spine ttp.   Skin: no rash, no bruising   Neuro: AAOx3, no sensory/motor deficits, slightly tremulous. CN 2-12 intact

## 2023-03-26 DIAGNOSIS — F41.9 ANXIETY DISORDER, UNSPECIFIED: ICD-10-CM

## 2023-03-26 DIAGNOSIS — F10.10 ALCOHOL ABUSE, UNCOMPLICATED: ICD-10-CM

## 2023-03-26 DIAGNOSIS — E83.42 HYPOMAGNESEMIA: ICD-10-CM

## 2023-03-26 DIAGNOSIS — E87.6 HYPOKALEMIA: ICD-10-CM

## 2023-03-26 LAB
ANION GAP SERPL CALC-SCNC: 4 MMOL/L — LOW (ref 5–17)
ANION GAP SERPL CALC-SCNC: 5 MMOL/L — SIGNIFICANT CHANGE UP (ref 5–17)
ANION GAP SERPL CALC-SCNC: 8 MMOL/L — SIGNIFICANT CHANGE UP (ref 5–17)
APPEARANCE UR: ABNORMAL
BACTERIA # UR AUTO: ABNORMAL
BILIRUB UR-MCNC: NEGATIVE — SIGNIFICANT CHANGE UP
BUN SERPL-MCNC: 4 MG/DL — LOW (ref 7–23)
CALCIUM SERPL-MCNC: 7.5 MG/DL — LOW (ref 8.5–10.1)
CALCIUM SERPL-MCNC: 7.5 MG/DL — LOW (ref 8.5–10.1)
CALCIUM SERPL-MCNC: 7.6 MG/DL — LOW (ref 8.5–10.1)
CHLORIDE SERPL-SCNC: 101 MMOL/L — SIGNIFICANT CHANGE UP (ref 96–108)
CHLORIDE SERPL-SCNC: 102 MMOL/L — SIGNIFICANT CHANGE UP (ref 96–108)
CHLORIDE SERPL-SCNC: 102 MMOL/L — SIGNIFICANT CHANGE UP (ref 96–108)
CO2 SERPL-SCNC: 29 MMOL/L — SIGNIFICANT CHANGE UP (ref 22–31)
CO2 SERPL-SCNC: 30 MMOL/L — SIGNIFICANT CHANGE UP (ref 22–31)
CO2 SERPL-SCNC: 32 MMOL/L — HIGH (ref 22–31)
COLOR SPEC: YELLOW — SIGNIFICANT CHANGE UP
CREAT SERPL-MCNC: 0.79 MG/DL — SIGNIFICANT CHANGE UP (ref 0.5–1.3)
CREAT SERPL-MCNC: 0.8 MG/DL — SIGNIFICANT CHANGE UP (ref 0.5–1.3)
CREAT SERPL-MCNC: 0.89 MG/DL — SIGNIFICANT CHANGE UP (ref 0.5–1.3)
DIFF PNL FLD: ABNORMAL
EGFR: 102 ML/MIN/1.73M2 — SIGNIFICANT CHANGE UP
EGFR: 103 ML/MIN/1.73M2 — SIGNIFICANT CHANGE UP
EGFR: 89 ML/MIN/1.73M2 — SIGNIFICANT CHANGE UP
EPI CELLS # UR: ABNORMAL
FLUAV AG NPH QL: SIGNIFICANT CHANGE UP
FLUBV AG NPH QL: SIGNIFICANT CHANGE UP
GLUCOSE SERPL-MCNC: 100 MG/DL — HIGH (ref 70–99)
GLUCOSE SERPL-MCNC: 151 MG/DL — HIGH (ref 70–99)
GLUCOSE SERPL-MCNC: 96 MG/DL — SIGNIFICANT CHANGE UP (ref 70–99)
GLUCOSE UR QL: 100 MG/DL
HCT VFR BLD CALC: 35.7 % — SIGNIFICANT CHANGE UP (ref 34.5–45)
HGB BLD-MCNC: 11.8 G/DL — SIGNIFICANT CHANGE UP (ref 11.5–15.5)
KETONES UR-MCNC: ABNORMAL
LEUKOCYTE ESTERASE UR-ACNC: ABNORMAL
MAGNESIUM SERPL-MCNC: 2.1 MG/DL — SIGNIFICANT CHANGE UP (ref 1.6–2.6)
MAGNESIUM SERPL-MCNC: 2.4 MG/DL — SIGNIFICANT CHANGE UP (ref 1.6–2.6)
MCHC RBC-ENTMCNC: 25.7 PG — LOW (ref 27–34)
MCHC RBC-ENTMCNC: 33.1 G/DL — SIGNIFICANT CHANGE UP (ref 32–36)
MCV RBC AUTO: 77.6 FL — LOW (ref 80–100)
NITRITE UR-MCNC: NEGATIVE — SIGNIFICANT CHANGE UP
NRBC # BLD: 0 /100 WBCS — SIGNIFICANT CHANGE UP (ref 0–0)
PH UR: 6 — SIGNIFICANT CHANGE UP (ref 5–8)
PHOSPHATE SERPL-MCNC: 1.8 MG/DL — LOW (ref 2.5–4.5)
PHOSPHATE SERPL-MCNC: 2.3 MG/DL — LOW (ref 2.5–4.5)
PLATELET # BLD AUTO: 290 K/UL — SIGNIFICANT CHANGE UP (ref 150–400)
POTASSIUM SERPL-MCNC: 2.1 MMOL/L — CRITICAL LOW (ref 3.5–5.3)
POTASSIUM SERPL-MCNC: 2.2 MMOL/L — CRITICAL LOW (ref 3.5–5.3)
POTASSIUM SERPL-MCNC: 2.3 MMOL/L — CRITICAL LOW (ref 3.5–5.3)
POTASSIUM SERPL-SCNC: 2.1 MMOL/L — CRITICAL LOW (ref 3.5–5.3)
POTASSIUM SERPL-SCNC: 2.2 MMOL/L — CRITICAL LOW (ref 3.5–5.3)
POTASSIUM SERPL-SCNC: 2.3 MMOL/L — CRITICAL LOW (ref 3.5–5.3)
PROT UR-MCNC: 100 MG/DL
RBC # BLD: 4.6 M/UL — SIGNIFICANT CHANGE UP (ref 3.8–5.2)
RBC # FLD: 16 % — HIGH (ref 10.3–14.5)
RBC CASTS # UR COMP ASSIST: ABNORMAL /HPF (ref 0–4)
SARS-COV-2 RNA SPEC QL NAA+PROBE: SIGNIFICANT CHANGE UP
SODIUM SERPL-SCNC: 135 MMOL/L — SIGNIFICANT CHANGE UP (ref 135–145)
SODIUM SERPL-SCNC: 139 MMOL/L — SIGNIFICANT CHANGE UP (ref 135–145)
SODIUM SERPL-SCNC: 139 MMOL/L — SIGNIFICANT CHANGE UP (ref 135–145)
SP GR SPEC: 1.01 — SIGNIFICANT CHANGE UP (ref 1.01–1.02)
UROBILINOGEN FLD QL: 1 MG/DL
WBC # BLD: 3.08 K/UL — LOW (ref 3.8–10.5)
WBC # FLD AUTO: 3.08 K/UL — LOW (ref 3.8–10.5)
WBC UR QL: SIGNIFICANT CHANGE UP

## 2023-03-26 PROCEDURE — 12345: CPT | Mod: NC

## 2023-03-26 RX ORDER — FOLIC ACID 0.8 MG
1 TABLET ORAL DAILY
Refills: 0 | Status: DISCONTINUED | OUTPATIENT
Start: 2023-03-26 | End: 2023-03-27

## 2023-03-26 RX ORDER — POTASSIUM CHLORIDE 20 MEQ
10 PACKET (EA) ORAL
Refills: 0 | Status: DISCONTINUED | OUTPATIENT
Start: 2023-03-26 | End: 2023-03-26

## 2023-03-26 RX ORDER — POTASSIUM PHOSPHATE, MONOBASIC POTASSIUM PHOSPHATE, DIBASIC 236; 224 MG/ML; MG/ML
30 INJECTION, SOLUTION INTRAVENOUS ONCE
Refills: 0 | Status: COMPLETED | OUTPATIENT
Start: 2023-03-26 | End: 2023-03-27

## 2023-03-26 RX ORDER — POTASSIUM CHLORIDE 20 MEQ
40 PACKET (EA) ORAL
Refills: 0 | Status: COMPLETED | OUTPATIENT
Start: 2023-03-26 | End: 2023-03-26

## 2023-03-26 RX ORDER — POTASSIUM CHLORIDE 20 MEQ
20 PACKET (EA) ORAL
Refills: 0 | Status: DISCONTINUED | OUTPATIENT
Start: 2023-03-26 | End: 2023-03-26

## 2023-03-26 RX ORDER — ACETAMINOPHEN 500 MG
650 TABLET ORAL EVERY 6 HOURS
Refills: 0 | Status: DISCONTINUED | OUTPATIENT
Start: 2023-03-26 | End: 2023-03-27

## 2023-03-26 RX ORDER — POTASSIUM CHLORIDE 20 MEQ
10 PACKET (EA) ORAL ONCE
Refills: 0 | Status: COMPLETED | OUTPATIENT
Start: 2023-03-26 | End: 2023-03-26

## 2023-03-26 RX ORDER — POTASSIUM CHLORIDE 20 MEQ
40 PACKET (EA) ORAL ONCE
Refills: 0 | Status: COMPLETED | OUTPATIENT
Start: 2023-03-26 | End: 2023-03-26

## 2023-03-26 RX ORDER — POTASSIUM CHLORIDE 20 MEQ
10 PACKET (EA) ORAL
Refills: 0 | Status: COMPLETED | OUTPATIENT
Start: 2023-03-26 | End: 2023-03-26

## 2023-03-26 RX ORDER — CALCIUM GLUCONATE 100 MG/ML
1 VIAL (ML) INTRAVENOUS ONCE
Refills: 0 | Status: COMPLETED | OUTPATIENT
Start: 2023-03-26 | End: 2023-03-26

## 2023-03-26 RX ORDER — SODIUM CHLORIDE 9 MG/ML
1000 INJECTION, SOLUTION INTRAVENOUS
Refills: 0 | Status: DISCONTINUED | OUTPATIENT
Start: 2023-03-26 | End: 2023-03-27

## 2023-03-26 RX ORDER — LANOLIN ALCOHOL/MO/W.PET/CERES
3 CREAM (GRAM) TOPICAL AT BEDTIME
Refills: 0 | Status: DISCONTINUED | OUTPATIENT
Start: 2023-03-26 | End: 2023-03-27

## 2023-03-26 RX ORDER — POTASSIUM PHOSPHATE, MONOBASIC POTASSIUM PHOSPHATE, DIBASIC 236; 224 MG/ML; MG/ML
15 INJECTION, SOLUTION INTRAVENOUS ONCE
Refills: 0 | Status: COMPLETED | OUTPATIENT
Start: 2023-03-26 | End: 2023-03-26

## 2023-03-26 RX ORDER — THIAMINE MONONITRATE (VIT B1) 100 MG
500 TABLET ORAL EVERY 8 HOURS
Refills: 0 | Status: DISCONTINUED | OUTPATIENT
Start: 2023-03-26 | End: 2023-03-27

## 2023-03-26 RX ORDER — POTASSIUM CHLORIDE 20 MEQ
20 PACKET (EA) ORAL ONCE
Refills: 0 | Status: COMPLETED | OUTPATIENT
Start: 2023-03-26 | End: 2023-03-26

## 2023-03-26 RX ORDER — ONDANSETRON 8 MG/1
4 TABLET, FILM COATED ORAL EVERY 8 HOURS
Refills: 0 | Status: DISCONTINUED | OUTPATIENT
Start: 2023-03-26 | End: 2023-03-27

## 2023-03-26 RX ORDER — SODIUM CHLORIDE 9 MG/ML
1000 INJECTION, SOLUTION INTRAVENOUS
Refills: 0 | Status: DISCONTINUED | OUTPATIENT
Start: 2023-03-26 | End: 2023-03-26

## 2023-03-26 RX ADMIN — SODIUM CHLORIDE 100 MILLILITER(S): 9 INJECTION, SOLUTION INTRAVENOUS at 21:02

## 2023-03-26 RX ADMIN — SODIUM CHLORIDE 100 MILLILITER(S): 9 INJECTION, SOLUTION INTRAVENOUS at 17:13

## 2023-03-26 RX ADMIN — Medication 100 MILLIEQUIVALENT(S): at 01:45

## 2023-03-26 RX ADMIN — Medication 105 MILLIGRAM(S): at 10:57

## 2023-03-26 RX ADMIN — Medication 105 MILLIGRAM(S): at 21:35

## 2023-03-26 RX ADMIN — Medication 40 MILLIEQUIVALENT(S): at 21:34

## 2023-03-26 RX ADMIN — Medication 25 GRAM(S): at 04:15

## 2023-03-26 RX ADMIN — Medication 100 MILLIEQUIVALENT(S): at 23:27

## 2023-03-26 RX ADMIN — Medication 50 MILLIEQUIVALENT(S): at 14:38

## 2023-03-26 RX ADMIN — Medication 100 MILLIEQUIVALENT(S): at 22:19

## 2023-03-26 RX ADMIN — Medication 100 MILLIEQUIVALENT(S): at 21:02

## 2023-03-26 RX ADMIN — Medication 105 MILLIGRAM(S): at 01:20

## 2023-03-26 RX ADMIN — Medication 100 MILLIEQUIVALENT(S): at 00:15

## 2023-03-26 RX ADMIN — Medication 40 MILLIEQUIVALENT(S): at 10:11

## 2023-03-26 RX ADMIN — Medication 1 MILLIGRAM(S): at 11:41

## 2023-03-26 RX ADMIN — Medication 40 MILLIEQUIVALENT(S): at 14:00

## 2023-03-26 RX ADMIN — Medication 100 GRAM(S): at 21:00

## 2023-03-26 RX ADMIN — Medication 3 MILLIGRAM(S): at 21:35

## 2023-03-26 RX ADMIN — POTASSIUM PHOSPHATE, MONOBASIC POTASSIUM PHOSPHATE, DIBASIC 62.5 MILLIMOLE(S): 236; 224 INJECTION, SOLUTION INTRAVENOUS at 11:40

## 2023-03-26 NOTE — H&P ADULT - ASSESSMENT
this is a 27 yo F hx depression, anxiety, and etoh use disorder (denies drinking everyday but admitted to drinking yesterday), Presents with b/l UE weakness for 1 day, pt hemodynamically stable, labs consistent with K 1.9, mag 1.5 and EKG shows U wave. She state she has had low K in the past but was never told why. treated with thiamine, K 10IV x2 and 40 PO, Mag 2 IV.     hypokalemia   hypomagnesemia   etoh abuse   anxiety d/o   -repletion started in ed.   -F/u lytes and ekg in AM   -telemetry   -utox  -pt/ot  -ciwa protocol   -scds  -regular diet

## 2023-03-26 NOTE — H&P ADULT - HISTORY OF PRESENT ILLNESS
this is a 27 yo F hx depression, anxiety, and etoh use disorder (denies drinking everyday but admitted to drinking yesterday), Presents with b/l UE weakness for 1 day, pt hemodynamically stable, labs consistent with K 1.9, mag 1.5 and EKG shows U wave. She state she has had low K in the past but was never told why. treated with thiamine, K 10IV x2 and 40 PO, Mag 2 IV.

## 2023-03-26 NOTE — PROVIDER CONTACT NOTE (OTHER) - BACKGROUND
K 1.9 -> 2.1 -> 2.2 K 1.9 -> 2.1 -> 2.2  Patient received KCL 40mEq PO x2, KCL 20mEq IV x1, & Kphos 15 milliMoles x1 dose prior to 1700 BMP

## 2023-03-26 NOTE — H&P ADULT - NSHPPHYSICALEXAM_GEN_ALL_CORE
constitutional: NAD AAOx3  HEENT: PERRLA EOMI  CV: RRR S1S2  Pulm: CTA b/l  GI: soft nontender nondistended + BS   Neuro: CN II-XII grossly intact. moves all extremities UE strenght 5/5 b/l and sensation intact   Musculoskeletal: no pedal edema or calf tenderness b/l

## 2023-03-26 NOTE — PATIENT PROFILE ADULT - FALL HARM RISK - UNIVERSAL INTERVENTIONS
Bed in lowest position, wheels locked, appropriate side rails in place/Call bell, personal items and telephone in reach/Instruct patient to call for assistance before getting out of bed or chair/Non-slip footwear when patient is out of bed/[place] to call system/Physically safe environment - no spills, clutter or unnecessary equipment/Purposeful Proactive Rounding/Room/bathroom lighting operational, light cord in reach

## 2023-03-27 ENCOUNTER — TRANSCRIPTION ENCOUNTER (OUTPATIENT)
Age: 28
End: 2023-03-27

## 2023-03-27 VITALS
TEMPERATURE: 98 F | OXYGEN SATURATION: 97 % | HEART RATE: 95 BPM | RESPIRATION RATE: 18 BRPM | DIASTOLIC BLOOD PRESSURE: 69 MMHG | SYSTOLIC BLOOD PRESSURE: 106 MMHG

## 2023-03-27 LAB
ALDOST SERPL-MCNC: 4.2 NG/DL — SIGNIFICANT CHANGE UP
ANION GAP SERPL CALC-SCNC: 5 MMOL/L — SIGNIFICANT CHANGE UP (ref 5–17)
ANION GAP SERPL CALC-SCNC: 7 MMOL/L — SIGNIFICANT CHANGE UP (ref 5–17)
BASOPHILS # BLD AUTO: 0.03 K/UL — SIGNIFICANT CHANGE UP (ref 0–0.2)
BASOPHILS NFR BLD AUTO: 0.8 % — SIGNIFICANT CHANGE UP (ref 0–2)
BUN SERPL-MCNC: 2 MG/DL — LOW (ref 7–23)
BUN SERPL-MCNC: 2 MG/DL — LOW (ref 7–23)
CALCIUM SERPL-MCNC: 7.4 MG/DL — LOW (ref 8.5–10.1)
CALCIUM SERPL-MCNC: 8 MG/DL — LOW (ref 8.5–10.1)
CHLORIDE SERPL-SCNC: 103 MMOL/L — SIGNIFICANT CHANGE UP (ref 96–108)
CHLORIDE SERPL-SCNC: 107 MMOL/L — SIGNIFICANT CHANGE UP (ref 96–108)
CK SERPL-CCNC: 251 U/L — HIGH (ref 26–192)
CO2 SERPL-SCNC: 28 MMOL/L — SIGNIFICANT CHANGE UP (ref 22–31)
CO2 SERPL-SCNC: 29 MMOL/L — SIGNIFICANT CHANGE UP (ref 22–31)
CREAT ?TM UR-MCNC: 186 MG/DL — SIGNIFICANT CHANGE UP
CREAT SERPL-MCNC: 0.6 MG/DL — SIGNIFICANT CHANGE UP (ref 0.5–1.3)
CREAT SERPL-MCNC: 0.61 MG/DL — SIGNIFICANT CHANGE UP (ref 0.5–1.3)
EGFR: 123 ML/MIN/1.73M2 — SIGNIFICANT CHANGE UP
EGFR: 124 ML/MIN/1.73M2 — SIGNIFICANT CHANGE UP
EOSINOPHIL # BLD AUTO: 0.13 K/UL — SIGNIFICANT CHANGE UP (ref 0–0.5)
EOSINOPHIL NFR BLD AUTO: 3.7 % — SIGNIFICANT CHANGE UP (ref 0–6)
GLUCOSE SERPL-MCNC: 101 MG/DL — HIGH (ref 70–99)
GLUCOSE SERPL-MCNC: 96 MG/DL — SIGNIFICANT CHANGE UP (ref 70–99)
HCT VFR BLD CALC: 32.4 % — LOW (ref 34.5–45)
HGB BLD-MCNC: 10.7 G/DL — LOW (ref 11.5–15.5)
IMM GRANULOCYTES NFR BLD AUTO: 0.3 % — SIGNIFICANT CHANGE UP (ref 0–0.9)
LYMPHOCYTES # BLD AUTO: 1.85 K/UL — SIGNIFICANT CHANGE UP (ref 1–3.3)
LYMPHOCYTES # BLD AUTO: 52.3 % — HIGH (ref 13–44)
MAGNESIUM SERPL-MCNC: 1.7 MG/DL — SIGNIFICANT CHANGE UP (ref 1.6–2.6)
MAGNESIUM SERPL-MCNC: 1.8 MG/DL — SIGNIFICANT CHANGE UP (ref 1.6–2.6)
MCHC RBC-ENTMCNC: 25.8 PG — LOW (ref 27–34)
MCHC RBC-ENTMCNC: 33 G/DL — SIGNIFICANT CHANGE UP (ref 32–36)
MCV RBC AUTO: 78.3 FL — LOW (ref 80–100)
MONOCYTES # BLD AUTO: 0.28 K/UL — SIGNIFICANT CHANGE UP (ref 0–0.9)
MONOCYTES NFR BLD AUTO: 7.9 % — SIGNIFICANT CHANGE UP (ref 2–14)
NEUTROPHILS # BLD AUTO: 1.24 K/UL — LOW (ref 1.8–7.4)
NEUTROPHILS NFR BLD AUTO: 35 % — LOW (ref 43–77)
NRBC # BLD: 0 /100 WBCS — SIGNIFICANT CHANGE UP (ref 0–0)
OSMOLALITY UR: 347 MOSM/KG — SIGNIFICANT CHANGE UP (ref 50–1200)
PHOSPHATE SERPL-MCNC: 3 MG/DL — SIGNIFICANT CHANGE UP (ref 2.5–4.5)
PHOSPHATE SERPL-MCNC: 4.8 MG/DL — HIGH (ref 2.5–4.5)
PLATELET # BLD AUTO: 216 K/UL — SIGNIFICANT CHANGE UP (ref 150–400)
POTASSIUM SERPL-MCNC: 2.9 MMOL/L — CRITICAL LOW (ref 3.5–5.3)
POTASSIUM SERPL-MCNC: 3.2 MMOL/L — LOW (ref 3.5–5.3)
POTASSIUM SERPL-SCNC: 2.9 MMOL/L — CRITICAL LOW (ref 3.5–5.3)
POTASSIUM SERPL-SCNC: 3.2 MMOL/L — LOW (ref 3.5–5.3)
POTASSIUM UR-SCNC: 22 MMOL/L — SIGNIFICANT CHANGE UP
PROT ?TM UR-MCNC: 42 MG/DL — HIGH (ref 0–12)
PROT/CREAT UR-RTO: 0.2 RATIO — SIGNIFICANT CHANGE UP (ref 0–0.2)
RBC # BLD: 4.14 M/UL — SIGNIFICANT CHANGE UP (ref 3.8–5.2)
RBC # FLD: 15.8 % — HIGH (ref 10.3–14.5)
SODIUM SERPL-SCNC: 137 MMOL/L — SIGNIFICANT CHANGE UP (ref 135–145)
SODIUM SERPL-SCNC: 142 MMOL/L — SIGNIFICANT CHANGE UP (ref 135–145)
SODIUM UR-SCNC: 63 MMOL/L — SIGNIFICANT CHANGE UP
TSH SERPL-MCNC: 0.98 UU/ML — SIGNIFICANT CHANGE UP (ref 0.36–3.74)
UUN UR-MCNC: 284 MG/DL — SIGNIFICANT CHANGE UP
WBC # BLD: 3.54 K/UL — LOW (ref 3.8–10.5)
WBC # FLD AUTO: 3.54 K/UL — LOW (ref 3.8–10.5)

## 2023-03-27 PROCEDURE — 93010 ELECTROCARDIOGRAM REPORT: CPT

## 2023-03-27 PROCEDURE — 99239 HOSP IP/OBS DSCHRG MGMT >30: CPT

## 2023-03-27 RX ORDER — POTASSIUM CHLORIDE 20 MEQ
40 PACKET (EA) ORAL ONCE
Refills: 0 | Status: COMPLETED | OUTPATIENT
Start: 2023-03-27 | End: 2023-03-27

## 2023-03-27 RX ORDER — POTASSIUM CHLORIDE 20 MEQ
10 PACKET (EA) ORAL
Refills: 0 | Status: COMPLETED | OUTPATIENT
Start: 2023-03-27 | End: 2023-03-27

## 2023-03-27 RX ORDER — MAGNESIUM SULFATE 500 MG/ML
2 VIAL (ML) INJECTION ONCE
Refills: 0 | Status: COMPLETED | OUTPATIENT
Start: 2023-03-27 | End: 2023-03-27

## 2023-03-27 RX ORDER — POTASSIUM CHLORIDE 20 MEQ
10 PACKET (EA) ORAL
Refills: 0 | Status: DISCONTINUED | OUTPATIENT
Start: 2023-03-27 | End: 2023-03-27

## 2023-03-27 RX ORDER — SODIUM CHLORIDE 9 MG/ML
1000 INJECTION, SOLUTION INTRAVENOUS
Refills: 0 | Status: DISCONTINUED | OUTPATIENT
Start: 2023-03-27 | End: 2023-03-27

## 2023-03-27 RX ADMIN — Medication 105 MILLIGRAM(S): at 06:58

## 2023-03-27 RX ADMIN — Medication 100 MILLIEQUIVALENT(S): at 09:32

## 2023-03-27 RX ADMIN — Medication 100 MILLIEQUIVALENT(S): at 12:00

## 2023-03-27 RX ADMIN — Medication 40 MILLIEQUIVALENT(S): at 17:43

## 2023-03-27 RX ADMIN — Medication 100 MILLIEQUIVALENT(S): at 11:00

## 2023-03-27 RX ADMIN — Medication 40 MILLIEQUIVALENT(S): at 09:31

## 2023-03-27 RX ADMIN — Medication 40 MILLIEQUIVALENT(S): at 09:32

## 2023-03-27 RX ADMIN — SODIUM CHLORIDE 100 MILLILITER(S): 9 INJECTION, SOLUTION INTRAVENOUS at 15:36

## 2023-03-27 RX ADMIN — Medication 1 MILLIGRAM(S): at 11:33

## 2023-03-27 RX ADMIN — POTASSIUM PHOSPHATE, MONOBASIC POTASSIUM PHOSPHATE, DIBASIC 83.33 MILLIMOLE(S): 236; 224 INJECTION, SOLUTION INTRAVENOUS at 00:51

## 2023-03-27 RX ADMIN — Medication 105 MILLIGRAM(S): at 15:09

## 2023-03-27 RX ADMIN — Medication 25 GRAM(S): at 09:00

## 2023-03-27 RX ADMIN — SODIUM CHLORIDE 100 MILLILITER(S): 9 INJECTION, SOLUTION INTRAVENOUS at 06:56

## 2023-03-27 NOTE — DISCHARGE NOTE PROVIDER - NSDCCPCAREPLAN_GEN_ALL_CORE_FT
PRINCIPAL DISCHARGE DIAGNOSIS  Diagnosis: Alcohol dependence with withdrawal  Assessment and Plan of Treatment: You were monitored for alcohol withdrawal symptoms and scored well and did not require any medications. Please abstain from drinking alcohol as it can damage and destory your liver, leading to multiorgan failure and death.      SECONDARY DISCHARGE DIAGNOSES  Diagnosis: Hypokalemia  Assessment and Plan of Treatment: You were given potassium because you had severe low potassium levels. Please follow up with your primary care doctor, two PCPs have been given to you to follow up with you if you do not have one.

## 2023-03-27 NOTE — PROVIDER CONTACT NOTE (CRITICAL VALUE NOTIFICATION) - SITUATION
As per labs potassium 2.9
pt admitted for tingling of her right hand
Patient admitted for hypokalemia, 1.9 patient given IV, and PO replacements.

## 2023-03-27 NOTE — DISCHARGE NOTE PROVIDER - PROVIDER TOKENS
PROVIDER:[TOKEN:[[id number]],FOLLOWUP:[1 week]],PROVIDER:[TOKEN:[[id number]],FOLLOWUP:[1 week]]

## 2023-03-27 NOTE — PROVIDER CONTACT NOTE (CRITICAL VALUE NOTIFICATION) - BACKGROUND
Pt admitted for hypokalemia
Hypokalemia 2.1 currently, 1.9 on admission.
Potassium critical after replacements. Potassium now 2.2

## 2023-03-27 NOTE — OCCUPATIONAL THERAPY INITIAL EVALUATION ADULT - PERTINENT HX OF CURRENT PROBLEM, REHAB EVAL
As per MD Calderon notes, pt is 29 y/o Female with PMH of depression, anxiety, and etoh use disorder. Presents to ED with b/l UE weakness for 1 day, admitted for severe hypokalemia and etoh withdrawal.

## 2023-03-27 NOTE — DISCHARGE NOTE NURSING/CASE MANAGEMENT/SOCIAL WORK - NSDCFUADDAPPT_GEN_ALL_CORE_FT
Pt verbalizes understanding of discharge and follow-up instructions.  PIV removed.  VSS.  All questions answered.  Ambulates to discharge with steady gait.     APPTS ARE READY TO BE MADE: [x] YES    Best Family or Patient Contact (if needed):    Additional Information about above appointments (if needed):    1:   2:   3:     Other comments or requests:

## 2023-03-27 NOTE — OCCUPATIONAL THERAPY INITIAL EVALUATION ADULT - FINE MOTOR COORDINATION, LEFT HAND, MANIPULATION OF OBJECTS, OT EVAL
Operative Note       Surgery Date: 10/20/2020     Surgeon(s) and Role:     * Mike Dye MD - Primary    Pre-op Diagnosis:  Cellulitis of right buttock [L03.317]    Post-op Diagnosis: Post-Op Diagnosis Codes:     * Cellulitis of right buttock [L03.317]    Procedure(s) (LRB):  INCISION AND DRAINAGE, ABSCESS (Right)    Anesthesia: Choice    Procedure in Detail/Findings:    Patient brought in the operative room placed on the operative table in the supine position.  She was connected to heart rate and blood pressure monitors.  She was then placed in the right lateral decubitus position.  IV sedation was provided by the anesthesia service.  The right buttock was prepped and draped in standard fashion.    A time-out was performed.    10 cc of 1% lidocaine and 10 cc of 0.25% Marcaine were infiltrated into the right buttocks around the area of induration.  A tangential incision was made through the previous 1 cm opening.  Subcutaneous tissues were dissected using electrocautery.  Hemostat was used to identify a pocket of purulent material.  There was scant purulent material which was cultured.    Hemostasis was achieved.    The wound was packed tightly with gauze.  Dry sterile dressings were placed.  Patient tolerated procedure well.    Sponge and instrument counts were correct    Estimated Blood Loss: * No values recorded between 10/20/2020 12:28 PM and 10/20/2020 12:47 PM *           Specimens (From admission, onward)    None        Implants: * No implants in log *           Disposition: PACU - hemodynamically stable.           Condition: Stable    Attestation:  I performed the procedure.           Discharge Note    Admit Date: 10/20/2020    Attending Physician: Mike Dye MD     Discharge Physician: Mike Dye MD    Final Diagnosis: Post-Op Diagnosis Codes:     * Cellulitis of right buttock [L03.317]    Disposition: Home or Self Care    Patient Instructions:   Current Discharge Medication List     container management/normal performance   START taking these medications    Details   HYDROcodone-acetaminophen (NORCO) 5-325 mg per tablet Take 1 tablet by mouth every 6 (six) hours as needed for Pain.  Qty: 20 tablet, Refills: 0    Comments: Quantity prescribed more than 7 day supply? Yes, quantity medically necessary         CONTINUE these medications which have NOT CHANGED    Details   albuterol (PROVENTIL/VENTOLIN HFA) 90 mcg/actuation inhaler Inhale 2 puffs into the lungs every 6 (six) hours as needed for Wheezing.  Qty: 18 g, Refills: 6      amLODIPine (NORVASC) 5 MG tablet Take 1 tablet by mouth once daily  Qty: 90 tablet, Refills: 1    Associated Diagnoses: Hypertension associated with diabetes      atorvastatin (LIPITOR) 40 MG tablet Take 40 mg by mouth once daily.      blood sugar diagnostic (ONETOUCH VERIO) Strp Glucose testing once daily.  Qty: 30 each, Refills: 6    Associated Diagnoses: Hypertension associated with diabetes; Combined hyperlipidemia associated with type 2 diabetes mellitus      blood-glucose meter Misc Use as directed.  Qty: 1 each, Refills: 0    Associated Diagnoses: Hypertension associated with diabetes; Combined hyperlipidemia associated with type 2 diabetes mellitus      BREO ELLIPTA 200-25 mcg/dose DsDv diskus inhaler Inhale 1 puff by mouth once daily  Qty: 180 each, Refills: 3    Associated Diagnoses: Mild intermittent asthma without complication      busPIRone (BUSPAR) 5 MG Tab Take 1 tablet (5 mg total) by mouth 2 (two) times daily.  Qty: 60 tablet, Refills: 6    Associated Diagnoses: Anxiety      clindamycin (CLEOCIN) 300 MG capsule Take 1 capsule (300 mg total) by mouth every 8 (eight) hours. for 10 days  Qty: 30 capsule, Refills: 0    Associated Diagnoses: Cellulitis of right buttock; Abscess of right buttock      clopidogrel (PLAVIX) 75 mg tablet Take 75 mg by mouth once daily.      diclofenac sodium (VOLTAREN) 1 % Gel Apply topically 2 (two) times daily as needed. Apply to affected area  Qty: 1 Tube, Refills: 1     Associated Diagnoses: Rheumatoid arthritis of multiple sites with negative rheumatoid factor      estradioL (IMVEXXY MAINTENANCE PACK) 10 mcg Inst Place 10 mcg vaginally.      EUTHYROX 50 mcg tablet Take 1 tablet by mouth once daily  Qty: 30 tablet, Refills: 11    Associated Diagnoses: Hypothyroidism (acquired)      folic acid (FOLVITE) 1 MG tablet Take 1 tablet (1 mg total) by mouth once daily.  Qty: 90 tablet, Refills: 3    Associated Diagnoses: Rheumatoid arthritis of multiple sites with negative rheumatoid factor      gabapentin (NEURONTIN) 300 MG capsule Take 1 capsule by mouth twice daily  Qty: 60 capsule, Refills: 0    Associated Diagnoses: Muscle cramps at night      lancets (LANCETS,ULTRA THIN) Misc Glucose testing daily.  Qty: 50 each, Refills: 11    Associated Diagnoses: Hypertension associated with diabetes; Combined hyperlipidemia associated with type 2 diabetes mellitus      metFORMIN (GLUCOPHAGE) 500 MG tablet Take 1 tablet (500 mg total) by mouth 2 (two) times daily with meals.  Qty: 60 tablet, Refills: 3    Associated Diagnoses: Controlled type 2 diabetes mellitus without complication, without long-term current use of insulin      methotrexate 2.5 MG Tab Take 4 tablets (10 mg total) by mouth every 7 days.  Qty: 48 tablet, Refills: 1    Associated Diagnoses: Rheumatoid arthritis of multiple sites with negative rheumatoid factor      methylPREDNISolone (MEDROL DOSEPACK) 4 mg tablet use as directed  Qty: 1 Package, Refills: 0    Associated Diagnoses: Rheumatoid arthritis of multiple sites with negative rheumatoid factor      peg 400-propylene glycol, PF, (SYSTANE ULTRA, PF,) 0.4-0.3 % Dpet Place 1 drop into both eyes 4 (four) times daily.  Qty: 1 each      rOPINIRole (REQUIP) 1 MG tablet Take 1 tablet (1 mg total) by mouth every evening.  Qty: 30 tablet, Refills: 2    Associated Diagnoses: Restless legs syndrome (RLS)      tofacitinib (XELJANZ XR) 11 mg Tb24 Take 11 mg by mouth once daily.  Qty: 30  tablet, Refills: 11      TRULICITY 0.75 mg/0.5 mL pen injector INJECT 0.5 MLS(0.75 MG TOTAL) INTO THE SKIN EVERY 7 DAYS  Qty: 4 pen, Refills: 6    Associated Diagnoses: Controlled type 2 diabetes mellitus without complication, without long-term current use of insulin      venlafaxine (EFFEXOR-XR) 150 MG Cp24 Take 1 capsule by mouth once daily  Qty: 30 capsule, Refills: 6    Associated Diagnoses: Major depression, recurrent, chronic; Anxiety         STOP taking these medications       sulfamethoxazole-trimethoprim 800-160mg (BACTRIM DS) 800-160 mg Tab Comments:   Reason for Stopping:               Discharge Procedure Orders (must include Diet, Follow-up, Activity)   Discharge Procedure Orders (must include Diet, Follow-up, Activity)   Diet general     Call MD for:  temperature >100.4     Call MD for:  persistent nausea and vomiting     Call MD for:  severe uncontrolled pain     Call MD for:  difficulty breathing, headache or visual disturbances     Call MD for:  redness, tenderness, or signs of infection (pain, swelling, redness, odor or green/yellow discharge around incision site)     Wound care routine (specify)   Order Comments: Wound care routine:  Remove and replace the packing daily starting tomorrow     Activity as tolerated        Discharge Date: No discharge date for patient encounter.

## 2023-03-27 NOTE — DISCHARGE NOTE NURSING/CASE MANAGEMENT/SOCIAL WORK - HISTORY OF COVID-19 VACCINATION
RT Protocol Note  Kira Fry 3 y o  male MRN: [id number]  salo at 8 am  Yes

## 2023-03-27 NOTE — DISCHARGE NOTE PROVIDER - CARE PROVIDER_API CALL
Tracie Valentino)  [facility]  [address], [address]  [place] [address]  Phone: [phone]  Fax: [phone]  Follow Up Time: 1 week    Rustam Bush)  Internal Medicine  [address] [place] [address]  Phone: [phone]  Fax: [phone]  Follow Up Time: 1 week

## 2023-03-27 NOTE — OCCUPATIONAL THERAPY INITIAL EVALUATION ADULT - ADDITIONAL COMMENTS
Pt reports she lives with children & significant other in a town house with 3 steps with bilateral wide rails to enter & 1 flight with 1 rail to reach second floor. Pt is independent with ADLs and mobility at baseline and does not use adaptive equipment.

## 2023-03-27 NOTE — PHYSICAL THERAPY INITIAL EVALUATION ADULT - LIVES WITH, PROFILE
Pt states she lives in a townhouse with children and their father, 10 steps with rail to enter, stays on main floor when inside the house.

## 2023-03-27 NOTE — OCCUPATIONAL THERAPY INITIAL EVALUATION ADULT - GENERAL OBSERVATIONS, REHAB EVAL
Pt encountered in bedside chair, NAD, IV connected, pt c/o mild bilateral UE hand pain (2/10) with activity.

## 2023-03-27 NOTE — PROVIDER CONTACT NOTE (CRITICAL VALUE NOTIFICATION) - ACTION/TREATMENT ORDERED:
Provider notified, Orders for potassium replacement in place. will continue to monitor
K replacement already ordered by MD. Change PO tablets to powder per patient request

## 2023-03-27 NOTE — DISCHARGE NOTE PROVIDER - HOSPITAL COURSE
29 yo F hx depression, anxiety, and etoh use disorder (denies drinking everyday but admitted to drinking yesterday), Presents with b/l UE weakness for 1 day, admitted for severe hypokalemia and etoh withdrawal. She had CIWA scores of 0 for the length of the stay. HR and BP has remained stable. Regarding hypokalemia, she was repleted aggressively to 3.2. Her other electrolytes including magnesium and phosphorus remained stable. Encouraged to follow up with her primary care doctor for further workup and further care of her electrolyte abnormalities. She was given potassium 40meq before discharge.

## 2023-03-27 NOTE — OCCUPATIONAL THERAPY INITIAL EVALUATION ADULT - MD ORDER
Nephrology ([facility]) Progress Note      Patient:  Isaias Sabillon  YOB: 1971  Date of Service: 3/6/2018  MRN: [id number]   Acct: [de-identified]   Primary Care Physician: ROMINA Stephenson  Advance Directive: Full Code  Admit Date: 3/5/2018       Hospital Day: 1  Referring Provider: Eris Morataya MD    Patient independently seen and examined, Chart, Consults, Notes, Operative notes, Labs, Cardiology, and Radiology studies reviewed as able. Subjective:  Isaias Sabillon is a 50 y.o. female  whom we were consulted for severe hyponatremia/volume overload. Patient has end-stage renal disease secondary to diabetic nephropathy. She presented with increasing shortness of breath and was found to have severe pulmonary edema, hyponatremia, hyperkalemia and hyperglycemia. Patient was admitted to ICU yesterday and has received emergent dialysis treatment for correction of volume status as well as hyponatremia. Finally BiPAP has been weaned off. This morning she looks more comfortable, blood pressure is fairly good control. Currently seen on hemodialysis  Hemodialysis access: AV fistula  Hemodialysis 3-1/2 hour  Ultrafiltration up to 3500 mL  2K bath  Blood flow rate is 400 mL.     Allergies:  Lamisil advanced [terbinafine hcl] and Penicillins    Medicines:  Current Facility-Administered Medications   Medication Dose Route Frequency Provider Last Rate Last Dose    glucose (GLUTOSE) 40 % oral gel 15 g  15 g Oral PRN Daniel Jacobson MD        dextrose 50 % solution 12.5 g  12.5 g Intravenous PRN Daniel Jacobson MD        glucagon (rDNA) injection 1 mg  1 mg Intramuscular PRN Daniel Jacobson MD        dextrose 5 % solution  100 mL/hr Intravenous PRN Daniel Jacobson MD        insulin lispro (HUMALOG) injection vial 0-12 Units  0-12 Units Subcutaneous TID WC Daniel Jacobson MD   4 Units at 03/06/18 0730    insulin lispro (HUMALOG) injection vial 0-6 Units  0-6 Units PHOS in the last 72 hours. HgbA1C:   Recent Labs      03/05/18   1130   LABA1C  10.3*     Hepatic: Recent Labs      03/05/18   1130  03/06/18   0138   ALKPHOS  129*  90   ALT  24  17   AST  31  19   PROT  7.8  6.2*   BILITOT  0.6  0.4   LABALBU  4.3  3.4*     Lactic Acid:   Recent Labs      03/05/18   1130   LACTA  1.3     Troponin: No results for input(s): CKTOTAL, CKMB, TROPONINT in the last 72 hours. ABGs: No results for input(s): PH, PCO2, PO2, HCO3, O2SAT in the last 72 hours. CRP:  No results for input(s): CRP in the last 72 hours. Sed Rate:  No results for input(s): SEDRATE in the last 72 hours. Cultures:   No results for input(s): CULTURE in the last 72 hours. No results for input(s): BC, Leyla Jani in the last 72 hours. No results for input(s): CXSURG in the last 72 hours. Radiology reports as per the Radiologist  Radiology: Xr Chest Portable    Result Date: 3/6/2018  Exam:   XR CHEST PORTABLE  Date:  3/6/2018 History:  Female, age  50 years; pulmonary edema COMPARISON:  Chest x-ray dated 3/5/2018. Findings : Myocardial megaly. Findings of fluid overload have decreased with now interstitial edema. No pleural effusion. No measurable pneumothorax The bones show no acute pathology. Impression: Decreasing findings of fluid overload. Signed by Dr Otilio Luther on 3/6/2018 7:23 AM    Xr Chest Portable    Result Date: 3/5/2018  Exam:   XR CHEST PORTABLE  Date:  3/5/2018 History:  Female, age  50 years; shortness of breath COMPARISON:  Chest x-ray dated 11/26/2017. Findings : Moderate cardiomegaly. Bilateral central dense opacities, confluent, most concerning for pulmonary edema in this clinical setting. . No definite pleural effusion. No measurable pneumothorax. The bones show no acute pathology. Impression: Findings of fluid overload. Signed by Dr Otilio Luther on 3/5/2018 11:10 AM       Assessment   1. End-stage renal disease, currently seen on hemodialysis  2.  Severe hyponatremia now Occupational Therapy Evaluate and treat

## 2023-03-27 NOTE — DISCHARGE NOTE PROVIDER - NSDCFUADDAPPT_GEN_ALL_CORE_FT
APPTS ARE READY TO BE MADE: [x] YES    Best Family or Patient Contact (if needed):    Additional Information about above appointments (if needed):    1:   2:   3:     Other comments or requests:    APPTS ARE READY TO BE MADE: [x] YES    Best Family or Patient Contact (if needed):    Additional Information about above appointments (if needed):    1:   2:   3:     Other comments or requests:   3 attempts were made to reach patient, which have been unsuccessful. 3 Voicemails have been left (3/28, 3/29 and 3/30). Will await a call back from patient to coordinate follow up care.

## 2023-03-27 NOTE — DISCHARGE NOTE NURSING/CASE MANAGEMENT/SOCIAL WORK - NSDCPEFALRISK_GEN_ALL_CORE
For information on Fall & Injury Prevention, visit: [email or web address] OR  [email or web address] OR  https://www.cdc.gov/steadi/patient.html

## 2023-03-27 NOTE — DISCHARGE NOTE NURSING/CASE MANAGEMENT/SOCIAL WORK - PATIENT PORTAL LINK FT
You can access the FollowMyHealth Patient Portal offered by [facility] by registering at the following website: [email or web address]. By joining [email or web address]’s FollowMyHealth portal, you will also be able to view your health information using other applications (apps) compatible with our system.

## 2023-03-27 NOTE — DISCHARGE NOTE PROVIDER - ATTENDING DISCHARGE PHYSICAL EXAMINATION:
CONSTITUTIONAL: Well groomed, no apparent distress   EYES: PERRLA and symmetric, EOMI, No conjunctival or scleral injection, non-icteric  ENMT: Oral mucosa with moist membranes. Normal dentition;  NECK: Supple, symmetric and without tracheal deviation   RESP: No respiratory distress, no use of accessory muscles; CTA b/l, no WRR  CV: RRR, +S1S2, no MRG; no JVD; no peripheral edema  GI: Soft, NT, ND, no rebound, no guarding; no palpable masses; no hepatosplenomegaly  MSK: Normal gait; No digital clubbing or cyanosis, Normal ROM without pain, no spinal tenderness, normal muscle strength/tone  SKIN: No rashes or ulcers noted;   NEURO: CN II-XII intact; normal reflexes in upper and lower extremities, sensation intact in upper and lower extremities b/l to light touch. no tremors. ciwa 0  PSYCH: Appropriate insight/judgment; A+O x 3, mood and affect appropriate, recent/remote memory intact

## 2023-03-27 NOTE — DISCHARGE NOTE PROVIDER - CARE PROVIDERS DIRECT ADDRESSES
,DirectAddress_Unknown,[email or web address]

## 2023-03-28 LAB — RENIN DIRECT, PLASMA: 2.4 PG/ML — SIGNIFICANT CHANGE UP

## 2023-03-29 NOTE — CHART NOTE - NSCHARTNOTEFT_GEN_A_CORE
Left 1 message for patient in regards to follow up care with callback information.
Left voicemail to return our call
Notified by RN of critical value K+ 2.3  Patient refuses potassium tablets ordered by Dr. Hernandez.   -potassium chloride powder 40mEq x1  -potassium chloride 10mEq IV every 1hr for 3 doses  -Repeat BMP in the morning
29 yo F hx depression, anxiety, and etoh use disorder (denies drinking everyday but admitted to drinking yesterday), Presents with b/l UE weakness for 1 day, admitted for severe hypokalemia and etoh withdrawal  -patient wanted to leave AMA today but I spoke with her at length the importance of staying. She is willing to stay for now, but does wish for a BMP at 5PM. She states she has obligations at home but will ask someone to help with these obligations so she can stay. However, at the time of my exam, she did have capacity to leave AMA   -aggressively repleting K  -hypomagnesia is corrected  -repleting phos  -PT/OT consulted  -remains on CIWA (scoring 1 only)  -c/w LR   -regular diet    Candice Hernandez MD  [facility] Division of Hospital Medicine  Available via MS Teams

## 2023-03-30 DIAGNOSIS — F41.9 ANXIETY DISORDER, UNSPECIFIED: ICD-10-CM

## 2023-03-30 DIAGNOSIS — E83.42 HYPOMAGNESEMIA: ICD-10-CM

## 2023-03-30 DIAGNOSIS — F17.210 NICOTINE DEPENDENCE, CIGARETTES, UNCOMPLICATED: ICD-10-CM

## 2023-03-30 DIAGNOSIS — F32.A DEPRESSION, UNSPECIFIED: ICD-10-CM

## 2023-03-30 DIAGNOSIS — R53.1 WEAKNESS: ICD-10-CM

## 2023-03-30 DIAGNOSIS — Z20.822 CONTACT WITH AND (SUSPECTED) EXPOSURE TO COVID-19: ICD-10-CM

## 2023-03-30 DIAGNOSIS — E87.6 HYPOKALEMIA: ICD-10-CM

## 2023-03-30 DIAGNOSIS — F10.239 ALCOHOL DEPENDENCE WITH WITHDRAWAL, UNSPECIFIED: ICD-10-CM
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
## 2023-04-03 ENCOUNTER — EMERGENCY (EMERGENCY)
Facility: HOSPITAL | Age: 28
LOS: 0 days | Discharge: HOME | End: 2023-04-03
Attending: STUDENT IN AN ORGANIZED HEALTH CARE EDUCATION/TRAINING PROGRAM
Payer: MEDICAID

## 2023-04-03 VITALS
HEIGHT: 68 IN | TEMPERATURE: 99 F | DIASTOLIC BLOOD PRESSURE: 84 MMHG | OXYGEN SATURATION: 99 % | HEART RATE: 96 BPM | RESPIRATION RATE: 16 BRPM | SYSTOLIC BLOOD PRESSURE: 120 MMHG | WEIGHT: 184.97 LBS

## 2023-04-03 VITALS
OXYGEN SATURATION: 97 % | DIASTOLIC BLOOD PRESSURE: 69 MMHG | HEART RATE: 81 BPM | RESPIRATION RATE: 18 BRPM | SYSTOLIC BLOOD PRESSURE: 110 MMHG

## 2023-04-03 DIAGNOSIS — Z98.891 HISTORY OF UTERINE SCAR FROM PREVIOUS SURGERY: Chronic | ICD-10-CM

## 2023-04-03 DIAGNOSIS — Z86.2 PERSONAL HISTORY OF DISEASES OF THE BLOOD AND BLOOD-FORMING ORGANS AND CERTAIN DISORDERS INVOLVING THE IMMUNE MECHANISM: ICD-10-CM

## 2023-04-03 DIAGNOSIS — I77.0 ARTERIOVENOUS FISTULA, ACQUIRED: ICD-10-CM

## 2023-04-03 DIAGNOSIS — F32.A DEPRESSION, UNSPECIFIED: ICD-10-CM

## 2023-04-03 DIAGNOSIS — R25.1 TREMOR, UNSPECIFIED: ICD-10-CM

## 2023-04-03 DIAGNOSIS — R11.2 NAUSEA WITH VOMITING, UNSPECIFIED: ICD-10-CM

## 2023-04-03 DIAGNOSIS — Z98.891 HISTORY OF UTERINE SCAR FROM PREVIOUS SURGERY: ICD-10-CM

## 2023-04-03 DIAGNOSIS — F41.9 ANXIETY DISORDER, UNSPECIFIED: ICD-10-CM

## 2023-04-03 DIAGNOSIS — F10.239 ALCOHOL DEPENDENCE WITH WITHDRAWAL, UNSPECIFIED: ICD-10-CM

## 2023-04-03 LAB
ALBUMIN SERPL ELPH-MCNC: 4 G/DL — SIGNIFICANT CHANGE UP (ref 3.3–5)
ALP SERPL-CCNC: 45 U/L — SIGNIFICANT CHANGE UP (ref 40–120)
ALT FLD-CCNC: 53 U/L — SIGNIFICANT CHANGE UP (ref 12–78)
ANION GAP SERPL CALC-SCNC: 16 MMOL/L — SIGNIFICANT CHANGE UP (ref 5–17)
AST SERPL-CCNC: 65 U/L — HIGH (ref 15–37)
BASOPHILS # BLD AUTO: 0.02 K/UL — SIGNIFICANT CHANGE UP (ref 0–0.2)
BASOPHILS NFR BLD AUTO: 0.4 % — SIGNIFICANT CHANGE UP (ref 0–2)
BILIRUB SERPL-MCNC: 0.9 MG/DL — SIGNIFICANT CHANGE UP (ref 0.2–1.2)
BUN SERPL-MCNC: 3 MG/DL — LOW (ref 7–23)
CALCIUM SERPL-MCNC: 8.9 MG/DL — SIGNIFICANT CHANGE UP (ref 8.5–10.1)
CHLORIDE SERPL-SCNC: 98 MMOL/L — SIGNIFICANT CHANGE UP (ref 96–108)
CO2 SERPL-SCNC: 24 MMOL/L — SIGNIFICANT CHANGE UP (ref 22–31)
CREAT SERPL-MCNC: 0.77 MG/DL — SIGNIFICANT CHANGE UP (ref 0.5–1.3)
EGFR: 106 ML/MIN/1.73M2 — SIGNIFICANT CHANGE UP
EOSINOPHIL # BLD AUTO: 0 K/UL — SIGNIFICANT CHANGE UP (ref 0–0.5)
EOSINOPHIL NFR BLD AUTO: 0 % — SIGNIFICANT CHANGE UP (ref 0–6)
ETHANOL SERPL-MCNC: <10 MG/DL — SIGNIFICANT CHANGE UP (ref 0–10)
GLUCOSE SERPL-MCNC: 106 MG/DL — HIGH (ref 70–99)
HCG SERPL-ACNC: <1 MIU/ML — SIGNIFICANT CHANGE UP
HCT VFR BLD CALC: 39.9 % — SIGNIFICANT CHANGE UP (ref 34.5–45)
HGB BLD-MCNC: 13 G/DL — SIGNIFICANT CHANGE UP (ref 11.5–15.5)
IMM GRANULOCYTES NFR BLD AUTO: 0.4 % — SIGNIFICANT CHANGE UP (ref 0–0.9)
LIDOCAIN IGE QN: 43 U/L — LOW (ref 73–393)
LYMPHOCYTES # BLD AUTO: 1.56 K/UL — SIGNIFICANT CHANGE UP (ref 1–3.3)
LYMPHOCYTES # BLD AUTO: 32.4 % — SIGNIFICANT CHANGE UP (ref 13–44)
MAGNESIUM SERPL-MCNC: 1.8 MG/DL — SIGNIFICANT CHANGE UP (ref 1.6–2.6)
MCHC RBC-ENTMCNC: 25.2 PG — LOW (ref 27–34)
MCHC RBC-ENTMCNC: 32.6 G/DL — SIGNIFICANT CHANGE UP (ref 32–36)
MCV RBC AUTO: 77.5 FL — LOW (ref 80–100)
MONOCYTES # BLD AUTO: 0.25 K/UL — SIGNIFICANT CHANGE UP (ref 0–0.9)
MONOCYTES NFR BLD AUTO: 5.2 % — SIGNIFICANT CHANGE UP (ref 2–14)
NEUTROPHILS # BLD AUTO: 2.97 K/UL — SIGNIFICANT CHANGE UP (ref 1.8–7.4)
NEUTROPHILS NFR BLD AUTO: 61.6 % — SIGNIFICANT CHANGE UP (ref 43–77)
NRBC # BLD: 0 /100 WBCS — SIGNIFICANT CHANGE UP (ref 0–0)
PLATELET # BLD AUTO: 415 K/UL — HIGH (ref 150–400)
POTASSIUM SERPL-MCNC: 3.7 MMOL/L — SIGNIFICANT CHANGE UP (ref 3.5–5.3)
POTASSIUM SERPL-SCNC: 3.7 MMOL/L — SIGNIFICANT CHANGE UP (ref 3.5–5.3)
PROT SERPL-MCNC: 8.3 GM/DL — SIGNIFICANT CHANGE UP (ref 6–8.3)
RBC # BLD: 5.15 M/UL — SIGNIFICANT CHANGE UP (ref 3.8–5.2)
RBC # FLD: 16.1 % — HIGH (ref 10.3–14.5)
RENIN PLAS-CCNC: 0.58 NG/ML/HR — SIGNIFICANT CHANGE UP (ref 0.17–5.38)
SODIUM SERPL-SCNC: 138 MMOL/L — SIGNIFICANT CHANGE UP (ref 135–145)
WBC # BLD: 4.82 K/UL — SIGNIFICANT CHANGE UP (ref 3.8–10.5)
WBC # FLD AUTO: 4.82 K/UL — SIGNIFICANT CHANGE UP (ref 3.8–10.5)

## 2023-04-03 PROCEDURE — 99284 EMERGENCY DEPT VISIT MOD MDM: CPT

## 2023-04-03 PROCEDURE — 93010 ELECTROCARDIOGRAM REPORT: CPT

## 2023-04-03 RX ORDER — THIAMINE MONONITRATE (VIT B1) 100 MG
100 TABLET ORAL ONCE
Refills: 0 | Status: COMPLETED | OUTPATIENT
Start: 2023-04-03 | End: 2023-04-03

## 2023-04-03 RX ORDER — SODIUM CHLORIDE 9 MG/ML
1000 INJECTION INTRAMUSCULAR; INTRAVENOUS; SUBCUTANEOUS ONCE
Refills: 0 | Status: COMPLETED | OUTPATIENT
Start: 2023-04-03 | End: 2023-04-03

## 2023-04-03 RX ORDER — FAMOTIDINE 10 MG/ML
20 INJECTION INTRAVENOUS ONCE
Refills: 0 | Status: COMPLETED | OUTPATIENT
Start: 2023-04-03 | End: 2023-04-03

## 2023-04-03 RX ADMIN — SODIUM CHLORIDE 1000 MILLILITER(S): 9 INJECTION INTRAMUSCULAR; INTRAVENOUS; SUBCUTANEOUS at 19:42

## 2023-04-03 RX ADMIN — Medication 100 MILLIGRAM(S): at 19:41

## 2023-04-03 RX ADMIN — Medication 30 MILLILITER(S): at 19:41

## 2023-04-03 RX ADMIN — Medication 50 MILLIGRAM(S): at 20:03

## 2023-04-03 RX ADMIN — FAMOTIDINE 20 MILLIGRAM(S): 10 INJECTION INTRAVENOUS at 19:42

## 2023-04-03 NOTE — ED PROVIDER NOTE - OBJECTIVE STATEMENT
29 y/o F with PMH depression, anxiety, ETOH disorder presents to the ED c/o tremors, N/V, concern for withdrawal. Patient was recently admitted to the hospital and discharged last week. Since discharge, states she has started drinking again although she has had difficulty keeping down the alcohol because she has been vomiting and also having some tremors. Last drink was yesterday but she vomited. She denies CP, SOB, fever, or chills. Last admission was found to be hypokalemic, states she is having trouble eating and keeping food down.

## 2023-04-03 NOTE — ED PROVIDER NOTE - NSFOLLOWUPINSTRUCTIONS_ED_ALL_ED_FT
Take librium taper as prescribed    Alcohol Abuse    Alcohol intoxication occurs when the amount of alcohol that a person has consumed impairs his or her ability to mentally and physically function. Chronic alcohol consumption can also lead to a variety of health issues including neurological disease, stomach disease, heart disease, liver disease, etc. Do not drive after drinking alcohol. Drinking enough alcohol to end up in an Emergency Room suggests you may have an alcohol abuse problem. Seek help at a drug addiction center.    SEEK IMMEDIATE MEDICAL CARE IF YOU HAVE ANY OF THE FOLLOWING SYMPTOMS: seizures, vomiting blood, blood in your stool, lightheadedness/dizziness, or becoming shaky to tremulous when you stop drinking.

## 2023-04-03 NOTE — ED ADULT TRIAGE NOTE - CHIEF COMPLAINT QUOTE
BIBA for n/v x 2 days and tingling and numbness on bilateral upper and lower extremities at 12 noon today. Also complains of abdominal pain and headache. Denies headache at this time. Drinks about 1 liter of alcohol 3x a week, last intake yesterday night. PMH alcohol abuse and seizures. Last dose of medication was 2 weeks ago. BIBA for n/v x 2 days and tingling and numbness on bilateral upper and lower extremities at 12 noon today. Also complains of abdominal pain and chest pain. Denies headache at this time. Drinks about 1 liter of alcohol 3x a week, last intake yesterday night. Tremors noted when arms extended. PMH alcohol abuse and seizures. Last dose of medication was 2 weeks ago.

## 2023-04-03 NOTE — ED ADULT TRIAGE NOTE - LOCATION:
"Subjective   Kari Stephen is a 49 y.o. female.     History of Present Illness   Kari Stephen 49 y.o. female presents for follow up of insomnia with onset of symptoms years ago. Patient describes symptoms as early morning awakening and frequent night time awakening. Patient has found complete relief with Ambien (Zolpidem). Associated symptoms include: fatigue if unable to take Rx . Patient denies daytime somnolence Symptoms have stabilized.  The patient has failed multiple OTC medications for insomnia.  They are well controlled on current Rx and will continue to try to take Rx PRN.  She will use the lowest effective dose.  The patient has read and signed the [facility] Controlled Substance Contract.  I will continue to see patient for regular follow up appointments and be prescribed the lowest effective dose.  TELLY has been reviewed by me and is updated every 3 months. The patient is aware of the potential for addiction and dependence.  She denies that Ambien (Zolpidem) causes excessive daytime drowsiness and sleep walking.  Patient voices understanding to take Ambien (Zolpidem) and go straight to bed. Patient must be able to sleep 7 hours or more when taking this.  Patient will hold Rx and contact me if they experience any impaired mental alertness the next day.        The following portions of the patient's history were reviewed and updated as appropriate: allergies, current medications, past social history and problem list.    Review of Systems   Constitutional: Negative for fever.   Respiratory: Negative for cough and shortness of breath.    Cardiovascular: Negative for chest pain.   Gastrointestinal: Negative for abdominal pain.   Neurological: Negative for dizziness.       Objective   /74   Pulse 56   Temp 97.3 °F (36.3 °C)   Ht 162.6 cm (64.02\")   Wt 60.3 kg (133 lb)   SpO2 99%   BMI 22.82 kg/m²   Physical Exam   Constitutional: She is oriented to person, place, and time. Vital signs " are normal. She appears well-developed and well-nourished. No distress.   HENT:   Head: Normocephalic.   Cardiovascular: Normal rate, regular rhythm and normal heart sounds.   Pulmonary/Chest: Effort normal and breath sounds normal.   Neurological: She is alert and oriented to person, place, and time. Gait normal.   Psychiatric: She has a normal mood and affect. Her behavior is normal. Judgment and thought content normal.   Vitals reviewed.      Assessment/Plan      Diagnosis Plan   1. Insomnia, unspecified type  zolpidem (AMBIEN) 10 MG tablet     rto in 3 mons     Elijahshante Nielson, APRN  7/31/2020   Right arm;

## 2023-04-03 NOTE — ED PROVIDER NOTE - NS ED ROS FT
CONST: no fevers, no chills  EYES: no pain, no vision changes  ENT: no sore throat, no ear pain, no change in hearing  CV: no chest pain, no leg swelling  RESP: no shortness of breath, no cough  ABD: no abdominal pain, +nausea, + vomiting, no diarrhea  : no dysuria, no flank pain, no hematuria  MSK: no back pain, no extremity pain  NEURO: no headache or additional neurologic complaints  HEME: no easy bleeding  SKIN:  no rash

## 2023-04-03 NOTE — ED ADULT NURSE NOTE - CHIEF COMPLAINT QUOTE
BIBA for n/v x 2 days and tingling and numbness on bilateral upper and lower extremities at 12 noon today. Also complains of abdominal pain and chest pain. Denies headache at this time. Drinks about 1 liter of alcohol 3x a week, last intake yesterday night. Tremors noted when arms extended. PMH alcohol abuse and seizures. Last dose of medication was 2 weeks ago.

## 2023-04-03 NOTE — ED PROVIDER NOTE - PATIENT PORTAL LINK FT
You can access the FollowMyHealth Patient Portal offered by [facility] by registering at the following website: [email or web address]. By joining [employer] [facility] [employer]’s FollowMyHealth portal, you will also be able to view your health information using other applications (apps) compatible with our system.

## 2023-04-03 NOTE — ED PROVIDER NOTE - CLINICAL SUMMARY MEDICAL DECISION MAKING FREE TEXT BOX
27 y/o F with hx of alcohol abuse presenting to the ED due to concern for withdrawal.  Vitals stable.  She has few tremors. Initial CIWA is 7.  Will dose librium, check labs for electrolyte abnormality, reassess    Repeat CIWA 4, patient feeling improved. Given stable CIWA, will discharge on librium taper.

## 2023-04-03 NOTE — ED PROVIDER NOTE - PHYSICAL EXAMINATION
GENERAL: Awake, alert, NAD  HEENT: NC/AT, moist mucous membranes  LUNGS: CTAB, no wheezes or crackles   CARDIAC: RRR, no m/r/g  ABDOMEN: Soft, normal BS, non tender, non distended, no rebound, no guarding  EXT: No edema, no calf tenderness, 2+ DP pulses bilaterally, no deformities.  NEURO: A&Ox3. Slightly tremulous. +tongue fasciculations. Moving all extremities. Initial CIWA: 7  SKIN: Warm and dry. No rash.  PSYCH: Normal affect.

## 2023-04-03 NOTE — ED ADULT NURSE NOTE - OBJECTIVE STATEMENT
Pt BIBA, AOx4, responsive, ambulatory. Pt c/o N/V, numbness and tingling of BUE and BLE, generalized abdominal pain started yesterday; also states she has right lower back pain. States she had 3 shots of vodka last night; and 1 liter of vodka on friday; hx of alcohol abuse. CIWA initiated with score of 7. Placed on cardiac monitor and tolerating RA. NKA. PMH of seizures; states her last seizure medication was 2 weeks ago. LMP was Jan 2023; denies pregnancy.

## 2023-04-05 NOTE — BH CONSULTATION LIAISON ASSESSMENT NOTE - NSBHCHARTREVIEWLAB_PSY_A_CORE FT
CBC Full  -  ( 30 Mar 2021 03:27 )  WBC Count : 4.07 K/uL  RBC Count : 4.14 M/uL  Hemoglobin : 10.5 g/dL  Hematocrit : 31.0 %  Platelet Count - Automated : 192 K/uL  Mean Cell Volume : 74.9 fL  Mean Cell Hemoglobin : 25.4 pg  Mean Cell Hemoglobin Concentration : 33.9 gm/dL  Auto Neutrophil # : 1.75 K/uL  Auto Lymphocyte # : 1.57 K/uL  Auto Monocyte # : 0.18 K/uL  Auto Eosinophil # : 0.00 K/uL  Auto Basophil # : 0.07 K/uL  Auto Neutrophil % : 43.0 %  Auto Lymphocyte % : 38.6 %  Auto Monocyte % : 4.4 %  Auto Eosinophil % : 0.0 %  Auto Basophil % : 1.7 %  03-30    134<L>  |  103  |  4<L>  ----------------------------<  111<H>  4.9   |  14<L>  |  0.56    Ca    6.8<L>      30 Mar 2021 15:40  Mg     1.6     03-30    TPro  7.1  /  Alb  4.1  /  TBili  1.1  /  DBili  x   /  AST  52<H>  /  ALT  77<H>  /  AlkPhos  43  03-30  Alcohol, Blood: 210: <10 mg/dL = Negative mg/dL (03.30.21 @ 03:27)    Composite Graft Text: The defect edges were debeveled with a #15 scalpel blade.  Given the location of the defect, shape of the defect, the proximity to free margins and the fact the defect was full thickness a composite graft was deemed most appropriate.  The defect was outline and then transferred to the donor site.  A full thickness graft was then excised from the donor site. The graft was then placed in the primary defect, oriented appropriately and then sutured into place.  The secondary defect was then repaired using a primary closure.

## 2023-04-08 ENCOUNTER — INPATIENT (INPATIENT)
Facility: HOSPITAL | Age: 28
LOS: 0 days | Discharge: HOME | End: 2023-04-09
Attending: INTERNAL MEDICINE | Admitting: INTERNAL MEDICINE
Payer: MEDICAID

## 2023-04-08 VITALS
SYSTOLIC BLOOD PRESSURE: 115 MMHG | TEMPERATURE: 97 F | RESPIRATION RATE: 14 BRPM | HEART RATE: 105 BPM | DIASTOLIC BLOOD PRESSURE: 80 MMHG | OXYGEN SATURATION: 100 % | HEIGHT: 68 IN | WEIGHT: 167.99 LBS

## 2023-04-08 DIAGNOSIS — Z98.891 HISTORY OF UTERINE SCAR FROM PREVIOUS SURGERY: Chronic | ICD-10-CM

## 2023-04-08 LAB
ALBUMIN SERPL ELPH-MCNC: 2.7 G/DL — LOW (ref 3.3–5)
ALBUMIN SERPL ELPH-MCNC: 3.4 G/DL — SIGNIFICANT CHANGE UP (ref 3.3–5)
ALP SERPL-CCNC: 40 U/L — SIGNIFICANT CHANGE UP (ref 40–120)
ALP SERPL-CCNC: 46 U/L — SIGNIFICANT CHANGE UP (ref 40–120)
ALT FLD-CCNC: 41 U/L — SIGNIFICANT CHANGE UP (ref 12–78)
ALT FLD-CCNC: 46 U/L — SIGNIFICANT CHANGE UP (ref 12–78)
ANION GAP SERPL CALC-SCNC: 4 MMOL/L — LOW (ref 5–17)
ANION GAP SERPL CALC-SCNC: 7 MMOL/L — SIGNIFICANT CHANGE UP (ref 5–17)
AST SERPL-CCNC: 41 U/L — HIGH (ref 15–37)
AST SERPL-CCNC: 43 U/L — HIGH (ref 15–37)
BASOPHILS # BLD AUTO: 0.05 K/UL — SIGNIFICANT CHANGE UP (ref 0–0.2)
BASOPHILS NFR BLD AUTO: 0.9 % — SIGNIFICANT CHANGE UP (ref 0–2)
BILIRUB SERPL-MCNC: 0.6 MG/DL — SIGNIFICANT CHANGE UP (ref 0.2–1.2)
BILIRUB SERPL-MCNC: 1.1 MG/DL — SIGNIFICANT CHANGE UP (ref 0.2–1.2)
BUN SERPL-MCNC: 5 MG/DL — LOW (ref 7–23)
BUN SERPL-MCNC: 8 MG/DL — SIGNIFICANT CHANGE UP (ref 7–23)
CALCIUM SERPL-MCNC: 7.5 MG/DL — LOW (ref 8.5–10.1)
CALCIUM SERPL-MCNC: 9 MG/DL — SIGNIFICANT CHANGE UP (ref 8.5–10.1)
CHLORIDE SERPL-SCNC: 101 MMOL/L — SIGNIFICANT CHANGE UP (ref 96–108)
CHLORIDE SERPL-SCNC: 96 MMOL/L — SIGNIFICANT CHANGE UP (ref 96–108)
CO2 SERPL-SCNC: 30 MMOL/L — SIGNIFICANT CHANGE UP (ref 22–31)
CO2 SERPL-SCNC: 33 MMOL/L — HIGH (ref 22–31)
CREAT SERPL-MCNC: 0.8 MG/DL — SIGNIFICANT CHANGE UP (ref 0.5–1.3)
CREAT SERPL-MCNC: 0.98 MG/DL — SIGNIFICANT CHANGE UP (ref 0.5–1.3)
EGFR: 102 ML/MIN/1.73M2 — SIGNIFICANT CHANGE UP
EGFR: 80 ML/MIN/1.73M2 — SIGNIFICANT CHANGE UP
EOSINOPHIL # BLD AUTO: 0.03 K/UL — SIGNIFICANT CHANGE UP (ref 0–0.5)
EOSINOPHIL NFR BLD AUTO: 0.5 % — SIGNIFICANT CHANGE UP (ref 0–6)
GLUCOSE SERPL-MCNC: 124 MG/DL — HIGH (ref 70–99)
GLUCOSE SERPL-MCNC: 130 MG/DL — HIGH (ref 70–99)
HCG SERPL-ACNC: <1 MIU/ML — SIGNIFICANT CHANGE UP
HCG SERPL-ACNC: <1 MIU/ML — SIGNIFICANT CHANGE UP
HCT VFR BLD CALC: 37.4 % — SIGNIFICANT CHANGE UP (ref 34.5–45)
HGB BLD-MCNC: 12.7 G/DL — SIGNIFICANT CHANGE UP (ref 11.5–15.5)
IMM GRANULOCYTES NFR BLD AUTO: 0.2 % — SIGNIFICANT CHANGE UP (ref 0–0.9)
LYMPHOCYTES # BLD AUTO: 1.94 K/UL — SIGNIFICANT CHANGE UP (ref 1–3.3)
LYMPHOCYTES # BLD AUTO: 34.8 % — SIGNIFICANT CHANGE UP (ref 13–44)
MAGNESIUM SERPL-MCNC: 1.4 MG/DL — LOW (ref 1.6–2.6)
MCHC RBC-ENTMCNC: 25.5 PG — LOW (ref 27–34)
MCHC RBC-ENTMCNC: 34 G/DL — SIGNIFICANT CHANGE UP (ref 32–36)
MCV RBC AUTO: 75.1 FL — LOW (ref 80–100)
MONOCYTES # BLD AUTO: 0.34 K/UL — SIGNIFICANT CHANGE UP (ref 0–0.9)
MONOCYTES NFR BLD AUTO: 6.1 % — SIGNIFICANT CHANGE UP (ref 2–14)
NEUTROPHILS # BLD AUTO: 3.21 K/UL — SIGNIFICANT CHANGE UP (ref 1.8–7.4)
NEUTROPHILS NFR BLD AUTO: 57.5 % — SIGNIFICANT CHANGE UP (ref 43–77)
NRBC # BLD: 0 /100 WBCS — SIGNIFICANT CHANGE UP (ref 0–0)
PLATELET # BLD AUTO: 410 K/UL — HIGH (ref 150–400)
POTASSIUM SERPL-MCNC: 2.6 MMOL/L — CRITICAL LOW (ref 3.5–5.3)
POTASSIUM SERPL-MCNC: 3.2 MMOL/L — LOW (ref 3.5–5.3)
POTASSIUM SERPL-SCNC: 2.6 MMOL/L — CRITICAL LOW (ref 3.5–5.3)
POTASSIUM SERPL-SCNC: 3.2 MMOL/L — LOW (ref 3.5–5.3)
PROT SERPL-MCNC: 5.8 GM/DL — LOW (ref 6–8.3)
PROT SERPL-MCNC: 7.1 GM/DL — SIGNIFICANT CHANGE UP (ref 6–8.3)
RBC # BLD: 4.98 M/UL — SIGNIFICANT CHANGE UP (ref 3.8–5.2)
RBC # FLD: 16 % — HIGH (ref 10.3–14.5)
SODIUM SERPL-SCNC: 135 MMOL/L — SIGNIFICANT CHANGE UP (ref 135–145)
SODIUM SERPL-SCNC: 136 MMOL/L — SIGNIFICANT CHANGE UP (ref 135–145)
WBC # BLD: 5.58 K/UL — SIGNIFICANT CHANGE UP (ref 3.8–10.5)
WBC # FLD AUTO: 5.58 K/UL — SIGNIFICANT CHANGE UP (ref 3.8–10.5)

## 2023-04-08 PROCEDURE — 99285 EMERGENCY DEPT VISIT HI MDM: CPT

## 2023-04-08 PROCEDURE — 93010 ELECTROCARDIOGRAM REPORT: CPT

## 2023-04-08 PROCEDURE — 99222 1ST HOSP IP/OBS MODERATE 55: CPT

## 2023-04-08 RX ORDER — MIRTAZAPINE 45 MG/1
30 TABLET, ORALLY DISINTEGRATING ORAL AT BEDTIME
Refills: 0 | Status: DISCONTINUED | OUTPATIENT
Start: 2023-04-08 | End: 2023-04-09

## 2023-04-08 RX ORDER — MAGNESIUM SULFATE 500 MG/ML
2 VIAL (ML) INJECTION ONCE
Refills: 0 | Status: COMPLETED | OUTPATIENT
Start: 2023-04-08 | End: 2023-04-09

## 2023-04-08 RX ORDER — SODIUM CHLORIDE 9 MG/ML
1000 INJECTION INTRAMUSCULAR; INTRAVENOUS; SUBCUTANEOUS
Refills: 0 | Status: DISCONTINUED | OUTPATIENT
Start: 2023-04-08 | End: 2023-04-09

## 2023-04-08 RX ORDER — POTASSIUM CHLORIDE 20 MEQ
10 PACKET (EA) ORAL
Refills: 0 | Status: COMPLETED | OUTPATIENT
Start: 2023-04-08 | End: 2023-04-08

## 2023-04-08 RX ORDER — POTASSIUM CHLORIDE 20 MEQ
40 PACKET (EA) ORAL ONCE
Refills: 0 | Status: COMPLETED | OUTPATIENT
Start: 2023-04-08 | End: 2023-04-08

## 2023-04-08 RX ORDER — DEXTROSE MONOHYDRATE, SODIUM CHLORIDE, AND POTASSIUM CHLORIDE 50; .745; 4.5 G/1000ML; G/1000ML; G/1000ML
1000 INJECTION, SOLUTION INTRAVENOUS
Refills: 0 | Status: DISCONTINUED | OUTPATIENT
Start: 2023-04-08 | End: 2023-04-08

## 2023-04-08 RX ORDER — MAGNESIUM SULFATE 500 MG/ML
2 VIAL (ML) INJECTION ONCE
Refills: 0 | Status: COMPLETED | OUTPATIENT
Start: 2023-04-08 | End: 2023-04-08

## 2023-04-08 RX ORDER — MAGNESIUM OXIDE 400 MG ORAL TABLET 241.3 MG
400 TABLET ORAL DAILY
Refills: 0 | Status: DISCONTINUED | OUTPATIENT
Start: 2023-04-08 | End: 2023-04-09

## 2023-04-08 RX ORDER — POTASSIUM CHLORIDE 20 MEQ
40 PACKET (EA) ORAL ONCE
Refills: 0 | Status: COMPLETED | OUTPATIENT
Start: 2023-04-08 | End: 2023-04-09

## 2023-04-08 RX ADMIN — Medication 40 MILLIEQUIVALENT(S): at 12:41

## 2023-04-08 RX ADMIN — Medication 100 MILLIEQUIVALENT(S): at 12:41

## 2023-04-08 RX ADMIN — SODIUM CHLORIDE 75 MILLILITER(S): 9 INJECTION INTRAMUSCULAR; INTRAVENOUS; SUBCUTANEOUS at 21:28

## 2023-04-08 RX ADMIN — Medication 100 MILLIEQUIVALENT(S): at 13:31

## 2023-04-08 RX ADMIN — Medication 40 MILLIEQUIVALENT(S): at 18:20

## 2023-04-08 RX ADMIN — MAGNESIUM OXIDE 400 MG ORAL TABLET 400 MILLIGRAM(S): 241.3 TABLET ORAL at 18:45

## 2023-04-08 RX ADMIN — Medication 100 MILLIEQUIVALENT(S): at 14:38

## 2023-04-08 RX ADMIN — SODIUM CHLORIDE 75 MILLILITER(S): 9 INJECTION INTRAMUSCULAR; INTRAVENOUS; SUBCUTANEOUS at 15:54

## 2023-04-08 RX ADMIN — Medication 25 GRAM(S): at 15:54

## 2023-04-08 NOTE — ED PROVIDER NOTE - PROGRESS NOTE DETAILS
Patient care signed out by Dr. Kelley.  H/o hypokalemia, unknown etiology.  Patient endorses tingling and paresthesias of her extremities.  EKG with sinus tach, questionable u wave.  Receiving k and mag repletion.  Patient is to be admitted to the hospital and the case was discussed with the admitting physician.  Any changes in plan, additional imaging/labs, and further work up will be at the discretion of the admitting physician. Per discussion with admitting physician, all necessary consults for admitted patients to be obtained by morning team unless patient requires emergent intervention or medical advice by specialist overnight. Patient care signed out by Dr. Kelley.  H/o hypokalemia, unknown etiology.  Patient endorses tingling and paresthesias of her extremities.  EKG with sinus tach, questionable u wave, denies chest pain or sob as stated in triage note.  Receiving k and mag repletion.  Patient is to be admitted to the hospital and the case was discussed with the admitting physician.  Any changes in plan, additional imaging/labs, and further work up will be at the discretion of the admitting physician. Per discussion with admitting physician, all necessary consults for admitted patients to be obtained by morning team unless patient requires emergent intervention or medical advice by specialist overnight.

## 2023-04-08 NOTE — PATIENT PROFILE ADULT - NSPROHMSYMPCOND_GEN_A_NUR
[facility]  [address]  [place] [address]  Phone: [phone]    Name:  Wilmer Camacho  Current Date: 2017  : 1970  MRN: [id number]   CHARISSA: [id number]    Visit Date: 2017  Address: [address] [place] [id number]  Phone: [phone]    Primary Care Provider     Name: Alfie Ford MD    Phone: [phone]    The staff of [facility] would like to thank you for allowing us to assist you with your healthcare needs. The following includes patient education materials and information on how best to care for your illness/injury at home and when to see a physician. If you need to locate a Doctor or clinic close to you, please call the Doctor Referral Service at [phone]. The Service is available Monday through Thursday from 8 AM to 8 PM and  from 8 AM to 4 PM.    Patients Please Note: If further time off is required, or a medical clearance to return to work is required, it must be obtained through your primary physician.  Return to work clearances and extensions of \"Time-Off\" will not be given by the Emergency Department.     We hope that you leave our Emergency Department believing that we provided you with very good care.   Your Opinion Matters To Us  If you receive a patient satisfaction survey in the mail, please complete and return it in the postage-paid envelope.  We truly value and appreciate your feedback.  Emergency Department Care Providers   Physician:Caryl Bravo DO    Advanced Practice Provider:  Physician Assistant: Billy Ghosh PA-C RN_________________ ED Tech__________________ Clerical_________________         none

## 2023-04-08 NOTE — H&P ADULT - NSHPLABSRESULTS_GEN_ALL_CORE
LABS:                        12.7   5.58  )-----------( 410      ( 08 Apr 2023 11:00 )             37.4     04-08    136  |  96  |  5<L>  ----------------------------<  130<H>  2.6<LL>   |  33<H>  |  0.80    Ca    9.0      08 Apr 2023 11:35  Phos  2.7     04-08  Mg     1.3     04-08    TPro  7.1  /  Alb  3.4  /  TBili  1.1  /  DBili  x   /  AST  43<H>  /  ALT  46  /  AlkPhos  46  04-08            RADIOLOGY & ADDITIONAL TESTS:

## 2023-04-08 NOTE — PATIENT PROFILE ADULT - FALL HARM RISK - UNIVERSAL INTERVENTIONS
Bed in lowest position, wheels locked, appropriate side rails in place/Call bell, personal items and telephone in reach/Instruct patient to call for assistance before getting out of bed or chair/Non-slip footwear when patient is out of bed/[place] to call system/Physically safe environment - no spills, clutter or unnecessary equipment/Purposeful Proactive Rounding/Room/bathroom lighting operational, light cord in reach

## 2023-04-08 NOTE — H&P ADULT - NSHPPHYSICALEXAM_GEN_ALL_CORE
PHYSICAL EXAMINATION:  Vital Signs Last 24 Hrs  T(C): 36.3 (08 Apr 2023 09:52), Max: 36.3 (08 Apr 2023 09:52)  T(F): 97.4 (08 Apr 2023 09:52), Max: 97.4 (08 Apr 2023 09:52)  HR: 88 (08 Apr 2023 13:34) (88 - 105)  BP: 106/77 (08 Apr 2023 13:34) (106/77 - 115/80)  BP(mean): 85 (08 Apr 2023 13:34) (85 - 85)  RR: 18 (08 Apr 2023 13:34) (14 - 18)  SpO2: 100% (08 Apr 2023 13:34) (100% - 100%)    Parameters below as of 08 Apr 2023 13:34  Patient On (Oxygen Delivery Method): room air      CAPILLARY BLOOD GLUCOSE          GENERAL: NAD,   ENMT: mucous membranes moist  NECK: supple, No JVD  CHEST/LUNG: clear to auscultation bilaterally; no rales, rhonchi, or wheezing b/l  HEART: normal S1, S2  ABDOMEN: BS+, soft, ND, NT   EXTREMITIES:  pulses palpable; no clubbing, cyanosis, or edema b/l LEs  NEURO: awake, alert, interactive; moves all extremities PHYSICAL EXAMINATION:  Vital Signs Last 24 Hrs  T(C): 36.3 (08 Apr 2023 09:52), Max: 36.3 (08 Apr 2023 09:52)  T(F): 97.4 (08 Apr 2023 09:52), Max: 97.4 (08 Apr 2023 09:52)  HR: 88 (08 Apr 2023 13:34) (88 - 105)  BP: 106/77 (08 Apr 2023 13:34) (106/77 - 115/80)  BP(mean): 85 (08 Apr 2023 13:34) (85 - 85)  RR: 18 (08 Apr 2023 13:34) (14 - 18)  SpO2: 100% (08 Apr 2023 13:34) (100% - 100%)    Parameters below as of 08 Apr 2023 13:34  Patient On (Oxygen Delivery Method): room air      CAPILLARY BLOOD GLUCOSE          GENERAL: NAD, seen in ER, comfortable, answers all questions, some tingling in both hands and weakness  ENMT: mucous membranes moist  NECK: supple, No JVD  CHEST/LUNG: clear to auscultation bilaterally; no rales, rhonchi, or wheezing b/l  HEART: normal S1, S2  ABDOMEN: BS+, soft, ND, NT   EXTREMITIES:  pulses palpable; no clubbing, cyanosis, or edema b/l LEs  NEURO: awake, alert, interactive; moves all extremities

## 2023-04-08 NOTE — ED ADULT TRIAGE NOTE - CHIEF COMPLAINT QUOTE
patient states was called by pmd last night because her potassium level was below 3,complaining of tingling and numbness to bilateral hands, chest tightness but denies pain

## 2023-04-08 NOTE — H&P ADULT - ASSESSMENT
28-year-old female, states she had routine blood work drawn by medical clinic in [facility].  Patient states she received called yesterday to go to ED for low potassium level.  On evaluation patient is well-appearing in no distress, denies any weakness, no chest pain, no shortness of breath, denies vomiting, or diarrhea. States she has a history of hypokalemia, patient had potassium level 1.9 on March 25 requiring admission.  Patient is unsure as to source of hypokalemia.  Patient admits to drinking alcohol last night however states drink in moderation.  Patient is clinically sober and has no signs of withdrawal in the ER.  K here is 2.6 on arrival.        Plan: Admit to tele for hypokalemia of 2.6 on arrival. Magnesium also low at 1.3.  Will supplement orally, IV and with IVF.   28-year-old female, states she had routine blood work drawn by medical clinic in [facility].  Patient states she received called yesterday to go to ED for low potassium level.  On evaluation patient is well-appearing in no distress, denies any weakness, no chest pain, no shortness of breath, denies vomiting, or diarrhea. States she has a history of hypokalemia, patient had potassium level 1.9 on March 25 requiring admission.  Patient is unsure as to source of hypokalemia.  Patient admits to drinking alcohol last night however states drink in moderation.  Patient is clinically sober and has no signs of withdrawal in the ER.  K here is 2.6 on arrival.        Plan: Admit to tele for hypokalemia of 2.6 on arrival with weakness and paresthesias. Magnesium also low at 1.3.  Will supplement orally,   IV KCL ryders and with IVF.  Recheck at 8 PM tonight and in AM. If lytes corrected, likely discharge home in AM. Will need   daily Potasium and Magnesium supplements.  Nutrition consult to increase Potasium in diet.

## 2023-04-08 NOTE — H&P ADULT - HISTORY OF PRESENT ILLNESS
28-year-old female, states she had routine blood work drawn by medical clinic in [facility].  Patient states she received called yesterday to go to ED for low potassium level.  On evaluation patient is well-appearing in no distress, denies any weakness, no chest pain, no shortness of breath, denies vomiting, or diarrhea. States she has a history of hypokalemia, patient had potassium level 1.9 on March 25 requiring admission.  Patient is unsure as to source of hypokalemia.  Patient admits to drinking alcohol last night however states drink in moderation.  Patient is clinically sober and has no signs of withdrawal in the ER.  K here is 2.6 on arrival.   28-year-old female, states she had routine blood work drawn by medical clinic in [facility].  Patient states she received called yesterday to go to ED for low potassium level.  On evaluation patient is well-appearing in no distress, denies any weakness, no chest pain, no shortness of breath, denies vomiting, or diarrhea. Notes weakness and paresthesias in both hands.  States she has a history of hypokalemia, patient had potassium level 1.9 on March 25 requiring admission.  Patient is unsure as to source of hypokalemia.  Patient admits to drinking alcohol last night however states drink in moderation.  Patient is clinically sober and has no signs of withdrawal in the ER.  K here is 2.6 on arrival.  No signs of malabsorption.

## 2023-04-08 NOTE — ED PROVIDER NOTE - OBJECTIVE STATEMENT
28-year-old female patient states she had routine blood work drawn by medical clinic in [facility].  Patient states she received called yesterday to go to ED for low potassium level.  On evaluation patient is well-appearing in no distress, denies any weakness, no chest pain, no shortness of breath, denies vomiting, no diarrhea.  Patient states has history of hypokalemia, patient had potassium level 1.9 on March 25 requiring admission.  Patient is unsure as to source of hypokalemia.  Patient admits to drinking alcohol last night however states drink in moderation.  Patient is clinically sober and has no signs of withdrawal.  Meds: Lorazepam, Remeron, Vistaril.

## 2023-04-09 ENCOUNTER — TRANSCRIPTION ENCOUNTER (OUTPATIENT)
Age: 28
End: 2023-04-09

## 2023-04-09 VITALS
HEART RATE: 94 BPM | SYSTOLIC BLOOD PRESSURE: 89 MMHG | OXYGEN SATURATION: 98 % | DIASTOLIC BLOOD PRESSURE: 60 MMHG | TEMPERATURE: 99 F | RESPIRATION RATE: 19 BRPM

## 2023-04-09 LAB
ANION GAP SERPL CALC-SCNC: 3 MMOL/L — LOW (ref 5–17)
ANION GAP SERPL CALC-SCNC: 5 MMOL/L — SIGNIFICANT CHANGE UP (ref 5–17)
BUN SERPL-MCNC: 4 MG/DL — LOW (ref 7–23)
BUN SERPL-MCNC: 5 MG/DL — LOW (ref 7–23)
CALCIUM SERPL-MCNC: 7.5 MG/DL — LOW (ref 8.5–10.1)
CALCIUM SERPL-MCNC: 8.5 MG/DL — SIGNIFICANT CHANGE UP (ref 8.5–10.1)
CHLORIDE SERPL-SCNC: 105 MMOL/L — SIGNIFICANT CHANGE UP (ref 96–108)
CHLORIDE SERPL-SCNC: 107 MMOL/L — SIGNIFICANT CHANGE UP (ref 96–108)
CO2 SERPL-SCNC: 27 MMOL/L — SIGNIFICANT CHANGE UP (ref 22–31)
CO2 SERPL-SCNC: 30 MMOL/L — SIGNIFICANT CHANGE UP (ref 22–31)
CREAT SERPL-MCNC: 0.67 MG/DL — SIGNIFICANT CHANGE UP (ref 0.5–1.3)
CREAT SERPL-MCNC: 0.69 MG/DL — SIGNIFICANT CHANGE UP (ref 0.5–1.3)
EGFR: 120 ML/MIN/1.73M2 — SIGNIFICANT CHANGE UP
EGFR: 121 ML/MIN/1.73M2 — SIGNIFICANT CHANGE UP
GLUCOSE SERPL-MCNC: 110 MG/DL — HIGH (ref 70–99)
GLUCOSE SERPL-MCNC: 112 MG/DL — HIGH (ref 70–99)
MAGNESIUM SERPL-MCNC: 2.4 MG/DL — SIGNIFICANT CHANGE UP (ref 1.6–2.6)
POTASSIUM SERPL-MCNC: 3.5 MMOL/L — SIGNIFICANT CHANGE UP (ref 3.5–5.3)
POTASSIUM SERPL-MCNC: 3.6 MMOL/L — SIGNIFICANT CHANGE UP (ref 3.5–5.3)
POTASSIUM SERPL-SCNC: 3.5 MMOL/L — SIGNIFICANT CHANGE UP (ref 3.5–5.3)
POTASSIUM SERPL-SCNC: 3.6 MMOL/L — SIGNIFICANT CHANGE UP (ref 3.5–5.3)
SODIUM SERPL-SCNC: 137 MMOL/L — SIGNIFICANT CHANGE UP (ref 135–145)
SODIUM SERPL-SCNC: 140 MMOL/L — SIGNIFICANT CHANGE UP (ref 135–145)

## 2023-04-09 PROCEDURE — 99239 HOSP IP/OBS DSCHRG MGMT >30: CPT

## 2023-04-09 RX ORDER — MIRTAZAPINE 45 MG/1
1 TABLET, ORALLY DISINTEGRATING ORAL
Qty: 0 | Refills: 0 | DISCHARGE
Start: 2023-04-09

## 2023-04-09 RX ORDER — MAGNESIUM OXIDE 400 MG ORAL TABLET 241.3 MG
1 TABLET ORAL
Qty: 7 | Refills: 0
Start: 2023-04-09 | End: 2023-04-15

## 2023-04-09 RX ORDER — POTASSIUM CHLORIDE 20 MEQ
40 PACKET (EA) ORAL EVERY 4 HOURS
Refills: 0 | Status: DISCONTINUED | OUTPATIENT
Start: 2023-04-09 | End: 2023-04-09

## 2023-04-09 RX ORDER — MIRTAZAPINE 45 MG/1
1 TABLET, ORALLY DISINTEGRATING ORAL
Refills: 0
Start: 2023-04-09

## 2023-04-09 RX ORDER — POTASSIUM CHLORIDE 20 MEQ
40 PACKET (EA) ORAL ONCE
Refills: 0 | Status: COMPLETED | OUTPATIENT
Start: 2023-04-09 | End: 2023-04-09

## 2023-04-09 RX ORDER — POTASSIUM CHLORIDE 20 MEQ
10 PACKET (EA) ORAL
Refills: 0 | Status: COMPLETED | OUTPATIENT
Start: 2023-04-09 | End: 2023-04-09

## 2023-04-09 RX ORDER — POTASSIUM CHLORIDE 20 MEQ
1 PACKET (EA) ORAL
Qty: 30 | Refills: 0
Start: 2023-04-09 | End: 2023-05-08

## 2023-04-09 RX ADMIN — Medication 25 GRAM(S): at 01:22

## 2023-04-09 RX ADMIN — Medication 40 MILLIEQUIVALENT(S): at 01:21

## 2023-04-09 RX ADMIN — MAGNESIUM OXIDE 400 MG ORAL TABLET 400 MILLIGRAM(S): 241.3 TABLET ORAL at 11:56

## 2023-04-09 RX ADMIN — Medication 100 MILLIEQUIVALENT(S): at 13:36

## 2023-04-09 RX ADMIN — Medication 100 MILLIEQUIVALENT(S): at 15:32

## 2023-04-09 RX ADMIN — Medication 40 MILLIEQUIVALENT(S): at 18:39

## 2023-04-09 RX ADMIN — SODIUM CHLORIDE 75 MILLILITER(S): 9 INJECTION INTRAMUSCULAR; INTRAVENOUS; SUBCUTANEOUS at 08:51

## 2023-04-09 RX ADMIN — Medication 40 MILLIEQUIVALENT(S): at 11:56

## 2023-04-09 RX ADMIN — Medication 100 MILLIEQUIVALENT(S): at 11:57

## 2023-04-09 NOTE — PROGRESS NOTE ADULT - SUBJECTIVE AND OBJECTIVE BOX
INTERVAL HPI/OVERNIGHT EVENTS:  Pt seen and examined at bedside.     Allergies/Intolerance: No Known Allergies      MEDICATIONS  (STANDING):  magnesium oxide 400 milliGRAM(s) Oral daily  mirtazapine 30 milliGRAM(s) Oral at bedtime  sodium chloride 0.9%. 1000 milliLiter(s) (75 mL/Hr) IV Continuous <Continuous>    MEDICATIONS  (PRN):  LORazepam     Tablet 0.5 milliGRAM(s) Oral two times a day PRN Agitation        ROS: all systems reviewed and wnl      PHYSICAL EXAMINATION:  Vital Signs Last 24 Hrs  T(C): 36.4 (09 Apr 2023 05:02), Max: 37.3 (08 Apr 2023 14:49)  T(F): 97.5 (09 Apr 2023 05:02), Max: 99.1 (08 Apr 2023 14:49)  HR: 84 (09 Apr 2023 05:02) (79 - 102)  BP: 96/65 (09 Apr 2023 05:02) (92/63 - 111/70)  BP(mean): 79 (08 Apr 2023 14:49) (79 - 85)  RR: 18 (09 Apr 2023 05:02) (18 - 21)  SpO2: 99% (09 Apr 2023 05:02) (98% - 100%)    Parameters below as of 09 Apr 2023 05:02  Patient On (Oxygen Delivery Method): room air      CAPILLARY BLOOD GLUCOSE          04-08 @ 07:01  -  04-09 @ 07:00  --------------------------------------------------------  IN: 1088 mL / OUT: 600 mL / NET: 488 mL        GENERAL: stable, comfortable on RA, no diarrhea, less weakness  NECK: supple, No JVD  CHEST/LUNG: clear to auscultation bilaterally; no rales, rhonchi, or wheezing b/l  HEART: normal S1, S2  ABDOMEN: BS+, soft, ND, NT   EXTREMITIES:  pulses palpable; no clubbing, cyanosis, or edema b/l LEs      LABS:                        12.7   5.58  )-----------( 410      ( 08 Apr 2023 11:00 )             37.4     04-09    140  |  107  |  5<L>  ----------------------------<  112<H>  3.5   |  30  |  0.69    Ca    8.5      09 Apr 2023 07:07  Phos  2.7     04-08  Mg     2.4     04-09    TPro  5.8<L>  /  Alb  2.7<L>  /  TBili  0.6  /  DBili  x   /  AST  41<H>  /  ALT  41  /  AlkPhos  40  04-08               INTERVAL HPI/OVERNIGHT EVENTS:  Pt seen and examined at bedside.     Allergies/Intolerance: No Known Allergies      MEDICATIONS  (STANDING):  magnesium oxide 400 milliGRAM(s) Oral daily  mirtazapine 30 milliGRAM(s) Oral at bedtime  sodium chloride 0.9%. 1000 milliLiter(s) (75 mL/Hr) IV Continuous <Continuous>    MEDICATIONS  (PRN):  LORazepam     Tablet 0.5 milliGRAM(s) Oral two times a day PRN Agitation        ROS: all systems reviewed and wnl      PHYSICAL EXAMINATION:  Vital Signs Last 24 Hrs  T(C): 36.4 (09 Apr 2023 05:02), Max: 37.3 (08 Apr 2023 14:49)  T(F): 97.5 (09 Apr 2023 05:02), Max: 99.1 (08 Apr 2023 14:49)  HR: 84 (09 Apr 2023 05:02) (79 - 102)  BP: 96/65 (09 Apr 2023 05:02) (92/63 - 111/70)  BP(mean): 79 (08 Apr 2023 14:49) (79 - 85)  RR: 18 (09 Apr 2023 05:02) (18 - 21)  SpO2: 99% (09 Apr 2023 05:02) (98% - 100%)    Parameters below as of 09 Apr 2023 05:02  Patient On (Oxygen Delivery Method): room air      CAPILLARY BLOOD GLUCOSE          04-08 @ 07:01  -  04-09 @ 07:00  --------------------------------------------------------  IN: 1088 mL / OUT: 600 mL / NET: 488 mL        GENERAL: stable, comfortable on RA, no diarrhea, less weakness, feels stronger this AM  NECK: supple, No JVD  CHEST/LUNG: clear to auscultation bilaterally; no rales, rhonchi, or wheezing b/l  HEART: normal S1, S2  ABDOMEN: BS+, soft, ND, NT   EXTREMITIES:  pulses palpable; no clubbing, cyanosis, or edema b/l LEs      LABS:                        12.7   5.58  )-----------( 410      ( 08 Apr 2023 11:00 )             37.4     04-09    140  |  107  |  5<L>  ----------------------------<  112<H>  3.5   |  30  |  0.69    Ca    8.5      09 Apr 2023 07:07  Phos  2.7     04-08  Mg     2.4     04-09    TPro  5.8<L>  /  Alb  2.7<L>  /  TBili  0.6  /  DBili  x   /  AST  41<H>  /  ALT  41  /  AlkPhos  40  04-08

## 2023-04-09 NOTE — DISCHARGE NOTE PROVIDER - NSDCCPCAREPLAN_GEN_ALL_CORE_FT
----- Message from Juani Ruby MD sent at 7/19/2019  9:20 AM CDT -----  Lab reviewed , FU advised as scheduled, abnormal urine analysis, if she has symptoms we will do culture, cholesterol, elevated to 182, TSH normal, CMP normal including electrolytes sugar kidney and liver test, B12 level is normal, CBC is normal, follow-up advised for further management.  Thank you   PRINCIPAL DISCHARGE DIAGNOSIS  Diagnosis: Hypokalemia  Assessment and Plan of Treatment: You have been diagnosed with hypokalemia. This means you have a low level of potassium in your blood. Potassium helps your nerve and muscle cells work as they should. These cells include the cells in your heart. A low level of potassium in the blood can cause serious problems, such as abnormal heart rhythms and even a heart attack.  Diet changes  Eat more potassium-rich foods such as:  Bananas  Oranges and orange juice  Tomatoes, tomato sauce, and tomato juice  Leafy green vegetables, such as spinach, kale, salad greens, collards, and chard  Melons (all kinds)  Pomegranates  Peas  Beans  Potatoes  Sweet potatoes  Avocados, including guacamole  Vegetable juices, such as V8  Fruit juices  All nuts and seeds  Fish, including tuna, halibut, salmon, cod, snapper, ok, swordfish, and perch  Milk, including fat-free, low-fat, whole, chocolate, and buttermilk  Soy milk  Other home care  Take a potassium supplement as directed by your healthcare provider.  After heavy exercise or any activity that causes you to sweat a lot, grab a beverage high in potassium. This includes chocolate milk, coconut water, orange juice, or low-sodium vegetable juices.  Be sure to eat foods or drink fluids with potassium if you have diarrhea or vomiting.  Have your potassium levels checked regularly as directed.  Take all medicines exactly as directed.  Tell your healthcare provider about all prescription and over-the-counter medicines you are taking. This includes herbal products. Some water pills (diuretics) can cause you to lose potassium.  Don't have foods that are high in salt. Pass up canned and prepared foods that are high in salt.

## 2023-04-09 NOTE — DISCHARGE NOTE NURSING/CASE MANAGEMENT/SOCIAL WORK - NSDCPEFALRISK_GEN_ALL_CORE
For information on Fall & Injury Prevention, visit: [email or web address] OR  [email or web address] OR  https://www.cdc.gov/steadi/patient.html

## 2023-04-09 NOTE — PROGRESS NOTE ADULT - ASSESSMENT
28-year-old female, states she had routine blood work drawn by medical clinic in [facility].  Patient states she received called yesterday to go to ED for low potassium level.  On evaluation patient is well-appearing in no distress, denies any weakness, no chest pain, no shortness of breath, denies vomiting, or diarrhea. States she has a history of hypokalemia, patient had potassium level 1.9 on March 25 requiring admission.  Patient is unsure as to source of hypokalemia.  Patient admits to drinking alcohol last night however states drink in moderation.  Patient is clinically sober and has no signs of withdrawal in the ER.  K here is 2.6 on arrival.        Plan: Admit to tele for hypokalemia of 2.6 on arrival with weakness and paresthesias. Magnesium also low at 1.3.  Will supplement orally,   IV KCL ryders and with IVF.  Recheck at 8 PM tonight and in AM. If lytes corrected, likely discharge home in AM. Will need   daily Potasium and Magnesium supplements.  Nutrition consult to increase Potasium in diet.   28-year-old female, states she had routine blood work drawn by medical clinic in [facility].  Patient states she received called yesterday to go to ED for low potassium level.  On evaluation patient is well-appearing in no distress, denies any weakness, no chest pain, no shortness of breath, denies vomiting, or diarrhea. States she has a history of hypokalemia, patient had potassium level 1.9 on March 25 requiring admission.  Patient is unsure as to source of hypokalemia.  Patient admits to drinking alcohol last night however states drink in moderation.  Patient is clinically sober and has no signs of withdrawal in the ER.  K here is 2.6 on arrival.        Plan: Admit to tele for hypokalemia of 2.6 on arrival with weakness and paresthesias. Magnesium also low at 1.3.  Will supplement orally,   IV KCL ryders and with IVF.  Recheck at 8 PM tonight and in AM. If lytes corrected, likely discharge home in AM. Will need   daily Potasium and Magnesium supplements.  Nutrition consult to increase Potasium in diet.      K is better this AM at 3.5, will supplement oral and IV K-shagufta, recheck and likely discharge home later today.    28-year-old female, states she had routine blood work drawn by medical clinic in [facility].  Patient states she received called yesterday to go to ED for low potassium level.  On evaluation patient is well-appearing in no distress, denies any weakness, no chest pain, no shortness of breath, denies vomiting, or diarrhea. States she has a history of hypokalemia, patient had potassium level 1.9 on March 25 requiring admission.  Patient is unsure as to source of hypokalemia.  Patient admits to drinking alcohol last night however states drink in moderation.  Patient is clinically sober and has no signs of withdrawal in the ER.  K here is 2.6 on arrival.        Plan: Admit to tele for hypokalemia of 2.6 on arrival with weakness and paresthesias. Magnesium also low at 1.3.  Will supplement orally,   IV KCL ryders and with IVF.  Recheck at 8 PM tonight and in AM. If lytes corrected, likely discharge home in AM. Will need   daily Potasium and Magnesium supplements.  Nutrition consult to increase Potasium in diet.      K is better this AM at 3.5, will supplement oral and IV K-shagufta, recheck and likely discharge home later today.       Over 35 minutes spent on discharge plan and document.

## 2023-04-09 NOTE — DISCHARGE NOTE NURSING/CASE MANAGEMENT/SOCIAL WORK - PATIENT PORTAL LINK FT
You can access the FollowMyHealth Patient Portal offered by [facility] by registering at the following website: [email or web address]. By joining [employer]’s FollowMyHealth portal, you will also be able to view your health information using other applications (apps) compatible with our system.

## 2023-04-09 NOTE — DISCHARGE NOTE NURSING/CASE MANAGEMENT/SOCIAL WORK - NSDPACMPNY_GEN_ALL_CORE
Patient contacted regarding COVID-19 diagnosis. Discussed COVID-19 related testing which was available at this time. Test results were positive. Patient informed of results, if available? yes. Ambulatory Care Manager contacted the patient by telephone to perform post discharge assessment. Call within 2 business days of discharge: Yes Verified name and  with patient as identifiers. Provided introduction to self, and explanation of the CTN/ACM role, and reason for call due to risk factors for infection and/or exposure to COVID-19. Symptoms reviewed with patient who verbalized the following symptoms: no new symptoms and no worsening symptoms      Due to no new or worsening symptoms encounter was not routed to provider for escalation. Discussed follow-up appointments. If no appointment was previously scheduled, appointment scheduling offered:  no. [facility] follow up appointment(s):   Future Appointments   Date Time Provider Elisa Esqueda   2022  9:45 AM Ligia Galeana MD [facility] MAIN BS [facility]     Non-BS follow up appointment(s): Interventions to address risk factors: Obtained and reviewed discharge summary and/or continuity of care documents     Advance Care Planning:   Does patient have an Advance Directive: not on file. Educated patient about risk for severe COVID-19 due to risk factors according to CDC guidelines. ACM reviewed discharge instructions, medical action plan and red flag symptoms with the patient who verbalized understanding. Discussed COVID vaccination status: yes. Education provided on COVID-19 vaccination as appropriate. Discussed exposure protocols and quarantine with CDC Guidelines. Patient was given an opportunity to verbalize any questions and concerns and agrees to contact ACM or health care provider for questions related to their healthcare.     Reviewed and educated patient on any new and changed medications related to discharge diagnosis     Was patient discharged with a pulse oximeter? no Discussed and confirmed pulse oximeter discharge instructions and when to notify provider or seek emergency care. ACM provided contact information. Plan for follow-up call in 5-7 days based on severity of symptoms and risk factors. Family

## 2023-04-09 NOTE — DISCHARGE NOTE PROVIDER - HOSPITAL COURSE
28-year-old female, states she had routine blood work drawn by medical clinic in [facility].  Patient states she received called yesterday to go to ED for low potassium level.  On evaluation patient is well-appearing in no distress, denies any weakness, no chest pain, no shortness of breath, denies vomiting, or diarrhea. States she has a history of hypokalemia, patient had potassium level 1.9 on March 25 requiring admission.  Patient is unsure as to source of hypokalemia.  Patient admits to drinking alcohol last night however states drink in moderation.  Patient is clinically sober and has no signs of withdrawal in the ER.  K here is 2.6 on arrival.        Plan: Admit to tele for hypokalemia of 2.6 on arrival with weakness and paresthesias. Magnesium also low at 1.3.  Will supplement orally,   IV KCL ryders and with IVF.  Recheck at 8 PM tonight and in AM. If lytes corrected, likely discharge home in AM. Will need   daily Potasium and Magnesium supplements.  Nutrition consult to increase Potasium in diet.      K is better this AM at 3.5, will supplement oral and IV K-shagufta, recheck and likely discharge home later today.    On 4/9/23 this case was reviewed with Dr. Low , the patient is medically stable and optimized for discharge as per attending. All medications were reviewed and prescriptions were sent to mutually agreed upon pharmacy. Discharge plan reviewed with patient and/or family.

## 2023-04-09 NOTE — DISCHARGE NOTE PROVIDER - NSDCMRMEDTOKEN_GEN_ALL_CORE_FT
magnesium oxide 400 mg oral tablet: 1 tab(s) orally once a day  mirtazapine 30 mg oral tablet: 1 tab(s) orally once a day (at bedtime)  Potassium Chloride (Eqv-Klor-Con 10) 10 mEq oral tablet, extended release: 1 tab(s) orally once a day

## 2023-04-11 NOTE — ED PROVIDER NOTE - DISPOSITION TYPE
RN called patient's daughter Juliet (CTC on file) and relayed provider message below. Juliet verbalized good understanding and will speak with patient later to see where her pain is at today. She will call or MyChart back with updates.     RN assisted in scheduling lab appointment at [facility] for 4/14/2023 at 1300.     Lizzette Leblanc RN  [facility]   DISCHARGE

## 2023-04-17 DIAGNOSIS — E87.6 HYPOKALEMIA: ICD-10-CM

## 2023-04-17 DIAGNOSIS — F41.9 ANXIETY DISORDER, UNSPECIFIED: ICD-10-CM

## 2023-04-17 DIAGNOSIS — F32.A DEPRESSION, UNSPECIFIED: ICD-10-CM

## 2023-05-15 NOTE — ED PROVIDER NOTE - OBJECTIVE STATEMENT
Patient's chart reviewed, please contact to schedule OA colonoscopy with .Holger Pineda MD      Schedule Procedure:   Please Schedule Routine (next available or patient preference)  Procedure: Colonoscopy (59907) with NuLytely  Diagnosis: Colon Cancer Screening Z12.11  Is patient:    Diabetic? No   ANTIPLATELET / ANTICOAGULATION: MEDICATION:  As per primary/cardiology Apixiban (Eliquis)  Latex allergy: No  Sleep apnea: Yes  Location: [facility]  Sedation: MAC Anesthesia    Patient takes Eliquis, will need clearance to HOLD for 3 days prior to colonoscopy.       26yF  at 24 weeks pregnant presents with intox. Contrary to triage note, patient states that she has not drank at all. Patient states that she presented due to a misunderstanding in which the father of her child called the police making false claim due to her refusing to give him money. Patient states that she feels safe at home, no h/o domestic abuse. No fever, chest pain, sob, abd pain, n/v, SI/HI, urinary or bowel irregularities.

## 2023-05-28 ENCOUNTER — EMERGENCY (EMERGENCY)
Facility: HOSPITAL | Age: 28
LOS: 0 days | Discharge: HOME | End: 2023-05-29
Attending: STUDENT IN AN ORGANIZED HEALTH CARE EDUCATION/TRAINING PROGRAM
Payer: MEDICAID

## 2023-05-28 VITALS
SYSTOLIC BLOOD PRESSURE: 131 MMHG | WEIGHT: 160.06 LBS | HEIGHT: 68 IN | HEART RATE: 86 BPM | DIASTOLIC BLOOD PRESSURE: 83 MMHG | OXYGEN SATURATION: 100 % | TEMPERATURE: 99 F | RESPIRATION RATE: 17 BRPM

## 2023-05-28 DIAGNOSIS — K52.9 NONINFECTIVE GASTROENTERITIS AND COLITIS, UNSPECIFIED: ICD-10-CM

## 2023-05-28 DIAGNOSIS — Z98.890 OTHER SPECIFIED POSTPROCEDURAL STATES: ICD-10-CM

## 2023-05-28 DIAGNOSIS — F41.9 ANXIETY DISORDER, UNSPECIFIED: ICD-10-CM

## 2023-05-28 DIAGNOSIS — Z86.2 PERSONAL HISTORY OF DISEASES OF THE BLOOD AND BLOOD-FORMING ORGANS AND CERTAIN DISORDERS INVOLVING THE IMMUNE MECHANISM: ICD-10-CM

## 2023-05-28 DIAGNOSIS — F32.A DEPRESSION, UNSPECIFIED: ICD-10-CM

## 2023-05-28 DIAGNOSIS — R10.9 UNSPECIFIED ABDOMINAL PAIN: ICD-10-CM

## 2023-05-28 DIAGNOSIS — E87.6 HYPOKALEMIA: ICD-10-CM

## 2023-05-28 DIAGNOSIS — Z98.891 HISTORY OF UTERINE SCAR FROM PREVIOUS SURGERY: Chronic | ICD-10-CM

## 2023-05-28 DIAGNOSIS — R11.2 NAUSEA WITH VOMITING, UNSPECIFIED: ICD-10-CM

## 2023-05-28 DIAGNOSIS — R53.1 WEAKNESS: ICD-10-CM

## 2023-05-28 LAB
HCT VFR BLD CALC: 35.7 % — SIGNIFICANT CHANGE UP (ref 34.5–45)
HGB BLD-MCNC: 11.9 G/DL — SIGNIFICANT CHANGE UP (ref 11.5–15.5)
LACTATE SERPL-SCNC: 1.3 MMOL/L — SIGNIFICANT CHANGE UP (ref 0.7–2)
MCHC RBC-ENTMCNC: 24.2 PG — LOW (ref 27–34)
MCHC RBC-ENTMCNC: 33.3 G/DL — SIGNIFICANT CHANGE UP (ref 32–36)
MCV RBC AUTO: 72.6 FL — LOW (ref 80–100)
PLATELET # BLD AUTO: 354 K/UL — SIGNIFICANT CHANGE UP (ref 150–400)
RBC # BLD: 4.92 M/UL — SIGNIFICANT CHANGE UP (ref 3.8–5.2)
RBC # FLD: 17.4 % — HIGH (ref 10.3–14.5)
SALICYLATES SERPL-MCNC: <1.7 MG/DL — LOW (ref 2.8–20)
WBC # BLD: 3.96 K/UL — SIGNIFICANT CHANGE UP (ref 3.8–10.5)
WBC # FLD AUTO: 3.96 K/UL — SIGNIFICANT CHANGE UP (ref 3.8–10.5)

## 2023-05-28 PROCEDURE — 99285 EMERGENCY DEPT VISIT HI MDM: CPT

## 2023-05-28 PROCEDURE — 93010 ELECTROCARDIOGRAM REPORT: CPT

## 2023-05-28 RX ORDER — FAMOTIDINE 10 MG/ML
20 INJECTION INTRAVENOUS ONCE
Refills: 0 | Status: COMPLETED | OUTPATIENT
Start: 2023-05-28 | End: 2023-05-28

## 2023-05-28 RX ORDER — DIPHENHYDRAMINE HCL 50 MG
25 CAPSULE ORAL ONCE
Refills: 0 | Status: COMPLETED | OUTPATIENT
Start: 2023-05-28 | End: 2023-05-28

## 2023-05-28 RX ORDER — SODIUM CHLORIDE 9 MG/ML
1000 INJECTION INTRAMUSCULAR; INTRAVENOUS; SUBCUTANEOUS ONCE
Refills: 0 | Status: COMPLETED | OUTPATIENT
Start: 2023-05-28 | End: 2023-05-28

## 2023-05-28 RX ORDER — METOCLOPRAMIDE HCL 10 MG
10 TABLET ORAL ONCE
Refills: 0 | Status: COMPLETED | OUTPATIENT
Start: 2023-05-28 | End: 2023-05-28

## 2023-05-28 RX ORDER — MAGNESIUM SULFATE 500 MG/ML
1 VIAL (ML) INJECTION ONCE
Refills: 0 | Status: COMPLETED | OUTPATIENT
Start: 2023-05-28 | End: 2023-05-28

## 2023-05-28 RX ORDER — POTASSIUM CHLORIDE 20 MEQ
10 PACKET (EA) ORAL
Refills: 0 | Status: COMPLETED | OUTPATIENT
Start: 2023-05-28 | End: 2023-05-29

## 2023-05-28 RX ORDER — MAGNESIUM SULFATE 500 MG/ML
2 VIAL (ML) INJECTION ONCE
Refills: 0 | Status: DISCONTINUED | OUTPATIENT
Start: 2023-05-28 | End: 2023-05-28

## 2023-05-28 RX ADMIN — FAMOTIDINE 20 MILLIGRAM(S): 10 INJECTION INTRAVENOUS at 22:52

## 2023-05-28 RX ADMIN — SODIUM CHLORIDE 1000 MILLILITER(S): 9 INJECTION INTRAMUSCULAR; INTRAVENOUS; SUBCUTANEOUS at 22:10

## 2023-05-28 RX ADMIN — Medication 100 GRAM(S): at 22:53

## 2023-05-28 RX ADMIN — Medication 25 MILLIGRAM(S): at 22:11

## 2023-05-28 RX ADMIN — Medication 10 MILLIGRAM(S): at 23:12

## 2023-05-28 NOTE — ED ADULT NURSE NOTE - OBJECTIVE STATEMENT
Pt is A&OX4, ambulatory. Complaining of vomiting, abdominal pain, tremors. States she is a daily drinker and took her last drink yesterday morning. Pt is A&OX4, ambulatory. Complaining of vomiting, abdominal pain, tremors. States she is a daily drinker and took her last drink yesterday morning. No acute distress noted. no PMH

## 2023-05-28 NOTE — ED ADULT NURSE NOTE - NSFALLUNIVINTERV_ED_ALL_ED
Bed/Stretcher in lowest position, wheels locked, appropriate side rails in place/Call bell, personal items and telephone in reach/Instruct patient to call for assistance before getting out of bed/chair/stretcher/Non-slip footwear applied when patient is off stretcher/[place] to call system/Physically safe environment - no spills, clutter or unnecessary equipment/Purposeful proactive rounding/Room/bathroom lighting operational, light cord in reach

## 2023-05-28 NOTE — ED ADULT TRIAGE NOTE - CHIEF COMPLAINT QUOTE
vomiting , abdominal pain last drink yesterday. 20 gauge left hand , Zofran 4mg iv given and normal saline 200ml

## 2023-05-29 VITALS
SYSTOLIC BLOOD PRESSURE: 102 MMHG | TEMPERATURE: 98 F | OXYGEN SATURATION: 100 % | HEART RATE: 85 BPM | DIASTOLIC BLOOD PRESSURE: 69 MMHG | RESPIRATION RATE: 17 BRPM

## 2023-05-29 LAB
ALBUMIN SERPL ELPH-MCNC: 3.9 G/DL — SIGNIFICANT CHANGE UP (ref 3.3–5)
ALP SERPL-CCNC: 37 U/L — LOW (ref 40–120)
ALT FLD-CCNC: 73 U/L — SIGNIFICANT CHANGE UP (ref 12–78)
ANION GAP SERPL CALC-SCNC: 13 MMOL/L — SIGNIFICANT CHANGE UP (ref 5–17)
ANISOCYTOSIS BLD QL: SLIGHT — SIGNIFICANT CHANGE UP
APAP SERPL-MCNC: < 2 UG/ML (ref 10–30)
APPEARANCE UR: CLEAR — SIGNIFICANT CHANGE UP
AST SERPL-CCNC: 78 U/L — HIGH (ref 15–37)
BACTERIA # UR AUTO: ABNORMAL
BASOPHILS # BLD AUTO: 0.02 K/UL — SIGNIFICANT CHANGE UP (ref 0–0.2)
BASOPHILS NFR BLD AUTO: 0.5 % — SIGNIFICANT CHANGE UP (ref 0–2)
BILIRUB SERPL-MCNC: 1.4 MG/DL — HIGH (ref 0.2–1.2)
BILIRUB UR-MCNC: ABNORMAL
BUN SERPL-MCNC: 7 MG/DL — SIGNIFICANT CHANGE UP (ref 7–23)
CALCIUM SERPL-MCNC: 9.1 MG/DL — SIGNIFICANT CHANGE UP (ref 8.5–10.1)
CHLORIDE SERPL-SCNC: 102 MMOL/L — SIGNIFICANT CHANGE UP (ref 96–108)
CO2 SERPL-SCNC: 25 MMOL/L — SIGNIFICANT CHANGE UP (ref 22–31)
COLOR SPEC: YELLOW — SIGNIFICANT CHANGE UP
CREAT SERPL-MCNC: 0.84 MG/DL — SIGNIFICANT CHANGE UP (ref 0.5–1.3)
DIFF PNL FLD: NEGATIVE — SIGNIFICANT CHANGE UP
EGFR: 96 ML/MIN/1.73M2 — SIGNIFICANT CHANGE UP
ELLIPTOCYTES BLD QL SMEAR: SLIGHT — SIGNIFICANT CHANGE UP
EOSINOPHIL # BLD AUTO: 0 K/UL — SIGNIFICANT CHANGE UP (ref 0–0.5)
EOSINOPHIL NFR BLD AUTO: 0 % — SIGNIFICANT CHANGE UP (ref 0–6)
EPI CELLS # UR: ABNORMAL
ETHANOL SERPL-MCNC: <10 MG/DL — SIGNIFICANT CHANGE UP (ref 0–10)
GLUCOSE SERPL-MCNC: 136 MG/DL — HIGH (ref 70–99)
GLUCOSE UR QL: NEGATIVE MG/DL — SIGNIFICANT CHANGE UP
HCG SERPL-ACNC: <1 MIU/ML — SIGNIFICANT CHANGE UP
HYPOCHROMIA BLD QL: SLIGHT — SIGNIFICANT CHANGE UP
IMM GRANULOCYTES NFR BLD AUTO: 0.3 % — SIGNIFICANT CHANGE UP (ref 0–0.9)
KETONES UR-MCNC: ABNORMAL
LEUKOCYTE ESTERASE UR-ACNC: ABNORMAL
LIDOCAIN IGE QN: 59 U/L — LOW (ref 73–393)
LYMPHOCYTES # BLD AUTO: 0.54 K/UL — LOW (ref 1–3.3)
LYMPHOCYTES # BLD AUTO: 13.6 % — SIGNIFICANT CHANGE UP (ref 13–44)
MACROCYTES BLD QL: SLIGHT — SIGNIFICANT CHANGE UP
MAGNESIUM SERPL-MCNC: 1.8 MG/DL — SIGNIFICANT CHANGE UP (ref 1.6–2.6)
MANUAL SMEAR VERIFICATION: SIGNIFICANT CHANGE UP
MICROCYTES BLD QL: SLIGHT — SIGNIFICANT CHANGE UP
MONOCYTES # BLD AUTO: 0.31 K/UL — SIGNIFICANT CHANGE UP (ref 0–0.9)
MONOCYTES NFR BLD AUTO: 7.8 % — SIGNIFICANT CHANGE UP (ref 2–14)
NEUTROPHILS # BLD AUTO: 3.08 K/UL — SIGNIFICANT CHANGE UP (ref 1.8–7.4)
NEUTROPHILS NFR BLD AUTO: 77.8 % — HIGH (ref 43–77)
NITRITE UR-MCNC: NEGATIVE — SIGNIFICANT CHANGE UP
NRBC # BLD: 0 /100 WBCS — SIGNIFICANT CHANGE UP (ref 0–0)
PH UR: 6 — SIGNIFICANT CHANGE UP (ref 5–8)
PLAT MORPH BLD: NORMAL — SIGNIFICANT CHANGE UP
POIKILOCYTOSIS BLD QL AUTO: SLIGHT — SIGNIFICANT CHANGE UP
POTASSIUM SERPL-MCNC: 2.9 MMOL/L — CRITICAL LOW (ref 3.5–5.3)
POTASSIUM SERPL-SCNC: 2.9 MMOL/L — CRITICAL LOW (ref 3.5–5.3)
PROT SERPL-MCNC: 7.9 GM/DL — SIGNIFICANT CHANGE UP (ref 6–8.3)
PROT UR-MCNC: 30 MG/DL
RBC BLD AUTO: ABNORMAL
RBC CASTS # UR COMP ASSIST: SIGNIFICANT CHANGE UP /HPF (ref 0–4)
SODIUM SERPL-SCNC: 140 MMOL/L — SIGNIFICANT CHANGE UP (ref 135–145)
SP GR SPEC: 1.01 — SIGNIFICANT CHANGE UP (ref 1.01–1.02)
TARGETS BLD QL SMEAR: SLIGHT — SIGNIFICANT CHANGE UP
UROBILINOGEN FLD QL: 1 MG/DL
WBC UR QL: SIGNIFICANT CHANGE UP

## 2023-05-29 PROCEDURE — 74177 CT ABD & PELVIS W/CONTRAST: CPT | Mod: 26,MA

## 2023-05-29 RX ORDER — CIPROFLOXACIN LACTATE 400MG/40ML
1 VIAL (ML) INTRAVENOUS
Qty: 20 | Refills: 0
Start: 2023-05-29 | End: 2023-06-07

## 2023-05-29 RX ORDER — ONDANSETRON 8 MG/1
1 TABLET, FILM COATED ORAL
Qty: 1 | Refills: 0
Start: 2023-05-29 | End: 2023-05-31

## 2023-05-29 RX ORDER — METRONIDAZOLE 500 MG
1 TABLET ORAL
Qty: 30 | Refills: 0
Start: 2023-05-29 | End: 2023-06-07

## 2023-05-29 RX ADMIN — Medication 1 GRAM(S): at 07:04

## 2023-05-29 RX ADMIN — Medication 10 MILLIEQUIVALENT(S): at 02:34

## 2023-05-29 RX ADMIN — Medication 100 MILLIEQUIVALENT(S): at 02:33

## 2023-05-29 RX ADMIN — Medication 100 MILLIEQUIVALENT(S): at 00:20

## 2023-05-29 RX ADMIN — Medication 100 MILLIEQUIVALENT(S): at 01:42

## 2023-05-29 RX ADMIN — Medication 0.5 MILLIGRAM(S): at 01:40

## 2023-05-29 RX ADMIN — Medication 10 MILLIEQUIVALENT(S): at 01:42

## 2023-05-29 NOTE — ED PROVIDER NOTE - PATIENT PORTAL LINK FT
You can access the FollowMyHealth Patient Portal offered by [facility] by registering at the following website: [email or web address]. By joining [email or web address]’s FollowMyHealth portal, you will also be able to view your health information using other applications (apps) compatible with our system.

## 2023-05-29 NOTE — ED PROVIDER NOTE - OBJECTIVE STATEMENT
28F pw abd pain 28F pmhx depression, anxiety, etoh use disorder who presents with nausea, vomiting, cramping abd pain duration x 1 say. Patient reports generalized weakness and sensation of muscle spasms in hands similar when she had previous hypokalemia (admitted for tx april 2023). She notes increased belching and decrease flatus. She admits to drinking alochol day prior to symptom onset. She denies fevers, chill, cough, sob, chest pain, dysuria , SI or HI. She denies any active depressive symptoms. She was given zofran and fluids by ems with minimal relief.

## 2023-05-29 NOTE — ED PROVIDER NOTE - NSICDXFAMILYHX_GEN_ALL_CORE_FT
After Visit Summary   2/14/2018    Clarissa Paulino    MRN: [id number]           Patient Information     Date Of Birth          1986        Visit Information        Provider Department      2/14/2018 10:00 AM Dallas San PA-C [facility]      Today's Diagnoses     Bariatric surgery status    -  1    Obesity (BMI 30-39.9)        Postsurgical malabsorption        GERD without esophagitis          Care Instructions    1 years status laparoscopic gastric sleeve  Body mass index is 39.81 kg/(m^2).  Post surgical malabsorption:   Reviewed her labs she had drawn last month.  They all look great.   Ordered CBC, vitamin B12, vitamin D, PTH, ferritin, TIBC, and iron labs. - placed for your annual   Follow food plan per dietitian recommendations.   Continue taking recommended post-op vitamins.  Discussed with patient that along with the surgery this is a lifestyle.  If she is working on her diet and exercise she will be successful long term.    GERD - since she has continued with omeprazole daily will have her try another trial off and go to zantac daily.  If symptoms return again will have her continue with omeprazole until her annual appointment.   Stress incontinence -resolved  Return to clinic in 6 months.            Follow-ups after your visit        Your next 10 appointments already scheduled     Aug 08, 2018 10:00 AM CDT   Annual Visit with Dallas San PA-C   [facility] ([facility])    [address] [place] [id number] [phone]            Aug 08, 2018 10:30 AM CDT   Annual Visit with  Tabatha Diet 3, RD   [facility] - [facility])    [address] [place] [id number] [phone]              Future tests that were ordered for you today      Open Future Orders        Priority Expected Expires Ordered    CBC with platelets Routine 2/14/2018 2/14/2019 2/14/2018    Vitamin B12 Routine 2/14/2018 2/14/2019 2/14/2018    Vitamin D Screen Routine 2/14/2018 2/14/2019 2/14/2018    Parathyroid Hormone Intact Routine 2/14/2018 2/14/2019 2/14/2018    Iron Routine 2/14/2018 2/14/2019 2/14/2018    Iron and Iron Binding Capacity Routine 2/14/2018 2/14/2019 2/14/2018    Ferritin Routine 2/14/2018 2/14/2019 2/14/2018            Who to contact     If you have questions or need follow up information about today's clinic visit or your schedule please contact [facility] directly at [phone].  Normal or non-critical lab and imaging results will be communicated to you by MyChart, letter or phone within 4 business days after the clinic has received the results. If you do not hear from us within 7 days, please contact the clinic through [facility] or phone. If you have a critical or abnormal lab result, we will notify you by phone as soon as possible.  Submit refill requests through [employer] or call your pharmacy and they will forward the refill request to us. Please allow 3 business days for your refill to be completed.          Additional Information About Your Visit        [facility] Information     [employer] gives you secure access to your electronic health record. If you see a primary care provider, you can also send messages to your care team and make appointments. If you have questions, please call your primary care clinic.  If you do not have a primary care provider, please call [phone] and they will assist you.        Care EveryWhere ID     This is your Care EveryWhere ID. This could be used by other organizations to access your [place] medical records  [id number]        Your Vitals Were     Pulse BMI (Body Mass Index)                68 39.81 kg/m2           Blood Pressure from Last 3 Encounters:   02/14/18 118/70   01/31/18  118/76   11/08/17 138/77    Weight from Last 3 Encounters:   02/14/18 254 lb (115.2 kg)   02/14/18 254 lb 3 oz (115.3 kg)   01/31/18 251 lb (113.9 kg)              We Performed the Following     OP ROOMING NOTE TO [facility]        Primary Care Provider Office Phone # Fax #    Krista Landaverde [phone] [address]  [place] [facility] [address]        Equal Access to Services     BRI SANCHEZ : Hadii aad ku hadasho Soomaali, waaxda luqadaha, qaybta kaalmada adeegyada, waxay idiin hayaan ademarita kharaisabela davila . [facility] [place] -[phone].    ATENCIÓN: Si habla español, tiene a chirinos disposición servicios gratuitos de asistencia lingüística. [facility] [phone].    We comply with applicable federal civil rights laws and [place] laws. We do not discriminate on the basis of race, color, national origin, age, disability, sex, sexual orientation, or gender identity.            Thank you!     Thank you for choosing [facility]  for your care. Our goal is always to provide you with excellent care. Hearing back from our patients is one way we can continue to improve our services. Please take a few minutes to complete the written survey that you may receive in the mail after your visit with us. Thank you!             Your Updated Medication List - Protect others around you: Learn how to safely use, store and throw away your medicines at www.disposemymeds.org.          This list is accurate as of 2/14/18 10:48 AM.  Always use your most recent med list.                   Brand Name Dispense Instructions for use Diagnosis    B-12-SL SL      Place 1,000 mcg under the tongue daily        calcium citrate-vitamin D 315-250 MG-UNIT Tabs per tablet    CITRACAL     Take 2 tablets by mouth 2 times daily        escitalopram 10 MG tablet    LEXAPRO    90 tablet    Take 1 tablet (10 mg) by mouth daily    Generalized anxiety disorder       multivitamin  peds with iron 60 MG chewable tablet       Take 2 chew tab by mouth every morning        OMEPRAZOLE PO      Take 20 mg by mouth daily        TYLENOL PO      Take 650 mg by mouth every 8 hours as needed for mild pain or fever        VITAMIN D (CHOLECALCIFEROL) PO      Take 2,000 Units by mouth daily        VITRON-C PO      Take 1 tablet by mouth every morning           FAMILY HISTORY:  Father  Still living? Unknown  Family history of hypertension, Age at diagnosis: Age Unknown  FH: diabetes mellitus, Age at diagnosis: Age Unknown    Mother  Still living? Unknown  Family history of hypertension, Age at diagnosis: Age Unknown  FH: diabetes mellitus, Age at diagnosis: Age Unknown

## 2023-05-29 NOTE — ED PROVIDER NOTE - CLINICAL SUMMARY MEDICAL DECISION MAKING FREE TEXT BOX
28F pmhx depression, anxiety, etoh use disorder who presents with nausea, vomiting, cramping abd pain duration x 1 say. Patient reports generalized weakness and sensation of muscle spasms in hands similar when she had previous hypokalemia (admitted for tx april 2023). She notes increased belching and decrease flatus. She admits to drinking alochol day prior to symptom onset. She denies fevers, chill, cough, sob, chest pain, dysuria , SI or HI. She denies any active depressive symptoms. She was given zofran and fluids by ems with minimal relief.    - soft abd, symptoms may be related to alcohol induced gastritis, primarily tender epigastrium /periumbilically, clinical hx not suggestive of torsion, will initiate symptomatic support with IV fluid hydration, anti-emetics, gentle pain control, librium to prevent etoh withdrawal (mild tremors, no severe withdrawal symptoms)  - labs to eval for anemia electrolyte derangements, mild qt prolongation on ekg, replete goal mg >2 and K > 3.5   - lipase eval for pancreatitis  - ct a/p rule out colitis or intestinal obstruction if not improving with symptomatic support -lower suspicion

## 2023-05-29 NOTE — ED PROVIDER NOTE - NS ED ROS FT
Gen: No fever, normal appetite  ENT: No ear pain, congestion, sore throat  Resp: No cough or trouble breathing  Cardiovascular: No chest pain or palpitation  Gastroenteric: see HPI   :  No change in urine output; no dysuria  MS: No joint or muscle pain  Skin: No rashes  Neuro: No headache; no abnormal movements  Remainder negative, except as per the HPI

## 2023-05-29 NOTE — ED PROVIDER NOTE - CARE PLAN
Principal Discharge DX:	Nausea and vomiting  Secondary Diagnosis:	Hypokalemia   1 Principal Discharge DX:	Nausea and vomiting  Secondary Diagnosis:	Hypokalemia  Secondary Diagnosis:	Colitis

## 2023-05-29 NOTE — ED PROVIDER NOTE - PROGRESS NOTE DETAILS
Lantigua DO: tp Lantigua DO: pt reassessed and reports significant improvement, explained results, doubt true bacterial colitis as pt is very well appearing, will rx abx if clinically  not improving, d/w pt Lantigua DO: pt reassessed and reports significant improvement, explained results, doubt true bacterial colitis as pt is very well appearing, will rx abx if clinically  not improving, d/w pt abx regimen only if fever or persistent abd pain or diarrhea

## 2023-05-29 NOTE — ED PROVIDER NOTE - NSFOLLOWUPINSTRUCTIONS_ED_ALL_ED_FT
You were seen today for nausea and vomiting and abdominal pain     - your potassium was low which was replenished.   - you were given magnesium supplementation   - your CT scan showed possible colitis - which is       Acute Abdominal Pain    WHAT YOU NEED TO KNOW:    The cause of your abdominal pain may not be found. If a cause is found, treatment will depend on what the cause is.     DISCHARGE INSTRUCTIONS:    Return to the emergency department if:     You vomit blood or cannot stop vomiting.      You have blood in your bowel movement or it looks like tar.       You have bleeding from your rectum.       Your abdomen is larger than usual, more painful, and hard.       You have severe pain in your abdomen.       You stop passing gas and having bowel movements.       You feel weak, dizzy, or faint.    Contact your healthcare provider if:     You have a fever.      You have new signs and symptoms.      Your symptoms do not get better with treatment.       You have questions or concerns about your condition or care.    Medicines may be given to decrease pain, treat an infection, and manage your symptoms. Take your medicine as directed. Call your healthcare provider if you think your medicine is not helping or if you have side effects. Tell him if you are allergic to any medicine. Keep a list of the medicines, vitamins, and herbs you take. Include the amounts, and when and why you take them. Bring the list or the pill bottles to follow-up visits. Carry your medicine list with you in case of an emergency.    Manage your symptoms:     Apply heat on your abdomen for 20 to 30 minutes every 2 hours for as many days as directed. Heat helps decrease pain and muscle spasms.       Manage your stress. Stress may cause abdominal pain. Your healthcare provider may recommend relaxation techniques and deep breathing exercises to help decrease your stress. Your healthcare provider may recommend you talk to someone about your stress or anxiety, such as a counselor or a trusted friend. Get plenty of sleep and exercise regularly.       Limit or do not drink alcohol. Alcohol can make your abdominal pain worse. Ask your healthcare provider if it is safe for you to drink alcohol. Also ask how much is safe for you to drink.       Do not smoke. Nicotine and other chemicals in cigarettes can damage your esophagus and stomach. Ask your healthcare provider for information if you currently smoke and need help to quit. E-cigarettes or smokeless tobacco still contain nicotine. Talk to your healthcare provider before you use these products.     Make changes to the food you eat as directed: Do not eat foods that cause abdominal pain or other symptoms. Eat small meals more often.     Eat more high-fiber foods if you are constipated. High-fiber foods include fruits, vegetables, whole-grain foods, and legumes.       Do not eat foods that cause gas if you have bloating. Examples include broccoli, cabbage, and cauliflower. Do not drink soda or carbonated drinks, because these may also cause gas.       Do not eat foods or drinks that contain sorbitol or fructose if you have diarrhea and bloating. Some examples are fruit juices, candy, jelly, and sugar-free gum.       Do not eat high-fat foods, such as fried foods, cheeseburgers, hot dogs, and desserts.      Limit or do not drink caffeine. Caffeine may make symptoms, such as heart burn or nausea, worse.       Drink plenty of liquids to prevent dehydration from diarrhea or vomiting. Ask your healthcare provider how much liquid to drink each day and which liquids are best for you.     Follow up with your healthcare provider as directed: Write down your questions so you remember to ask them during your visits. You were seen today for nausea and vomiting and abdominal pain     - your potassium was low which was replenished.   - you were given magnesium supplementation   - your CT scan showed possible colitis - which is inflammation of the colon - this is sometimes due to viral cause which requires supportive care, but sometimes antibiotics are necessary   - if you develop persistent pain or diarrhea or new fevers, take antibiotics cirpofloxacin every 12 hours and flagyl every 8 hours for 10 days for treatment of colitis.   - follow up with primary care physician       Acute Abdominal Pain    WHAT YOU NEED TO KNOW:    The cause of your abdominal pain may not be found. If a cause is found, treatment will depend on what the cause is.     DISCHARGE INSTRUCTIONS:    Return to the emergency department if:     You vomit blood or cannot stop vomiting.      You have blood in your bowel movement or it looks like tar.       You have bleeding from your rectum.       Your abdomen is larger than usual, more painful, and hard.       You have severe pain in your abdomen.       You stop passing gas and having bowel movements.       You feel weak, dizzy, or faint.    Contact your healthcare provider if:     You have a fever.      You have new signs and symptoms.      Your symptoms do not get better with treatment.       You have questions or concerns about your condition or care.    Medicines may be given to decrease pain, treat an infection, and manage your symptoms. Take your medicine as directed. Call your healthcare provider if you think your medicine is not helping or if you have side effects. Tell him if you are allergic to any medicine. Keep a list of the medicines, vitamins, and herbs you take. Include the amounts, and when and why you take them. Bring the list or the pill bottles to follow-up visits. Carry your medicine list with you in case of an emergency.    Manage your symptoms:     Apply heat on your abdomen for 20 to 30 minutes every 2 hours for as many days as directed. Heat helps decrease pain and muscle spasms.       Manage your stress. Stress may cause abdominal pain. Your healthcare provider may recommend relaxation techniques and deep breathing exercises to help decrease your stress. Your healthcare provider may recommend you talk to someone about your stress or anxiety, such as a counselor or a trusted friend. Get plenty of sleep and exercise regularly.       Limit or do not drink alcohol. Alcohol can make your abdominal pain worse. Ask your healthcare provider if it is safe for you to drink alcohol. Also ask how much is safe for you to drink.       Do not smoke. Nicotine and other chemicals in cigarettes can damage your esophagus and stomach. Ask your healthcare provider for information if you currently smoke and need help to quit. E-cigarettes or smokeless tobacco still contain nicotine. Talk to your healthcare provider before you use these products.     Make changes to the food you eat as directed: Do not eat foods that cause abdominal pain or other symptoms. Eat small meals more often.     Eat more high-fiber foods if you are constipated. High-fiber foods include fruits, vegetables, whole-grain foods, and legumes.       Do not eat foods that cause gas if you have bloating. Examples include broccoli, cabbage, and cauliflower. Do not drink soda or carbonated drinks, because these may also cause gas.       Do not eat foods or drinks that contain sorbitol or fructose if you have diarrhea and bloating. Some examples are fruit juices, candy, jelly, and sugar-free gum.       Do not eat high-fat foods, such as fried foods, cheeseburgers, hot dogs, and desserts.      Limit or do not drink caffeine. Caffeine may make symptoms, such as heart burn or nausea, worse.       Drink plenty of liquids to prevent dehydration from diarrhea or vomiting. Ask your healthcare provider how much liquid to drink each day and which liquids are best for you.     Follow up with your healthcare provider as directed: Write down your questions so you remember to ask them during your visits.

## 2023-05-30 LAB
CULTURE RESULTS: SIGNIFICANT CHANGE UP
SPECIMEN SOURCE: SIGNIFICANT CHANGE UP

## 2023-06-05 LAB — CULTURE RESULTS: SIGNIFICANT CHANGE UP

## 2023-06-20 ENCOUNTER — EMERGENCY (EMERGENCY)
Facility: HOSPITAL | Age: 28
LOS: 0 days | Discharge: HOME | End: 2023-06-20
Attending: EMERGENCY MEDICINE
Payer: MEDICAID

## 2023-06-20 VITALS
DIASTOLIC BLOOD PRESSURE: 80 MMHG | HEART RATE: 95 BPM | RESPIRATION RATE: 16 BRPM | HEIGHT: 68 IN | SYSTOLIC BLOOD PRESSURE: 103 MMHG | OXYGEN SATURATION: 100 % | WEIGHT: 149.91 LBS | TEMPERATURE: 98 F

## 2023-06-20 VITALS
DIASTOLIC BLOOD PRESSURE: 89 MMHG | OXYGEN SATURATION: 98 % | SYSTOLIC BLOOD PRESSURE: 128 MMHG | HEART RATE: 82 BPM | RESPIRATION RATE: 14 BRPM

## 2023-06-20 DIAGNOSIS — R10.13 EPIGASTRIC PAIN: ICD-10-CM

## 2023-06-20 DIAGNOSIS — D57.3 SICKLE-CELL TRAIT: ICD-10-CM

## 2023-06-20 DIAGNOSIS — R07.89 OTHER CHEST PAIN: ICD-10-CM

## 2023-06-20 DIAGNOSIS — R11.2 NAUSEA WITH VOMITING, UNSPECIFIED: ICD-10-CM

## 2023-06-20 DIAGNOSIS — F17.210 NICOTINE DEPENDENCE, CIGARETTES, UNCOMPLICATED: ICD-10-CM

## 2023-06-20 DIAGNOSIS — Z98.891 HISTORY OF UTERINE SCAR FROM PREVIOUS SURGERY: Chronic | ICD-10-CM

## 2023-06-20 DIAGNOSIS — Z87.19 PERSONAL HISTORY OF OTHER DISEASES OF THE DIGESTIVE SYSTEM: ICD-10-CM

## 2023-06-20 LAB
ALBUMIN SERPL ELPH-MCNC: 4.3 G/DL — SIGNIFICANT CHANGE UP (ref 3.3–5)
ALP SERPL-CCNC: 38 U/L — LOW (ref 40–120)
ALT FLD-CCNC: 100 U/L — HIGH (ref 12–78)
ANION GAP SERPL CALC-SCNC: 8 MMOL/L — SIGNIFICANT CHANGE UP (ref 5–17)
ANISOCYTOSIS BLD QL: SLIGHT — SIGNIFICANT CHANGE UP
AST SERPL-CCNC: 94 U/L — HIGH (ref 15–37)
BASOPHILS # BLD AUTO: 0.02 K/UL — SIGNIFICANT CHANGE UP (ref 0–0.2)
BASOPHILS NFR BLD AUTO: 0.3 % — SIGNIFICANT CHANGE UP (ref 0–2)
BILIRUB SERPL-MCNC: 0.9 MG/DL — SIGNIFICANT CHANGE UP (ref 0.2–1.2)
BUN SERPL-MCNC: 5 MG/DL — LOW (ref 7–23)
CALCIUM SERPL-MCNC: 10.8 MG/DL — HIGH (ref 8.5–10.1)
CHLORIDE SERPL-SCNC: 94 MMOL/L — LOW (ref 96–108)
CO2 SERPL-SCNC: 33 MMOL/L — HIGH (ref 22–31)
CREAT SERPL-MCNC: 0.94 MG/DL — SIGNIFICANT CHANGE UP (ref 0.5–1.3)
EGFR: 84 ML/MIN/1.73M2 — SIGNIFICANT CHANGE UP
EOSINOPHIL # BLD AUTO: 0 K/UL — SIGNIFICANT CHANGE UP (ref 0–0.5)
EOSINOPHIL NFR BLD AUTO: 0 % — SIGNIFICANT CHANGE UP (ref 0–6)
GLUCOSE SERPL-MCNC: 96 MG/DL — SIGNIFICANT CHANGE UP (ref 70–99)
HCG SERPL-ACNC: <1 MIU/ML — SIGNIFICANT CHANGE UP
HCT VFR BLD CALC: 42.3 % — SIGNIFICANT CHANGE UP (ref 34.5–45)
HGB BLD-MCNC: 13.7 G/DL — SIGNIFICANT CHANGE UP (ref 11.5–15.5)
IMM GRANULOCYTES NFR BLD AUTO: 0.3 % — SIGNIFICANT CHANGE UP (ref 0–0.9)
LACTATE SERPL-SCNC: 1.7 MMOL/L — SIGNIFICANT CHANGE UP (ref 0.7–2)
LACTATE SERPL-SCNC: 2.8 MMOL/L — HIGH (ref 0.7–2)
LYMPHOCYTES # BLD AUTO: 2.16 K/UL — SIGNIFICANT CHANGE UP (ref 1–3.3)
LYMPHOCYTES # BLD AUTO: 33.3 % — SIGNIFICANT CHANGE UP (ref 13–44)
MANUAL SMEAR VERIFICATION: SIGNIFICANT CHANGE UP
MCHC RBC-ENTMCNC: 24 PG — LOW (ref 27–34)
MCHC RBC-ENTMCNC: 32.4 G/DL — SIGNIFICANT CHANGE UP (ref 32–36)
MCV RBC AUTO: 74 FL — LOW (ref 80–100)
MICROCYTES BLD QL: SIGNIFICANT CHANGE UP
MONOCYTES # BLD AUTO: 0.48 K/UL — SIGNIFICANT CHANGE UP (ref 0–0.9)
MONOCYTES NFR BLD AUTO: 7.4 % — SIGNIFICANT CHANGE UP (ref 2–14)
NEUTROPHILS # BLD AUTO: 3.81 K/UL — SIGNIFICANT CHANGE UP (ref 1.8–7.4)
NEUTROPHILS NFR BLD AUTO: 58.7 % — SIGNIFICANT CHANGE UP (ref 43–77)
NRBC # BLD: 0 /100 WBCS — SIGNIFICANT CHANGE UP (ref 0–0)
NT-PROBNP SERPL-SCNC: 28 PG/ML — SIGNIFICANT CHANGE UP (ref 0–125)
PLAT MORPH BLD: NORMAL — SIGNIFICANT CHANGE UP
PLATELET # BLD AUTO: 461 K/UL — HIGH (ref 150–400)
POTASSIUM SERPL-MCNC: 3 MMOL/L — LOW (ref 3.5–5.3)
POTASSIUM SERPL-SCNC: 3 MMOL/L — LOW (ref 3.5–5.3)
PROT SERPL-MCNC: 8.3 GM/DL — SIGNIFICANT CHANGE UP (ref 6–8.3)
RBC # BLD: 5.72 M/UL — HIGH (ref 3.8–5.2)
RBC # FLD: 21.1 % — HIGH (ref 10.3–14.5)
RBC BLD AUTO: ABNORMAL
SODIUM SERPL-SCNC: 135 MMOL/L — SIGNIFICANT CHANGE UP (ref 135–145)
TROPONIN I, HIGH SENSITIVITY RESULT: 3 NG/L — SIGNIFICANT CHANGE UP
WBC # BLD: 6.49 K/UL — SIGNIFICANT CHANGE UP (ref 3.8–10.5)
WBC # FLD AUTO: 6.49 K/UL — SIGNIFICANT CHANGE UP (ref 3.8–10.5)

## 2023-06-20 PROCEDURE — 71045 X-RAY EXAM CHEST 1 VIEW: CPT | Mod: 26

## 2023-06-20 PROCEDURE — 99285 EMERGENCY DEPT VISIT HI MDM: CPT

## 2023-06-20 PROCEDURE — 93010 ELECTROCARDIOGRAM REPORT: CPT

## 2023-06-20 RX ORDER — ONDANSETRON 8 MG/1
4 TABLET, FILM COATED ORAL ONCE
Refills: 0 | Status: COMPLETED | OUTPATIENT
Start: 2023-06-20 | End: 2023-06-20

## 2023-06-20 RX ORDER — FAMOTIDINE 10 MG/ML
20 INJECTION INTRAVENOUS ONCE
Refills: 0 | Status: COMPLETED | OUTPATIENT
Start: 2023-06-20 | End: 2023-06-20

## 2023-06-20 RX ORDER — FAMOTIDINE 10 MG/ML
1 INJECTION INTRAVENOUS
Qty: 28 | Refills: 0
Start: 2023-06-20 | End: 2023-07-03

## 2023-06-20 RX ORDER — ONDANSETRON 8 MG/1
4 TABLET, FILM COATED ORAL ONCE
Refills: 0 | Status: DISCONTINUED | OUTPATIENT
Start: 2023-06-20 | End: 2023-06-20

## 2023-06-20 RX ORDER — ACETAMINOPHEN 500 MG
1000 TABLET ORAL ONCE
Refills: 0 | Status: COMPLETED | OUTPATIENT
Start: 2023-06-20 | End: 2023-06-20

## 2023-06-20 RX ORDER — SODIUM CHLORIDE 9 MG/ML
1000 INJECTION INTRAMUSCULAR; INTRAVENOUS; SUBCUTANEOUS ONCE
Refills: 0 | Status: COMPLETED | OUTPATIENT
Start: 2023-06-20 | End: 2023-06-20

## 2023-06-20 RX ORDER — POTASSIUM CHLORIDE 20 MEQ
40 PACKET (EA) ORAL ONCE
Refills: 0 | Status: COMPLETED | OUTPATIENT
Start: 2023-06-20 | End: 2023-06-20

## 2023-06-20 RX ADMIN — FAMOTIDINE 20 MILLIGRAM(S): 10 INJECTION INTRAVENOUS at 15:07

## 2023-06-20 RX ADMIN — SODIUM CHLORIDE 1000 MILLILITER(S): 9 INJECTION INTRAMUSCULAR; INTRAVENOUS; SUBCUTANEOUS at 13:43

## 2023-06-20 RX ADMIN — SODIUM CHLORIDE 1000 MILLILITER(S): 9 INJECTION INTRAMUSCULAR; INTRAVENOUS; SUBCUTANEOUS at 15:11

## 2023-06-20 RX ADMIN — ONDANSETRON 4 MILLIGRAM(S): 8 TABLET, FILM COATED ORAL at 15:07

## 2023-06-20 RX ADMIN — Medication 40 MILLIEQUIVALENT(S): at 15:07

## 2023-06-20 RX ADMIN — ONDANSETRON 4 MILLIGRAM(S): 8 TABLET, FILM COATED ORAL at 19:21

## 2023-06-20 RX ADMIN — Medication 400 MILLIGRAM(S): at 14:20

## 2023-06-20 NOTE — ED PROVIDER NOTE - CARE PROVIDER_API CALL
Russel Jean  Gastroenterology  [address], [address]  [place] [address]  Phone: [phone]  Fax: [phone]  Follow Up Time:

## 2023-06-20 NOTE — ED PROVIDER NOTE - NSFOLLOWUPINSTRUCTIONS_ED_ALL_ED_FT
Today you were seen in the ER for chest pain, nausea and vomiting.     Please see below for a copy of your results.     A prescription for pepcid was sent to the pharmacy. Read all instructions and take as directed.     Chest Pain    Chest pain can be caused by many different conditions which may or may not be dangerous. Causes include heartburn, lung infections, heart attack, blood clot in lungs, skin infections, strain or damage to muscle, cartilage, or bones, etc. In addition to a history and physical examination, an electrocardiogram (ECG) or other lab tests may have been performed to determine the cause of your chest pain. Follow up with your primary care provider or with a cardiologist as instructed.     SEEK IMMEDIATE MEDICAL CARE IF YOU HAVE ANY OF THE FOLLOWING SYMPTOMS: worsening chest pain, coughing up blood, unexplained back/neck/jaw pain, severe abdominal pain, dizziness or lightheadedness, fainting, shortness of breath, sweaty or clammy skin, vomiting, or racing heart beat. These symptoms may represent a serious problem that is an emergency. Do not wait to see if the symptoms will go away. Get medical help right away. Call 911 and do not drive yourself to the hospital.    Nausea / Vomiting    Nausea is the feeling that you have to vomit. As nausea gets worse, it can lead to vomiting. Vomiting puts you at an increased risk for dehydration. Older adults and people with other diseases or a weak immune system are at higher risk for dehydration. Drink clear fluids in small but frequent amounts as tolerated. Eat bland, easy-to-digest foods in small amounts as tolerated.    SEEK IMMEDIATE MEDICAL CARE IF YOU HAVE ANY OF THE FOLLOWING SYMPTOMS: fever, inability to keep sufficient fluids down, black or bloody vomitus, black or bloody stools, lightheadedness/dizziness, chest pain, severe headache, rash, shortness of breath, cold or clammy skin, confusion, pain with urination, or any signs of dehydration.    Advance activity as tolerated.      Continue all previously prescribed medications as directed unless otherwise instructed.      Follow up with your primary care physician in 48-72 hours- bring copies of your results.

## 2023-06-20 NOTE — ED PROVIDER NOTE - CLINICAL SUMMARY MEDICAL DECISION MAKING FREE TEXT BOX
28y Female with PMHx of GERD, sickle cell trait reports chest pain. Patient reports burning chest pain that began 3 days ago, reports nonbloody nonbilious vomiting x 2 days, last episode yesterday. Denies fever, chills, SOB, URI symptoms, abdominal pain, diarrhea, urinary complaints. Vital signs stable, +epigastric and LUQ tenderness, no focal deficit. Concern for viral gastroenteritis, less likely ACS vs pancreatitis vs acute ce. Will get labs, meds, IVF, reassess. Dispo pending results.

## 2023-06-20 NOTE — ED PROVIDER NOTE - OBJECTIVE STATEMENT
28y Female with PMHx of GERD, sickle cell trait reports chest pain. Patient reports burning chest pain that began 3 days ago, reports nonbloody nonbilious vomiting x 2 days, last episode yesterday. Denies fever, chills, SOB, URI symptoms, abdominal pain, diarrhea, urinary complaints. 28y Female with PMHx of GERD, sickle cell trait reports chest pain. Patient reports burning chest pain that began 3 days ago, reports nonbloody nonbilious vomiting x 2 days, last episode yesterday. Denies fever, chills, SOB, URI symptoms, abdominal pain, diarrhea, urinary complaints. Denies pain meds.

## 2023-06-20 NOTE — ED ADULT NURSE NOTE - NSFALLUNIVINTERV_ED_ALL_ED
Bed/Stretcher in lowest position, wheels locked, appropriate side rails in place/Call bell, personal items and telephone in reach/Instruct patient to call for assistance before getting out of bed/chair/stretcher/Non-slip footwear applied when patient is off stretcher/[place] to call system/Physically safe environment - no spills, clutter or unnecessary equipment/Purposeful proactive rounding/Room/bathroom lighting operational, light cord in reach

## 2023-06-20 NOTE — ED ADULT NURSE NOTE - OBJECTIVE STATEMENT
Pt presents a&ox3 c/o chest pain starting this weekend, 7/10, some intermittent dizziness. Feels some pain in her l neck and l side as well. States she has not had a full meal since Friday. Some nausea, vomited 4 times yesterday. No blood in vomit or stool.   Pt denies sob, headache PMH Gerd

## 2023-06-20 NOTE — ED ADULT NURSE NOTE - SUICIDE SCREENING QUESTION 3
No Pt was seen and examed at bed side with resident  86 yo M PMH ESRD on HD MWF, AAA s/p repair, MI, atrial fibrillation (not on A/C 2/2 falls), HFpEF (last EF 75% in 2015) s/p PPM and ablation 2016, severe aortic stenosis, severe mitral regurgitation, gout, GERD, depression, hypothyroidism, HTN, and DLBCL currently on Rituximab p/w increasing fluid leakage from his left pleurx catheter and hypotension. Pt BP improved with midodrine, on 20 mg TID, tapered to 10mg TID, can add additional dose if BP drop during HD. Echo pending, PPM integration done today, hold off metoprolol. CT surgery consult appreciated, continue monitor Pluerx drainage. Clinically no signs of acute infection, f/u Bx, hold off Abx. ESRD, renal consulted, HD as scheduled. . Pt hypotension with multiple comorbidities, overall prognosis guarded. After extensive discussion with both son and wife, Pt currently full code. Pt was seen and examed at bed side with resident  86 yo M PMH ESRD on HD MWF, AAA s/p repair, MI, atrial fibrillation (not on A/C 2/2 falls), HFpEF (last EF 75% in 2015) s/p PPM and ablation 2016, severe aortic stenosis, severe mitral regurgitation, gout, GERD, depression, hypothyroidism, HTN, and DLBCL currently on Rituximab p/w increasing fluid leakage from his left pleurx catheter and hypotension. Pt BP improved with midodrine, on 20 mg TID, tapered to 10mg TID, can add additional dose if BP drop during HD. Echo pending, PPM integration done today, hold off metoprolol. CT surgery consult appreciated, continue monitor Pluerx drainage. Clinically no signs of acute infection, f/u Bx, hold off Abx. ESRD, renal consulted, HD as scheduled. . Pt hypotension with multiple comorbidities, overall prognosis guarded. After extensive face to face discussion with both son and wife, Pt currently full code. Time spent > 40 min

## 2023-06-29 ENCOUNTER — EMERGENCY (EMERGENCY)
Facility: HOSPITAL | Age: 28
LOS: 1 days | Discharge: LEFT BEFORE DISCHARGE | End: 2023-07-01
Attending: STUDENT IN AN ORGANIZED HEALTH CARE EDUCATION/TRAINING PROGRAM | Admitting: HOSPITALIST
Payer: MEDICAID

## 2023-06-29 VITALS
RESPIRATION RATE: 16 BRPM | WEIGHT: 149.91 LBS | SYSTOLIC BLOOD PRESSURE: 138 MMHG | HEIGHT: 68 IN | DIASTOLIC BLOOD PRESSURE: 64 MMHG | OXYGEN SATURATION: 98 % | HEART RATE: 112 BPM | TEMPERATURE: 98 F

## 2023-06-29 DIAGNOSIS — R94.31 ABNORMAL ELECTROCARDIOGRAM [ECG] [EKG]: ICD-10-CM

## 2023-06-29 DIAGNOSIS — Z83.3 FAMILY HISTORY OF DIABETES MELLITUS: ICD-10-CM

## 2023-06-29 DIAGNOSIS — Z98.891 HISTORY OF UTERINE SCAR FROM PREVIOUS SURGERY: Chronic | ICD-10-CM

## 2023-06-29 DIAGNOSIS — E83.42 HYPOMAGNESEMIA: ICD-10-CM

## 2023-06-29 DIAGNOSIS — K21.9 GASTRO-ESOPHAGEAL REFLUX DISEASE WITHOUT ESOPHAGITIS: ICD-10-CM

## 2023-06-29 DIAGNOSIS — F41.9 ANXIETY DISORDER, UNSPECIFIED: ICD-10-CM

## 2023-06-29 DIAGNOSIS — R53.1 WEAKNESS: ICD-10-CM

## 2023-06-29 DIAGNOSIS — R20.2 PARESTHESIA OF SKIN: ICD-10-CM

## 2023-06-29 DIAGNOSIS — F32.A DEPRESSION, UNSPECIFIED: ICD-10-CM

## 2023-06-29 DIAGNOSIS — Z53.21 PROCEDURE AND TREATMENT NOT CARRIED OUT DUE TO PATIENT LEAVING PRIOR TO BEING SEEN BY HEALTH CARE PROVIDER: ICD-10-CM

## 2023-06-29 DIAGNOSIS — E87.6 HYPOKALEMIA: ICD-10-CM

## 2023-06-29 DIAGNOSIS — D64.9 ANEMIA, UNSPECIFIED: ICD-10-CM

## 2023-06-29 DIAGNOSIS — F10.20 ALCOHOL DEPENDENCE, UNCOMPLICATED: ICD-10-CM

## 2023-06-29 DIAGNOSIS — D57.3 SICKLE-CELL TRAIT: ICD-10-CM

## 2023-06-29 DIAGNOSIS — E83.39 OTHER DISORDERS OF PHOSPHORUS METABOLISM: ICD-10-CM

## 2023-06-29 DIAGNOSIS — Z82.49 FAMILY HISTORY OF ISCHEMIC HEART DISEASE AND OTHER DISEASES OF THE CIRCULATORY SYSTEM: ICD-10-CM

## 2023-06-29 DIAGNOSIS — R00.0 TACHYCARDIA, UNSPECIFIED: ICD-10-CM

## 2023-06-29 DIAGNOSIS — E87.8 OTHER DISORDERS OF ELECTROLYTE AND FLUID BALANCE, NOT ELSEWHERE CLASSIFIED: ICD-10-CM

## 2023-06-29 LAB
ALBUMIN SERPL ELPH-MCNC: 4.3 G/DL — SIGNIFICANT CHANGE UP (ref 3.3–5)
ALP SERPL-CCNC: 57 U/L — SIGNIFICANT CHANGE UP (ref 40–120)
ALT FLD-CCNC: 73 U/L — SIGNIFICANT CHANGE UP (ref 12–78)
ANION GAP SERPL CALC-SCNC: 14 MMOL/L — SIGNIFICANT CHANGE UP (ref 5–17)
AST SERPL-CCNC: 76 U/L — HIGH (ref 15–37)
BASOPHILS # BLD AUTO: 0.07 K/UL — SIGNIFICANT CHANGE UP (ref 0–0.2)
BASOPHILS NFR BLD AUTO: 1.3 % — SIGNIFICANT CHANGE UP (ref 0–2)
BILIRUB SERPL-MCNC: 1.2 MG/DL — SIGNIFICANT CHANGE UP (ref 0.2–1.2)
BUN SERPL-MCNC: 5 MG/DL — LOW (ref 7–23)
CALCIUM SERPL-MCNC: 9.7 MG/DL — SIGNIFICANT CHANGE UP (ref 8.5–10.1)
CHLORIDE SERPL-SCNC: 95 MMOL/L — LOW (ref 96–108)
CO2 SERPL-SCNC: 31 MMOL/L — SIGNIFICANT CHANGE UP (ref 22–31)
CREAT SERPL-MCNC: 0.7 MG/DL — SIGNIFICANT CHANGE UP (ref 0.5–1.3)
EGFR: 119 ML/MIN/1.73M2 — SIGNIFICANT CHANGE UP
EOSINOPHIL # BLD AUTO: 0.01 K/UL — SIGNIFICANT CHANGE UP (ref 0–0.5)
EOSINOPHIL NFR BLD AUTO: 0.2 % — SIGNIFICANT CHANGE UP (ref 0–6)
ETHANOL SERPL-MCNC: 237 MG/DL — HIGH (ref 0–10)
GLUCOSE BLDC GLUCOMTR-MCNC: 102 MG/DL — HIGH (ref 70–99)
GLUCOSE BLDC GLUCOMTR-MCNC: 131 MG/DL — HIGH (ref 70–99)
GLUCOSE SERPL-MCNC: 65 MG/DL — LOW (ref 70–99)
HCT VFR BLD CALC: 40.5 % — SIGNIFICANT CHANGE UP (ref 34.5–45)
HGB BLD-MCNC: 13.5 G/DL — SIGNIFICANT CHANGE UP (ref 11.5–15.5)
IMM GRANULOCYTES NFR BLD AUTO: 0.4 % — SIGNIFICANT CHANGE UP (ref 0–0.9)
LYMPHOCYTES # BLD AUTO: 1.63 K/UL — SIGNIFICANT CHANGE UP (ref 1–3.3)
LYMPHOCYTES # BLD AUTO: 29.5 % — SIGNIFICANT CHANGE UP (ref 13–44)
MAGNESIUM SERPL-MCNC: 1.4 MG/DL — LOW (ref 1.6–2.6)
MCHC RBC-ENTMCNC: 23.9 PG — LOW (ref 27–34)
MCHC RBC-ENTMCNC: 33.3 G/DL — SIGNIFICANT CHANGE UP (ref 32–36)
MCV RBC AUTO: 71.6 FL — LOW (ref 80–100)
MONOCYTES # BLD AUTO: 0.26 K/UL — SIGNIFICANT CHANGE UP (ref 0–0.9)
MONOCYTES NFR BLD AUTO: 4.7 % — SIGNIFICANT CHANGE UP (ref 2–14)
NEUTROPHILS # BLD AUTO: 3.54 K/UL — SIGNIFICANT CHANGE UP (ref 1.8–7.4)
NEUTROPHILS NFR BLD AUTO: 63.9 % — SIGNIFICANT CHANGE UP (ref 43–77)
NRBC # BLD: 0 /100 WBCS — SIGNIFICANT CHANGE UP (ref 0–0)
PHOSPHATE SERPL-MCNC: 2 MG/DL — LOW (ref 2.5–4.5)
PLATELET # BLD AUTO: 443 K/UL — HIGH (ref 150–400)
POTASSIUM SERPL-MCNC: 2.6 MMOL/L — CRITICAL LOW (ref 3.5–5.3)
POTASSIUM SERPL-SCNC: 2.6 MMOL/L — CRITICAL LOW (ref 3.5–5.3)
PROT SERPL-MCNC: 8.3 GM/DL — SIGNIFICANT CHANGE UP (ref 6–8.3)
RBC # BLD: 5.66 M/UL — HIGH (ref 3.8–5.2)
RBC # FLD: 20.4 % — HIGH (ref 10.3–14.5)
SODIUM SERPL-SCNC: 140 MMOL/L — SIGNIFICANT CHANGE UP (ref 135–145)
WBC # BLD: 5.53 K/UL — SIGNIFICANT CHANGE UP (ref 3.8–10.5)
WBC # FLD AUTO: 5.53 K/UL — SIGNIFICANT CHANGE UP (ref 3.8–10.5)

## 2023-06-29 PROCEDURE — 99285 EMERGENCY DEPT VISIT HI MDM: CPT

## 2023-06-29 PROCEDURE — 99282 EMERGENCY DEPT VISIT SF MDM: CPT

## 2023-06-29 PROCEDURE — 93010 ELECTROCARDIOGRAM REPORT: CPT

## 2023-06-29 RX ORDER — SODIUM,POTASSIUM PHOSPHATES 278-250MG
2 POWDER IN PACKET (EA) ORAL ONCE
Refills: 0 | Status: COMPLETED | OUTPATIENT
Start: 2023-06-29 | End: 2023-06-29

## 2023-06-29 RX ORDER — PANTOPRAZOLE SODIUM 20 MG/1
40 TABLET, DELAYED RELEASE ORAL
Refills: 0 | Status: DISCONTINUED | OUTPATIENT
Start: 2023-06-29 | End: 2023-07-01

## 2023-06-29 RX ORDER — MULTIVIT-MIN/FERROUS GLUCONATE 9 MG/15 ML
1 LIQUID (ML) ORAL DAILY
Refills: 0 | Status: DISCONTINUED | OUTPATIENT
Start: 2023-06-29 | End: 2023-07-01

## 2023-06-29 RX ORDER — GLUCAGON INJECTION, SOLUTION 0.5 MG/.1ML
1 INJECTION, SOLUTION SUBCUTANEOUS ONCE
Refills: 0 | Status: DISCONTINUED | OUTPATIENT
Start: 2023-06-29 | End: 2023-07-01

## 2023-06-29 RX ORDER — SODIUM CHLORIDE 9 MG/ML
1000 INJECTION, SOLUTION INTRAVENOUS
Refills: 0 | Status: DISCONTINUED | OUTPATIENT
Start: 2023-06-29 | End: 2023-07-01

## 2023-06-29 RX ORDER — MAGNESIUM SULFATE 500 MG/ML
1 VIAL (ML) INJECTION ONCE
Refills: 0 | Status: COMPLETED | OUTPATIENT
Start: 2023-06-29 | End: 2023-06-29

## 2023-06-29 RX ORDER — DEXTROSE 50 % IN WATER 50 %
12.5 SYRINGE (ML) INTRAVENOUS ONCE
Refills: 0 | Status: DISCONTINUED | OUTPATIENT
Start: 2023-06-29 | End: 2023-07-01

## 2023-06-29 RX ORDER — ACETAMINOPHEN 500 MG
650 TABLET ORAL EVERY 6 HOURS
Refills: 0 | Status: DISCONTINUED | OUTPATIENT
Start: 2023-06-29 | End: 2023-07-01

## 2023-06-29 RX ORDER — THIAMINE MONONITRATE (VIT B1) 100 MG
100 TABLET ORAL ONCE
Refills: 0 | Status: COMPLETED | OUTPATIENT
Start: 2023-06-29 | End: 2023-06-29

## 2023-06-29 RX ORDER — DEXTROSE 50 % IN WATER 50 %
15 SYRINGE (ML) INTRAVENOUS ONCE
Refills: 0 | Status: DISCONTINUED | OUTPATIENT
Start: 2023-06-29 | End: 2023-07-01

## 2023-06-29 RX ORDER — THIAMINE MONONITRATE (VIT B1) 100 MG
100 TABLET ORAL DAILY
Refills: 0 | Status: DISCONTINUED | OUTPATIENT
Start: 2023-06-30 | End: 2023-07-01

## 2023-06-29 RX ORDER — SODIUM CHLORIDE 9 MG/ML
1000 INJECTION INTRAMUSCULAR; INTRAVENOUS; SUBCUTANEOUS ONCE
Refills: 0 | Status: COMPLETED | OUTPATIENT
Start: 2023-06-29 | End: 2023-06-29

## 2023-06-29 RX ORDER — POTASSIUM CHLORIDE 20 MEQ
40 PACKET (EA) ORAL ONCE
Refills: 0 | Status: COMPLETED | OUTPATIENT
Start: 2023-06-29 | End: 2023-06-29

## 2023-06-29 RX ORDER — FOLIC ACID 0.8 MG
1 TABLET ORAL DAILY
Refills: 0 | Status: DISCONTINUED | OUTPATIENT
Start: 2023-06-29 | End: 2023-07-01

## 2023-06-29 RX ORDER — DEXTROSE 50 % IN WATER 50 %
25 SYRINGE (ML) INTRAVENOUS ONCE
Refills: 0 | Status: DISCONTINUED | OUTPATIENT
Start: 2023-06-29 | End: 2023-07-01

## 2023-06-29 RX ORDER — POTASSIUM CHLORIDE 20 MEQ
10 PACKET (EA) ORAL
Refills: 0 | Status: COMPLETED | OUTPATIENT
Start: 2023-06-29 | End: 2023-06-29

## 2023-06-29 RX ADMIN — PANTOPRAZOLE SODIUM 40 MILLIGRAM(S): 20 TABLET, DELAYED RELEASE ORAL at 17:42

## 2023-06-29 RX ADMIN — Medication 100 MILLIEQUIVALENT(S): at 15:02

## 2023-06-29 RX ADMIN — SODIUM CHLORIDE 1000 MILLILITER(S): 9 INJECTION INTRAMUSCULAR; INTRAVENOUS; SUBCUTANEOUS at 13:12

## 2023-06-29 RX ADMIN — Medication 100 MILLIGRAM(S): at 15:57

## 2023-06-29 RX ADMIN — Medication 50 MILLIGRAM(S): at 11:59

## 2023-06-29 RX ADMIN — Medication 100 MILLIEQUIVALENT(S): at 18:50

## 2023-06-29 RX ADMIN — Medication 100 GRAM(S): at 13:07

## 2023-06-29 RX ADMIN — Medication 100 MILLIEQUIVALENT(S): at 17:41

## 2023-06-29 RX ADMIN — Medication 1 MILLIGRAM(S): at 17:42

## 2023-06-29 RX ADMIN — SODIUM CHLORIDE 125 MILLILITER(S): 9 INJECTION, SOLUTION INTRAVENOUS at 17:41

## 2023-06-29 RX ADMIN — Medication 40 MILLIEQUIVALENT(S): at 13:06

## 2023-06-29 RX ADMIN — Medication 2 PACKET(S): at 13:23

## 2023-06-29 RX ADMIN — Medication 1 TABLET(S): at 17:42

## 2023-06-29 RX ADMIN — Medication 100 GRAM(S): at 20:07

## 2023-06-29 NOTE — PATIENT PROFILE ADULT - NSPROPTRIGHTSUPPORTPERSON_GEN_A_NUR
Consent 3/Introductory Paragraph: I gave the patient a chance to ask questions they had about the procedure.  Following this I explained the Mohs procedure and consent was obtained. The risks, benefits and alternatives to therapy were discussed in detail. Specifically, the risks of infection, scarring, bleeding, prolonged wound healing, incomplete removal, allergy to anesthesia, nerve injury and recurrence were addressed. Prior to the procedure, the treatment site was clearly identified and confirmed by the patient. All components of Universal Protocol/PAUSE Rule completed. declines

## 2023-06-29 NOTE — ED ADULT NURSE NOTE - OBJECTIVE STATEMENT
28y female AOx3 presenting to ED with c/o generalized weakness and difficulty with hand dexterity at this time. pt has hx of ETOH and reports frequent alcohol ingestion. pt reports that today she is feeling weak and has not had an appetite at all. pt denies chest pain, sob, N+V+D, abd pain, back pain, h/a, blurred vision, recent fever, sob. hx of low potassium, GERD. NKDA.

## 2023-06-29 NOTE — H&P ADULT - PROBLEM SELECTOR PLAN 2
Cessation education  thiamine, folic acid, multivitamin  CIWA protocol with symptoms trigger ativan for now  Monitor for DT

## 2023-06-29 NOTE — H&P ADULT - ASSESSMENT
28 years old female with h/o alcohol dependence present to ED with complain of generalized weakness, muscle cramps, tingling/numbness sensation around mouth which started today AM. Patient has been drinking and has minimal food intake. Reported nausea but no vomiting. Reported reflux symptoms.  Slightly tachycardic, afebrile, sat well at RA. EKG with sinus tachy, QTc 497. No leukocytosis, plt 443, K 2.6, Cl 95, glucose 65, Mg 1.4, Phosphorus 2 28 years old female with h/o alcohol dependence present to ED with complain of generalized weakness, muscle cramps, tingling/numbness sensation around mouth which started today AM. Patient has been drinking and has minimal food intake. Reported nausea but no vomiting. Reported reflux symptoms.  Slightly tachycardic, afebrile, sat well at RA. EKG  ( I personally review) with sinus tachy, QTc 497. No leukocytosis, plt 443, K 2.6, Cl 95, glucose 65, Mg 1.4, Phosphorus 2

## 2023-06-29 NOTE — H&P ADULT - HISTORY OF PRESENT ILLNESS
28 years old female with h/o alcohol dependence present to ED with complain of generalized weakness, muscle cramps, tingling/numbness sensation around mouth which started today AM. Patient has been drinking and has minimal food intake. Reported nausea but no vomiting. Reported reflux symptoms.  Slightly tachycardic, afebrile, sat well at RA. EKG with sinus tachy, QTc 497. No leukocytosis, plt 443, K 2.6, Cl 95, glucose 65, Mg 1.4, Phosphorus 2    SH: drink 1 pint to 1L of vodka daily  FH: no family h/o HTN, DM

## 2023-06-29 NOTE — H&P ADULT - NSHPPHYSICALEXAM_GEN_ALL_CORE
CONSTITUTIONAL: alert and cooperative, no acute distress.   EYES: PERRL, no scleral icterus  ENT: Mucosa moist, tongue normal.   NECK: Neck supple, trachea midline, non-tender, no masses or thyromegaly.  CARDIAC: Normal S1 and S2. Regular rate and rhythms. No murmurs. No Pedal edema. Peripheral pulses intact  LUNGS: Equal air entry both lungs. No rales, rhonchi, wheezing. Normal respiratory effort.   ABDOMEN: Soft, nondistended, nontender. No guarding or rebound tenderness. No hepatomegaly or splenomegaly. Bowel sound normal.  MUSCULOSKELETAL: Normocephalic, atraumatic. No significant deformity or joint abnormality  NEUROLOGICAL: No gross motor or sensory deficits  SKIN: no lesions or eruptions. Normal turgor  PSYCHIATRIC: A&O x 3, appropriate mood and affect.

## 2023-06-29 NOTE — ED ADULT NURSE NOTE - NSFALLUNIVINTERV_ED_ALL_ED
Bed/Stretcher in lowest position, wheels locked, appropriate side rails in place/Call bell, personal items and telephone in reach/Instruct patient to call for assistance before getting out of bed/chair/stretcher/Non-slip footwear applied when patient is off stretcher/[place] to call system/Physically safe environment - no spills, clutter or unnecessary equipment/Purposeful proactive rounding/Room/bathroom lighting operational, light cord in reach

## 2023-06-29 NOTE — ED ADULT TRIAGE NOTE - CHIEF COMPLAINT QUOTE
pt c/o weakness and tinging to B/L hands and numbness around her mouth and eyes since this morning at 6am. history of  low potassium. states last time she had these symptom she potassium was 1.7.

## 2023-06-29 NOTE — ED PROVIDER NOTE - OBJECTIVE STATEMENT
27yo female with pmh etoh abuse with hx withdrawal seizures, gerd, sickle cell trait, p/w b/l UE paresthesias and cramping mainly in hand and fingers.  Has a history of this when potassium was very low.  Today also notes some of this around mouth too.  No LE symptoms but has generalized weakness.  No cp, abd pain, ha, neck pain, trauma.  States she drinks daily, has eaten solid food maybe 2-3 times in past 2 weeks.  Last drink early this morning.

## 2023-06-29 NOTE — ED PROVIDER NOTE - PHYSICAL EXAMINATION
General appearance: Nontoxic appearing, conversant, afebrile    Eyes: anicteric sclerae, RIN, EOMI   HENT: Atraumatic; oropharynx clear, MMM and no ulcerations, no pharyngeal erythema or exudate   Neck: Trachea midline; Full range of motion, supple   Pulm: CTA bl, normal respiratory effort and no intercostal retractions, normal work of breathing   CV: RRR, No murmurs, rubs, or gallops   Abdomen: Soft, non-tender, non-distended; no guarding or rebound   Extremities: No peripheral edema, no gross deformities, FROM x4, 5/5 MS x4 except difficulty extending b/l fingers, gross sensation intact    Skin: Dry, normal temperature, turgor and texture; no rash   Psych: Appropriate affect, cooperative

## 2023-06-29 NOTE — ED ADULT NURSE NOTE - SUICIDE SCREENING QUESTION 3
Pharmacy Medication History Note      List of current medications patient is taking is complete. Source of information: Patient, Fill history at [address] on [facility]. Changes made to medication list:  Medications removed (include reason, ex. therapy complete or physician discontinued):  Cephalexin- completed therapy  Multivitamin- Duplicate, as well as  script. Medications added/doses adjusted:  None    Other notes (ex. Recent course of antibiotics, Coumadin dosing):  Denies use of other OTC or herbal medications.         Electronically signed by Lamont Chang on 2022 at 10:47 AM No

## 2023-06-29 NOTE — H&P ADULT - PROBLEM SELECTOR PLAN 1
low K, CL, Mg, Phosphorus  In the setting of decrease oral intake and alcohol use  Replace with IV Mg, potassium CL, oral phosphorus  Closely monitor and replace serum electrolytes, anticipate will need more due to decrease storage  Repeat lytes in PM

## 2023-06-29 NOTE — ED ADULT NURSE NOTE - CADM POA URETHRAL CATHETER
Lipid abnormalities are improving with treatment.  Nutritional counseling was provided. and Pharmacotherapy as ordered.  Lipids will be reassessed 1 mo.   No

## 2023-06-29 NOTE — H&P ADULT - NSHPADDITIONALINFOADULT_GEN_ALL_CORE
Slightly prolonged QTc--> likely due to electrolytes disorder. Avoid QTc prolonging agents. Correct electrolytes abnormalities   Hypoglycemia--> likely due to decrease oral intake and deplete glycogen storage. Encourage oral intake. POC glucose and hypoglycemia protocol Slightly prolonged QTc-- 497> likely due to electrolytes disorder. Avoid QTc prolonging agents. Correct electrolytes abnormalities . Repeat EKG in AM  Hypoglycemia--> likely due to decrease oral intake and deplete glycogen storage. Encourage oral intake. POC glucose and hypoglycemia protocol

## 2023-06-29 NOTE — ED PROVIDER NOTE - CLINICAL SUMMARY MEDICAL DECISION MAKING FREE TEXT BOX
B/l UE cramping, paresthesias similar to prior episode of electrolyte abn.  Not c/w cva.  No trauma, neck pain.  Lower suspicion cervical pathology or radiculopathy.  Slightly tachycardic but not htn, no obvious withdrawal sx at this time, will continue to monitor.  Plan for labs, librium, dispo per results.

## 2023-06-30 DIAGNOSIS — R94.31 ABNORMAL ELECTROCARDIOGRAM [ECG] [EKG]: ICD-10-CM

## 2023-06-30 DIAGNOSIS — E16.2 HYPOGLYCEMIA, UNSPECIFIED: ICD-10-CM

## 2023-06-30 LAB
ALBUMIN SERPL ELPH-MCNC: 2.9 G/DL — LOW (ref 3.3–5)
ALP SERPL-CCNC: 48 U/L — SIGNIFICANT CHANGE UP (ref 40–120)
ALT FLD-CCNC: 58 U/L — SIGNIFICANT CHANGE UP (ref 12–78)
ANION GAP SERPL CALC-SCNC: 5 MMOL/L — SIGNIFICANT CHANGE UP (ref 5–17)
ANION GAP SERPL CALC-SCNC: 7 MMOL/L — SIGNIFICANT CHANGE UP (ref 5–17)
ANION GAP SERPL CALC-SCNC: 9 MMOL/L — SIGNIFICANT CHANGE UP (ref 5–17)
AST SERPL-CCNC: 75 U/L — HIGH (ref 15–37)
BILIRUB SERPL-MCNC: 1.1 MG/DL — SIGNIFICANT CHANGE UP (ref 0.2–1.2)
BUN SERPL-MCNC: 1 MG/DL — LOW (ref 7–23)
BUN SERPL-MCNC: 2 MG/DL — LOW (ref 7–23)
BUN SERPL-MCNC: 3 MG/DL — LOW (ref 7–23)
CALCIUM SERPL-MCNC: 7.6 MG/DL — LOW (ref 8.5–10.1)
CALCIUM SERPL-MCNC: 7.6 MG/DL — LOW (ref 8.5–10.1)
CALCIUM SERPL-MCNC: 7.7 MG/DL — LOW (ref 8.5–10.1)
CHLORIDE SERPL-SCNC: 102 MMOL/L — SIGNIFICANT CHANGE UP (ref 96–108)
CHLORIDE SERPL-SCNC: 103 MMOL/L — SIGNIFICANT CHANGE UP (ref 96–108)
CHLORIDE SERPL-SCNC: 105 MMOL/L — SIGNIFICANT CHANGE UP (ref 96–108)
CO2 SERPL-SCNC: 27 MMOL/L — SIGNIFICANT CHANGE UP (ref 22–31)
CO2 SERPL-SCNC: 29 MMOL/L — SIGNIFICANT CHANGE UP (ref 22–31)
CO2 SERPL-SCNC: 30 MMOL/L — SIGNIFICANT CHANGE UP (ref 22–31)
CREAT SERPL-MCNC: 0.69 MG/DL — SIGNIFICANT CHANGE UP (ref 0.5–1.3)
CREAT SERPL-MCNC: 0.7 MG/DL — SIGNIFICANT CHANGE UP (ref 0.5–1.3)
CREAT SERPL-MCNC: 0.82 MG/DL — SIGNIFICANT CHANGE UP (ref 0.5–1.3)
EGFR: 119 ML/MIN/1.73M2 — SIGNIFICANT CHANGE UP
EGFR: 120 ML/MIN/1.73M2 — SIGNIFICANT CHANGE UP
EGFR: 99 ML/MIN/1.73M2 — SIGNIFICANT CHANGE UP
GLUCOSE SERPL-MCNC: 78 MG/DL — SIGNIFICANT CHANGE UP (ref 70–99)
GLUCOSE SERPL-MCNC: 79 MG/DL — SIGNIFICANT CHANGE UP (ref 70–99)
GLUCOSE SERPL-MCNC: 96 MG/DL — SIGNIFICANT CHANGE UP (ref 70–99)
HCT VFR BLD CALC: 35.1 % — SIGNIFICANT CHANGE UP (ref 34.5–45)
HGB BLD-MCNC: 11.6 G/DL — SIGNIFICANT CHANGE UP (ref 11.5–15.5)
MAGNESIUM SERPL-MCNC: 1.5 MG/DL — LOW (ref 1.6–2.6)
MAGNESIUM SERPL-MCNC: 1.7 MG/DL — SIGNIFICANT CHANGE UP (ref 1.6–2.6)
MCHC RBC-ENTMCNC: 23.6 PG — LOW (ref 27–34)
MCHC RBC-ENTMCNC: 33 G/DL — SIGNIFICANT CHANGE UP (ref 32–36)
MCV RBC AUTO: 71.3 FL — LOW (ref 80–100)
NRBC # BLD: 0 /100 WBCS — SIGNIFICANT CHANGE UP (ref 0–0)
PHOSPHATE SERPL-MCNC: 2.7 MG/DL — SIGNIFICANT CHANGE UP (ref 2.5–4.5)
PLATELET # BLD AUTO: 324 K/UL — SIGNIFICANT CHANGE UP (ref 150–400)
POTASSIUM SERPL-MCNC: 2.9 MMOL/L — CRITICAL LOW (ref 3.5–5.3)
POTASSIUM SERPL-MCNC: 2.9 MMOL/L — CRITICAL LOW (ref 3.5–5.3)
POTASSIUM SERPL-MCNC: 3.3 MMOL/L — LOW (ref 3.5–5.3)
POTASSIUM SERPL-SCNC: 2.9 MMOL/L — CRITICAL LOW (ref 3.5–5.3)
POTASSIUM SERPL-SCNC: 2.9 MMOL/L — CRITICAL LOW (ref 3.5–5.3)
POTASSIUM SERPL-SCNC: 3.3 MMOL/L — LOW (ref 3.5–5.3)
PROT SERPL-MCNC: 5.8 GM/DL — LOW (ref 6–8.3)
RBC # BLD: 4.92 M/UL — SIGNIFICANT CHANGE UP (ref 3.8–5.2)
RBC # FLD: 19.6 % — HIGH (ref 10.3–14.5)
SODIUM SERPL-SCNC: 139 MMOL/L — SIGNIFICANT CHANGE UP (ref 135–145)
WBC # BLD: 4.23 K/UL — SIGNIFICANT CHANGE UP (ref 3.8–10.5)
WBC # FLD AUTO: 4.23 K/UL — SIGNIFICANT CHANGE UP (ref 3.8–10.5)

## 2023-06-30 PROCEDURE — 99283 EMERGENCY DEPT VISIT LOW MDM: CPT

## 2023-06-30 RX ORDER — POTASSIUM CHLORIDE 20 MEQ
40 PACKET (EA) ORAL EVERY 4 HOURS
Refills: 0 | Status: DISCONTINUED | OUTPATIENT
Start: 2023-06-30 | End: 2023-07-01

## 2023-06-30 RX ORDER — MAGNESIUM SULFATE 500 MG/ML
2 VIAL (ML) INJECTION ONCE
Refills: 0 | Status: COMPLETED | OUTPATIENT
Start: 2023-06-30 | End: 2023-06-30

## 2023-06-30 RX ORDER — LANOLIN ALCOHOL/MO/W.PET/CERES
3 CREAM (GRAM) TOPICAL AT BEDTIME
Refills: 0 | Status: DISCONTINUED | OUTPATIENT
Start: 2023-06-30 | End: 2023-07-01

## 2023-06-30 RX ORDER — POTASSIUM CHLORIDE 20 MEQ
40 PACKET (EA) ORAL EVERY 4 HOURS
Refills: 0 | Status: COMPLETED | OUTPATIENT
Start: 2023-06-30 | End: 2023-06-30

## 2023-06-30 RX ADMIN — Medication 25 GRAM(S): at 21:53

## 2023-06-30 RX ADMIN — PANTOPRAZOLE SODIUM 40 MILLIGRAM(S): 20 TABLET, DELAYED RELEASE ORAL at 05:05

## 2023-06-30 RX ADMIN — Medication 3 MILLIGRAM(S): at 21:53

## 2023-06-30 RX ADMIN — Medication 1 TABLET(S): at 12:40

## 2023-06-30 RX ADMIN — Medication 40 MILLIEQUIVALENT(S): at 12:36

## 2023-06-30 RX ADMIN — Medication 40 MILLIEQUIVALENT(S): at 05:05

## 2023-06-30 RX ADMIN — Medication 40 MILLIEQUIVALENT(S): at 21:53

## 2023-06-30 RX ADMIN — SODIUM CHLORIDE 125 MILLILITER(S): 9 INJECTION, SOLUTION INTRAVENOUS at 02:30

## 2023-06-30 RX ADMIN — Medication 1 MILLIGRAM(S): at 12:35

## 2023-06-30 RX ADMIN — Medication 100 MILLIGRAM(S): at 12:36

## 2023-06-30 RX ADMIN — Medication 40 MILLIEQUIVALENT(S): at 14:49

## 2023-07-01 VITALS
OXYGEN SATURATION: 96 % | TEMPERATURE: 98 F | SYSTOLIC BLOOD PRESSURE: 117 MMHG | HEART RATE: 78 BPM | DIASTOLIC BLOOD PRESSURE: 69 MMHG | RESPIRATION RATE: 17 BRPM

## 2023-07-01 LAB
ANION GAP SERPL CALC-SCNC: 3 MMOL/L — LOW (ref 5–17)
BUN SERPL-MCNC: 1 MG/DL — LOW (ref 7–23)
CALCIUM SERPL-MCNC: 7.5 MG/DL — LOW (ref 8.5–10.1)
CHLORIDE SERPL-SCNC: 110 MMOL/L — HIGH (ref 96–108)
CO2 SERPL-SCNC: 26 MMOL/L — SIGNIFICANT CHANGE UP (ref 22–31)
CREAT SERPL-MCNC: 0.55 MG/DL — SIGNIFICANT CHANGE UP (ref 0.5–1.3)
EGFR: 126 ML/MIN/1.73M2 — SIGNIFICANT CHANGE UP
GLUCOSE SERPL-MCNC: 92 MG/DL — SIGNIFICANT CHANGE UP (ref 70–99)
MAGNESIUM SERPL-MCNC: 2 MG/DL — SIGNIFICANT CHANGE UP (ref 1.6–2.6)
PHOSPHATE SERPL-MCNC: 1.7 MG/DL — LOW (ref 2.5–4.5)
POTASSIUM SERPL-MCNC: 4 MMOL/L — SIGNIFICANT CHANGE UP (ref 3.5–5.3)
POTASSIUM SERPL-SCNC: 4 MMOL/L — SIGNIFICANT CHANGE UP (ref 3.5–5.3)
SODIUM SERPL-SCNC: 139 MMOL/L — SIGNIFICANT CHANGE UP (ref 135–145)

## 2023-07-01 PROCEDURE — 93010 ELECTROCARDIOGRAM REPORT: CPT

## 2023-07-01 PROCEDURE — 99283 EMERGENCY DEPT VISIT LOW MDM: CPT

## 2023-07-01 RX ORDER — SODIUM,POTASSIUM PHOSPHATES 278-250MG
1 POWDER IN PACKET (EA) ORAL ONCE
Refills: 0 | Status: DISCONTINUED | OUTPATIENT
Start: 2023-07-01 | End: 2023-07-01

## 2023-07-01 RX ADMIN — PANTOPRAZOLE SODIUM 40 MILLIGRAM(S): 20 TABLET, DELAYED RELEASE ORAL at 06:26

## 2023-07-01 RX ADMIN — Medication 40 MILLIEQUIVALENT(S): at 06:26

## 2023-07-01 NOTE — PROGRESS NOTE ADULT - PROBLEM SELECTOR PLAN 2
Cessation education  Continue Thiamine, folic acid, multivitamin  CIWA protocol with symptoms trigger ativan for now
Cessation education  Continue Thiamine, folic acid, multivitamin  CIWA protocol with symptoms trigger ativan for now

## 2023-07-01 NOTE — PROGRESS NOTE ADULT - PROBLEM SELECTOR PLAN 1
Severe hypokalemia w/ hypomagnesemia and hypophosphatemia in setting of decrease oral intake and alcohol use  Supplement K aggressively   Supplemented Mg and Phos, now normalized   BMP q12h for now  Encourage PO intake
Severe hypokalemia w/ hypomagnesemia and hypophosphatemia in setting of decrease oral intake and alcohol use  Supplement K aggressively   Supplemented Mg and Phos, now normalized   BMP q12h for now  Encourage PO intake

## 2023-07-01 NOTE — PROGRESS NOTE ADULT - ASSESSMENT
28 years old female with h/o alcohol dependence present to ED with complain of generalized weakness, muscle cramps, tingling/numbness sensation around mouth x 1 day. Patient has been drinking and has minimal food intake. Found to have severe hypokalemia, hypomagnesemia and hypophosphatemia.       alcoholism   - place patient on withdrawal protocol and monitor and treatment based on the protocol   - Ativan/Librium based on score of withdrawal   - monitor BMP and LFTs   - MVI/folate/Thiamine   -   advise to see psy as outpatient for evaluation  for depression  Because his compliance with treatments may be impacted by depression       
28 years old female with h/o alcohol dependence present to ED with complain of generalized weakness, muscle cramps, tingling/numbness sensation around mouth x 1 day. Patient has been drinking and has minimal food intake. Found to have severe hypokalemia, hypomagnesemia and hypophosphatemia.

## 2023-07-01 NOTE — PROGRESS NOTE ADULT - SUBJECTIVE AND OBJECTIVE BOX
PROGRESS NOTE:     Patient is a 28y old  Female who presents with a chief complaint of electrolytes disorder (29 Jun 2023 13:57)      SUBJECTIVE / OVERNIGHT EVENTS: Symptoms improving.     ADDITIONAL REVIEW OF SYSTEMS:    MEDICATIONS  (STANDING):  dextrose 50% Injectable 25 Gram(s) IV Push once  dextrose 50% Injectable 12.5 Gram(s) IV Push once  dextrose 50% Injectable 25 Gram(s) IV Push once  dextrose Oral Gel 15 Gram(s) Oral once  folic acid 1 milliGRAM(s) Oral daily  glucagon  Injectable 1 milliGRAM(s) IntraMuscular once  lactated ringers. 1000 milliLiter(s) (125 mL/Hr) IV Continuous <Continuous>  multivitamin/minerals 1 Tablet(s) Oral daily  pantoprazole    Tablet 40 milliGRAM(s) Oral before breakfast  potassium chloride    Tablet ER 40 milliEquivalent(s) Oral every 4 hours  thiamine 100 milliGRAM(s) Oral daily    MEDICATIONS  (PRN):  acetaminophen     Tablet .. 650 milliGRAM(s) Oral every 6 hours PRN Mild Pain (1 - 3), Moderate Pain (4 - 6)  aluminum hydroxide/magnesium hydroxide/simethicone Suspension 30 milliLiter(s) Oral every 4 hours PRN Dyspepsia  LORazepam   Injectable 2 milliGRAM(s) IV Push every 1 hour PRN Symptom-triggered: each CIWA -Ar score 8 or GREATER      CAPILLARY BLOOD GLUCOSE      POCT Blood Glucose.: 102 mg/dL (29 Jun 2023 21:10)  POCT Blood Glucose.: 131 mg/dL (29 Jun 2023 16:01)    I&O's Summary      PHYSICAL EXAM:  Vital Signs Last 24 Hrs  T(C): 37.6 (30 Jun 2023 12:35), Max: 37.6 (30 Jun 2023 12:35)  T(F): 99.7 (30 Jun 2023 12:35), Max: 99.7 (30 Jun 2023 12:35)  HR: 91 (30 Jun 2023 12:35) (69 - 105)  BP: 111/80 (30 Jun 2023 12:35) (100/64 - 111/80)  BP(mean): --  RR: 17 (30 Jun 2023 12:35) (12 - 19)  SpO2: 98% (30 Jun 2023 12:35) (98% - 100%)    Parameters below as of 30 Jun 2023 05:36  Patient On (Oxygen Delivery Method): room air        CONSTITUTIONAL: NAD, well-developed  RESPIRATORY: Normal respiratory effort; lungs are clear to auscultation bilaterally  CARDIOVASCULAR: Regular rate and rhythm, normal S1 and S2, no murmur/rub/gallop; No lower extremity edema; Peripheral pulses are 2+ bilaterally  ABDOMEN: Nontender to palpation, normoactive bowel sounds, no rebound/guarding; No hepatosplenomegaly  MUSCLOSKELETAL: no clubbing or cyanosis of digits; no joint swelling or tenderness to palpation  PSYCH: A+O to person, place, and time; affect appropriate  NEUROLOGICAL: No gross motor or sensory deficits    LABS:                        11.6   4.23  )-----------( 324      ( 30 Jun 2023 02:30 )             35.1     06-30    139  |  102  |  2<L>  ----------------------------<  79  2.9<LL>   |  30  |  0.70    Ca    7.6<L>      30 Jun 2023 02:30  Phos  2.7     06-30  Mg     1.7     06-30    TPro  5.8<L>  /  Alb  2.9<L>  /  TBili  1.1  /  DBili  x   /  AST  75<H>  /  ALT  58  /  AlkPhos  48  06-30          Urinalysis Basic - ( 30 Jun 2023 02:30 )    Color: x / Appearance: x / SG: x / pH: x  Gluc: 79 mg/dL / Ketone: x  / Bili: x / Urobili: x   Blood: x / Protein: x / Nitrite: x   Leuk Esterase: x / RBC: x / WBC x   Sq Epi: x / Non Sq Epi: x / Bacteria: x          RADIOLOGY & ADDITIONAL TESTS:  Results Reviewed:   Imaging Personally Reviewed:  Electrocardiogram Personally Reviewed:    COORDINATION OF CARE:  Care Discussed with Consultants/Other Providers [Y/N]:  Prior or Outpatient Records Reviewed [Y/N]:  
Patient is a 28y old  Female who presents with a chief complaint of electrolytes disorder (30 Jun 2023 13:59)      INTERVAL HPI/OVERNIGHT EVENTS: positive for weakness and denies cp palpitation and sob     MEDICATIONS  (STANDING):  dextrose 50% Injectable 25 Gram(s) IV Push once  dextrose 50% Injectable 12.5 Gram(s) IV Push once  dextrose 50% Injectable 25 Gram(s) IV Push once  dextrose Oral Gel 15 Gram(s) Oral once  folic acid 1 milliGRAM(s) Oral daily  glucagon  Injectable 1 milliGRAM(s) IntraMuscular once  lactated ringers. 1000 milliLiter(s) (125 mL/Hr) IV Continuous <Continuous>  melatonin 3 milliGRAM(s) Oral at bedtime  multivitamin/minerals 1 Tablet(s) Oral daily  pantoprazole    Tablet 40 milliGRAM(s) Oral before breakfast  potassium chloride    Tablet ER 40 milliEquivalent(s) Oral every 4 hours  thiamine 100 milliGRAM(s) Oral daily    MEDICATIONS  (PRN):  acetaminophen     Tablet .. 650 milliGRAM(s) Oral every 6 hours PRN Mild Pain (1 - 3), Moderate Pain (4 - 6)  aluminum hydroxide/magnesium hydroxide/simethicone Suspension 30 milliLiter(s) Oral every 4 hours PRN Dyspepsia  LORazepam   Injectable 2 milliGRAM(s) IV Push every 1 hour PRN Symptom-triggered: each CIWA -Ar score 8 or GREATER      Allergies    No Known Allergies    Intolerances        REVIEW OF SYSTEMS:  CONSTITUTIONAL: No fever, weight loss, or fatigue  EYES: No eye pain, visual disturbances, or discharge  ENMT:  No difficulty hearing, tinnitus, vertigo; No sinus or throat pain  NECK: No pain or stiffness  BREASTS: No pain, masses, or nipple discharge  RESPIRATORY: No cough, wheezing, chills or hemoptysis; No shortness of breath  CARDIOVASCULAR: No chest pain, palpitations, dizziness, or leg swelling  GASTROINTESTINAL: No abdominal or epigastric pain. No nausea, vomiting, or hematemesis; No diarrhea or constipation. No melena or hematochezia.  GENITOURINARY: No dysuria, frequency, hematuria, or incontinence  NEUROLOGICAL: No headaches, memory loss, loss of strength, numbness, or tremors  SKIN: No itching, burning, rashes, or lesions   LYMPH NODES: No enlarged glands  ENDOCRINE: No heat or cold intolerance; No hair loss  MUSCULOSKELETAL: No joint pain or swelling; No muscle, back, or extremity pain  PSYCHIATRIC: No depression, anxiety, mood swings, or difficulty sleeping  HEME/LYMPH: No easy bruising, or bleeding gums  ALLERGY AND IMMUNOLOGIC: No hives or eczema    Vital Signs Last 24 Hrs  T(C): 36.4 (01 Jul 2023 10:49), Max: 37.6 (30 Jun 2023 12:35)  T(F): 97.5 (01 Jul 2023 10:49), Max: 99.7 (30 Jun 2023 12:35)  HR: 78 (01 Jul 2023 10:49) (78 - 95)  BP: 117/69 (01 Jul 2023 10:49) (92/64 - 117/69)  BP(mean): --  RR: 17 (01 Jul 2023 10:49) (17 - 19)  SpO2: 96% (01 Jul 2023 10:49) (96% - 99%)    Parameters below as of 01 Jul 2023 05:53  Patient On (Oxygen Delivery Method): room air        PHYSICAL EXAM:  GENERAL: NAD, well-groomed, well-developed  HEAD:  Atraumatic, Normocephalic  EYES: EOMI, PERRLA, conjunctiva and sclera clear  ENMT: No tonsillar erythema, exudates, or enlargement; Moist mucous membranes, Good dentition, No lesions  NECK: Supple, No JVD, Normal thyroid  NERVOUS SYSTEM:  Alert & Oriented X3, Good concentration; Motor Strength 5/5 B/L upper and lower extremities; DTRs 2+ intact and symmetric  CHEST/LUNG: Clear to percussion bilaterally; No rales, rhonchi, wheezing, or rubs  HEART: Regular rate and rhythm; No murmurs, rubs, or gallops  ABDOMEN: Soft, Nontender, Nondistended; Bowel sounds present  EXTREMITIES:  2+ Peripheral Pulses, No clubbing, cyanosis, or edema  LYMPH: No lymphadenopathy noted  SKIN: No rashes or lesions    LABS:                        11.6   4.23  )-----------( 324      ( 30 Jun 2023 02:30 )             35.1     07-01    139  |  110<H>  |  1<L>  ----------------------------<  92  4.0   |  26  |  0.55    Ca    7.5<L>      01 Jul 2023 07:50  Phos  1.7     07-01  Mg     2.0     07-01    TPro  5.8<L>  /  Alb  2.9<L>  /  TBili  1.1  /  DBili  x   /  AST  75<H>  /  ALT  58  /  AlkPhos  48  06-30      Urinalysis Basic - ( 01 Jul 2023 07:50 )    Color: x / Appearance: x / SG: x / pH: x  Gluc: 92 mg/dL / Ketone: x  / Bili: x / Urobili: x   Blood: x / Protein: x / Nitrite: x   Leuk Esterase: x / RBC: x / WBC x   Sq Epi: x / Non Sq Epi: x / Bacteria: x      CAPILLARY BLOOD GLUCOSE          RADIOLOGY & ADDITIONAL TESTS:    Imaging Personally Reviewed:  [ X] YES  [ ] NO    Consultant(s) Notes Reviewed:  [ X] YES  [ ] NO    Care Discussed with Consultants/Other Providers [X ] YES  [ ] NO

## 2023-07-01 NOTE — PROGRESS NOTE ADULT - PROBLEM SELECTOR PLAN 5
Slightly prolonged QTc of 497 likely due to electrolyte abnormalities   Avoid QTc prolonging agents  Correct electrolytes as above   Repeat EKG once electrolytes normalize
Slightly prolonged QTc of 497 likely due to electrolyte abnormalities   Avoid QTc prolonging agents  Correct electrolytes as above   Repeat EKG once electrolytes normalize

## 2023-07-01 NOTE — PROGRESS NOTE ADULT - PROBLEM SELECTOR PLAN 4
Likely due to decrease oral intake and depleted glycogen storage  Encourage oral intake  POC glucose and hypoglycemia protocol x 24 hrs, can discontinue if no recurrent hypoglycemia
Likely due to decrease oral intake and depleted glycogen storage  Encourage oral intake  POC glucose and hypoglycemia protocol x 24 hrs, can discontinue if no recurrent hypoglycemia

## 2023-07-24 NOTE — OB PROVIDER H&P - NSMATERNALFETALCONCERNS_OBGYN_ALL_OB_FT
Stroke RN at bedside prior to patient arrival   MATERNAL FETAL ALERT  C/S X3   history of anxiety and depression  treated with Paxil , suicidal ideation and alcohol use in early  pregnancy.  Admitted x2 in this pregnancy ,  (3/30/2021 and 4/4/2021)   alert SW , NICU  s/p NICU consult

## 2023-08-20 ENCOUNTER — INPATIENT (INPATIENT)
Facility: HOSPITAL | Age: 28
LOS: 2 days | Discharge: HOME | End: 2023-08-23
Attending: STUDENT IN AN ORGANIZED HEALTH CARE EDUCATION/TRAINING PROGRAM | Admitting: STUDENT IN AN ORGANIZED HEALTH CARE EDUCATION/TRAINING PROGRAM
Payer: MEDICAID

## 2023-08-20 VITALS
OXYGEN SATURATION: 98 % | HEART RATE: 163 BPM | WEIGHT: 139.99 LBS | TEMPERATURE: 98 F | RESPIRATION RATE: 22 BRPM | SYSTOLIC BLOOD PRESSURE: 93 MMHG | DIASTOLIC BLOOD PRESSURE: 70 MMHG | HEIGHT: 68 IN

## 2023-08-20 DIAGNOSIS — Z98.891 HISTORY OF UTERINE SCAR FROM PREVIOUS SURGERY: Chronic | ICD-10-CM

## 2023-08-20 LAB
ALBUMIN SERPL ELPH-MCNC: 5.1 G/DL — HIGH (ref 3.3–5)
ALP SERPL-CCNC: 39 U/L — LOW (ref 40–120)
ALT FLD-CCNC: 59 U/L — SIGNIFICANT CHANGE UP (ref 12–78)
ANION GAP SERPL CALC-SCNC: 19 MMOL/L — HIGH (ref 5–17)
ANION GAP SERPL CALC-SCNC: 24 MMOL/L — HIGH (ref 5–17)
APPEARANCE UR: ABNORMAL
APTT BLD: 28.1 SEC — SIGNIFICANT CHANGE UP (ref 24.5–35.6)
AST SERPL-CCNC: 28 U/L — SIGNIFICANT CHANGE UP (ref 15–37)
BACTERIA # UR AUTO: ABNORMAL
BASE EXCESS BLDV CALC-SCNC: -9.7 MMOL/L — LOW (ref -2–3)
BASOPHILS # BLD AUTO: 0.03 K/UL — SIGNIFICANT CHANGE UP (ref 0–0.2)
BASOPHILS NFR BLD AUTO: 0.3 % — SIGNIFICANT CHANGE UP (ref 0–2)
BILIRUB SERPL-MCNC: 1.6 MG/DL — HIGH (ref 0.2–1.2)
BILIRUB UR-MCNC: ABNORMAL
BLOOD GAS COMMENTS, VENOUS: SIGNIFICANT CHANGE UP
BUN SERPL-MCNC: 10 MG/DL — SIGNIFICANT CHANGE UP (ref 7–23)
BUN SERPL-MCNC: 8 MG/DL — SIGNIFICANT CHANGE UP (ref 7–23)
CALCIUM SERPL-MCNC: 10.6 MG/DL — HIGH (ref 8.5–10.1)
CALCIUM SERPL-MCNC: 9.3 MG/DL — SIGNIFICANT CHANGE UP (ref 8.5–10.1)
CHLORIDE BLDV-SCNC: 106 MMOL/L — SIGNIFICANT CHANGE UP (ref 98–107)
CHLORIDE SERPL-SCNC: 103 MMOL/L — SIGNIFICANT CHANGE UP (ref 96–108)
CHLORIDE SERPL-SCNC: 108 MMOL/L — SIGNIFICANT CHANGE UP (ref 96–108)
CO2 BLDV-SCNC: 13 MMOL/L — LOW (ref 22–26)
CO2 SERPL-SCNC: 13 MMOL/L — LOW (ref 22–31)
CO2 SERPL-SCNC: 14 MMOL/L — LOW (ref 22–31)
COLOR SPEC: YELLOW — SIGNIFICANT CHANGE UP
CREAT SERPL-MCNC: 1.22 MG/DL — SIGNIFICANT CHANGE UP (ref 0.5–1.3)
CREAT SERPL-MCNC: 1.52 MG/DL — HIGH (ref 0.5–1.3)
D DIMER BLD IA.RAPID-MCNC: <150 NG/ML DDU — SIGNIFICANT CHANGE UP
DIFF PNL FLD: ABNORMAL
EGFR: 47 ML/MIN/1.73M2 — LOW
EGFR: 61 ML/MIN/1.73M2 — SIGNIFICANT CHANGE UP
EOSINOPHIL # BLD AUTO: 0 K/UL — SIGNIFICANT CHANGE UP (ref 0–0.5)
EOSINOPHIL NFR BLD AUTO: 0 % — SIGNIFICANT CHANGE UP (ref 0–6)
GAS PNL BLDV: 137 MMOL/L — SIGNIFICANT CHANGE UP (ref 136–145)
GAS PNL BLDV: SIGNIFICANT CHANGE UP
GAS PNL BLDV: SIGNIFICANT CHANGE UP
GLUCOSE BLDV-MCNC: 131 MG/DL — HIGH (ref 65–95)
GLUCOSE SERPL-MCNC: 113 MG/DL — HIGH (ref 70–99)
GLUCOSE SERPL-MCNC: 137 MG/DL — HIGH (ref 70–99)
GLUCOSE UR QL: NEGATIVE MG/DL — SIGNIFICANT CHANGE UP
HCG SERPL-ACNC: <1 MIU/ML — SIGNIFICANT CHANGE UP
HCO3 BLDV-SCNC: 13 MMOL/L — LOW (ref 22–28)
HCT VFR BLD CALC: 43.7 % — SIGNIFICANT CHANGE UP (ref 34.5–45)
HCT VFR BLDA CALC: 39 % — SIGNIFICANT CHANGE UP (ref 37–47)
HGB BLD CALC-MCNC: 13 G/DL — SIGNIFICANT CHANGE UP (ref 11.7–16.1)
HGB BLD-MCNC: 14.6 G/DL — SIGNIFICANT CHANGE UP (ref 11.5–15.5)
IMM GRANULOCYTES NFR BLD AUTO: 0.2 % — SIGNIFICANT CHANGE UP (ref 0–0.9)
INR BLD: 0.93 RATIO — SIGNIFICANT CHANGE UP (ref 0.85–1.18)
KETONES UR-MCNC: ABNORMAL
LACTATE BLDV-MCNC: 1.2 MMOL/L — SIGNIFICANT CHANGE UP (ref 0.56–1.39)
LACTATE SERPL-SCNC: 1.3 MMOL/L — SIGNIFICANT CHANGE UP (ref 0.7–2)
LACTATE SERPL-SCNC: 2.1 MMOL/L — HIGH (ref 0.7–2)
LEUKOCYTE ESTERASE UR-ACNC: NEGATIVE — SIGNIFICANT CHANGE UP
LIDOCAIN IGE QN: 52 U/L — LOW (ref 73–393)
LYMPHOCYTES # BLD AUTO: 1.23 K/UL — SIGNIFICANT CHANGE UP (ref 1–3.3)
LYMPHOCYTES # BLD AUTO: 12.1 % — LOW (ref 13–44)
MAGNESIUM SERPL-MCNC: 1.6 MG/DL — SIGNIFICANT CHANGE UP (ref 1.6–2.6)
MCHC RBC-ENTMCNC: 25.4 PG — LOW (ref 27–34)
MCHC RBC-ENTMCNC: 33.4 G/DL — SIGNIFICANT CHANGE UP (ref 32–36)
MCV RBC AUTO: 76 FL — LOW (ref 80–100)
MONOCYTES # BLD AUTO: 0.66 K/UL — SIGNIFICANT CHANGE UP (ref 0–0.9)
MONOCYTES NFR BLD AUTO: 6.5 % — SIGNIFICANT CHANGE UP (ref 2–14)
NEUTROPHILS # BLD AUTO: 8.21 K/UL — HIGH (ref 1.8–7.4)
NEUTROPHILS NFR BLD AUTO: 80.9 % — HIGH (ref 43–77)
NITRITE UR-MCNC: NEGATIVE — SIGNIFICANT CHANGE UP
NRBC # BLD: 0 /100 WBCS — SIGNIFICANT CHANGE UP (ref 0–0)
PCO2 BLDV: 20 MMHG — LOW (ref 42–55)
PH BLDV: 7.41 — SIGNIFICANT CHANGE UP (ref 7.32–7.43)
PH UR: 6 — SIGNIFICANT CHANGE UP (ref 5–8)
PLATELET # BLD AUTO: 472 K/UL — HIGH (ref 150–400)
PO2 BLDV: 124 MMHG — HIGH (ref 25–45)
POTASSIUM BLDV-SCNC: 3.5 MMOL/L — SIGNIFICANT CHANGE UP (ref 3.5–5.1)
POTASSIUM SERPL-MCNC: 3 MMOL/L — LOW (ref 3.5–5.3)
POTASSIUM SERPL-MCNC: 3 MMOL/L — LOW (ref 3.5–5.3)
POTASSIUM SERPL-SCNC: 3 MMOL/L — LOW (ref 3.5–5.3)
POTASSIUM SERPL-SCNC: 3 MMOL/L — LOW (ref 3.5–5.3)
PROT SERPL-MCNC: 9.7 GM/DL — HIGH (ref 6–8.3)
PROT UR-MCNC: 30 MG/DL
PROTHROM AB SERPL-ACNC: 11.1 SEC — SIGNIFICANT CHANGE UP (ref 9.5–13)
RAPID RVP RESULT: SIGNIFICANT CHANGE UP
RBC # BLD: 5.75 M/UL — HIGH (ref 3.8–5.2)
RBC # FLD: 19.9 % — HIGH (ref 10.3–14.5)
RBC CASTS # UR COMP ASSIST: NEGATIVE /HPF — SIGNIFICANT CHANGE UP (ref 0–4)
SAO2 % BLDV: 100 % — HIGH (ref 94–98)
SARS-COV-2 RNA SPEC QL NAA+PROBE: SIGNIFICANT CHANGE UP
SODIUM SERPL-SCNC: 140 MMOL/L — SIGNIFICANT CHANGE UP (ref 135–145)
SODIUM SERPL-SCNC: 141 MMOL/L — SIGNIFICANT CHANGE UP (ref 135–145)
SP GR SPEC: 1.01 — SIGNIFICANT CHANGE UP (ref 1.01–1.02)
TROPONIN I, HIGH SENSITIVITY RESULT: 4 NG/L — SIGNIFICANT CHANGE UP
UROBILINOGEN FLD QL: 1 MG/DL
WBC # BLD: 10.15 K/UL — SIGNIFICANT CHANGE UP (ref 3.8–10.5)
WBC # FLD AUTO: 10.15 K/UL — SIGNIFICANT CHANGE UP (ref 3.8–10.5)
WBC UR QL: SIGNIFICANT CHANGE UP

## 2023-08-20 PROCEDURE — 71045 X-RAY EXAM CHEST 1 VIEW: CPT | Mod: 26

## 2023-08-20 PROCEDURE — 93010 ELECTROCARDIOGRAM REPORT: CPT

## 2023-08-20 PROCEDURE — 70491 CT SOFT TISSUE NECK W/DYE: CPT | Mod: 26,MA

## 2023-08-20 PROCEDURE — 99285 EMERGENCY DEPT VISIT HI MDM: CPT

## 2023-08-20 RX ORDER — ACETAMINOPHEN 500 MG
975 TABLET ORAL ONCE
Refills: 0 | Status: COMPLETED | OUTPATIENT
Start: 2023-08-20 | End: 2023-08-20

## 2023-08-20 RX ORDER — DEXAMETHASONE 0.5 MG/5ML
6 ELIXIR ORAL ONCE
Refills: 0 | Status: COMPLETED | OUTPATIENT
Start: 2023-08-20 | End: 2023-08-20

## 2023-08-20 RX ORDER — SODIUM CHLORIDE 9 MG/ML
1950 INJECTION INTRAMUSCULAR; INTRAVENOUS; SUBCUTANEOUS ONCE
Refills: 0 | Status: COMPLETED | OUTPATIENT
Start: 2023-08-20 | End: 2023-08-20

## 2023-08-20 RX ORDER — ACETAMINOPHEN 500 MG
1000 TABLET ORAL ONCE
Refills: 0 | Status: COMPLETED | OUTPATIENT
Start: 2023-08-20 | End: 2023-08-20

## 2023-08-20 RX ORDER — BENZOCAINE AND MENTHOL 5; 1 G/100ML; G/100ML
1 LIQUID ORAL ONCE
Refills: 0 | Status: COMPLETED | OUTPATIENT
Start: 2023-08-20 | End: 2023-08-20

## 2023-08-20 RX ORDER — CEFTRIAXONE 500 MG/1
1000 INJECTION, POWDER, FOR SOLUTION INTRAMUSCULAR; INTRAVENOUS ONCE
Refills: 0 | Status: COMPLETED | OUTPATIENT
Start: 2023-08-20 | End: 2023-08-20

## 2023-08-20 RX ORDER — POTASSIUM CHLORIDE 20 MEQ
40 PACKET (EA) ORAL ONCE
Refills: 0 | Status: COMPLETED | OUTPATIENT
Start: 2023-08-20 | End: 2023-08-20

## 2023-08-20 RX ORDER — SODIUM CHLORIDE 9 MG/ML
1000 INJECTION INTRAMUSCULAR; INTRAVENOUS; SUBCUTANEOUS ONCE
Refills: 0 | Status: COMPLETED | OUTPATIENT
Start: 2023-08-20 | End: 2023-08-20

## 2023-08-20 RX ORDER — ONDANSETRON 8 MG/1
4 TABLET, FILM COATED ORAL ONCE
Refills: 0 | Status: COMPLETED | OUTPATIENT
Start: 2023-08-20 | End: 2023-08-20

## 2023-08-20 RX ORDER — FAMOTIDINE 10 MG/ML
20 INJECTION INTRAVENOUS ONCE
Refills: 0 | Status: COMPLETED | OUTPATIENT
Start: 2023-08-20 | End: 2023-08-20

## 2023-08-20 RX ORDER — KETOROLAC TROMETHAMINE 30 MG/ML
15 SYRINGE (ML) INJECTION ONCE
Refills: 0 | Status: DISCONTINUED | OUTPATIENT
Start: 2023-08-20 | End: 2023-08-20

## 2023-08-20 RX ORDER — LIDOCAINE 4 G/100G
1 CREAM TOPICAL ONCE
Refills: 0 | Status: COMPLETED | OUTPATIENT
Start: 2023-08-20 | End: 2023-08-20

## 2023-08-20 RX ADMIN — FAMOTIDINE 20 MILLIGRAM(S): 10 INJECTION INTRAVENOUS at 14:04

## 2023-08-20 RX ADMIN — Medication 400 MILLIGRAM(S): at 15:10

## 2023-08-20 RX ADMIN — CEFTRIAXONE 1000 MILLIGRAM(S): 500 INJECTION, POWDER, FOR SOLUTION INTRAMUSCULAR; INTRAVENOUS at 16:10

## 2023-08-20 RX ADMIN — Medication 6 MILLIGRAM(S): at 14:09

## 2023-08-20 RX ADMIN — BENZOCAINE AND MENTHOL 1 LOZENGE: 5; 1 LIQUID ORAL at 14:08

## 2023-08-20 RX ADMIN — Medication 15 MILLIGRAM(S): at 14:08

## 2023-08-20 RX ADMIN — CEFTRIAXONE 100 MILLIGRAM(S): 500 INJECTION, POWDER, FOR SOLUTION INTRAMUSCULAR; INTRAVENOUS at 14:03

## 2023-08-20 RX ADMIN — Medication 40 MILLIEQUIVALENT(S): at 20:47

## 2023-08-20 RX ADMIN — ONDANSETRON 4 MILLIGRAM(S): 8 TABLET, FILM COATED ORAL at 20:15

## 2023-08-20 RX ADMIN — Medication 1000 MILLIGRAM(S): at 15:10

## 2023-08-20 RX ADMIN — SODIUM CHLORIDE 1950 MILLILITER(S): 9 INJECTION INTRAMUSCULAR; INTRAVENOUS; SUBCUTANEOUS at 15:05

## 2023-08-20 RX ADMIN — Medication 1 MILLIGRAM(S): at 20:15

## 2023-08-20 RX ADMIN — SODIUM CHLORIDE 1950 MILLILITER(S): 9 INJECTION INTRAMUSCULAR; INTRAVENOUS; SUBCUTANEOUS at 14:05

## 2023-08-20 RX ADMIN — Medication 15 MILLIGRAM(S): at 15:08

## 2023-08-20 RX ADMIN — Medication 1 MILLIGRAM(S): at 16:14

## 2023-08-20 RX ADMIN — Medication 1000 MILLIGRAM(S): at 16:10

## 2023-08-20 RX ADMIN — LIDOCAINE 1 PATCH: 4 CREAM TOPICAL at 20:15

## 2023-08-20 NOTE — ED ADULT NURSE NOTE - OBJECTIVE STATEMENT
Pt aaox4, presents to ed c/o sore throat, sharp non-radiating cp, nausea, nbnb vomiting, palpitations, weakness, sob, chills. x3 days. Pt states she was recently dx with tonsillitis. Pmhx etoh dependence, major depression. CIWA 7 upon arrival to ed. 12 lead ekg completed. Cardiac and spo2 Pt aaox4, presents to ed c/o sore throat, sharp non-radiating cp, nausea, nbnb vomiting, palpitations, weakness, sob, chills and diaphoresis x3 days. Pt states she was recently dx with tonsillitis. Pmhx etoh dependence, major depression. Last drink of alcohol Wednesday evening. CIWA 7 upon arrival to ed. 12 lead ekg completed. Cardiac and spo2 monitoring in place. Rectal temp 100.7 in ed.

## 2023-08-20 NOTE — ED PROVIDER NOTE - CLINICAL SUMMARY MEDICAL DECISION MAKING FREE TEXT BOX
Cough URI symptoms with chest pain and SOB tachycardic on arrival with no respiratory distress, lungs clear.  Possible viral syndrome.  Will get rectal temperature r/o fever,  Will give IVF for dehydration, r/o PNA - check CXR, check RVP r/o PE - will get d-dimer, give medication for throat discomfort and Re-eval Cough URI symptoms with chest pain and SOB tachycardic on arrival with no respiratory distress, lungs clear.  Possible viral syndrome.  Will get rectal temperature r/o fever,  Will give IVF for dehydration, r/o PNA - check CXR, check RVP r/o PE - will get d-dimer, give medication for throat discomfort and Re-eval.    Lactate slightly elevated IVF ordered patient signed out to oncoming attending.

## 2023-08-20 NOTE — ED ADULT NURSE REASSESSMENT NOTE - NS ED NURSE REASSESS COMMENT FT1
Pt resting comfortably at this time. No s/s of pain noted. Continuous cardiac and SPO2 monitoring in place. Dr. King aware of persistent tachycardia. Pt CIWA scores are stable, NAD at this time. Plan of care ongoing.

## 2023-08-20 NOTE — ED PROVIDER NOTE - SKIN, MLM
Hospital Medicine Discharge Summary    Patient ID: Lenny Dubin      Patient's PCP: Katherine Lindsay MD    Admit Date: 8/1/2019     Discharge Date:   8/4/19    Admitting Physician: Ailyn Anderson MD     Discharge Physician: Jose David Rojas MD     Discharge Diagnoses: Active Hospital Problems    Diagnosis    Sepsis ([address] [A41.9]    Shock ([address] [R57.9]       The patient was seen and examined on day of discharge and this discharge summary is in conjunction with any daily progress note from day of discharge. Hospital Course:     68 y.o. female who presented [facility] [place] with 1 day history of confusion fatigue and generalized malaise. Patient has not been feeling well for the last 4 days. She had an episode of nausea vomiting early in the week. Patient family feels that she has been more confused and brought her into the ER for Evaluation  Noted to be very hypotensive here despite multiple liter fluid boluses she made hypotensive and is admitted to the ICU  Patient is in good spirits overall and is feeling somewhat better after getting the fluids. She is jovial and laughing although she remains on pressors. Denies abdominal pain. Denies flank pain. Denies hematuria. No chest discomfort    1. MICHELLE most likely pre renal . Improving . 2. I prefer dc home in am , pt wants to go home today . 3. Hypotension resolved. 4. Anemia stable   5. Abx per team .   6. If dc home check labs on Monday . And f/u with Dr Abi Orr in 1-2 wks     Physical Exam Performed:     BP (!) 141/82   Pulse 60   Temp 99.2 °F (37.3 °C) (Oral)   Resp 20   Ht 5' 7\" (1.702 m)   Wt 249 lb 6.4 oz (113.1 kg)   SpO2 97%   BMI 39.06 kg/m²       General appearance:  No apparent distress, appears stated age and cooperative. HEENT:  Normal cephalic, atraumatic without obvious deformity. Pupils equal, round, and reactive to light. Extra ocular muscles intact. Conjunctivae/corneas clear.   Neck: Supple, with full range
Skin normal color for race, warm, dry and intact. No evidence of rash.

## 2023-08-20 NOTE — ED PROVIDER NOTE - OBJECTIVE STATEMENT
This patient is a 28 year old woman who presents to the ER c/o sore throat, cough, palpitations, chest pain, SOB, cough and generalized weakness x 2 days.  She denies sick contacts and recent travel.  She has not taken any medication for pain.  Patient reports 10/10 pain in the throat worse with swallowing.

## 2023-08-20 NOTE — ED ADULT TRIAGE NOTE - CHIEF COMPLAINT QUOTE
pt c/o cough, sore throat, palpitation, chest pain, shortness of breath, dizziness and  weakness for 2 days. recent history of tonsilitis. hr at triage 163

## 2023-08-20 NOTE — ED PROVIDER NOTE - PROGRESS NOTE DETAILS
pt signed out to me by Dr. mtz:    Ct was pending which showed pneumomediastinum. pt refusing any recent procedures, states she has been coughing and vomiting last few days. covered w zosyn,. CT chest abdomen pelvis ordered, no perforations seen. likely primary. no indication for GI or ENT at this time. will admit for IV abx and observcaiton

## 2023-08-20 NOTE — ED ADULT NURSE NOTE - NSFALLRISKINTERV_ED_ALL_ED
Assistance OOB with selected safe patient handling equipment if applicable/Assistance with ambulation/Communicate fall risk and risk factors to all staff, patient, and family/Monitor gait and stability/Monitor for mental status changes and reorient to person, place, and time, as needed/Provide visual cue: yellow wristband, yellow gown, etc/Reinforce activity limits and safety measures with patient and family/Toileting schedule using arm’s reach rule for commode and bathroom/Use of alarms - bed, stretcher, chair and/or video monitoring/Call bell, personal items and telephone in reach/Instruct patient to call for assistance before getting out of bed/chair/stretcher/Non-slip footwear applied when patient is off stretcher/[place] to call system/Physically safe environment - no spills, clutter or unnecessary equipment/Purposeful Proactive Rounding/Room/bathroom lighting operational, light cord in reach

## 2023-08-21 DIAGNOSIS — R09.89 OTHER SPECIFIED SYMPTOMS AND SIGNS INVOLVING THE CIRCULATORY AND RESPIRATORY SYSTEMS: ICD-10-CM

## 2023-08-21 DIAGNOSIS — A41.9 SEPSIS, UNSPECIFIED ORGANISM: ICD-10-CM

## 2023-08-21 DIAGNOSIS — J98.2 INTERSTITIAL EMPHYSEMA: ICD-10-CM

## 2023-08-21 DIAGNOSIS — F10.10 ALCOHOL ABUSE, UNCOMPLICATED: ICD-10-CM

## 2023-08-21 DIAGNOSIS — E87.6 HYPOKALEMIA: ICD-10-CM

## 2023-08-21 LAB
ALBUMIN SERPL ELPH-MCNC: 3.9 G/DL — SIGNIFICANT CHANGE UP (ref 3.3–5)
ALBUMIN SERPL ELPH-MCNC: 4.2 G/DL — SIGNIFICANT CHANGE UP (ref 3.3–5)
ALP SERPL-CCNC: 28 U/L — LOW (ref 40–120)
ALP SERPL-CCNC: 31 U/L — LOW (ref 40–120)
ALT FLD-CCNC: 42 U/L — SIGNIFICANT CHANGE UP (ref 12–78)
ALT FLD-CCNC: 45 U/L — SIGNIFICANT CHANGE UP (ref 12–78)
AMPHET UR-MCNC: NEGATIVE — SIGNIFICANT CHANGE UP
ANION GAP SERPL CALC-SCNC: 13 MMOL/L — SIGNIFICANT CHANGE UP (ref 5–17)
ANION GAP SERPL CALC-SCNC: 15 MMOL/L — SIGNIFICANT CHANGE UP (ref 5–17)
AST SERPL-CCNC: 18 U/L — SIGNIFICANT CHANGE UP (ref 15–37)
AST SERPL-CCNC: 26 U/L — SIGNIFICANT CHANGE UP (ref 15–37)
BARBITURATES UR SCN-MCNC: NEGATIVE — SIGNIFICANT CHANGE UP
BENZODIAZ UR-MCNC: NEGATIVE — SIGNIFICANT CHANGE UP
BILIRUB DIRECT SERPL-MCNC: 0.3 MG/DL — SIGNIFICANT CHANGE UP (ref 0–0.3)
BILIRUB INDIRECT FLD-MCNC: 0.8 MG/DL — SIGNIFICANT CHANGE UP (ref 0.2–1)
BILIRUB SERPL-MCNC: 1.1 MG/DL — SIGNIFICANT CHANGE UP (ref 0.2–1.2)
BILIRUB SERPL-MCNC: 1.1 MG/DL — SIGNIFICANT CHANGE UP (ref 0.2–1.2)
BUN SERPL-MCNC: 8 MG/DL — SIGNIFICANT CHANGE UP (ref 7–23)
BUN SERPL-MCNC: 9 MG/DL — SIGNIFICANT CHANGE UP (ref 7–23)
CALCIUM SERPL-MCNC: 9.3 MG/DL — SIGNIFICANT CHANGE UP (ref 8.5–10.1)
CALCIUM SERPL-MCNC: 9.3 MG/DL — SIGNIFICANT CHANGE UP (ref 8.5–10.1)
CHLORIDE SERPL-SCNC: 109 MMOL/L — HIGH (ref 96–108)
CHLORIDE SERPL-SCNC: 109 MMOL/L — HIGH (ref 96–108)
CO2 SERPL-SCNC: 18 MMOL/L — LOW (ref 22–31)
CO2 SERPL-SCNC: 20 MMOL/L — LOW (ref 22–31)
COCAINE METAB.OTHER UR-MCNC: NEGATIVE — SIGNIFICANT CHANGE UP
CREAT SERPL-MCNC: 1 MG/DL — SIGNIFICANT CHANGE UP (ref 0.5–1.3)
CREAT SERPL-MCNC: 1.04 MG/DL — SIGNIFICANT CHANGE UP (ref 0.5–1.3)
EGFR: 74 ML/MIN/1.73M2 — SIGNIFICANT CHANGE UP
EGFR: 78 ML/MIN/1.73M2 — SIGNIFICANT CHANGE UP
ETHANOL SERPL-MCNC: <10 MG/DL — SIGNIFICANT CHANGE UP (ref 0–10)
GLUCOSE SERPL-MCNC: 82 MG/DL — SIGNIFICANT CHANGE UP (ref 70–99)
GLUCOSE SERPL-MCNC: 98 MG/DL — SIGNIFICANT CHANGE UP (ref 70–99)
HCT VFR BLD CALC: 36.6 % — SIGNIFICANT CHANGE UP (ref 34.5–45)
HGB BLD-MCNC: 11.9 G/DL — SIGNIFICANT CHANGE UP (ref 11.5–15.5)
MAGNESIUM SERPL-MCNC: 1.7 MG/DL — SIGNIFICANT CHANGE UP (ref 1.6–2.6)
MCHC RBC-ENTMCNC: 25.2 PG — LOW (ref 27–34)
MCHC RBC-ENTMCNC: 32.5 G/DL — SIGNIFICANT CHANGE UP (ref 32–36)
MCV RBC AUTO: 77.5 FL — LOW (ref 80–100)
METHADONE UR-MCNC: NEGATIVE — SIGNIFICANT CHANGE UP
NRBC # BLD: 0 /100 WBCS — SIGNIFICANT CHANGE UP (ref 0–0)
OPIATES UR-MCNC: NEGATIVE — SIGNIFICANT CHANGE UP
PCP SPEC-MCNC: SIGNIFICANT CHANGE UP
PCP UR-MCNC: NEGATIVE — SIGNIFICANT CHANGE UP
PHOSPHATE SERPL-MCNC: 3.5 MG/DL — SIGNIFICANT CHANGE UP (ref 2.5–4.5)
PLATELET # BLD AUTO: 330 K/UL — SIGNIFICANT CHANGE UP (ref 150–400)
POTASSIUM SERPL-MCNC: 2.9 MMOL/L — CRITICAL LOW (ref 3.5–5.3)
POTASSIUM SERPL-MCNC: 2.9 MMOL/L — CRITICAL LOW (ref 3.5–5.3)
POTASSIUM SERPL-SCNC: 2.9 MMOL/L — CRITICAL LOW (ref 3.5–5.3)
POTASSIUM SERPL-SCNC: 2.9 MMOL/L — CRITICAL LOW (ref 3.5–5.3)
PROT SERPL-MCNC: 7.5 GM/DL — SIGNIFICANT CHANGE UP (ref 6–8.3)
PROT SERPL-MCNC: 7.7 GM/DL — SIGNIFICANT CHANGE UP (ref 6–8.3)
RBC # BLD: 4.72 M/UL — SIGNIFICANT CHANGE UP (ref 3.8–5.2)
RBC # FLD: 19.5 % — HIGH (ref 10.3–14.5)
SALICYLATES SERPL-MCNC: <1.7 MG/DL — LOW (ref 2.8–20)
SODIUM SERPL-SCNC: 142 MMOL/L — SIGNIFICANT CHANGE UP (ref 135–145)
SODIUM SERPL-SCNC: 142 MMOL/L — SIGNIFICANT CHANGE UP (ref 135–145)
THC UR QL: NEGATIVE — SIGNIFICANT CHANGE UP
WBC # BLD: 7.41 K/UL — SIGNIFICANT CHANGE UP (ref 3.8–10.5)
WBC # FLD AUTO: 7.41 K/UL — SIGNIFICANT CHANGE UP (ref 3.8–10.5)

## 2023-08-21 PROCEDURE — 99254 IP/OBS CNSLTJ NEW/EST MOD 60: CPT | Mod: 57

## 2023-08-21 PROCEDURE — 74220 X-RAY XM ESOPHAGUS 1CNTRST: CPT | Mod: 26

## 2023-08-21 PROCEDURE — 74177 CT ABD & PELVIS W/CONTRAST: CPT | Mod: 26,MA

## 2023-08-21 PROCEDURE — 12345: CPT | Mod: NC

## 2023-08-21 PROCEDURE — 99223 1ST HOSP IP/OBS HIGH 75: CPT

## 2023-08-21 PROCEDURE — 71260 CT THORAX DX C+: CPT | Mod: 26,MA

## 2023-08-21 RX ORDER — POTASSIUM CHLORIDE 20 MEQ
10 PACKET (EA) ORAL
Refills: 0 | Status: COMPLETED | OUTPATIENT
Start: 2023-08-21 | End: 2023-08-21

## 2023-08-21 RX ORDER — SODIUM CHLORIDE 9 MG/ML
1000 INJECTION INTRAMUSCULAR; INTRAVENOUS; SUBCUTANEOUS
Refills: 0 | Status: DISCONTINUED | OUTPATIENT
Start: 2023-08-21 | End: 2023-08-23

## 2023-08-21 RX ORDER — PANTOPRAZOLE SODIUM 20 MG/1
40 TABLET, DELAYED RELEASE ORAL
Refills: 0 | Status: DISCONTINUED | OUTPATIENT
Start: 2023-08-21 | End: 2023-08-23

## 2023-08-21 RX ORDER — POTASSIUM CHLORIDE 20 MEQ
40 PACKET (EA) ORAL EVERY 4 HOURS
Refills: 0 | Status: COMPLETED | OUTPATIENT
Start: 2023-08-21 | End: 2023-08-21

## 2023-08-21 RX ORDER — ACETAMINOPHEN 500 MG
650 TABLET ORAL EVERY 6 HOURS
Refills: 0 | Status: DISCONTINUED | OUTPATIENT
Start: 2023-08-21 | End: 2023-08-23

## 2023-08-21 RX ORDER — MAGNESIUM SULFATE 500 MG/ML
2 VIAL (ML) INJECTION ONCE
Refills: 0 | Status: COMPLETED | OUTPATIENT
Start: 2023-08-21 | End: 2023-08-21

## 2023-08-21 RX ORDER — ONDANSETRON 8 MG/1
4 TABLET, FILM COATED ORAL ONCE
Refills: 0 | Status: COMPLETED | OUTPATIENT
Start: 2023-08-21 | End: 2023-08-21

## 2023-08-21 RX ORDER — ONDANSETRON 8 MG/1
4 TABLET, FILM COATED ORAL EVERY 8 HOURS
Refills: 0 | Status: DISCONTINUED | OUTPATIENT
Start: 2023-08-21 | End: 2023-08-23

## 2023-08-21 RX ORDER — POTASSIUM CHLORIDE 20 MEQ
40 PACKET (EA) ORAL ONCE
Refills: 0 | Status: COMPLETED | OUTPATIENT
Start: 2023-08-21 | End: 2023-08-21

## 2023-08-21 RX ORDER — MIRTAZAPINE 45 MG/1
15 TABLET, ORALLY DISINTEGRATING ORAL AT BEDTIME
Refills: 0 | Status: DISCONTINUED | OUTPATIENT
Start: 2023-08-21 | End: 2023-08-23

## 2023-08-21 RX ORDER — LANOLIN ALCOHOL/MO/W.PET/CERES
3 CREAM (GRAM) TOPICAL AT BEDTIME
Refills: 0 | Status: DISCONTINUED | OUTPATIENT
Start: 2023-08-21 | End: 2023-08-23

## 2023-08-21 RX ORDER — BENZOCAINE AND MENTHOL 5; 1 G/100ML; G/100ML
1 LIQUID ORAL
Refills: 0 | Status: DISCONTINUED | OUTPATIENT
Start: 2023-08-21 | End: 2023-08-23

## 2023-08-21 RX ADMIN — MIRTAZAPINE 15 MILLIGRAM(S): 45 TABLET, ORALLY DISINTEGRATING ORAL at 21:51

## 2023-08-21 RX ADMIN — Medication 1 MILLIGRAM(S): at 16:57

## 2023-08-21 RX ADMIN — Medication 25 GRAM(S): at 12:21

## 2023-08-21 RX ADMIN — Medication 100 MILLIEQUIVALENT(S): at 19:47

## 2023-08-21 RX ADMIN — LIDOCAINE 1 PATCH: 4 CREAM TOPICAL at 08:00

## 2023-08-21 RX ADMIN — SODIUM CHLORIDE 70 MILLILITER(S): 9 INJECTION INTRAMUSCULAR; INTRAVENOUS; SUBCUTANEOUS at 20:51

## 2023-08-21 RX ADMIN — Medication 40 MILLIEQUIVALENT(S): at 09:12

## 2023-08-21 RX ADMIN — Medication 0.5 MILLIGRAM(S): at 09:12

## 2023-08-21 RX ADMIN — Medication 1 MILLIGRAM(S): at 01:32

## 2023-08-21 RX ADMIN — Medication 40 MILLIEQUIVALENT(S): at 21:52

## 2023-08-21 RX ADMIN — Medication 40 MILLIEQUIVALENT(S): at 14:21

## 2023-08-21 RX ADMIN — BENZOCAINE AND MENTHOL 1 LOZENGE: 5; 1 LIQUID ORAL at 12:31

## 2023-08-21 RX ADMIN — Medication 100 MILLIEQUIVALENT(S): at 21:51

## 2023-08-21 RX ADMIN — Medication 100 MILLIEQUIVALENT(S): at 20:50

## 2023-08-21 RX ADMIN — PANTOPRAZOLE SODIUM 40 MILLIGRAM(S): 20 TABLET, DELAYED RELEASE ORAL at 05:49

## 2023-08-21 RX ADMIN — Medication 100 MILLIEQUIVALENT(S): at 09:12

## 2023-08-21 RX ADMIN — SODIUM CHLORIDE 70 MILLILITER(S): 9 INJECTION INTRAMUSCULAR; INTRAVENOUS; SUBCUTANEOUS at 05:50

## 2023-08-21 RX ADMIN — Medication 650 MILLIGRAM(S): at 06:03

## 2023-08-21 RX ADMIN — ONDANSETRON 4 MILLIGRAM(S): 8 TABLET, FILM COATED ORAL at 06:03

## 2023-08-21 RX ADMIN — Medication 100 MILLIEQUIVALENT(S): at 11:17

## 2023-08-21 RX ADMIN — Medication 100 MILLIEQUIVALENT(S): at 10:22

## 2023-08-21 RX ADMIN — ONDANSETRON 4 MILLIGRAM(S): 8 TABLET, FILM COATED ORAL at 01:42

## 2023-08-21 RX ADMIN — LIDOCAINE 1 PATCH: 4 CREAM TOPICAL at 07:00

## 2023-08-21 NOTE — PATIENT PROFILE ADULT - NSPROPTRIGHTSUPPORTPERSON_GEN_A_NUR
Clinical Summary

                             Created on: 2020



Khoa Treadwell

External Reference #: [id number]

: 1951

Sex: Male



Demographics





                          Address                   [address] [place]  [address]

 

                          Home Phone                [phone]

 

                          Preferred Language        English

 

                          Marital Status            Unknown

 

                          Anglican Affiliation     Unknown

 

                          Race                      White

 

                          Ethnic Group              Non-





Author





                          Author                    MARCIE [place]

 

                          [facility]

 

                          Address                   Unknown

 

                          Phone                     Unavailable







Support





                Name            Relationship    Address         Phone

 

                    Mrs EVERARDO Treadwell                [address] [place]  [address]                     [phone]







Care Team Providers





                    Care Team Member Name Role                Phone

 

                          PCP                       Unavailable







Allergies

No Known Allergies



Medications





                          End Date                  Status



              Medication   Sig          Dispensed    Refills      Start  



                                         Date  

 

                                                    Active



                 amLODIPine (NORVASC) 5 MG  Take 5 mg by    3                 



                     tablet              mouth 2 (two)       0  



                                         times daily.     

 

                                                    Active



                 carvediloL (COREG) 25 MG  Take 25 mg by   3                 



                     tablet              mouth 2 (two)       0  



                                         times daily.     

 

                                                    Active



                 losartan-hydroCHLOROthiaz  Take 1 tablet   0                 



                     britney (HYZAAR) 50-12.5 mg  by mouth            0  



                           per tablet                daily.     

 

                                                    Active



                 predniSONE (DELTASONE) 10  Take 20 mg by   3                 



                     MG tablet           mouth daily.        0  







Active Problems





Not on file



Encounters





                          Care Team                 Description



                     Date                Type                Specialty  

 

                                        



Sha Malik MD             ESRD (end stage renal disease) on dialys

is ([facility]) 

(Primary Dx); 

Encounter regarding vascular access for dialysis for end-stage renal disease 
([facility]); 

Essential hypertension



                     2020          Video -             Cardiology  



                                         Telemedicine   



after 2019



Social History





                                        Date



                 Tobacco Use     Types           Packs/Day       Years Used 

 

                                         



                                         Former Smoker    

 

    



                                         Smokeless Tobacco: Never   



                                         Used   







 



                           Sex Assigned at Birth     Date Recorded

 

 



                                         Not on file 







                                        Industry



                           Job Start Date            Occupation 

 

                                        Not on file



                           Not on file               Not on file 







                                        Travel End



                           Travel History            Travel Start 

 





                                         No recent travel history available.







Last Filed Vital Signs





                                        Time Taken



                           Vital Sign                Reading 

 

                                        -



                           Blood Pressure            - 

 

                                        -



                           Pulse                     - 

 

                                        -



                           Temperature               - 

 

                                        -



                           Respiratory Rate          - 

 

                                        -



                           Oxygen Saturation         - 

 

                                        -



                           Inhaled Oxygen            - 



                                         Concentration  

 

                                        2020  2:02 PM CDT



                           Weight                    63.5 kg (140 lb) 

 

                                        2020  2:02 PM CDT



                           Height                    167.6 cm (5' 6") 

 

                                        2020  2:02 PM CDT



                           Body Mass Index           22.6 







Plan of Treatment





   



                 Health Maintenance  Due Date        Last Done       Comments

 

   



                           COLON CANCER SCREENING    1951  



                                         COLONOSCOPY   

 

   



                           PNEUMOCOCCAL 65+          2014  



                                         HIGH/HIGHEST RISK (1 of 2   



                                         - PCV13)   

 

   



                           MEDICARE ANNUAL WELLNESS  2016  



                                         (YEAR 2 or FIRST YEAR if   



                                         no IPPE)   

 

   



                           INFLUENZA VACCINE (Season  2020  



                                         Ended)   







Results

Not on fileafter 2019



Insurance





     



            Payer      Benefit    Subscriber ID  Type       Phone      Address



                                         Plan /    



                                         Group    

 

     



                 MEDICARE        MEDICARE A      xxxxxxxxxxx     Medicare  



                                         B    







     



            Guarantor Name  Account    Relation to  Date of    Phone      Betty

g Address



                     Type                Patient             Birth  

 

     



            Khoa Treadwell  Personal/F  Self       1951  [phone] [id number]               ([place] [address] declines

## 2023-08-21 NOTE — H&P ADULT - NSHPPHYSICALEXAM_GEN_ALL_CORE
Constitutional:  Somnolent, NAD   Eyes:  normal conjunctiva.   Neck:  normal venous pressure, no carotid bruit.   Cardiac:  no murmur, no rub, no gallop, Irregularly irregular.   Pulmonary:  clear lung fields, good air entry, no respiratory distress.   Abdomen:  abdomen soft, non-tender, no masses/organomegaly, normal bowel sounds. rren  Musculoskeletal:  normal gait.   Extremities:  no cyanosis, no clubbing, no varicosities, Lower extremity edema 2 b/l.   Skin:  no rash, no skin lesions.   Neurological:  moves all extremities, no focal deficits, normal speech.

## 2023-08-21 NOTE — H&P ADULT - PROBLEM SELECTOR PLAN 1
Patient met sepsis criteria upon ED arrival   Currently afebrile, Vitals stable   S/P CTX   - IVF   - Antibiotics   - Monitor

## 2023-08-21 NOTE — ED ADULT NURSE REASSESSMENT NOTE - NS ED NURSE REASSESS COMMENT FT1
Informed by CT that pt's IV line was not properly flushing/drawing back, and pt was having pain with IV contrast admin. Multiple RNs unsuccessful at placing a new line. US IV line placed by Dr. King, patient tolerated well. Blood return present, line flushes easily. Notified CT that patient is ready for test, awaiting transport now. NAD noted at this time, cardiac and SPO2 monitoring in place.

## 2023-08-21 NOTE — PATIENT PROFILE ADULT - FALL HARM RISK - UNIVERSAL INTERVENTIONS
Detail Level: Detailed
Bed in lowest position, wheels locked, appropriate side rails in place/Call bell, personal items and telephone in reach/Instruct patient to call for assistance before getting out of bed or chair/Non-slip footwear when patient is out of bed/[place] to call system/Physically safe environment - no spills, clutter or unnecessary equipment/Purposeful Proactive Rounding/Room/bathroom lighting operational, light cord in reach

## 2023-08-21 NOTE — PATIENT PROFILE ADULT - FUNCTIONAL ASSESSMENT - BASIC MOBILITY 6.
4-calculated by average/Not able to assess (calculate score using [facility] averaging method)

## 2023-08-21 NOTE — PROGRESS NOTE ADULT - SUBJECTIVE AND OBJECTIVE BOX
Patient is a 28y old  Female who presents with a chief complaint of     INTERVAL HPI/OVERNIGHT EVENTS: No acute events overnight. Patient satting well on RA. Denies SOB and cough.     MEDICATIONS  (STANDING):  pantoprazole    Tablet 40 milliGRAM(s) Oral before breakfast  potassium chloride    Tablet ER 40 milliEquivalent(s) Oral every 4 hours  sodium chloride 0.9%. 1000 milliLiter(s) (70 mL/Hr) IV Continuous <Continuous>    MEDICATIONS  (PRN):  acetaminophen     Tablet .. 650 milliGRAM(s) Oral every 6 hours PRN Temp greater or equal to 38C (100.4F), Mild Pain (1 - 3)  aluminum hydroxide/magnesium hydroxide/simethicone Suspension 30 milliLiter(s) Oral every 4 hours PRN Dyspepsia  benzocaine/menthol Lozenge 1 Lozenge Oral every 3 hours PRN Sore Throat  LORazepam   Injectable 2 milliGRAM(s) IV Push every 4 hours PRN CIWA-Ar score 8 or greater  melatonin 3 milliGRAM(s) Oral at bedtime PRN Insomnia  ondansetron Injectable 4 milliGRAM(s) IV Push every 8 hours PRN Nausea and/or Vomiting      Allergies    No Known Allergies    Intolerances        REVIEW OF SYSTEMS: all negative with exception of above    Vital Signs Last 24 Hrs  T(C): 36.9 (21 Aug 2023 12:48), Max: 37.1 (20 Aug 2023 15:48)  T(F): 98.4 (21 Aug 2023 12:48), Max: 98.8 (20 Aug 2023 15:48)  HR: 100 (21 Aug 2023 12:48) (85 - 130)  BP: 100/69 (21 Aug 2023 12:48) (100/69 - 125/62)  BP(mean): --  RR: 18 (21 Aug 2023 12:48) (15 - 19)  SpO2: 100% (21 Aug 2023 12:48) (97% - 100%)    Parameters below as of 21 Aug 2023 12:48  Patient On (Oxygen Delivery Method): room air        PHYSICAL EXAM:  GENERAL: NAD, well-groomed  NERVOUS SYSTEM:  Alert & Oriented X3, Good concentration; Motor Strength 5/5 B/L upper and lower extremities; DTRs 2+ intact and symmetric  CHEST/LUNG: Clear to percussion bilaterally; No rales, rhonchi, wheezing, or rubs  HEART: Regular rate and rhythm; No murmurs, rubs, or gallops  ABDOMEN: Soft, Nontender, Nondistended; Bowel sounds present  EXTREMITIES:  2+ Peripheral Pulses, No clubbing, cyanosis, or edema      LABS:                        14.6   10.15 )-----------( 472      ( 20 Aug 2023 13:50 )             43.7     08-21    142  |  109<H>  |  9   ----------------------------<  98  2.9<LL>   |  20<L>  |  1.04    Ca    9.3      21 Aug 2023 08:20  Phos  3.5     08-21  Mg     1.7     08-21    TPro  7.5  /  Alb  3.9  /  TBili  1.1  /  DBili  0.3  /  AST  18  /  ALT  42  /  AlkPhos  28<L>  08-21    PT/INR - ( 20 Aug 2023 13:50 )   PT: 11.1 sec;   INR: 0.93 ratio         PTT - ( 20 Aug 2023 13:50 )  PTT:28.1 sec  Urinalysis Basic - ( 21 Aug 2023 08:20 )    Color: x / Appearance: x / SG: x / pH: x  Gluc: 98 mg/dL / Ketone: x  / Bili: x / Urobili: x   Blood: x / Protein: x / Nitrite: x   Leuk Esterase: x / RBC: x / WBC x   Sq Epi: x / Non Sq Epi: x / Bacteria: x      CAPILLARY BLOOD GLUCOSE          RADIOLOGY & ADDITIONAL TESTS:    Imaging Personally Reviewed:  [ ] YES  [ ] NO    Consultant(s) Notes Reviewed:  [ ] YES  [ ] NO    Care Discussed with Consultants/Other Providers [ ] YES  [ ] NO

## 2023-08-21 NOTE — H&P ADULT - PROBLEM SELECTOR PLAN 2
Presents with 2 days Hx of URI symptoms   CT-Chest: Redemonstration of pneumomediastinum tracking into the neck soft tissues  Received Dexamethasone   - Tylenol for fever   - Antibiotics   - Monitor resp status

## 2023-08-21 NOTE — H&P ADULT - NSICDXPASTSURGICALHX_GEN_ALL_CORE_FT
12/29/21 1123   CM/SW Referral Data   Referral Source    Reason for Referral Discharge planning   Informant Daughter   Pertinent Medical Hx   Does patient have an established PCP?  Yes   Discharge Needs   Anticipated D/C needs Home health car PAST SURGICAL HISTORY:  H/O:  x3

## 2023-08-21 NOTE — PATIENT PROFILE ADULT - DEAF OR HARD OF HEARING?
Who is calling:  patient  Reason for Call:  Patient received nebulizer solution but no machine or tubing. Patient reports [facility] does not have anything and reports an order needs to go to a [employer] company not a mail order pharmacy. Please and call patient as soon as possible.   Date of last appointment with primary care: 11/5/2020  Okay to leave a detailed message: Yes       no

## 2023-08-21 NOTE — CONSULT NOTE ADULT - SUBJECTIVE AND OBJECTIVE BOX
HPI:  28 year old female with a history of depression, anxiety  presents to the ED with 2 days history of cough, sore throat, palpitation, chest pain, shortness of breath, dizziness and  weakness. Endorses she was recently diagnosed with  tonsilitis. Upon ED arrival patient was found to be mildly hypotensive, tachycardic, tachypneic and febrile. CT-chest:  Redemonstration of pneumomediastinum tracking into the neck soft tissues. Upon evaluation patient appears somnolent. endorses most of her symptoms have subsided but still experiencing throat pain. Drinks multiple glasses of alcohol daily.  (21 Aug 2023 05:33)    Patient seen and examined at bedside resting comfortably. Endorses history as above and mentions her chest       PAST MEDICAL & SURGICAL HISTORY:  Depression      Anxiety      Other specified postprocedural states      Anemia, unspecified type      H/O:   x3          Review of Systems:  contained within HPI    MEDICATIONS  (STANDING):  pantoprazole    Tablet 40 milliGRAM(s) Oral before breakfast  potassium chloride    Tablet ER 40 milliEquivalent(s) Oral every 4 hours  sodium chloride 0.9%. 1000 milliLiter(s) (70 mL/Hr) IV Continuous <Continuous>    MEDICATIONS  (PRN):  acetaminophen     Tablet .. 650 milliGRAM(s) Oral every 6 hours PRN Temp greater or equal to 38C (100.4F), Mild Pain (1 - 3)  aluminum hydroxide/magnesium hydroxide/simethicone Suspension 30 milliLiter(s) Oral every 4 hours PRN Dyspepsia  benzocaine/menthol Lozenge 1 Lozenge Oral every 3 hours PRN Sore Throat  LORazepam   Injectable 2 milliGRAM(s) IV Push every 4 hours PRN CIWA-Ar score 8 or greater  melatonin 3 milliGRAM(s) Oral at bedtime PRN Insomnia  ondansetron Injectable 4 milliGRAM(s) IV Push every 8 hours PRN Nausea and/or Vomiting      Allergies    No Known Allergies    Intolerances        SOCIAL HISTORY          Smoking: Yes [ ]  No [ ]   ______pk yrs          ETOH  Yes [ ]  No [ ]  Social [ ]          DRUGS:  Yes [ ]  No [ ]  if so what______________    FAMILY HISTORY:  FH: diabetes mellitus (Father, Mother)    Family history of hypertension (Father, Mother)        Vital Signs Last 24 Hrs  T(C): 36.9 (21 Aug 2023 12:48), Max: 37.1 (20 Aug 2023 15:48)  T(F): 98.4 (21 Aug 2023 12:48), Max: 98.8 (20 Aug 2023 15:48)  HR: 100 (21 Aug 2023 12:48) (85 - 130)  BP: 100/69 (21 Aug 2023 12:48) (100/69 - 125/62)  BP(mean): --  RR: 18 (21 Aug 2023 12:48) (15 - 19)  SpO2: 100% (21 Aug 2023 12:48) (97% - 100%)    Parameters below as of 21 Aug 2023 12:48  Patient On (Oxygen Delivery Method): room air        Physical Exam:    General:  Appears stated age, well-groomed, well-nourished, no distress  Eyes: DALIA  HENT: WNL, no JVD  Chest: clear breath sounds  Cardiovascular: Regular rate & rhythm  Abdomen:    Extremities:   Skin: No rash  Musculoskeletal: normal strength  Neuro/Psych: AAO x3      LABS:                        14.6   10.15 )-----------( 472      ( 20 Aug 2023 13:50 )             43.7     08-21    142  |  109<H>  |  9   ----------------------------<  98  2.9<LL>   |  20<L>  |  1.04    Ca    9.3      21 Aug 2023 08:20  Phos  3.5     08-21  Mg     1.7     08-21    TPro  7.5  /  Alb  3.9  /  TBili  1.1  /  DBili  0.3  /  AST  18  /  ALT  42  /  AlkPhos  28<L>  08-21    PT/INR - ( 20 Aug 2023 13:50 )   PT: 11.1 sec;   INR: 0.93 ratio         PTT - ( 20 Aug 2023 13:50 )  PTT:28.1 sec  Urinalysis Basic - ( 21 Aug 2023 08:20 )    Color: x / Appearance: x / SG: x / pH: x  Gluc: 98 mg/dL / Ketone: x  / Bili: x / Urobili: x   Blood: x / Protein: x / Nitrite: x   Leuk Esterase: x / RBC: x / WBC x   Sq Epi: x / Non Sq Epi: x / Bacteria: x        RADIOLOGY & ADDITIONAL STUDIES: HPI:  28 year old female with a history of depression, anxiety  presents to the ED with 2 days history of cough, sore throat, palpitation, chest pain, shortness of breath, dizziness and  weakness. Endorses she was recently diagnosed with  tonsilitis. Upon ED arrival patient was found to be mildly hypotensive, tachycardic, tachypneic and febrile. CT-chest:  Redemonstration of pneumomediastinum tracking into the neck soft tissues. Upon evaluation patient appears somnolent. endorses most of her symptoms have subsided but still experiencing throat pain. Drinks multiple glasses of alcohol daily.  (21 Aug 2023 05:33)    Patient seen and examined at bedside resting comfortably. Endorses history as above and mentions her chest pain began over the weekend after multiple episodes of emesis that began on Thursday (now resolved). She states she decided to come to the ED when she began to experience shortness of breath and felt her heart racing. She admits to feeling warm throughout the weekend but was unable to document her temperature. She admits to fevers while in the ED and continued SOB and CP, but denies chills, N/V/D.       PAST MEDICAL & SURGICAL HISTORY:  Depression      Anxiety      Other specified postprocedural states      Anemia, unspecified type      H/O:   x3          Review of Systems:  contained within HPI    MEDICATIONS  (STANDING):  pantoprazole    Tablet 40 milliGRAM(s) Oral before breakfast  potassium chloride    Tablet ER 40 milliEquivalent(s) Oral every 4 hours  sodium chloride 0.9%. 1000 milliLiter(s) (70 mL/Hr) IV Continuous <Continuous>    MEDICATIONS  (PRN):  acetaminophen     Tablet .. 650 milliGRAM(s) Oral every 6 hours PRN Temp greater or equal to 38C (100.4F), Mild Pain (1 - 3)  aluminum hydroxide/magnesium hydroxide/simethicone Suspension 30 milliLiter(s) Oral every 4 hours PRN Dyspepsia  benzocaine/menthol Lozenge 1 Lozenge Oral every 3 hours PRN Sore Throat  LORazepam   Injectable 2 milliGRAM(s) IV Push every 4 hours PRN CIWA-Ar score 8 or greater  melatonin 3 milliGRAM(s) Oral at bedtime PRN Insomnia  ondansetron Injectable 4 milliGRAM(s) IV Push every 8 hours PRN Nausea and/or Vomiting      Allergies    No Known Allergies    Intolerances        SOCIAL HISTORY          Smoking: Yes [X]  No []   ______pk yrs          ETOH  Yes [X]  No [ ]  Social [ ]          DRUGS:  Yes [ ]  No [X]  if so what______________    FAMILY HISTORY:  FH: diabetes mellitus (Father, Mother)    Family history of hypertension (Father, Mother)    Vital Signs Last 24 Hrs  T(C): 36.9 (21 Aug 2023 12:48), Max: 37.1 (20 Aug 2023 15:48)  T(F): 98.4 (21 Aug 2023 12:48), Max: 98.8 (20 Aug 2023 15:48)  HR: 100 (21 Aug 2023 12:48) (85 - 130)  BP: 100/69 (21 Aug 2023 12:48) (100/69 - 125/62)  BP(mean): --  RR: 18 (21 Aug 2023 12:48) (15 - 19)  SpO2: 100% (21 Aug 2023 12:48) (97% - 100%)    Parameters below as of 21 Aug 2023 12:48  Patient On (Oxygen Delivery Method): room air      Physical Exam:  General:  Appears stated age, well-groomed, well-nourished, no distress  Eyes: DALIA  HENT: WNL, no JVD  Chest: scattered rhonchi throughout, non labored respirations. No areas of crepitus or reproducible chest pain noted   Cardiovascular: Regular rate & rhythm  Abdomen: soft, NT/ND  Extremities: non edematous   Skin: No rash  Musculoskeletal: normal strength  Neuro/Psych: AAO x3      LABS:                        14.6   10.15 )-----------( 472      ( 20 Aug 2023 13:50 )             43.7     08-21    142  |  109<H>  |  9   ----------------------------<  98  2.9<LL>   |  20<L>  |  1.04    Ca    9.3      21 Aug 2023 08:20  Phos  3.5     08-21  Mg     1.7     08-21    TPro  7.5  /  Alb  3.9  /  TBili  1.1  /  DBili  0.3  /  AST  18  /  ALT  42  /  AlkPhos  28<L>  08-21    PT/INR - ( 20 Aug 2023 13:50 )   PT: 11.1 sec;   INR: 0.93 ratio         PTT - ( 20 Aug 2023 13:50 )  PTT:28.1 sec  Urinalysis Basic - ( 21 Aug 2023 08:20 )    Color: x / Appearance: x / SG: x / pH: x  Gluc: 98 mg/dL / Ketone: x  / Bili: x / Urobili: x   Blood: x / Protein: x / Nitrite: x   Leuk Esterase: x / RBC: x / WBC x   Sq Epi: x / Non Sq Epi: x / Bacteria: x    RADIOLOGY & ADDITIONAL STUDIES:  < from: CT Abdomen and Pelvis w/ IV Cont (23 @ 02:12) >  ACC: [id number] EXAM:  CT ABDOMEN AND PELVIS IC   ORDERED BY: CLEOPATRA ANDERSON     ACC: [id number] EXAM:  CT CHEST IC   ORDERED BY: CLEOPATRA ANDERSON     PROCEDURE DATE:  2023          INTERPRETATION:  CLINICAL INFORMATION: Pneumomediastinum.  Vomiting.    COMPARISON: Chest radiograph from 2023.  CTA neck from 2023.    CT abdomen/pelvis from 2023.    CONTRAST/COMPLICATIONS:  IV Contrast: Omnipaque 350  93 cc administered   7 cc discarded  Oral Contrast: NONE  Complications: None reportedat time of study completion    PROCEDURE:  CT of the Chest, Abdomen and Pelvis was performed.  Sagittal and coronal reformats were performed.    FINDINGS:  CHEST:  LUNGS AND LARGE AIRWAYS: Patent central airways. Lungs are clear.  PLEURA: No pleural effusion or pneumothorax.  VESSELS: Within normal limits.  HEART: Heart size is normal. No pericardial effusion.  MEDIASTINUM AND ISAIAH: Redemonstration of pneumomediastinum, not   significantly changed from earlier neck CT.  Small amount of fluid in the   esophagus.  No lymphadenopathy.  CHEST WALL AND LOWER NECK: Extension of pneumomediastinum into the lower   neck soft tissues appears grossly unchanged from earlier neck CT scan.    ABDOMEN AND PELVIS:  LIVER: Diffuse hypoattenuation of the liverparenchyma, compatible with   hepatic steatosis.  BILE DUCTS: Normal caliber.  GALLBLADDER: Nonspecific gallbladder distention.  No CT evidence of   gallstones, gallbladder wall thickening or pericholecystic inflammatory   changes.  SPLEEN: Within normal limits.  PANCREAS: Within normal limits.  ADRENALS: Within normal limits.  KIDNEYS/URETERS: Within normal limits.    BLADDER: Within normal limits.  REPRODUCTIVE ORGANS: Approximately 1.5 cm dominant follicle in the right   ovary.  Within normal limits.    BOWEL: Small hiatal hernia.  No bowel obstruction. Appendix is normal.    Mild cecal diverticulosis.  PERITONEUM: No ascites.  VESSELS: Within normal limits.  RETROPERITONEUM/LYMPH NODES: No lymphadenopathy.  ABDOMINAL WALL: Within normal limits.  BONES: Within normal limits.    IMPRESSION:  CT chest:  1.  Redemonstration of pneumomediastinum tracking into the neck soft   tissues, grossly unchanged from CT neck performed 5 hours and 45 minutes   previously.  2.  Small amount of fluid inthe esophagus.  Correlate clinically for   gastroesophageal reflux disease.  3.  No evidence of pneumonia.  No pleural effusion or pneumothorax.    CT abdomen/pelvis:  1.  No CT evidence of bowel obstruction, active inflammatory process or   intra-abdominal source for infection.  2.  Small hiatal hernia.  3.  Hepatic steatosis.  4.  Nonspecific gallbladder distention.  No CT evidence of cholelithiasis   or acute cholecystitis.  5.  Mild cecal diverticulosis without evidence for acute diverticulitis.      < end of copied text >      < from: Xray Esophagram Single Contrast (23 @ 14:24) >  ACC: [id number] EXAM:  XR ESOPH SNGL CON STUDY   ORDERED BY: MADELEINE REYES     PROCEDURE DATE:  2023          INTERPRETATION:  CLINICAL INDICATION: Pneumomediastinum, evaluate for   esophageal perforation.    FINDINGS:    The patient swallowed barium without difficulty.    The esophagus demonstrates a normal course, caliber and contour. There is   normal esophageal motility and no mucosal irregularities. No reflux was   identified. No evidence of esophageal leak. Spelled contrast is noted on   the patient's gown.    Contrast passes unimpeded through the gastroesophageal junction into the   stomach.    IMPRESSION:    No esophageal leak identified.    A/P  28 year old female with a history of depression, anxiety  presents to the ED with 2 days history of cough, sore throat, palpitation, chest pain, shortness of breath, dizziness and  weakness. Found with pneumomediastinum on CT and no evidence of esophageal leak on esophagram     - IV abx  - ok for clear liquids  - no thoracic intervention at this time  - continue care per primary team  - d/w Dr. Tran

## 2023-08-21 NOTE — H&P ADULT - NSHPLABSRESULTS_GEN_ALL_CORE
14.6   10.15 )-----------( 472      ( 20 Aug 2023 13:50 )             43.7     08-20    141  |  108  |  10  ----------------------------<  113<H>  3.0<L>   |  14<L>  |  1.22    Ca    9.3      20 Aug 2023 20:29  Mg     1.6     08-20    TPro  9.7<H>  /  Alb  5.1<H>  /  TBili  1.6<H>  /  DBili  x   /  AST  28  /  ALT  59  /  AlkPhos  39<L>  08-20    PT/INR - ( 20 Aug 2023 13:50 )   PT: 11.1 sec;   INR: 0.93 ratio         PTT - ( 20 Aug 2023 13:50 )  PTT:28.1 sec  Urinalysis Basic - ( 20 Aug 2023 20:29 )    Color: x / Appearance: x / SG: x / pH: x  Gluc: 113 mg/dL / Ketone: x  / Bili: x / Urobili: x   Blood: x / Protein: x / Nitrite: x   Leuk Esterase: x / RBC: x / WBC x   Sq Epi: x / Non Sq Epi: x / Bacteria: x      CT chest:  1. Redemonstration of pneumomediastinum tracking into the neck soft tissues, grossly unchanged from CT neck performed 5 hours and 45 minutes previously.  2. Small amount of fluid in the esophagus. Correlate clinically for gastroesophageal reflux disease.  3. No evidence of pneumonia. No pleural effusion or pneumothorax.    CT abdomen/pelvis:  1. No CT evidence of bowel obstruction, active inflammatory process or intra-abdominal source for infection.  2. Small hiatal hernia.  3. Hepatic steatosis.  4. Nonspecific gallbladder distention. No CT evidence of cholelithiasis or acute cholecystitis.  5. Mild cecal diverticulosis without evidence for acute diverticulitis.

## 2023-08-21 NOTE — CONSULT NOTE ADULT - NS ATTEND AMEND GEN_ALL_CORE FT
patient with pneumomediastinum. recommend barium swallow to rule out esophageal perf. NPO and abx in meantime

## 2023-08-21 NOTE — PATIENT PROFILE ADULT - NSTOBACCO TYPE_GEN_A_CORE_RD
Arava Counseling:  Patient counseled regarding adverse effects of Arava including but not limited to nausea, vomiting, abnormalities in liver function tests. Patients may develop mouth sores, rash, diarrhea, and abnormalities in blood counts. The patient understands that monitoring is required including LFTs and blood counts.  There is a rare possibility of scarring of the liver and lung problems that can occur when taking methotrexate. Persistent nausea, loss of appetite, pale stools, dark urine, cough, and shortness of breath should be reported immediately. Patient advised to discontinue Arava treatment and consult with a physician prior to attempting conception. The patient will have to undergo a treatment to eliminate Arava from the body prior to conception. Clindamycin Counseling: I counseled the patient regarding use of clindamycin as an antibiotic for prophylactic and/or therapeutic purposes. Clindamycin is active against numerous classes of bacteria, including skin bacteria. Side effects may include nausea, diarrhea, gastrointestinal upset, rash, hives, yeast infections, and in rare cases, colitis. Humira Pregnancy And Lactation Text: This medication is Pregnancy Category B and is considered safe during pregnancy. It is unknown if this medication is excreted in breast milk. Arava Pregnancy And Lactation Text: This medication is Pregnancy Category X and is absolutely contraindicated during pregnancy. It is unknown if it is excreted in breast milk. Humira Counseling:  I discussed with the patient the risks of adalimumab including but not limited to myelosuppression, immunosuppression, autoimmune hepatitis, demyelinating diseases, lymphoma, and serious infections.  The patient understands that monitoring is required including a PPD at baseline and must alert us or the primary physician if symptoms of infection or other concerning signs are noted. Hydroxychloroquine Pregnancy And Lactation Text: This medication has been shown to cause fetal harm but it isn't assigned a Pregnancy Risk Category. There are small amounts excreted in breast milk. Azathioprine Pregnancy And Lactation Text: This medication is Pregnancy Category D and isn't considered safe during pregnancy. It is unknown if this medication is excreted in breast milk. Xolair Counseling:  Patient informed of potential adverse effects including but not limited to fever, muscle aches, rash and allergic reactions.  The patient verbalized understanding of the proper use and possible adverse effects of Xolair.  All of the patient's questions and concerns were addressed. Sarecycline Counseling: Patient advised regarding possible photosensitivity and discoloration of the teeth, skin, lips, tongue and gums.  Patient instructed to avoid sunlight, if possible.  When exposed to sunlight, patients should wear protective clothing, sunglasses, and sunscreen.  The patient was instructed to call the office immediately if the following severe adverse effects occur:  hearing changes, easy bruising/bleeding, severe headache, or vision changes.  The patient verbalized understanding of the proper use and possible adverse effects of sarecycline.  All of the patient's questions and concerns were addressed. Minoxidil Counseling: Minoxidil is a topical medication which can increase blood flow where it is applied. It is uncertain how this medication increases hair growth. Side effects are uncommon and include stinging and allergic reactions. Cigarettes Sski Pregnancy And Lactation Text: This medication is Pregnancy Category D and isn't considered safe during pregnancy. It is excreted in breast milk. Picato Pregnancy And Lactation Text: This medication is Pregnancy Category C. It is unknown if this medication is excreted in breast milk. Solaraze Pregnancy And Lactation Text: This medication is Pregnancy Category B and is considered safe. There is some data to suggest avoiding during the third trimester. It is unknown if this medication is excreted in breast milk. Skyrizi Counseling: I discussed with the patient the risks of risankizumab-rzaa including but not limited to immunosuppression, and serious infections.  The patient understands that monitoring is required including a PPD at baseline and must alert us or the primary physician if symptoms of infection or other concerning signs are noted. Simponi Pregnancy And Lactation Text: The risk during pregnancy and breastfeeding is uncertain with this medication. Isotretinoin Pregnancy And Lactation Text: This medication is Pregnancy Category X and is considered extremely dangerous during pregnancy. It is unknown if it is excreted in breast milk. Cyclosporine Pregnancy And Lactation Text: This medication is Pregnancy Category C and it isn't know if it is safe during pregnancy. This medication is excreted in breast milk. Minocycline Counseling: Patient advised regarding possible photosensitivity and discoloration of the teeth, skin, lips, tongue and gums.  Patient instructed to avoid sunlight, if possible.  When exposed to sunlight, patients should wear protective clothing, sunglasses, and sunscreen.  The patient was instructed to call the office immediately if the following severe adverse effects occur:  hearing changes, easy bruising/bleeding, severe headache, or vision changes.  The patient verbalized understanding of the proper use and possible adverse effects of minocycline.  All of the patient's questions and concerns were addressed. Colchicine Pregnancy And Lactation Text: This medication is Pregnancy Category C and isn't considered safe during pregnancy. It is excreted in breast milk. Eucrisa Counseling: Patient may experience a mild burning sensation during topical application. Eucrisa is not approved in children less than 2 years of age. Hydroquinone Counseling:  Patient advised that medication may result in skin irritation, lightening (hypopigmentation), dryness, and burning.  In the event of skin irritation, the patient was advised to reduce the amount of the drug applied or use it less frequently.  Rarely, spots that are treated with hydroquinone can become darker (pseudoochronosis).  Should this occur, patient instructed to stop medication and call the office. The patient verbalized understanding of the proper use and possible adverse effects of hydroquinone.  All of the patient's questions and concerns were addressed. Topical Clindamycin Pregnancy And Lactation Text: This medication is Pregnancy Category B and is considered safe during pregnancy. It is unknown if it is excreted in breast milk. Benzoyl Peroxide Pregnancy And Lactation Text: This medication is Pregnancy Category C. It is unknown if benzoyl peroxide is excreted in breast milk. Stelara Counseling:  I discussed with the patient the risks of ustekinumab including but not limited to immunosuppression, malignancy, posterior leukoencephalopathy syndrome, and serious infections.  The patient understands that monitoring is required including a PPD at baseline and must alert us or the primary physician if symptoms of infection or other concerning signs are noted. Nsaids Counseling: NSAID Counseling: I discussed with the patient that NSAIDs should be taken with food. Prolonged use of NSAIDs can result in the development of stomach ulcers.  Patient advised to stop taking NSAIDs if abdominal pain occurs.  The patient verbalized understanding of the proper use and possible adverse effects of NSAIDs.  All of the patient's questions and concerns were addressed. Dupixent Counseling: I discussed with the patient the risks of dupilumab including but not limited to eye infection and irritation, cold sores, injection site reactions, worsening of asthma, allergic reactions and increased risk of parasitic infection.  Live vaccines should be avoided while taking dupilumab. Dupilumab will also interact with certain medications such as warfarin and cyclosporine. The patient understands that monitoring is required and they must alert us or the primary physician if symptoms of infection or other concerning signs are noted. Erivedge Counseling- I discussed with the patient the risks of Erivedge including but not limited to nausea, vomiting, diarrhea, constipation, weight loss, changes in the sense of taste, decreased appetite, muscle spasms, and hair loss.  The patient verbalized understanding of the proper use and possible adverse effects of Erivedge.  All of the patient's questions and concerns were addressed. Sarecycline Pregnancy And Lactation Text: This medication is Pregnancy Category D and not consider safe during pregnancy. It is also excreted in breast milk. Fluconazole Pregnancy And Lactation Text: This medication is Pregnancy Category C and it isn't know if it is safe during pregnancy. It is also excreted in breast milk. Gabapentin Counseling: I discussed with the patient the risks of gabapentin including but not limited to dizziness, somnolence, fatigue and ataxia. Clofazimine Counseling:  I discussed with the patient the risks of clofazimine including but not limited to skin and eye pigmentation, liver damage, nausea/vomiting, gastrointestinal bleeding and allergy. Imiquimod Counseling:  I discussed with the patient the risks of imiquimod including but not limited to erythema, scaling, itching, weeping, crusting, and pain.  Patient understands that the inflammatory response to imiquimod is variable from person to person and was educated regarded proper titration schedule.  If flu-like symptoms develop, patient knows to discontinue the medication and contact us. High Dose Vitamin A Pregnancy And Lactation Text: High dose vitamin A therapy is contraindicated during pregnancy and breast feeding. Hydroxyzine Pregnancy And Lactation Text: This medication is not safe during pregnancy and should not be taken. It is also excreted in breast milk and breast feeding isn't recommended. Solaraze Counseling:  I discussed with the patient the risks of Solaraze including but not limited to erythema, scaling, itching, weeping, crusting, and pain. Cimzia Counseling:  I discussed with the patient the risks of Cimzia including but not limited to immunosuppression, allergic reactions and infections.  The patient understands that monitoring is required including a PPD at baseline and must alert us or the primary physician if symptoms of infection or other concerning signs are noted. Cimzia Pregnancy And Lactation Text: This medication crosses the placenta but can be considered safe in certain situations. Cimzia may be excreted in breast milk. Azithromycin Counseling:  I discussed with the patient the risks of azithromycin including but not limited to GI upset, allergic reaction, drug rash, diarrhea, and yeast infections. Cephalexin Pregnancy And Lactation Text: This medication is Pregnancy Category B and considered safe during pregnancy.  It is also excreted in breast milk but can be used safely for shorter doses. Siliq Counseling:  I discussed with the patient the risks of Siliq including but not limited to new or worsening depression, suicidal thoughts and behavior, immunosuppression, malignancy, posterior leukoencephalopathy syndrome, and serious infections.  The patient understands that monitoring is required including a PPD at baseline and must alert us or the primary physician if symptoms of infection or other concerning signs are noted. There is also a special program designed to monitor depression which is required with Siliq. Tazorac Pregnancy And Lactation Text: This medication is not safe during pregnancy. It is unknown if this medication is excreted in breast milk. Enbrel Counseling:  I discussed with the patient the risks of etanercept including but not limited to myelosuppression, immunosuppression, autoimmune hepatitis, demyelinating diseases, lymphoma, and infections.  The patient understands that monitoring is required including a PPD at baseline and must alert us or the primary physician if symptoms of infection or other concerning signs are noted. Eucrisa Pregnancy And Lactation Text: This medication has not been assigned a Pregnancy Risk Category but animal studies failed to show danger with the topical medication. It is unknown if the medication is excreted in breast milk. Griseofulvin Pregnancy And Lactation Text: This medication is Pregnancy Category X and is known to cause serious birth defects. It is unknown if this medication is excreted in breast milk but breast feeding should be avoided. Bexarotene Pregnancy And Lactation Text: This medication is Pregnancy Category X and should not be given to women who are pregnant or may become pregnant. This medication should not be used if you are breast feeding. Cephalexin Counseling: I counseled the patient regarding use of cephalexin as an antibiotic for prophylactic and/or therapeutic purposes. Cephalexin (commonly prescribed under brand name Keflex) is a cephalosporin antibiotic which is active against numerous classes of bacteria, including most skin bacteria. Side effects may include nausea, diarrhea, gastrointestinal upset, rash, hives, yeast infections, and in rare cases, hepatitis, kidney disease, seizures, fever, confusion, neurologic symptoms, and others. Patients with severe allergies to penicillin medications are cautioned that there is about a 10% incidence of cross-reactivity with cephalosporins. When possible, patients with penicillin allergies should use alternatives to cephalosporins for antibiotic therapy. Itraconazole Counseling:  I discussed with the patient the risks of itraconazole including but not limited to liver damage, nausea/vomiting, neuropathy, and severe allergy.  The patient understands that this medication is best absorbed when taken with acidic beverages such as non-diet cola or ginger ale.  The patient understands that monitoring is required including baseline LFTs and repeat LFTs at intervals.  The patient understands that they are to contact us or the primary physician if concerning signs are noted. Spironolactone Counseling: Patient advised regarding risks of diarrhea, abdominal pain, hyperkalemia, birth defects (for female patients), liver toxicity and renal toxicity. The patient may need blood work to monitor liver and kidney function and potassium levels while on therapy. The patient verbalized understanding of the proper use and possible adverse effects of spironolactone.  All of the patient's questions and concerns were addressed. Metronidazole Counseling:  I discussed with the patient the risks of metronidazole including but not limited to seizures, nausea/vomiting, a metallic taste in the mouth, nausea/vomiting and severe allergy. Doxycycline Counseling:  Patient counseled regarding possible photosensitivity and increased risk for sunburn.  Patient instructed to avoid sunlight, if possible.  When exposed to sunlight, patients should wear protective clothing, sunglasses, and sunscreen.  The patient was instructed to call the office immediately if the following severe adverse effects occur:  hearing changes, easy bruising/bleeding, severe headache, or vision changes.  The patient verbalized understanding of the proper use and possible adverse effects of doxycycline.  All of the patient's questions and concerns were addressed. High Dose Vitamin A Counseling: Side effects reviewed, pt to contact office should one occur. Doxepin Counseling:  Patient advised that the medication is sedating and not to drive a car after taking this medication. Patient informed of potential adverse effects including but not limited to dry mouth, urinary retention, and blurry vision.  The patient verbalized understanding of the proper use and possible adverse effects of doxepin.  All of the patient's questions and concerns were addressed. Prednisone Counseling:  I discussed with the patient the risks of prolonged use of prednisone including but not limited to weight gain, insomnia, osteoporosis, mood changes, diabetes, susceptibility to infection, glaucoma and high blood pressure.  In cases where prednisone use is prolonged, patients should be monitored with blood pressure checks, serum glucose levels and an eye exam.  Additionally, the patient may need to be placed on GI prophylaxis, PCP prophylaxis, and calcium and vitamin D supplementation and/or a bisphosphonate.  The patient verbalized understanding of the proper use and the possible adverse effects of prednisone.  All of the patient's questions and concerns were addressed. Drysol Counseling:  I discussed with the patient the risks of drysol/aluminum chloride including but not limited to skin rash, itching, irritation, burning. Protopic Counseling: Patient may experience a mild burning sensation during topical application. Protopic is not approved in children less than 2 years of age. There have been case reports of hematologic and skin malignancies in patients using topical calcineurin inhibitors although causality is questionable. Xeljanz Counseling: I discussed with the patient the risks of Xeljanz therapy including increased risk of infection, liver issues, headache, diarrhea, or cold symptoms. Live vaccines should be avoided. They were instructed to call if they have any problems. Cellcept Counseling:  I discussed with the patient the risks of mycophenolate mofetil including but not limited to infection/immunosuppression, GI upset, hypokalemia, hypercholesterolemia, bone marrow suppression, lymphoproliferative disorders, malignancy, GI ulceration/bleed/perforation, colitis, interstitial lung disease, kidney failure, progressive multifocal leukoencephalopathy, and birth defects.  The patient understands that monitoring is required including a baseline creatinine and regular CBC testing. In addition, patient must alert us immediately if symptoms of infection or other concerning signs are noted. Ivermectin Pregnancy And Lactation Text: This medication is Pregnancy Category C and it isn't known if it is safe during pregnancy. It is also excreted in breast milk. Zyclara Counseling:  I discussed with the patient the risks of imiquimod including but not limited to erythema, scaling, itching, weeping, crusting, and pain.  Patient understands that the inflammatory response to imiquimod is variable from person to person and was educated regarded proper titration schedule.  If flu-like symptoms develop, patient knows to discontinue the medication and contact us. Cyclosporine Counseling:  I discussed with the patient the risks of cyclosporine including but not limited to hypertension, gingival hyperplasia,myelosuppression, immunosuppression, liver damage, kidney damage, neurotoxicity, lymphoma, and serious infections. The patient understands that monitoring is required including baseline blood pressure, CBC, CMP, lipid panel and uric acid, and then 1-2 times monthly CMP and blood pressure. Methotrexate Counseling:  Patient counseled regarding adverse effects of methotrexate including but not limited to nausea, vomiting, abnormalities in liver function tests. Patients may develop mouth sores, rash, diarrhea, and abnormalities in blood counts. The patient understands that monitoring is required including LFT's and blood counts.  There is a rare possibility of scarring of the liver and lung problems that can occur when taking methotrexate. Persistent nausea, loss of appetite, pale stools, dark urine, cough, and shortness of breath should be reported immediately. Patient advised to discontinue methotrexate treatment at least three months before attempting to become pregnant.  I discussed the need for folate supplements while taking methotrexate.  These supplements can decrease side effects during methotrexate treatment. The patient verbalized understanding of the proper use and possible adverse effects of methotrexate.  All of the patient's questions and concerns were addressed. Birth Control Pills Counseling: Birth Control Pill Counseling: I discussed with the patient the potential side effects of OCPs including but not limited to increased risk of stroke, heart attack, thrombophlebitis, deep venous thrombosis, hepatic adenomas, breast changes, GI upset, headaches, and depression.  The patient verbalized understanding of the proper use and possible adverse effects of OCPs. All of the patient's questions and concerns were addressed. Oxybutynin Counseling:  I discussed with the patient the risks of oxybutynin including but not limited to skin rash, drowsiness, dry mouth, difficulty urinating, and blurred vision. Xolair Pregnancy And Lactation Text: This medication is Pregnancy Category B and is considered safe during pregnancy. This medication is excreted in breast milk. Odomzo Counseling- I discussed with the patient the risks of Odomzo including but not limited to nausea, vomiting, diarrhea, constipation, weight loss, changes in the sense of taste, decreased appetite, muscle spasms, and hair loss.  The patient verbalized understanding of the proper use and possible adverse effects of Odomzo.  All of the patient's questions and concerns were addressed. Benzoyl Peroxide Counseling: Patient counseled that medicine may cause skin irritation and bleach clothing.  In the event of skin irritation, the patient was advised to reduce the amount of the drug applied or use it less frequently.   The patient verbalized understanding of the proper use and possible adverse effects of benzoyl peroxide.  All of the patient's questions and concerns were addressed. Spironolactone Pregnancy And Lactation Text: This medication can cause feminization of the male fetus and should be avoided during pregnancy. The active metabolite is also found in breast milk. Drysol Pregnancy And Lactation Text: This medication is considered safe during pregnancy and breast feeding. Fluconazole Counseling:  Patient counseled regarding adverse effects of fluconazole including but not limited to headache, diarrhea, nausea, upset stomach, liver function test abnormalities, taste disturbance, and stomach pain.  There is a rare possibility of liver failure that can occur when taking fluconazole.  The patient understands that monitoring of LFTs and kidney function test may be required, especially at baseline. The patient verbalized understanding of the proper use and possible adverse effects of fluconazole.  All of the patient's questions and concerns were addressed. Bactrim Counseling:  I discussed with the patient the risks of sulfa antibiotics including but not limited to GI upset, allergic reaction, drug rash, diarrhea, dizziness, photosensitivity, and yeast infections.  Rarely, more serious reactions can occur including but not limited to aplastic anemia, agranulocytosis, methemoglobinemia, blood dyscrasias, liver or kidney failure, lung infiltrates or desquamative/blistering drug rashes. Erythromycin Counseling:  I discussed with the patient the risks of erythromycin including but not limited to GI upset, allergic reaction, drug rash, diarrhea, increase in liver enzymes, and yeast infections. Acitretin Counseling:  I discussed with the patient the risks of acitretin including but not limited to hair loss, dry lips/skin/eyes, liver damage, hyperlipidemia, depression/suicidal ideation, photosensitivity.  Serious rare side effects can include but are not limited to pancreatitis, pseudotumor cerebri, bony changes, clot formation/stroke/heart attack.  Patient understands that alcohol is contraindicated since it can result in liver toxicity and significantly prolong the elimination of the drug by many years. Bexarotene Counseling:  I discussed with the patient the risks of bexarotene including but not limited to hair loss, dry lips/skin/eyes, liver abnormalities, hyperlipidemia, pancreatitis, depression/suicidal ideation, photosensitivity, drug rash/allergic reactions, hypothyroidism, anemia, leukopenia, infection, cataracts, and teratogenicity.  Patient understands that they will need regular blood tests to check lipid profile, liver function tests, white blood cell count, thyroid function tests and pregnancy test if applicable. Clindamycin Pregnancy And Lactation Text: This medication can be used in pregnancy if certain situations. Clindamycin is also present in breast milk. Rituxan Counseling:  I discussed with the patient the risks of Rituxan infusions. Side effects can include infusion reactions, severe drug rashes including mucocutaneous reactions, reactivation of latent hepatitis and other infections and rarely progressive multifocal leukoencephalopathy.  All of the patient's questions and concerns were addressed. Infliximab Counseling:  I discussed with the patient the risks of infliximab including but not limited to myelosuppression, immunosuppression, autoimmune hepatitis, demyelinating diseases, lymphoma, and serious infections.  The patient understands that monitoring is required including a PPD at baseline and must alert us or the primary physician if symptoms of infection or other concerning signs are noted. Colchicine Counseling:  Patient counseled regarding adverse effects including but not limited to stomach upset (nausea, vomiting, stomach pain, or diarrhea).  Patient instructed to limit alcohol consumption while taking this medication.  Colchicine may reduce blood counts especially with prolonged use.  The patient understands that monitoring of kidney function and blood counts may be required, especially at baseline. The patient verbalized understanding of the proper use and possible adverse effects of colchicine.  All of the patient's questions and concerns were addressed. Dapsone Counseling: I discussed with the patient the risks of dapsone including but not limited to hemolytic anemia, agranulocytosis, rashes, methemoglobinemia, kidney failure, peripheral neuropathy, headaches, GI upset, and liver toxicity.  Patients who start dapsone require monitoring including baseline LFTs and weekly CBCs for the first month, then every month thereafter.  The patient verbalized understanding of the proper use and possible adverse effects of dapsone.  All of the patient's questions and concerns were addressed. Methotrexate Pregnancy And Lactation Text: This medication is Pregnancy Category X and is known to cause fetal harm. This medication is excreted in breast milk. Cosentyx Counseling:  I discussed with the patient the risks of Cosentyx including but not limited to worsening of Crohn's disease, immunosuppression, allergic reactions and infections.  The patient understands that monitoring is required including a PPD at baseline and must alert us or the primary physician if symptoms of infection or other concerning signs are noted. Isotretinoin Counseling: Patient should get monthly blood tests, not donate blood, not drive at night if vision affected, not share medication, and not undergo elective surgery for 6 months after tx completed. Side effects reviewed, pt to contact office should one occur. Xelsantosz Pregnancy And Lactation Text: This medication is Pregnancy Category D and is not considered safe during pregnancy.  The risk during breast feeding is also uncertain. Ketoconazole Pregnancy And Lactation Text: This medication is Pregnancy Category C and it isn't know if it is safe during pregnancy. It is also excreted in breast milk and breast feeding isn't recommended. Carac Pregnancy And Lactation Text: This medication is Pregnancy Category X and contraindicated in pregnancy and in women who may become pregnant. It is unknown if this medication is excreted in breast milk. Topical Clindamycin Counseling: Patient counseled that this medication may cause skin irritation or allergic reactions.  In the event of skin irritation, the patient was advised to reduce the amount of the drug applied or use it less frequently.   The patient verbalized understanding of the proper use and possible adverse effects of clindamycin.  All of the patient's questions and concerns were addressed. Tremfya Counseling: I discussed with the patient the risks of guselkumab including but not limited to immunosuppression, serious infections, worsening of inflammatory bowel disease and drug reactions.  The patient understands that monitoring is required including a PPD at baseline and must alert us or the primary physician if symptoms of infection or other concerning signs are noted. Topical Sulfur Applications Counseling: Topical Sulfur Counseling: Patient counseled that this medication may cause skin irritation or allergic reactions.  In the event of skin irritation, the patient was advised to reduce the amount of the drug applied or use it less frequently.   The patient verbalized understanding of the proper use and possible adverse effects of topical sulfur application.  All of the patient's questions and concerns were addressed. Detail Level: Zone Use Enhanced Medication Counseling?: No Cimetidine Counseling:  I discussed with the patient the risks of Cimetidine including but not limited to gynecomastia, headache, diarrhea, nausea, drowsiness, arrhythmias, pancreatitis, skin rashes, psychosis, bone marrow suppression and kidney toxicity. Doxycycline Pregnancy And Lactation Text: This medication is Pregnancy Category D and not consider safe during pregnancy. It is also excreted in breast milk but is considered safe for shorter treatment courses. Tazorac Counseling:  Patient advised that medication is irritating and drying.  Patient may need to apply sparingly and wash off after an hour before eventually leaving it on overnight.  The patient verbalized understanding of the proper use and possible adverse effects of tazorac.  All of the patient's questions and concerns were addressed. Taltz Counseling: I discussed with the patient the risks of ixekizumab including but not limited to immunosuppression, serious infections, worsening of inflammatory bowel disease and drug reactions.  The patient understands that monitoring is required including a PPD at baseline and must alert us or the primary physician if symptoms of infection or other concerning signs are noted. Cyclophosphamide Pregnancy And Lactation Text: This medication is Pregnancy Category D and it isn't considered safe during pregnancy. This medication is excreted in breast milk. Griseofulvin Counseling:  I discussed with the patient the risks of griseofulvin including but not limited to photosensitivity, cytopenia, liver damage, nausea/vomiting and severe allergy.  The patient understands that this medication is best absorbed when taken with a fatty meal (e.g., ice cream or french fries). Otezla Pregnancy And Lactation Text: This medication is Pregnancy Category C and it isn't known if it is safe during pregnancy. It is unknown if it is excreted in breast milk. Quinolones Counseling:  I discussed with the patient the risks of fluoroquinolones including but not limited to GI upset, allergic reaction, drug rash, diarrhea, dizziness, photosensitivity, yeast infections, liver function test abnormalities, tendonitis/tendon rupture. Glycopyrrolate Counseling:  I discussed with the patient the risks of glycopyrrolate including but not limited to skin rash, drowsiness, dry mouth, difficulty urinating, and blurred vision. Azathioprine Counseling:  I discussed with the patient the risks of azathioprine including but not limited to myelosuppression, immunosuppression, hepatotoxicity, lymphoma, and infections.  The patient understands that monitoring is required including baseline LFTs, Creatinine, possible TPMP genotyping and weekly CBCs for the first month and then every 2 weeks thereafter.  The patient verbalized understanding of the proper use and possible adverse effects of azathioprine.  All of the patient's questions and concerns were addressed. Hydroxychloroquine Counseling:  I discussed with the patient that a baseline ophthalmologic exam is needed at the start of therapy and every year thereafter while on therapy. A CBC may also be warranted for monitoring.  The side effects of this medication were discussed with the patient, including but not limited to agranulocytosis, aplastic anemia, seizures, rashes, retinopathy, and liver toxicity. Patient instructed to call the office should any adverse effect occur.  The patient verbalized understanding of the proper use and possible adverse effects of Plaquenil.  All the patient's questions and concerns were addressed. Elidel Counseling: Patient may experience a mild burning sensation during topical application. Elidel is not approved in children less than 2 years of age. There have been case reports of hematologic and skin malignancies in patients using topical calcineurin inhibitors although causality is questionable. SSKI Counseling:  I discussed with the patient the risks of SSKI including but not limited to thyroid abnormalities, metallic taste, GI upset, fever, headache, acne, arthralgias, paraesthesias, lymphadenopathy, easy bleeding, arrhythmias, and allergic reaction. Bactrim Pregnancy And Lactation Text: This medication is Pregnancy Category D and is known to cause fetal risk.  It is also excreted in breast milk. Acitretin Pregnancy And Lactation Text: This medication is Pregnancy Category X and should not be given to women who are pregnant or may become pregnant in the future. This medication is excreted in breast milk. Cyclophosphamide Counseling:  I discussed with the patient the risks of cyclophosphamide including but not limited to hair loss, hormonal abnormalities, decreased fertility, abdominal pain, diarrhea, nausea and vomiting, bone marrow suppression and infection. The patient understands that monitoring is required while taking this medication. Thalidomide Counseling: I discussed with the patient the risks of thalidomide including but not limited to birth defects, anxiety, weakness, chest pain, dizziness, cough and severe allergy. Rituxan Pregnancy And Lactation Text: This medication is Pregnancy Category C and it isn't know if it is safe during pregnancy. It is unknown if this medication is excreted in breast milk but similar antibodies are known to be excreted. Azithromycin Pregnancy And Lactation Text: This medication is considered safe during pregnancy and is also secreted in breast milk. Cimetidine Pregnancy And Lactation Text: This medication is Pregnancy Category B and is considered safe during pregnancy. It is also excreted in breast milk and breast feeding isn't recommended. Simponi Counseling:  I discussed with the patient the risks of golimumab including but not limited to myelosuppression, immunosuppression, autoimmune hepatitis, demyelinating diseases, lymphoma, and serious infections.  The patient understands that monitoring is required including a PPD at baseline and must alert us or the primary physician if symptoms of infection or other concerning signs are noted. Topical Retinoid counseling:  Patient advised to apply a pea-sized amount only at bedtime and wait 30 minutes after washing their face before applying.  If too drying, patient may add a non-comedogenic moisturizer. The patient verbalized understanding of the proper use and possible adverse effects of retinoids.  All of the patient's questions and concerns were addressed. Birth Control Pills Pregnancy And Lactation Text: This medication should be avoided if pregnant and for the first 30 days post-partum. Ilumya Counseling: I discussed with the patient the risks of tildrakizumab including but not limited to immunosuppression, malignancy, posterior leukoencephalopathy syndrome, and serious infections.  The patient understands that monitoring is required including a PPD at baseline and must alert us or the primary physician if symptoms of infection or other concerning signs are noted. Otezla Counseling: The side effects of Otezla were discussed with the patient, including but not limited to worsening or new depression, weight loss, diarrhea, nausea, upper respiratory tract infection, and headache. Patient instructed to call the office should any adverse effect occur.  The patient verbalized understanding of the proper use and possible adverse effects of Otezla.  All the patient's questions and concerns were addressed. Ketoconazole Counseling:   Patient counseled regarding improving absorption with orange juice.  Adverse effects include but are not limited to breast enlargement, headache, diarrhea, nausea, upset stomach, liver function test abnormalities, taste disturbance, and stomach pain.  There is a rare possibility of liver failure that can occur when taking ketoconazole. The patient understands that monitoring of LFTs may be required, especially at baseline. The patient verbalized understanding of the proper use and possible adverse effects of ketoconazole.  All of the patient's questions and concerns were addressed. Carac Counseling:  I discussed with the patient the risks of Carac including but not limited to erythema, scaling, itching, weeping, crusting, and pain. 5-Fu Counseling: 5-Fluorouracil Counseling:  I discussed with the patient the risks of 5-fluorouracil including but not limited to erythema, scaling, itching, weeping, crusting, and pain. Tetracycline Counseling: Patient counseled regarding possible photosensitivity and increased risk for sunburn.  Patient instructed to avoid sunlight, if possible.  When exposed to sunlight, patients should wear protective clothing, sunglasses, and sunscreen.  The patient was instructed to call the office immediately if the following severe adverse effects occur:  hearing changes, easy bruising/bleeding, severe headache, or vision changes.  The patient verbalized understanding of the proper use and possible adverse effects of tetracycline.  All of the patient's questions and concerns were addressed. Patient understands to avoid pregnancy while on therapy due to potential birth defects. Glycopyrrolate Pregnancy And Lactation Text: This medication is Pregnancy Category B and is considered safe during pregnancy. It is unknown if it is excreted breast milk. Topical Sulfur Applications Pregnancy And Lactation Text: This medication is Pregnancy Category C and has an unknown safety profile during pregnancy. It is unknown if this topical medication is excreted in breast milk. Rifampin Pregnancy And Lactation Text: This medication is Pregnancy Category C and it isn't know if it is safe during pregnancy. It is also excreted in breast milk and should not be used if you are breast feeding. Albendazole Counseling:  I discussed with the patient the risks of albendazole including but not limited to cytopenia, kidney damage, nausea/vomiting and severe allergy.  The patient understands that this medication is being used in an off-label manner. Picato Counseling:  I discussed with the patient the risks of Picato including but not limited to erythema, scaling, itching, weeping, crusting, and pain. Rifampin Counseling: I discussed with the patient the risks of rifampin including but not limited to liver damage, kidney damage, red-orange body fluids, nausea/vomiting and severe allergy. Valtrex Pregnancy And Lactation Text: this medication is Pregnancy Category B and is considered safe during pregnancy. This medication is not directly found in breast milk but it's metabolite acyclovir is present. Doxepin Pregnancy And Lactation Text: This medication is Pregnancy Category C and it isn't known if it is safe during pregnancy. It is also excreted in breast milk and breast feeding isn't recommended. Dupixent Pregnancy And Lactation Text: This medication likely crosses the placenta but the risk for the fetus is uncertain. This medication is excreted in breast milk. Nsaids Pregnancy And Lactation Text: These medications are considered safe up to 30 weeks gestation. It is excreted in breast milk. Metronidazole Pregnancy And Lactation Text: This medication is Pregnancy Category B and considered safe during pregnancy.  It is also excreted in breast milk. Valtrex Counseling: I discussed with the patient the risks of valacyclovir including but not limited to kidney damage, nausea, vomiting and severe allergy.  The patient understands that if the infection seems to be worsening or is not improving, they are to call. Ivermectin Counseling:  Patient instructed to take medication on an empty stomach with a full glass of water.  Patient informed of potential adverse effects including but not limited to nausea, diarrhea, dizziness, itching, and swelling of the extremities or lymph nodes.  The patient verbalized understanding of the proper use and possible adverse effects of ivermectin.  All of the patient's questions and concerns were addressed. Erythromycin Pregnancy And Lactation Text: This medication is Pregnancy Category B and is considered safe during pregnancy. It is also excreted in breast milk. Hydroxyzine Counseling: Patient advised that the medication is sedating and not to drive a car after taking this medication.  Patient informed of potential adverse effects including but not limited to dry mouth, urinary retention, and blurry vision.  The patient verbalized understanding of the proper use and possible adverse effects of hydroxyzine.  All of the patient's questions and concerns were addressed. Protopic Pregnancy And Lactation Text: This medication is Pregnancy Category C. It is unknown if this medication is excreted in breast milk when applied topically. Terbinafine Counseling: Patient counseling regarding adverse effects of terbinafine including but not limited to headache, diarrhea, rash, upset stomach, liver function test abnormalities, itching, taste/smell disturbance, nausea, abdominal pain, and flatulence.  There is a rare possibility of liver failure that can occur when taking terbinafine.  The patient understands that a baseline LFT and kidney function test may be required. The patient verbalized understanding of the proper use and possible adverse effects of terbinafine.  All of the patient's questions and concerns were addressed. Dapsone Pregnancy And Lactation Text: This medication is Pregnancy Category C and is not considered safe during pregnancy or breast feeding.

## 2023-08-21 NOTE — H&P ADULT - ASSESSMENT
28 year old female with a history of depression, anxiety  presents to the ED with 2 days history of cough, sore throat, palpitation, chest pain, shortness of breath, dizziness and  weakness. Endorses she was recently diagnosed with  tonsilitis. Upon ED arrival patient was found to be mildly hypotensive, tachycardic, tachypneic and febrile. CT-chest:  Redemonstration of pneumomediastinum tracking into the neck soft tissues. Upon evaluation patient appears somnolent. endorses most of her symptoms have subsided but still experiencing throat pain. Drinks multiple glasses of alcohol daily.

## 2023-08-22 LAB
ALBUMIN SERPL ELPH-MCNC: 3.1 G/DL — LOW (ref 3.3–5)
ALP SERPL-CCNC: 23 U/L — LOW (ref 40–120)
ALT FLD-CCNC: 35 U/L — SIGNIFICANT CHANGE UP (ref 12–78)
ANION GAP SERPL CALC-SCNC: 6 MMOL/L — SIGNIFICANT CHANGE UP (ref 5–17)
AST SERPL-CCNC: 23 U/L — SIGNIFICANT CHANGE UP (ref 15–37)
BASOPHILS # BLD AUTO: 0.02 K/UL — SIGNIFICANT CHANGE UP (ref 0–0.2)
BASOPHILS NFR BLD AUTO: 0.4 % — SIGNIFICANT CHANGE UP (ref 0–2)
BILIRUB SERPL-MCNC: 1.2 MG/DL — SIGNIFICANT CHANGE UP (ref 0.2–1.2)
BUN SERPL-MCNC: 6 MG/DL — LOW (ref 7–23)
CALCIUM SERPL-MCNC: 8.4 MG/DL — LOW (ref 8.5–10.1)
CHLORIDE SERPL-SCNC: 113 MMOL/L — HIGH (ref 96–108)
CHOLEST SERPL-MCNC: 207 MG/DL — HIGH
CO2 SERPL-SCNC: 22 MMOL/L — SIGNIFICANT CHANGE UP (ref 22–31)
CREAT SERPL-MCNC: 0.64 MG/DL — SIGNIFICANT CHANGE UP (ref 0.5–1.3)
CULTURE RESULTS: SIGNIFICANT CHANGE UP
EGFR: 122 ML/MIN/1.73M2 — SIGNIFICANT CHANGE UP
EOSINOPHIL # BLD AUTO: 0.02 K/UL — SIGNIFICANT CHANGE UP (ref 0–0.5)
EOSINOPHIL NFR BLD AUTO: 0.4 % — SIGNIFICANT CHANGE UP (ref 0–6)
GLUCOSE SERPL-MCNC: 87 MG/DL — SIGNIFICANT CHANGE UP (ref 70–99)
HCT VFR BLD CALC: 30 % — LOW (ref 34.5–45)
HDLC SERPL-MCNC: 59 MG/DL — SIGNIFICANT CHANGE UP
HGB BLD-MCNC: 9.9 G/DL — LOW (ref 11.5–15.5)
IMM GRANULOCYTES NFR BLD AUTO: 0.2 % — SIGNIFICANT CHANGE UP (ref 0–0.9)
LIPID PNL WITH DIRECT LDL SERPL: 137 MG/DL — HIGH
LYMPHOCYTES # BLD AUTO: 2.39 K/UL — SIGNIFICANT CHANGE UP (ref 1–3.3)
LYMPHOCYTES # BLD AUTO: 43.2 % — SIGNIFICANT CHANGE UP (ref 13–44)
MAGNESIUM SERPL-MCNC: 1.6 MG/DL — SIGNIFICANT CHANGE UP (ref 1.6–2.6)
MCHC RBC-ENTMCNC: 25.4 PG — LOW (ref 27–34)
MCHC RBC-ENTMCNC: 33 G/DL — SIGNIFICANT CHANGE UP (ref 32–36)
MCV RBC AUTO: 76.9 FL — LOW (ref 80–100)
MONOCYTES # BLD AUTO: 0.41 K/UL — SIGNIFICANT CHANGE UP (ref 0–0.9)
MONOCYTES NFR BLD AUTO: 7.4 % — SIGNIFICANT CHANGE UP (ref 2–14)
NEUTROPHILS # BLD AUTO: 2.68 K/UL — SIGNIFICANT CHANGE UP (ref 1.8–7.4)
NEUTROPHILS NFR BLD AUTO: 48.4 % — SIGNIFICANT CHANGE UP (ref 43–77)
NON HDL CHOLESTEROL: 148 MG/DL — HIGH
NRBC # BLD: 0 /100 WBCS — SIGNIFICANT CHANGE UP (ref 0–0)
PHOSPHATE SERPL-MCNC: 2.8 MG/DL — SIGNIFICANT CHANGE UP (ref 2.5–4.5)
PLATELET # BLD AUTO: 233 K/UL — SIGNIFICANT CHANGE UP (ref 150–400)
POTASSIUM SERPL-MCNC: 3 MMOL/L — LOW (ref 3.5–5.3)
POTASSIUM SERPL-SCNC: 3 MMOL/L — LOW (ref 3.5–5.3)
POTASSIUM UR-SCNC: 28.3 MMOL/L — SIGNIFICANT CHANGE UP
PROT SERPL-MCNC: 5.6 GM/DL — LOW (ref 6–8.3)
RBC # BLD: 3.9 M/UL — SIGNIFICANT CHANGE UP (ref 3.8–5.2)
RBC # FLD: 19.3 % — HIGH (ref 10.3–14.5)
SODIUM SERPL-SCNC: 141 MMOL/L — SIGNIFICANT CHANGE UP (ref 135–145)
SPECIMEN SOURCE: SIGNIFICANT CHANGE UP
TRIGL SERPL-MCNC: 60 MG/DL — SIGNIFICANT CHANGE UP
WBC # BLD: 5.53 K/UL — SIGNIFICANT CHANGE UP (ref 3.8–10.5)
WBC # FLD AUTO: 5.53 K/UL — SIGNIFICANT CHANGE UP (ref 3.8–10.5)

## 2023-08-22 PROCEDURE — 99232 SBSQ HOSP IP/OBS MODERATE 35: CPT

## 2023-08-22 PROCEDURE — 99233 SBSQ HOSP IP/OBS HIGH 50: CPT | Mod: 57

## 2023-08-22 RX ORDER — POTASSIUM CHLORIDE 20 MEQ
40 PACKET (EA) ORAL ONCE
Refills: 0 | Status: COMPLETED | OUTPATIENT
Start: 2023-08-22 | End: 2023-08-22

## 2023-08-22 RX ORDER — POTASSIUM CHLORIDE 20 MEQ
10 PACKET (EA) ORAL
Refills: 0 | Status: COMPLETED | OUTPATIENT
Start: 2023-08-22 | End: 2023-08-22

## 2023-08-22 RX ADMIN — Medication 100 MILLIEQUIVALENT(S): at 12:00

## 2023-08-22 RX ADMIN — Medication 100 MILLIEQUIVALENT(S): at 10:00

## 2023-08-22 RX ADMIN — Medication 100 MILLIEQUIVALENT(S): at 09:15

## 2023-08-22 RX ADMIN — Medication 40 MILLIEQUIVALENT(S): at 09:15

## 2023-08-22 NOTE — PROGRESS NOTE ADULT - PROBLEM SELECTOR PLAN 1
Patient met sepsis criteria upon ED arrival   Currently afebrile, Vitals stable   S/P CTX   - IVF   - Antibiotics   - F/u blood and urine cx. No leukocytosis and no fever. will monitor off abx
Patient met sepsis criteria upon ED arrival   Currently afebrile, Vitals stable   S/P CTX   - IVF   - Antibiotics   - F/u blood and urine cx. No leukocytosis and no fever. will monitor off abx

## 2023-08-22 NOTE — PROGRESS NOTE ADULT - PROBLEM SELECTOR PLAN 2
Presents with 2 days Hx of URI symptoms   CT-Chest: Redemonstration of pneumomediastinum tracking into the neck soft tissues  Received Dexamethasone   - Thoracic surgery c/s placed- F/u XRay esophagram- no esophageal leak  - Will advance to regular diet
Presents with 2 days Hx of URI symptoms   CT-Chest: Redemonstration of pneumomediastinum tracking into the neck soft tissues  Received Dexamethasone   - Tylenol for fever   - Antibiotics   - Monitor resp status  - Thoracic surgery c/s placed- F/u XRay esophagram.

## 2023-08-22 NOTE — PROGRESS NOTE ADULT - PROBLEM SELECTOR PLAN 4
Endorses she mixes multiple drinks a day   - [facility] protocol
Endorses she mixes multiple drinks a day   - [facility] protocol

## 2023-08-22 NOTE — PROGRESS NOTE ADULT - NS ATTEND AMEND GEN_ALL_CORE FT
patient seen - swallow study negative for leak. tolerated clears and given regular diet - no fevers/chills no pain with eating.   no further thoracic issues at this time - reconsult prn

## 2023-08-22 NOTE — PROGRESS NOTE ADULT - SUBJECTIVE AND OBJECTIVE BOX
Patient is a 28y old  Female who presents with a chief complaint of     INTERVAL HPI/OVERNIGHT EVENTS: No acute events overnight. HD stable.     MEDICATIONS  (STANDING):  pantoprazole    Tablet 40 milliGRAM(s) Oral before breakfast  potassium chloride  10 mEq/100 mL IVPB 10 milliEquivalent(s) IV Intermittent every 1 hour  sodium chloride 0.9%. 1000 milliLiter(s) (70 mL/Hr) IV Continuous <Continuous>    MEDICATIONS  (PRN):  acetaminophen     Tablet .. 650 milliGRAM(s) Oral every 6 hours PRN Temp greater or equal to 38C (100.4F), Mild Pain (1 - 3)  aluminum hydroxide/magnesium hydroxide/simethicone Suspension 30 milliLiter(s) Oral every 4 hours PRN Dyspepsia  benzocaine/menthol Lozenge 1 Lozenge Oral every 3 hours PRN Sore Throat  LORazepam   Injectable 2 milliGRAM(s) IV Push every 4 hours PRN CIWA-Ar score 8 or greater  melatonin 3 milliGRAM(s) Oral at bedtime PRN Insomnia  mirtazapine 15 milliGRAM(s) Oral at bedtime PRN depression  ondansetron Injectable 4 milliGRAM(s) IV Push every 8 hours PRN Nausea and/or Vomiting      Allergies    No Known Allergies    Intolerances        REVIEW OF SYSTEMS: all negative with exception of above    Vital Signs Last 24 Hrs  T(C): 36.4 (22 Aug 2023 11:35), Max: 36.9 (21 Aug 2023 12:48)  T(F): 97.5 (22 Aug 2023 11:35), Max: 98.4 (21 Aug 2023 12:48)  HR: 82 (22 Aug 2023 11:35) (78 - 100)  BP: 100/70 (22 Aug 2023 11:35) (92/61 - 117/80)  BP(mean): --  RR: 18 (22 Aug 2023 11:35) (18 - 18)  SpO2: 100% (22 Aug 2023 11:35) (98% - 100%)    Parameters below as of 21 Aug 2023 12:48  Patient On (Oxygen Delivery Method): room air    PHYSICAL EXAM:  GENERAL: NAD, well-groomed  NERVOUS SYSTEM:  Alert & Oriented X3, Good concentration; Motor Strength 5/5 B/L upper and lower extremities; DTRs 2+ intact and symmetric  CHEST/LUNG: Clear to percussion bilaterally; No rales, rhonchi, wheezing, or rubs  HEART: Regular rate and rhythm; No murmurs, rubs, or gallops  ABDOMEN: Soft, Nontender, Nondistended; Bowel sounds present  EXTREMITIES:  2+ Peripheral Pulses, No clubbing, cyanosis, or edema      LABS:                        9.9    5.53  )-----------( 233      ( 22 Aug 2023 06:17 )             30.0     08-22    141  |  113<H>  |  6<L>  ----------------------------<  87  3.0<L>   |  22  |  0.64    Ca    8.4<L>      22 Aug 2023 06:17  Phos  2.8     08-22  Mg     1.6     08-22    TPro  5.6<L>  /  Alb  3.1<L>  /  TBili  1.2  /  DBili  x   /  AST  23  /  ALT  35  /  AlkPhos  23<L>  08-22    PT/INR - ( 20 Aug 2023 13:50 )   PT: 11.1 sec;   INR: 0.93 ratio         PTT - ( 20 Aug 2023 13:50 )  PTT:28.1 sec  Urinalysis Basic - ( 22 Aug 2023 06:17 )    Color: x / Appearance: x / SG: x / pH: x  Gluc: 87 mg/dL / Ketone: x  / Bili: x / Urobili: x   Blood: x / Protein: x / Nitrite: x   Leuk Esterase: x / RBC: x / WBC x   Sq Epi: x / Non Sq Epi: x / Bacteria: x      CAPILLARY BLOOD GLUCOSE          RADIOLOGY & ADDITIONAL TESTS:    Imaging Personally Reviewed:  [ ] YES  [ ] NO  < from: Xray Esophagram Single Contrast (08.21.23 @ 14:24) >  IMPRESSION:    No esophageal leak identified.    --- End of Report ---            MAXWELL ENGLE MD; Attending Radiologist  This document has been electronically signed. Aug 21 2023  2:34PM    < end of copied text >        Consultant(s) Notes Reviewed:  [ ] YES  [ ] NO    Care Discussed with Consultants/Other Providers [ ] YES  [ ] NO

## 2023-08-22 NOTE — PROGRESS NOTE ADULT - SUBJECTIVE AND OBJECTIVE BOX
HOD # 1    SUBJECTIVE:  29 y/o F seen and examined at bedside with no acute overnight events. Pt tolerating clears without n/v. PT denies chest pain, SOB, palpitations, fevers, chills, melena or hematochezia     Vital Signs Last 24 Hrs  T(C): 36.4 (22 Aug 2023 11:35), Max: 36.9 (21 Aug 2023 12:48)  T(F): 97.5 (22 Aug 2023 11:35), Max: 98.4 (21 Aug 2023 12:48)  HR: 82 (22 Aug 2023 11:35) (78 - 100)  BP: 100/70 (22 Aug 2023 11:35) (92/61 - 117/80)  BP(mean): --  RR: 18 (22 Aug 2023 11:35) (18 - 18)  SpO2: 100% (22 Aug 2023 11:35) (98% - 100%)    Parameters below as of 21 Aug 2023 12:48  Patient On (Oxygen Delivery Method): room air        PHYSICAL EXAM:  GENERAL: No acute distress, well-developed  HEAD:  Atraumatic, Normocephalic  CHEST: no palpable crepitus   ABDOMEN: Soft, non-tender, non-distended  NEUROLOGY: responding appropriately, no focal deficits    I&O's Summary    21 Aug 2023 07:01  -  22 Aug 2023 07:00  --------------------------------------------------------  IN: 1380 mL / OUT: 0 mL / NET: 1380 mL    22 Aug 2023 07:01  -  22 Aug 2023 11:45  --------------------------------------------------------  IN: 360 mL / OUT: 0 mL / NET: 360 mL      I&O's Detail    21 Aug 2023 07:01  -  22 Aug 2023 07:00  --------------------------------------------------------  IN:    IV PiggyBack: 300 mL    Oral Fluid: 240 mL    sodium chloride 0.9%: 840 mL  Total IN: 1380 mL    OUT:  Total OUT: 0 mL    Total NET: 1380 mL      22 Aug 2023 07:01  -  22 Aug 2023 11:45  --------------------------------------------------------  IN:    Oral Fluid: 360 mL  Total IN: 360 mL    OUT:  Total OUT: 0 mL    Total NET: 360 mL        MEDICATIONS  (STANDING):  pantoprazole    Tablet 40 milliGRAM(s) Oral before breakfast  potassium chloride  10 mEq/100 mL IVPB 10 milliEquivalent(s) IV Intermittent every 1 hour  sodium chloride 0.9%. 1000 milliLiter(s) (70 mL/Hr) IV Continuous <Continuous>    MEDICATIONS  (PRN):  acetaminophen     Tablet .. 650 milliGRAM(s) Oral every 6 hours PRN Temp greater or equal to 38C (100.4F), Mild Pain (1 - 3)  aluminum hydroxide/magnesium hydroxide/simethicone Suspension 30 milliLiter(s) Oral every 4 hours PRN Dyspepsia  benzocaine/menthol Lozenge 1 Lozenge Oral every 3 hours PRN Sore Throat  LORazepam   Injectable 2 milliGRAM(s) IV Push every 4 hours PRN CIWA-Ar score 8 or greater  melatonin 3 milliGRAM(s) Oral at bedtime PRN Insomnia  mirtazapine 15 milliGRAM(s) Oral at bedtime PRN depression  ondansetron Injectable 4 milliGRAM(s) IV Push every 8 hours PRN Nausea and/or Vomiting    LABS:                        9.9    5.53  )-----------( 233      ( 22 Aug 2023 06:17 )             30.0     08-22    141  |  113<H>  |  6<L>  ----------------------------<  87  3.0<L>   |  22  |  0.64    Ca    8.4<L>      22 Aug 2023 06:17  Phos  2.8     08-22  Mg     1.6     08-22    TPro  5.6<L>  /  Alb  3.1<L>  /  TBili  1.2  /  DBili  x   /  AST  23  /  ALT  35  /  AlkPhos  23<L>  08-22    PT/INR - ( 20 Aug 2023 13:50 )   PT: 11.1 sec;   INR: 0.93 ratio         PTT - ( 20 Aug 2023 13:50 )  PTT:28.1 sec  Urinalysis Basic - ( 22 Aug 2023 06:17 )    Color: x / Appearance: x / SG: x / pH: x  Gluc: 87 mg/dL / Ketone: x  / Bili: x / Urobili: x   Blood: x / Protein: x / Nitrite: x   Leuk Esterase: x / RBC: x / WBC x   Sq Epi: x / Non Sq Epi: x / Bacteria: x      ASSESSMENT  29 y/o F HOD # 1 admitted with pneumomediastinum on CT however esophagram with no esophageal leak, tolerating clears, afeb vss,     PLAN  - cont care per primary team, no thoracic surgery intervention at this time  - clears, adv as tolerated per primary team  - trend labs/electrolytes per primary team  HOD # 1    SUBJECTIVE:  27 y/o F seen and examined at bedside with no acute overnight events. Pt tolerating clears without n/v. PT denies chest pain, SOB, palpitations, fevers, chills, melena or hematochezia     Vital Signs Last 24 Hrs  T(C): 36.4 (22 Aug 2023 11:35), Max: 36.9 (21 Aug 2023 12:48)  T(F): 97.5 (22 Aug 2023 11:35), Max: 98.4 (21 Aug 2023 12:48)  HR: 82 (22 Aug 2023 11:35) (78 - 100)  BP: 100/70 (22 Aug 2023 11:35) (92/61 - 117/80)  BP(mean): --  RR: 18 (22 Aug 2023 11:35) (18 - 18)  SpO2: 100% (22 Aug 2023 11:35) (98% - 100%)    Parameters below as of 21 Aug 2023 12:48  Patient On (Oxygen Delivery Method): room air        PHYSICAL EXAM:  GENERAL: No acute distress, well-developed  HEAD:  Atraumatic, Normocephalic  CHEST: no palpable crepitus   ABDOMEN: Soft, non-tender, non-distended  NEUROLOGY: responding appropriately, no focal deficits    I&O's Summary    21 Aug 2023 07:01  -  22 Aug 2023 07:00  --------------------------------------------------------  IN: 1380 mL / OUT: 0 mL / NET: 1380 mL    22 Aug 2023 07:01  -  22 Aug 2023 11:45  --------------------------------------------------------  IN: 360 mL / OUT: 0 mL / NET: 360 mL      I&O's Detail    21 Aug 2023 07:01  -  22 Aug 2023 07:00  --------------------------------------------------------  IN:    IV PiggyBack: 300 mL    Oral Fluid: 240 mL    sodium chloride 0.9%: 840 mL  Total IN: 1380 mL    OUT:  Total OUT: 0 mL    Total NET: 1380 mL      22 Aug 2023 07:01  -  22 Aug 2023 11:45  --------------------------------------------------------  IN:    Oral Fluid: 360 mL  Total IN: 360 mL    OUT:  Total OUT: 0 mL    Total NET: 360 mL        MEDICATIONS  (STANDING):  pantoprazole    Tablet 40 milliGRAM(s) Oral before breakfast  potassium chloride  10 mEq/100 mL IVPB 10 milliEquivalent(s) IV Intermittent every 1 hour  sodium chloride 0.9%. 1000 milliLiter(s) (70 mL/Hr) IV Continuous <Continuous>    MEDICATIONS  (PRN):  acetaminophen     Tablet .. 650 milliGRAM(s) Oral every 6 hours PRN Temp greater or equal to 38C (100.4F), Mild Pain (1 - 3)  aluminum hydroxide/magnesium hydroxide/simethicone Suspension 30 milliLiter(s) Oral every 4 hours PRN Dyspepsia  benzocaine/menthol Lozenge 1 Lozenge Oral every 3 hours PRN Sore Throat  LORazepam   Injectable 2 milliGRAM(s) IV Push every 4 hours PRN CIWA-Ar score 8 or greater  melatonin 3 milliGRAM(s) Oral at bedtime PRN Insomnia  mirtazapine 15 milliGRAM(s) Oral at bedtime PRN depression  ondansetron Injectable 4 milliGRAM(s) IV Push every 8 hours PRN Nausea and/or Vomiting    LABS:                        9.9    5.53  )-----------( 233      ( 22 Aug 2023 06:17 )             30.0     08-22    141  |  113<H>  |  6<L>  ----------------------------<  87  3.0<L>   |  22  |  0.64    Ca    8.4<L>      22 Aug 2023 06:17  Phos  2.8     08-22  Mg     1.6     08-22    TPro  5.6<L>  /  Alb  3.1<L>  /  TBili  1.2  /  DBili  x   /  AST  23  /  ALT  35  /  AlkPhos  23<L>  08-22    PT/INR - ( 20 Aug 2023 13:50 )   PT: 11.1 sec;   INR: 0.93 ratio         PTT - ( 20 Aug 2023 13:50 )  PTT:28.1 sec  Urinalysis Basic - ( 22 Aug 2023 06:17 )    Color: x / Appearance: x / SG: x / pH: x  Gluc: 87 mg/dL / Ketone: x  / Bili: x / Urobili: x   Blood: x / Protein: x / Nitrite: x   Leuk Esterase: x / RBC: x / WBC x   Sq Epi: x / Non Sq Epi: x / Bacteria: x      ASSESSMENT  27 y/o F HOD # 1 admitted with pneumomediastinum on CT however esophagram with no esophageal leak, tolerating clears, afeb vss,     PLAN  - cont care per primary team, no thoracic surgery intervention at this time  - clears, adv as tolerated per primary team  - trend labs/electrolytes per primary team   - will sign off  - d/w Dr. Tran

## 2023-08-22 NOTE — PROGRESS NOTE ADULT - ASSESSMENT
28 year old female with a history of depression, anxiety  presents to the ED with 2 days history of cough, sore throat, palpitation, chest pain, shortness of breath, dizziness and  weakness. Endorses she was recently diagnosed with  tonsilitis. Upon ED arrival patient was found to be mildly hypotensive, tachycardic, tachypneic and febrile. CT-chest:  Redemonstration of pneumomediastinum tracking into the neck soft tissues. Upon evaluation patient appears somnolent. endorses most of her symptoms have subsided but still experiencing throat pain. Drinks multiple glasses of alcohol daily. 
28 year old female with a history of depression, anxiety  presents to the ED with 2 days history of cough, sore throat, palpitation, chest pain, shortness of breath, dizziness and  weakness. Endorses she was recently diagnosed with  tonsilitis. Upon ED arrival patient was found to be mildly hypotensive, tachycardic, tachypneic and febrile. CT-chest:  Redemonstration of pneumomediastinum tracking into the neck soft tissues. Upon evaluation patient appears somnolent. endorses most of her symptoms have subsided but still experiencing throat pain. Drinks multiple glasses of alcohol daily.

## 2023-08-23 ENCOUNTER — TRANSCRIPTION ENCOUNTER (OUTPATIENT)
Age: 28
End: 2023-08-23

## 2023-08-23 VITALS
HEART RATE: 85 BPM | SYSTOLIC BLOOD PRESSURE: 95 MMHG | TEMPERATURE: 97 F | RESPIRATION RATE: 18 BRPM | DIASTOLIC BLOOD PRESSURE: 64 MMHG | OXYGEN SATURATION: 100 %

## 2023-08-23 PROCEDURE — 99239 HOSP IP/OBS DSCHRG MGMT >30: CPT

## 2023-08-23 RX ADMIN — SODIUM CHLORIDE 70 MILLILITER(S): 9 INJECTION INTRAMUSCULAR; INTRAVENOUS; SUBCUTANEOUS at 02:34

## 2023-08-23 NOTE — DISCHARGE NOTE NURSING/CASE MANAGEMENT/SOCIAL WORK - PATIENT PORTAL LINK FT
You can access the FollowMyHealth Patient Portal offered by [facility] by registering at the following website: [email or web address]. By joining [facility]’s FollowMyHealth portal, you will also be able to view your health information using other applications (apps) compatible with our system.

## 2023-08-23 NOTE — CHART NOTE - NSCHARTNOTEFT_GEN_A_CORE
To whom it may concern:     ALEJANDRO ALEGRIA has been under inpatient care at [facility] since 8/21/23.  She was discharged from the hospital 8/23/23 and may resume all activities without restrictions.       If there are any questions please do not hesitate to call.    Grace MATHEWS  on behalf of Dr. García  [phone]

## 2023-08-23 NOTE — DISCHARGE NOTE PROVIDER - PROVIDER TOKENS
FREE:[LAST:[pcp],PHONE:[(   )    -],FAX:[(   )    -]] FREE:[LAST:[pcp],PHONE:[(   )    -],FAX:[(   )    -]],PROVIDER:[TOKEN:[[id number]]]

## 2023-08-23 NOTE — DISCHARGE NOTE PROVIDER - NSDCCPCAREPLAN_GEN_ALL_CORE_FT
PRINCIPAL DISCHARGE DIAGNOSIS  Diagnosis: Pneumomediastinum  Assessment and Plan of Treatment: Found with pneumomediastinum on CT and no evidence of esophageal leak on esophagram. Seen by CT surgery team with recomendation for no surgical intervention at this time.   follow up with your pcp in 7-10 days after discharge         SECONDARY DISCHARGE DIAGNOSES  Diagnosis: Alcohol abuse  Assessment and Plan of Treatment: In order to optimize your overall health and prevent adverse events, please abstain from ingesting alcohol. Continue to supplement with recommended vitamins and follow-up with your primary care provider for further medical care. Return to the ED if you develop any of the following: Shaking/tremors, confusion, irritability, difficulty staying awake/sleeping, fevers, hallucinations, or seizures.  It is highly recommended to attend AA meetings to help create a sober lifestyle. If you need immediate assistance with substance abuse you may contact the [facility] by calling [phone].      Diagnosis: Hypokalemia  Assessment and Plan of Treatment:

## 2023-08-23 NOTE — DISCHARGE NOTE PROVIDER - CARE PROVIDERS DIRECT ADDRESSES
,DirectAddress_Unknown ,DirectAddress_Unknown,[email or web address]

## 2023-08-23 NOTE — DISCHARGE NOTE PROVIDER - CARE PROVIDER_API CALL
pcp,   Phone: (   )    -  Fax: (   )    -  Follow Up Time:    pcp,   Phone: (   )    -  Fax: (   )    -  Follow Up Time:     Graciela Tran  Thoracic Surgery  [phone] [place] [address] [place] [address]  Phone: [phone]  Fax: [phone]  Follow Up Time:

## 2023-08-23 NOTE — DISCHARGE NOTE NURSING/CASE MANAGEMENT/SOCIAL WORK - NSDCPEFALRISK_GEN_ALL_CORE
For information on Fall & Injury Prevention, visit: [email or web address] OR  [email or web address] OR  https://www.cdc.gov/steadi/patient.html

## 2023-08-23 NOTE — DISCHARGE NOTE PROVIDER - HOSPITAL COURSE
28 year old female with a history of depression, anxiety  presents to the ED with 2 days history of cough, sore throat, palpitation, chest pain, shortness of breath, dizziness and  weakness. Endorses she was recently diagnosed with  tonsilitis. Upon ED arrival patient was found to be mildly hypotensive, tachycardic, tachypneic and febrile. CT-chest:  Redemonstration of pneumomediastinum tracking into the neck soft tissues. Upon evaluation patient appears somnolent. endorses most of her symptoms have subsided but still experiencing throat pain. Drinks multiple glasses of alcohol daily.       Problem/Plan - 1:  ·  Problem: Sepsis.   ·  Plan: Patient met sepsis criteria upon ED arrival   Currently afebrile, Vitals stable   S/P CTX   - IVF   - Antibiotics   - F/u blood and urine cx. No leukocytosis and no fever. will monitor off abx.    Problem/Plan - 2:  ·  Problem: Pneumomediastinum.   ·  Plan: Presents with 2 days Hx of URI symptoms   CT-Chest: Redemonstration of pneumomediastinum tracking into the neck soft tissues  Received Dexamethasone   - Thoracic surgery c/s placed- F/u XRay esophagram- no esophageal leak  - Will advance to regular diet.    Problem/Plan - 3:  ·  Problem: Hypokalemia.   ·  Plan: - Replenish as needed.    Problem/Plan - 4:  ·  Problem: Alcohol abuse.   ·  Plan: Endorses she mixes multiple drinks a day   - CIWA protocol.    On 8/23/23 this case was reviewed with Dr. García, the patient is medically stable and optimized for discharge as per attending. All medications were reviewed and prescriptions were sent to mutually agreed upon pharmacy. Discharge plan reviewed with patient and/or family.   28 year old female with a history of depression, anxiety  presents to the ED with 2 days history of cough, sore throat, palpitation, chest pain, shortness of breath, dizziness and  weakness. Endorses she was recently diagnosed with  tonsilitis. Upon ED arrival patient was found to be mildly hypotensive, tachycardic, tachypneic and febrile. CT-chest:  Redemonstration of pneumomediastinum tracking into the neck soft tissues. Upon evaluation patient appears somnolent. endorses most of her symptoms have subsided but still experiencing throat pain. Drinks multiple glasses of alcohol daily.     Problem/Plan - 1:  ·  Problem: Sepsis.   ·  Plan: Patient met sepsis criteria upon ED arrival   Currently afebrile, Vitals stable   S/P CTX   - IVF   - Antibiotics   - F/u blood and urine cx. No leukocytosis and no fever. will monitor off abx.    Problem/Plan - 2:  ·  Problem: Pneumomediastinum.   ·  Plan: Presents with 2 days Hx of URI symptoms   CT-Chest: Redemonstration of pneumomediastinum tracking into the neck soft tissues  Received Dexamethasone   - Thoracic surgery c/s placed- F/u XRay esophagram- no esophageal leak  - Will advance to regular diet.    Problem/Plan - 3:  ·  Problem: Hypokalemia.   ·  Plan: - Replenish as needed.    Problem/Plan - 4:  ·  Problem: Alcohol abuse.   ·  Plan: Endorses she mixes multiple drinks a day   - CIWA protocol.    On 8/23/23 this case was reviewed with Dr. García, the patient is medically stable and optimized for discharge as per attending. All medications were reviewed and prescriptions were sent to mutually agreed upon pharmacy. Discharge plan reviewed with patient and/or family.

## 2023-08-23 NOTE — DISCHARGE NOTE PROVIDER - ATTENDING DISCHARGE PHYSICAL EXAMINATION:
Vital Signs Last 24 Hrs  T(F): 97.2 (23 Aug 2023 11:01), Max: 98.3 (23 Aug 2023 05:13)  HR: 85 (23 Aug 2023 11:01) (77 - 88)  BP: 95/64 (23 Aug 2023 11:01) (89/64 - 102/69)  RR: 18 (23 Aug 2023 11:01) (18 - 18)  SpO2: 100% (23 Aug 2023 11:01) (100% - 100%)    Physical Exam:  Constitutional: NAD, awake and alert  Respiratory: cta b/l no wheezing no rhonchi  Cardiovascular: +s1/s2 no edema b/l le  Gastrointestinal: soft nt nd bs+  Vascular: 2+ peripheral pulses  Neurological: A/O x 3, no focal deficits

## 2023-08-25 LAB
CULTURE RESULTS: SIGNIFICANT CHANGE UP
CULTURE RESULTS: SIGNIFICANT CHANGE UP
SPECIMEN SOURCE: SIGNIFICANT CHANGE UP
SPECIMEN SOURCE: SIGNIFICANT CHANGE UP

## 2023-08-25 NOTE — ED ADULT NURSE NOTE - BEFAST SCREENING
Re: Missing Information    Dear  and office staff, we are missing information from your office on patient Jesse Garcias, MRN: [id number], : 2011. We are unable to optimize your patient for surgery without the order being completed.    Please fax the following missing items as noted below:    [x] Labs  [x] EKG      Please fax back as soon as possible to [phone].  DO NOT USE THIS FORM AS AN ORDER. If you have any questions, please contact the [facility] Preadmitting Department at [phone] as soon as possible to avoid any delay in service for your patient.    [facility] Preadmitting Department  (P) [phone]  (F) [phone]      [facility] Order Requirements state:  ALL ORDERS MUST BE DATED, LEGIBLE AND CONTAIN:  Full Patient Legal Name (as appears on ’s license)  Patient’s Date of Birth (as appears on ’s license)  Pre-admission testing as per anesthesia guidelines  Diagnosis and procedural consent.  (No spelling errors or abbreviations)  Physician/provider name  Physician/Provider CMS Approved Signature    All documentation (ie. History and Physical, labs) MUST contain name and date of birth on EACH page.    Thank you for helping us provide you and your patient with the best possible experience!   Negative

## 2023-08-29 DIAGNOSIS — F10.10 ALCOHOL ABUSE, UNCOMPLICATED: ICD-10-CM

## 2023-08-29 DIAGNOSIS — A41.9 SEPSIS, UNSPECIFIED ORGANISM: ICD-10-CM

## 2023-08-29 DIAGNOSIS — F32.A DEPRESSION, UNSPECIFIED: ICD-10-CM

## 2023-08-29 DIAGNOSIS — F41.9 ANXIETY DISORDER, UNSPECIFIED: ICD-10-CM

## 2023-08-29 DIAGNOSIS — E87.6 HYPOKALEMIA: ICD-10-CM

## 2023-08-29 DIAGNOSIS — J03.90 ACUTE TONSILLITIS, UNSPECIFIED: ICD-10-CM

## 2023-08-29 DIAGNOSIS — J98.2 INTERSTITIAL EMPHYSEMA: ICD-10-CM

## 2023-11-10 NOTE — DISCHARGE NOTE ANTEPARTUM - NS OB DC IMMUNIZATIONS2 YN
No

## 2023-12-14 NOTE — OB RN PATIENT PROFILE - INSTRUCTED TO PATIENT: IF THE INFANT IS NOT PINK DURING SKIN TO SKIN, THE PARENTS IS TO SEEK ASSISTANCE IMMEDIATELY.
As discussed, your breast wound appears improving compared to the past exam done by me.  Please follow-up with infectious disease recommendations and also the suture which I am able to see today on physical exam which could be the triggering factor of nonhealing abscess of the breast wound.    Please keep your upcoming appointments with the surgeon, infectious disease and wound care appointments as scheduled for plans on removal of THIS suture versus any other alternate recommendations by them.    Refills done for your levothyroxine, recommend to take the RSV vaccine at pharmacy where Medicare covers.   Statement Selected

## 2024-05-03 NOTE — BH CONSULTATION LIAISON ASSESSMENT NOTE - NSBHATTESTNPTRAINEE_PSY_A_CORE
Patient not medically clear. Patient had dialysis line removed and will have it replaced after a short break. At the time of discharge, patient wants to return home. Will follow.      05/03/24 9261   Discharge Planning   Home or Post Acute Services None   Patient expects to be discharged to: Home   Does the patient need discharge transport arranged? No        agree

## 2024-06-18 NOTE — PATIENT PROFILE ADULT - DO YOU FEEL THREATENED BY OTHERS?
No protocol for requested medication.    Medication: diazepam   Last office visit date: 10/24/2023 with Dr. Mendoza  Establish care appointment scheduled with Dr. Thompson on 08/19/2024.  Pharmacy: [id number] - [place], [place] - [address]    Order pended, routed to clinician for review.      no

## 2024-08-29 NOTE — BH CONSULTATION LIAISON PROGRESS NOTE - NSBHFUPINTERVALHXFT_PSY_A_CORE
Most recent hospitalization reviewed.  On cephalexin for 6 weeks.  Continue ibuprofen, gabapentin and Tylenol.  Refill for Dilaudid 4 mg, 60 tablets but should make an effort to decrease use of Dilaudid over the next few weeks.  Discussion of prediabetes.  Can finish metformin and then stop it.  No reason to have a glucometer at home.  Will recheck A1c in the future.  Advised that diabetes does not start until the A1c reaches 6.6.  Recheck 3 weeks.   Received a call from medicine team this afternoon stating that patient wanted to be discharged, and plans on [facility]. Reviewed SW note to get more information. Team wanted f/u to ensure clearance psychiatrically.     Met with patient. Alert, oriented, engages well with interview. Admits that she is an alcoholic, but wants 'to get sober for my daughter. I have this momentum now, so I want to make sure I get into the rehab'. She states that she wants to be discharged today, go home to pack her belongings, etc and reach the rehab facility tomorrow before 8am. She verbalizes that she understands that there is a risk for w/d considering she cannot get ativan if she leaves AMA. She understands the risk for alcohol relapse as well. She will come to an ED if she feels unwell at any point. She expresses her motivation to be sober. She denies any mood symptoms, denies any si or hi, denies any a/vh or paranoia when asked. No symptoms of withdrawal at this time.   Care coordinated with  Sha. He states that he has no safety concerns if she were to come home. He plans on driving her to [facility] tomorrow AM.He states that he will ensure safe transport home from [facility] when medicine team calls him with details.

## 2024-09-13 NOTE — OB RN PATIENT PROFILE - THE IMPORTANCE OF INITIATING BREASTFEEDING WITHIN THE FIRST HOUR OF BIRTH.
Pt presents for visit and u/s. She denies any issues today. She has felt fetal movement.     U/s: appropriate growth noted, anatomy normal but limited RVOT and 3VV, low lying placenta noted    A/p: 19 weeks: reviewed u/s findings. Reviewed PTL warnings. F/u 4 weeks with f/u anatomy    Low lying placenta: discussed with pt/ partner. Recheck at f/u u/s 4 weeks.   
Statement Selected

## 2024-10-05 ENCOUNTER — EMERGENCY (EMERGENCY)
Facility: HOSPITAL | Age: 29
LOS: 0 days | Discharge: HOME | End: 2024-10-05
Attending: STUDENT IN AN ORGANIZED HEALTH CARE EDUCATION/TRAINING PROGRAM
Payer: MEDICAID

## 2024-10-05 VITALS
TEMPERATURE: 98 F | OXYGEN SATURATION: 100 % | RESPIRATION RATE: 18 BRPM | SYSTOLIC BLOOD PRESSURE: 121 MMHG | HEART RATE: 83 BPM | WEIGHT: 119.93 LBS | DIASTOLIC BLOOD PRESSURE: 80 MMHG

## 2024-10-05 VITALS
HEART RATE: 91 BPM | RESPIRATION RATE: 18 BRPM | SYSTOLIC BLOOD PRESSURE: 110 MMHG | DIASTOLIC BLOOD PRESSURE: 75 MMHG | OXYGEN SATURATION: 100 % | TEMPERATURE: 98 F

## 2024-10-05 DIAGNOSIS — R53.1 WEAKNESS: ICD-10-CM

## 2024-10-05 DIAGNOSIS — E87.6 HYPOKALEMIA: ICD-10-CM

## 2024-10-05 DIAGNOSIS — Z98.891 HISTORY OF UTERINE SCAR FROM PREVIOUS SURGERY: Chronic | ICD-10-CM

## 2024-10-05 DIAGNOSIS — R11.2 NAUSEA WITH VOMITING, UNSPECIFIED: ICD-10-CM

## 2024-10-05 DIAGNOSIS — F10.20 ALCOHOL DEPENDENCE, UNCOMPLICATED: ICD-10-CM

## 2024-10-05 DIAGNOSIS — F17.290 NICOTINE DEPENDENCE, OTHER TOBACCO PRODUCT, UNCOMPLICATED: ICD-10-CM

## 2024-10-05 LAB
ALBUMIN SERPL ELPH-MCNC: 4 G/DL — SIGNIFICANT CHANGE UP (ref 3.5–5.2)
ALP SERPL-CCNC: 52 U/L — SIGNIFICANT CHANGE UP (ref 30–115)
ALT FLD-CCNC: 28 U/L — SIGNIFICANT CHANGE UP (ref 0–41)
ANION GAP SERPL CALC-SCNC: 26 MMOL/L — HIGH (ref 7–14)
APPEARANCE UR: ABNORMAL
AST SERPL-CCNC: 65 U/L — HIGH (ref 0–41)
BACTERIA # UR AUTO: NEGATIVE /HPF — SIGNIFICANT CHANGE UP
BASE EXCESS BLDV CALC-SCNC: 4.5 MMOL/L — HIGH (ref -2–3)
BASOPHILS # BLD AUTO: 0.04 K/UL — SIGNIFICANT CHANGE UP (ref 0–0.2)
BASOPHILS NFR BLD AUTO: 0.9 % — SIGNIFICANT CHANGE UP (ref 0–1)
BILIRUB SERPL-MCNC: 0.9 MG/DL — SIGNIFICANT CHANGE UP (ref 0.2–1.2)
BILIRUB UR-MCNC: NEGATIVE — SIGNIFICANT CHANGE UP
BUN SERPL-MCNC: 4 MG/DL — LOW (ref 10–20)
CA-I SERPL-SCNC: 1 MMOL/L — LOW (ref 1.15–1.33)
CALCIUM SERPL-MCNC: 8.5 MG/DL — SIGNIFICANT CHANGE UP (ref 8.4–10.5)
CAST: 0 /LPF — SIGNIFICANT CHANGE UP (ref 0–4)
CHLORIDE SERPL-SCNC: 89 MMOL/L — LOW (ref 98–110)
CO2 SERPL-SCNC: 25 MMOL/L — SIGNIFICANT CHANGE UP (ref 17–32)
COLOR SPEC: YELLOW — SIGNIFICANT CHANGE UP
CREAT SERPL-MCNC: 0.6 MG/DL — LOW (ref 0.7–1.5)
DIFF PNL FLD: NEGATIVE — SIGNIFICANT CHANGE UP
EGFR: 123 ML/MIN/1.73M2 — SIGNIFICANT CHANGE UP
EGFR: 123 ML/MIN/1.73M2 — SIGNIFICANT CHANGE UP
EOSINOPHIL # BLD AUTO: 0 K/UL — SIGNIFICANT CHANGE UP (ref 0–0.7)
EOSINOPHIL NFR BLD AUTO: 0 % — SIGNIFICANT CHANGE UP (ref 0–8)
GAS PNL BLDV: 136 MMOL/L — SIGNIFICANT CHANGE UP (ref 136–145)
GAS PNL BLDV: SIGNIFICANT CHANGE UP
GAS PNL BLDV: SIGNIFICANT CHANGE UP
GLUCOSE SERPL-MCNC: 60 MG/DL — LOW (ref 70–99)
GLUCOSE UR QL: NEGATIVE MG/DL — SIGNIFICANT CHANGE UP
HCG SERPL QL: NEGATIVE — SIGNIFICANT CHANGE UP
HCO3 BLDV-SCNC: 28 MMOL/L — SIGNIFICANT CHANGE UP (ref 22–29)
HCT VFR BLD CALC: 28.9 % — LOW (ref 37–47)
HCT VFR BLDA CALC: 28 % — LOW (ref 34.5–46.5)
HGB BLD-MCNC: 9.6 G/DL — LOW (ref 12–16)
IMM GRANULOCYTES NFR BLD AUTO: 0.2 % — SIGNIFICANT CHANGE UP (ref 0.1–0.3)
KETONES UR-MCNC: 80 MG/DL
LACTATE BLDV-MCNC: 1.7 MMOL/L — SIGNIFICANT CHANGE UP (ref 0.5–2)
LEUKOCYTE ESTERASE UR-ACNC: NEGATIVE — SIGNIFICANT CHANGE UP
LYMPHOCYTES # BLD AUTO: 1.05 K/UL — LOW (ref 1.2–3.4)
LYMPHOCYTES # BLD AUTO: 23.7 % — SIGNIFICANT CHANGE UP (ref 20.5–51.1)
MAGNESIUM SERPL-MCNC: 1.2 MG/DL — LOW (ref 1.8–2.4)
MCHC RBC-ENTMCNC: 23.9 PG — LOW (ref 27–31)
MCHC RBC-ENTMCNC: 33.2 G/DL — SIGNIFICANT CHANGE UP (ref 32–37)
MCV RBC AUTO: 71.9 FL — LOW (ref 81–99)
MONOCYTES # BLD AUTO: 0.34 K/UL — SIGNIFICANT CHANGE UP (ref 0.1–0.6)
MONOCYTES NFR BLD AUTO: 7.7 % — SIGNIFICANT CHANGE UP (ref 1.7–9.3)
NEUTROPHILS # BLD AUTO: 2.99 K/UL — SIGNIFICANT CHANGE UP (ref 1.4–6.5)
NEUTROPHILS NFR BLD AUTO: 67.5 % — SIGNIFICANT CHANGE UP (ref 42.2–75.2)
NITRITE UR-MCNC: NEGATIVE — SIGNIFICANT CHANGE UP
NRBC # BLD: 0 /100 WBCS — SIGNIFICANT CHANGE UP (ref 0–0)
NRBC BLD-RTO: 0 /100 WBCS — SIGNIFICANT CHANGE UP (ref 0–0)
PCO2 BLDV: 38 MMHG — LOW (ref 39–42)
PH BLDV: 7.48 — HIGH (ref 7.32–7.43)
PH UR: 5.5 — SIGNIFICANT CHANGE UP (ref 5–8)
PLATELET # BLD AUTO: 554 K/UL — HIGH (ref 130–400)
PMV BLD: 9.6 FL — SIGNIFICANT CHANGE UP (ref 7.4–10.4)
PO2 BLDV: 64 MMHG — HIGH (ref 25–45)
POTASSIUM BLDV-SCNC: 2.1 MMOL/L — CRITICAL LOW (ref 3.5–5.1)
POTASSIUM SERPL-MCNC: 3.2 MMOL/L — LOW (ref 3.5–5)
POTASSIUM SERPL-SCNC: 3.2 MMOL/L — LOW (ref 3.5–5)
PROT SERPL-MCNC: 7.4 G/DL — SIGNIFICANT CHANGE UP (ref 6–8)
PROT UR-MCNC: 30 MG/DL
RBC # BLD: 4.02 M/UL — LOW (ref 4.2–5.4)
RBC # FLD: 18.5 % — HIGH (ref 11.5–14.5)
RBC CASTS # UR COMP ASSIST: 12 /HPF — HIGH (ref 0–4)
SODIUM SERPL-SCNC: 140 MMOL/L — SIGNIFICANT CHANGE UP (ref 135–146)
SP GR SPEC: 1.02 — SIGNIFICANT CHANGE UP (ref 1–1.03)
SQUAMOUS # UR AUTO: 23 /HPF — HIGH (ref 0–5)
UROBILINOGEN FLD QL: 1 MG/DL — SIGNIFICANT CHANGE UP (ref 0.2–1)
WBC # BLD: 4.43 K/UL — LOW (ref 4.8–10.8)
WBC # FLD AUTO: 4.43 K/UL — LOW (ref 4.8–10.8)
WBC UR QL: 5 /HPF — SIGNIFICANT CHANGE UP (ref 0–5)

## 2024-10-05 PROCEDURE — 36415 COLL VENOUS BLD VENIPUNCTURE: CPT

## 2024-10-05 PROCEDURE — 81001 URINALYSIS AUTO W/SCOPE: CPT

## 2024-10-05 PROCEDURE — 84132 ASSAY OF SERUM POTASSIUM: CPT

## 2024-10-05 PROCEDURE — 84703 CHORIONIC GONADOTROPIN ASSAY: CPT

## 2024-10-05 PROCEDURE — 96375 TX/PRO/DX INJ NEW DRUG ADDON: CPT

## 2024-10-05 PROCEDURE — 84295 ASSAY OF SERUM SODIUM: CPT

## 2024-10-05 PROCEDURE — 99285 EMERGENCY DEPT VISIT HI MDM: CPT

## 2024-10-05 PROCEDURE — 85014 HEMATOCRIT: CPT

## 2024-10-05 PROCEDURE — 93005 ELECTROCARDIOGRAM TRACING: CPT

## 2024-10-05 PROCEDURE — 80053 COMPREHEN METABOLIC PANEL: CPT

## 2024-10-05 PROCEDURE — 99284 EMERGENCY DEPT VISIT MOD MDM: CPT | Mod: 25

## 2024-10-05 PROCEDURE — 85025 COMPLETE CBC W/AUTO DIFF WBC: CPT

## 2024-10-05 PROCEDURE — 82330 ASSAY OF CALCIUM: CPT

## 2024-10-05 PROCEDURE — 85018 HEMOGLOBIN: CPT

## 2024-10-05 PROCEDURE — 96374 THER/PROPH/DIAG INJ IV PUSH: CPT

## 2024-10-05 PROCEDURE — 83605 ASSAY OF LACTIC ACID: CPT

## 2024-10-05 PROCEDURE — 93010 ELECTROCARDIOGRAM REPORT: CPT

## 2024-10-05 PROCEDURE — 83735 ASSAY OF MAGNESIUM: CPT

## 2024-10-05 RX ORDER — METOCLOPRAMIDE HCL 10 MG
10 TABLET ORAL ONCE
Refills: 0 | Status: COMPLETED | OUTPATIENT
Start: 2024-10-05 | End: 2024-10-05

## 2024-10-05 RX ORDER — B1/B2/B3/B5/B6/B12/VIT C/FOLIC 500-0.5 MG
1 TABLET ORAL ONCE
Refills: 0 | Status: COMPLETED | OUTPATIENT
Start: 2024-10-05 | End: 2024-10-05

## 2024-10-05 RX ORDER — CHLORDIAZEPOXIDE HCL 10 MG
50 CAPSULE ORAL ONCE
Refills: 0 | Status: DISCONTINUED | OUTPATIENT
Start: 2024-10-05 | End: 2024-10-05

## 2024-10-05 RX ORDER — MAGNESIUM SULFATE 500 MG/ML
2 SYRINGE (ML) INJECTION ONCE
Refills: 0 | Status: DISCONTINUED | OUTPATIENT
Start: 2024-10-05 | End: 2024-10-05

## 2024-10-05 RX ADMIN — Medication 105 MILLIGRAM(S): at 11:12

## 2024-10-05 RX ADMIN — Medication 104 MILLIGRAM(S): at 11:12

## 2024-10-05 RX ADMIN — Medication 1 TABLET(S): at 11:12

## 2024-10-05 RX ADMIN — Medication 50 MILLIGRAM(S): at 11:12

## 2024-10-05 RX ADMIN — Medication 40 MILLIEQUIVALENT(S): at 14:01

## 2024-10-14 ENCOUNTER — INPATIENT (INPATIENT)
Facility: HOSPITAL | Age: 29
LOS: 7 days | Discharge: HOME | End: 2024-10-22
Attending: STUDENT IN AN ORGANIZED HEALTH CARE EDUCATION/TRAINING PROGRAM | Admitting: INTERNAL MEDICINE
Payer: MEDICAID

## 2024-10-14 VITALS
DIASTOLIC BLOOD PRESSURE: 100 MMHG | HEIGHT: 66 IN | WEIGHT: 130.07 LBS | HEART RATE: 91 BPM | SYSTOLIC BLOOD PRESSURE: 136 MMHG | TEMPERATURE: 98 F | RESPIRATION RATE: 16 BRPM | OXYGEN SATURATION: 100 %

## 2024-10-14 DIAGNOSIS — Z98.891 HISTORY OF UTERINE SCAR FROM PREVIOUS SURGERY: Chronic | ICD-10-CM

## 2024-10-14 DIAGNOSIS — F10.239 ALCOHOL DEPENDENCE WITH WITHDRAWAL, UNSPECIFIED: ICD-10-CM

## 2024-10-14 LAB
ALBUMIN SERPL ELPH-MCNC: 4.7 G/DL — SIGNIFICANT CHANGE UP (ref 3.5–5.2)
ALBUMIN SERPL ELPH-MCNC: 5.3 G/DL — HIGH (ref 3.5–5.2)
ALP SERPL-CCNC: 61 U/L — SIGNIFICANT CHANGE UP (ref 30–115)
ALP SERPL-CCNC: 75 U/L — SIGNIFICANT CHANGE UP (ref 30–115)
ALT FLD-CCNC: 21 U/L — SIGNIFICANT CHANGE UP (ref 0–41)
ALT FLD-CCNC: 26 U/L — SIGNIFICANT CHANGE UP (ref 0–41)
ANION GAP SERPL CALC-SCNC: 23 MMOL/L — HIGH (ref 7–14)
ANION GAP SERPL CALC-SCNC: 25 MMOL/L — HIGH (ref 7–14)
ANISOCYTOSIS BLD QL: SLIGHT — SIGNIFICANT CHANGE UP
AST SERPL-CCNC: 55 U/L — HIGH (ref 0–41)
AST SERPL-CCNC: 67 U/L — HIGH (ref 0–41)
BASE EXCESS BLDV CALC-SCNC: 0.8 MMOL/L — SIGNIFICANT CHANGE UP (ref -2–3)
BASOPHILS # BLD AUTO: 0 K/UL — SIGNIFICANT CHANGE UP (ref 0–0.2)
BASOPHILS NFR BLD AUTO: 0 % — SIGNIFICANT CHANGE UP (ref 0–1)
BILIRUB DIRECT SERPL-MCNC: 0.4 MG/DL — HIGH (ref 0–0.3)
BILIRUB INDIRECT FLD-MCNC: 0.6 MG/DL — SIGNIFICANT CHANGE UP (ref 0.2–1.2)
BILIRUB SERPL-MCNC: 1 MG/DL — SIGNIFICANT CHANGE UP (ref 0.2–1.2)
BILIRUB SERPL-MCNC: 1.2 MG/DL — SIGNIFICANT CHANGE UP (ref 0.2–1.2)
BUN SERPL-MCNC: <3 MG/DL — LOW (ref 10–20)
BUN SERPL-MCNC: <3 MG/DL — LOW (ref 10–20)
CA-I SERPL-SCNC: 1.13 MMOL/L — LOW (ref 1.15–1.33)
CALCIUM SERPL-MCNC: 8.7 MG/DL — SIGNIFICANT CHANGE UP (ref 8.4–10.5)
CALCIUM SERPL-MCNC: 9.6 MG/DL — SIGNIFICANT CHANGE UP (ref 8.4–10.5)
CHLORIDE SERPL-SCNC: 100 MMOL/L — SIGNIFICANT CHANGE UP (ref 98–110)
CHLORIDE SERPL-SCNC: 95 MMOL/L — LOW (ref 98–110)
CO2 SERPL-SCNC: 20 MMOL/L — SIGNIFICANT CHANGE UP (ref 17–32)
CO2 SERPL-SCNC: 21 MMOL/L — SIGNIFICANT CHANGE UP (ref 17–32)
CREAT SERPL-MCNC: 0.7 MG/DL — SIGNIFICANT CHANGE UP (ref 0.7–1.5)
CREAT SERPL-MCNC: 0.8 MG/DL — SIGNIFICANT CHANGE UP (ref 0.7–1.5)
EGFR: 101 ML/MIN/1.73M2 — SIGNIFICANT CHANGE UP
EGFR: 119 ML/MIN/1.73M2 — SIGNIFICANT CHANGE UP
EOSINOPHIL # BLD AUTO: 0 K/UL — SIGNIFICANT CHANGE UP (ref 0–0.7)
EOSINOPHIL NFR BLD AUTO: 0 % — SIGNIFICANT CHANGE UP (ref 0–8)
GAS PNL BLDV: 139 MMOL/L — SIGNIFICANT CHANGE UP (ref 136–145)
GAS PNL BLDV: SIGNIFICANT CHANGE UP
GAS PNL BLDV: SIGNIFICANT CHANGE UP
GLUCOSE SERPL-MCNC: 103 MG/DL — HIGH (ref 70–99)
GLUCOSE SERPL-MCNC: 132 MG/DL — HIGH (ref 70–99)
HCO3 BLDV-SCNC: 25 MMOL/L — SIGNIFICANT CHANGE UP (ref 22–29)
HCT VFR BLD CALC: 37 % — SIGNIFICANT CHANGE UP (ref 37–47)
HCT VFR BLDA CALC: 38 % — SIGNIFICANT CHANGE UP (ref 34.5–46.5)
HGB BLD CALC-MCNC: 12.8 G/DL — SIGNIFICANT CHANGE UP (ref 11.7–16.1)
HGB BLD-MCNC: 12.2 G/DL — SIGNIFICANT CHANGE UP (ref 12–16)
HYPOCHROMIA BLD QL: SLIGHT — SIGNIFICANT CHANGE UP
LACTATE BLDV-MCNC: 1.3 MMOL/L — SIGNIFICANT CHANGE UP (ref 0.5–2)
LYMPHOCYTES # BLD AUTO: 1.49 K/UL — SIGNIFICANT CHANGE UP (ref 1.2–3.4)
LYMPHOCYTES # BLD AUTO: 20.1 % — LOW (ref 20.5–51.1)
MAGNESIUM SERPL-MCNC: 1.6 MG/DL — LOW (ref 1.8–2.4)
MANUAL SMEAR VERIFICATION: SIGNIFICANT CHANGE UP
MCHC RBC-ENTMCNC: 24.7 PG — LOW (ref 27–31)
MCHC RBC-ENTMCNC: 33 G/DL — SIGNIFICANT CHANGE UP (ref 32–37)
MCV RBC AUTO: 75.1 FL — LOW (ref 81–99)
MICROCYTES BLD QL: SLIGHT — SIGNIFICANT CHANGE UP
MONOCYTES # BLD AUTO: 0.06 K/UL — LOW (ref 0.1–0.6)
MONOCYTES NFR BLD AUTO: 0.8 % — LOW (ref 1.7–9.3)
NEUTROPHILS # BLD AUTO: 4.47 K/UL — SIGNIFICANT CHANGE UP (ref 1.4–6.5)
NEUTROPHILS NFR BLD AUTO: 60.5 % — SIGNIFICANT CHANGE UP (ref 42.2–75.2)
PCO2 BLDV: 39 MMHG — SIGNIFICANT CHANGE UP (ref 39–42)
PH BLDV: 7.42 — SIGNIFICANT CHANGE UP (ref 7.32–7.43)
PHOSPHATE SERPL-MCNC: 2 MG/DL — LOW (ref 2.1–4.9)
PLAT MORPH BLD: NORMAL — SIGNIFICANT CHANGE UP
PLATELET # BLD AUTO: 530 K/UL — HIGH (ref 130–400)
PMV BLD: 9.7 FL — SIGNIFICANT CHANGE UP (ref 7.4–10.4)
PO2 BLDV: 23 MMHG — LOW (ref 25–45)
POLYCHROMASIA BLD QL SMEAR: SIGNIFICANT CHANGE UP
POTASSIUM BLDV-SCNC: 2.7 MMOL/L — CRITICAL LOW (ref 3.5–5.1)
POTASSIUM SERPL-MCNC: 3.1 MMOL/L — LOW (ref 3.5–5)
POTASSIUM SERPL-MCNC: 3.4 MMOL/L — LOW (ref 3.5–5)
POTASSIUM SERPL-SCNC: 3.1 MMOL/L — LOW (ref 3.5–5)
POTASSIUM SERPL-SCNC: 3.4 MMOL/L — LOW (ref 3.5–5)
PROT SERPL-MCNC: 8 G/DL — SIGNIFICANT CHANGE UP (ref 6–8)
PROT SERPL-MCNC: 9.4 G/DL — HIGH (ref 6–8)
RBC # BLD: 4.93 M/UL — SIGNIFICANT CHANGE UP (ref 4.2–5.4)
RBC # FLD: 20.3 % — HIGH (ref 11.5–14.5)
RBC BLD AUTO: ABNORMAL
SAO2 % BLDV: 20.3 % — LOW (ref 67–88)
SODIUM SERPL-SCNC: 141 MMOL/L — SIGNIFICANT CHANGE UP (ref 135–146)
SODIUM SERPL-SCNC: 143 MMOL/L — SIGNIFICANT CHANGE UP (ref 135–146)
VARIANT LYMPHS # BLD: 18.6 % — HIGH (ref 0–5)
WBC # BLD: 7.39 K/UL — SIGNIFICANT CHANGE UP (ref 4.8–10.8)
WBC # FLD AUTO: 7.39 K/UL — SIGNIFICANT CHANGE UP (ref 4.8–10.8)

## 2024-10-14 PROCEDURE — 85610 PROTHROMBIN TIME: CPT

## 2024-10-14 PROCEDURE — 86901 BLOOD TYPING SEROLOGIC RH(D): CPT

## 2024-10-14 PROCEDURE — 99223 1ST HOSP IP/OBS HIGH 75: CPT

## 2024-10-14 PROCEDURE — 80076 HEPATIC FUNCTION PANEL: CPT

## 2024-10-14 PROCEDURE — 80061 LIPID PANEL: CPT

## 2024-10-14 PROCEDURE — 85025 COMPLETE CBC W/AUTO DIFF WBC: CPT

## 2024-10-14 PROCEDURE — 83735 ASSAY OF MAGNESIUM: CPT

## 2024-10-14 PROCEDURE — 76705 ECHO EXAM OF ABDOMEN: CPT

## 2024-10-14 PROCEDURE — 99222 1ST HOSP IP/OBS MODERATE 55: CPT

## 2024-10-14 PROCEDURE — 82553 CREATINE MB FRACTION: CPT

## 2024-10-14 PROCEDURE — 93010 ELECTROCARDIOGRAM REPORT: CPT

## 2024-10-14 PROCEDURE — 70498 CT ANGIOGRAPHY NECK: CPT | Mod: MC

## 2024-10-14 PROCEDURE — 80048 BASIC METABOLIC PNL TOTAL CA: CPT

## 2024-10-14 PROCEDURE — 99285 EMERGENCY DEPT VISIT HI MDM: CPT

## 2024-10-14 PROCEDURE — 87389 HIV-1 AG W/HIV-1&-2 AB AG IA: CPT

## 2024-10-14 PROCEDURE — 86850 RBC ANTIBODY SCREEN: CPT

## 2024-10-14 PROCEDURE — 85027 COMPLETE CBC AUTOMATED: CPT

## 2024-10-14 PROCEDURE — 72158 MRI LUMBAR SPINE W/O & W/DYE: CPT | Mod: MC

## 2024-10-14 PROCEDURE — 70450 CT HEAD/BRAIN W/O DYE: CPT | Mod: MC

## 2024-10-14 PROCEDURE — 82550 ASSAY OF CK (CPK): CPT

## 2024-10-14 PROCEDURE — 93005 ELECTROCARDIOGRAM TRACING: CPT

## 2024-10-14 PROCEDURE — 72040 X-RAY EXAM NECK SPINE 2-3 VW: CPT

## 2024-10-14 PROCEDURE — 70496 CT ANGIOGRAPHY HEAD: CPT | Mod: MC

## 2024-10-14 PROCEDURE — 81001 URINALYSIS AUTO W/SCOPE: CPT

## 2024-10-14 PROCEDURE — 84100 ASSAY OF PHOSPHORUS: CPT

## 2024-10-14 PROCEDURE — 86900 BLOOD TYPING SEROLOGIC ABO: CPT

## 2024-10-14 PROCEDURE — 72125 CT NECK SPINE W/O DYE: CPT | Mod: MC

## 2024-10-14 PROCEDURE — 85730 THROMBOPLASTIN TIME PARTIAL: CPT

## 2024-10-14 PROCEDURE — A9579: CPT

## 2024-10-14 PROCEDURE — 36415 COLL VENOUS BLD VENIPUNCTURE: CPT

## 2024-10-14 PROCEDURE — 93306 TTE W/DOPPLER COMPLETE: CPT

## 2024-10-14 PROCEDURE — 84702 CHORIONIC GONADOTROPIN TEST: CPT

## 2024-10-14 PROCEDURE — 95819 EEG AWAKE AND ASLEEP: CPT

## 2024-10-14 PROCEDURE — 82962 GLUCOSE BLOOD TEST: CPT

## 2024-10-14 PROCEDURE — 0042T: CPT | Mod: MC

## 2024-10-14 PROCEDURE — 80053 COMPREHEN METABOLIC PANEL: CPT

## 2024-10-14 PROCEDURE — 84484 ASSAY OF TROPONIN QUANT: CPT

## 2024-10-14 RX ORDER — SODIUM CHLORIDE 9 MG/ML
1000 INJECTION, SOLUTION INTRAMUSCULAR; INTRAVENOUS; SUBCUTANEOUS ONCE
Refills: 0 | Status: COMPLETED | OUTPATIENT
Start: 2024-10-14 | End: 2024-10-14

## 2024-10-14 RX ORDER — HYDROXYZINE HCL 25 MG
50 TABLET ORAL EVERY 6 HOURS
Refills: 0 | Status: DISCONTINUED | OUTPATIENT
Start: 2024-10-14 | End: 2024-10-18

## 2024-10-14 RX ORDER — CHLORDIAZEPOXIDE HCL 10 MG
50 CAPSULE ORAL EVERY 24 HOURS
Refills: 0 | Status: ACTIVE | OUTPATIENT
Start: 2024-10-18 | End: 2024-10-18

## 2024-10-14 RX ORDER — DIAZEPAM 10 MG/1
10 FILM BUCCAL ONCE
Refills: 0 | Status: DISCONTINUED | OUTPATIENT
Start: 2024-10-14 | End: 2024-10-14

## 2024-10-14 RX ORDER — B-COMPLEX WITH VITAMIN C
1 VIAL (ML) INJECTION DAILY
Refills: 0 | Status: DISCONTINUED | OUTPATIENT
Start: 2024-10-14 | End: 2024-10-22

## 2024-10-14 RX ORDER — CHLORDIAZEPOXIDE HCL 10 MG
50 CAPSULE ORAL EVERY 12 HOURS
Refills: 0 | Status: DISCONTINUED | OUTPATIENT
Start: 2024-10-16 | End: 2024-10-17

## 2024-10-14 RX ORDER — MAGNESIUM SULFATE IN 0.9% NACL 2 G/50 ML
2 INTRAVENOUS SOLUTION, PIGGYBACK (ML) INTRAVENOUS
Refills: 0 | Status: COMPLETED | OUTPATIENT
Start: 2024-10-14 | End: 2024-10-14

## 2024-10-14 RX ORDER — THIAMINE HCL 100 MG
100 TABLET ORAL DAILY
Refills: 0 | Status: COMPLETED | OUTPATIENT
Start: 2024-10-14 | End: 2024-10-16

## 2024-10-14 RX ORDER — FOLIC ACID 1 MG/1
1 TABLET ORAL ONCE
Refills: 0 | Status: COMPLETED | OUTPATIENT
Start: 2024-10-14 | End: 2024-10-14

## 2024-10-14 RX ORDER — CHLORDIAZEPOXIDE HCL 10 MG
100 CAPSULE ORAL
Refills: 0 | Status: DISCONTINUED | OUTPATIENT
Start: 2024-10-14 | End: 2024-10-17

## 2024-10-14 RX ORDER — POTASSIUM CHLORIDE 10 MEQ
40 TABLET, EXTENDED RELEASE ORAL ONCE
Refills: 0 | Status: COMPLETED | OUTPATIENT
Start: 2024-10-14 | End: 2024-10-14

## 2024-10-14 RX ORDER — QUETIAPINE FUMARATE 200 MG/1
100 TABLET ORAL AT BEDTIME
Refills: 0 | Status: DISCONTINUED | OUTPATIENT
Start: 2024-10-14 | End: 2024-10-18

## 2024-10-14 RX ORDER — ARIPIPRAZOLE 2 MG/1
5 TABLET ORAL DAILY
Refills: 0 | Status: DISCONTINUED | OUTPATIENT
Start: 2024-10-14 | End: 2024-10-22

## 2024-10-14 RX ORDER — DIAZEPAM 10 MG/1
5 FILM BUCCAL DAILY
Refills: 0 | Status: DISCONTINUED | OUTPATIENT
Start: 2024-10-14 | End: 2024-10-17

## 2024-10-14 RX ORDER — CHLORDIAZEPOXIDE HCL 10 MG
50 CAPSULE ORAL EVERY 8 HOURS
Refills: 0 | Status: DISCONTINUED | OUTPATIENT
Start: 2024-10-15 | End: 2024-10-16

## 2024-10-14 RX ORDER — CHLORDIAZEPOXIDE HCL 10 MG
50 CAPSULE ORAL EVERY 6 HOURS
Refills: 0 | Status: DISCONTINUED | OUTPATIENT
Start: 2024-10-14 | End: 2024-10-15

## 2024-10-14 RX ORDER — FOLIC ACID 1 MG/1
1 TABLET ORAL DAILY
Refills: 0 | Status: DISCONTINUED | OUTPATIENT
Start: 2024-10-14 | End: 2024-10-22

## 2024-10-14 RX ORDER — GABAPENTIN 300 MG/1
600 CAPSULE ORAL THREE TIMES A DAY
Refills: 0 | Status: DISCONTINUED | OUTPATIENT
Start: 2024-10-14 | End: 2024-10-15

## 2024-10-14 RX ORDER — SERTRALINE HYDROCHLORIDE 50 MG/1
50 TABLET, FILM COATED ORAL DAILY
Refills: 0 | Status: DISCONTINUED | OUTPATIENT
Start: 2024-10-14 | End: 2024-10-22

## 2024-10-14 RX ORDER — THIAMINE HCL 100 MG
500 TABLET ORAL ONCE
Refills: 0 | Status: COMPLETED | OUTPATIENT
Start: 2024-10-14 | End: 2024-10-14

## 2024-10-14 RX ORDER — CHLORDIAZEPOXIDE HCL 10 MG
CAPSULE ORAL
Refills: 0 | Status: DISCONTINUED | OUTPATIENT
Start: 2024-10-17 | End: 2024-10-17

## 2024-10-14 RX ORDER — PANTOPRAZOLE SODIUM 40 MG/1
40 TABLET, DELAYED RELEASE ORAL
Refills: 0 | Status: DISCONTINUED | OUTPATIENT
Start: 2024-10-14 | End: 2024-10-22

## 2024-10-14 RX ORDER — MIRTAZAPINE 30 MG/1
15 TABLET ORAL AT BEDTIME
Refills: 0 | Status: DISCONTINUED | OUTPATIENT
Start: 2024-10-14 | End: 2024-10-22

## 2024-10-14 RX ORDER — ACETAMINOPHEN 500 MG
650 TABLET ORAL ONCE
Refills: 0 | Status: COMPLETED | OUTPATIENT
Start: 2024-10-14 | End: 2024-10-14

## 2024-10-14 RX ORDER — ONDANSETRON HYDROCHLORIDE 2 MG/ML
4 INJECTION, SOLUTION INTRAMUSCULAR; INTRAVENOUS ONCE
Refills: 0 | Status: COMPLETED | OUTPATIENT
Start: 2024-10-14 | End: 2024-10-14

## 2024-10-14 RX ADMIN — Medication 50 MILLIGRAM(S): at 23:07

## 2024-10-14 RX ADMIN — GABAPENTIN 600 MILLIGRAM(S): 300 CAPSULE ORAL at 22:29

## 2024-10-14 RX ADMIN — Medication 25 GRAM(S): at 15:11

## 2024-10-14 RX ADMIN — Medication 105 MILLIGRAM(S): at 08:30

## 2024-10-14 RX ADMIN — GABAPENTIN 600 MILLIGRAM(S): 300 CAPSULE ORAL at 14:35

## 2024-10-14 RX ADMIN — SODIUM CHLORIDE 2000 MILLILITER(S): 9 INJECTION, SOLUTION INTRAMUSCULAR; INTRAVENOUS; SUBCUTANEOUS at 08:26

## 2024-10-14 RX ADMIN — DIAZEPAM 10 MILLIGRAM(S): 10 FILM BUCCAL at 10:19

## 2024-10-14 RX ADMIN — Medication 1 TABLET(S): at 11:35

## 2024-10-14 RX ADMIN — FOLIC ACID 1 MILLIGRAM(S): 1 TABLET ORAL at 08:24

## 2024-10-14 RX ADMIN — MIRTAZAPINE 15 MILLIGRAM(S): 30 TABLET ORAL at 22:30

## 2024-10-14 RX ADMIN — Medication 40 MILLIEQUIVALENT(S): at 22:29

## 2024-10-14 RX ADMIN — SERTRALINE HYDROCHLORIDE 50 MILLIGRAM(S): 50 TABLET, FILM COATED ORAL at 22:30

## 2024-10-14 RX ADMIN — ARIPIPRAZOLE 5 MILLIGRAM(S): 2 TABLET ORAL at 22:30

## 2024-10-14 RX ADMIN — Medication 25 GRAM(S): at 14:35

## 2024-10-14 RX ADMIN — QUETIAPINE FUMARATE 100 MILLIGRAM(S): 200 TABLET ORAL at 22:30

## 2024-10-14 RX ADMIN — DIAZEPAM 5 MILLIGRAM(S): 10 FILM BUCCAL at 22:30

## 2024-10-14 RX ADMIN — Medication 25 GRAM(S): at 17:32

## 2024-10-14 RX ADMIN — Medication 50 MILLIGRAM(S): at 11:36

## 2024-10-14 RX ADMIN — ONDANSETRON HYDROCHLORIDE 4 MILLIGRAM(S): 2 INJECTION, SOLUTION INTRAMUSCULAR; INTRAVENOUS at 08:24

## 2024-10-14 RX ADMIN — Medication 50 MILLIGRAM(S): at 17:33

## 2024-10-14 RX ADMIN — Medication 650 MILLIGRAM(S): at 23:07

## 2024-10-14 NOTE — CONSULT NOTE ADULT - ASSESSMENT
IMPRESSION:    Alchohol withdrawal - last CIWA 4  Alchohol use disorder  At risk for malnutrition    PLAN:    CNS: CIWA monitoring.  No seizures since Saturday.  CATCH team / addiction medicine evaluation.  Thiamine + Folate + multivitamins.    HEENT: Oral care    PULMONARY:  HOB @ 45 degrees.  Aspiration precautions     CARDIOVASCULAR:  TTE to assess for EtOH induced cardiomyopathy.  Keep equal balance.    GI: GI prophylaxis not indicated.  Feeding.  Bowel regimen     RENAL:  Follow up lytes.  Correct as needed.    INFECTIOUS DISEASE: Monitor VS off Abx    HEMATOLOGICAL:  DVT prophylaxis.    ENDOCRINE:  Follow up FS.  Insulin protocol if needed.    MUSCULOSKELETAL: IAT    The patient is currently not a candidate for MICU/SDU IMPRESSION:    Alchohol withdrawal - last CIWA 4  Alchohol use disorder  At risk for malnutrition    PLAN:    CNS: CIWA monitoring.  No seizures since Saturday.  CATCH team / addiction medicine evaluation.  Thiamine + Folate + multivitamins.    HEENT: Oral care    PULMONARY:  HOB @ 45 degrees.  Aspiration precautions     CARDIOVASCULAR:  TTE to assess for EtOH induced cardiomyopathy.  Keep equal balance.    GI: GI prophylaxis not indicated.  Feeding.  Bowel regimen.     RENAL:  Follow up lytes.  Correct as needed.    INFECTIOUS DISEASE: Monitor VS off Abx    HEMATOLOGICAL:  DVT prophylaxis.    ENDOCRINE:  Follow up FS.  Insulin protocol if needed.    MUSCULOSKELETAL: IAT    The patient is currently not a candidate for MICU/SDU IMPRESSION:    Alchohol withdrawal - last CIWA 4  Alchohol use disorder  At risk for malnutrition    PLAN:    CNS: CIWA monitoring.  No seizures since Saturday.  CATCH team / addiction medicine evaluation.  Thiamine + Folate + multivitamins.    HEENT: Oral care    PULMONARY:  HOB @ 45 degrees.  Aspiration precautions     CARDIOVASCULAR:  TTE to assess for EtOH induced cardiomyopathy.  D5 LR 75 cc/h    GI: GI prophylaxis  Feeding.  Bowel regimen.     RENAL:  Follow up lytes.  Correct as needed.    INFECTIOUS DISEASE: Monitor VS off Abx    HEMATOLOGICAL:  DVT prophylaxis.    ENDOCRINE:  Follow up FS.  Insulin protocol if needed.    MUSCULOSKELETAL: IAT    floor

## 2024-10-14 NOTE — H&P ADULT - NSHPPHYSICALEXAM_GEN_ALL_CORE
General: Well appearing, well nourished, in no distress. Oriented x 3, normal mood and affect .  Skin: Good turgor, no rash, unusual bruising or prominent lesions  Hair: Normal texture and distribution.  Nails: Normal color, no deformities  HEENT:  Head: Normocephalic, atraumatic, no visible or palpable masses, depressions, or scaring.  Eyes: Visual acuity intact, conjunctiva clear, sclera non-icteric, EOM intact, PERRL, fundi  have normal optic discs and vessels, no exudates or hemorrhages  Ears: EACs clear, TMs translucent & mobile, ossicles nl appearance, hearing intact.  Nose: No external lesions, mucosa non-inflamed, septum and turbinates normal  Mouth: Mucous membranes moist, white plaques present on tongue, tongue piercing present x3  Teeth/Gums: No obvious caries or periodontal disease. No gingival inflammation or significant  resorption.  Pharynx: Mucosa non-inflamed, no tonsillar hypertrophy or exudate  Neck: Supple, without lesions, bruits, or adenopathy, thyroid non-enlarged and non-tender  Heart: No cardiomegaly or thrills; regular rate and rhythm, no murmur or gallop  Lungs: Clear to auscultation and percussion  Abdomen: Bowel sounds normal, no tenderness, organomegaly, masses, or hernia  Extremities: No amputations or deformities, cyanosis, edema or varicosities, peripheral pulses  intact  Neurologic: No focal deficits.
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
## 2024-10-14 NOTE — H&P ADULT - NSHPLABSRESULTS_GEN_ALL_CORE
.  LABS:                         12.2   7.39  )-----------( 530      ( 14 Oct 2024 08:55 )             37.0     10-14    143  |  100  |  <3[L]  ----------------------------<  103[H]  3.1[L]   |  20  |  0.7    Ca    8.7      14 Oct 2024 11:11  Phos  2.0     10-14  Mg     1.6     10-14    TPro  8.0  /  Alb  4.7  /  TBili  1.0  /  DBili  0.4[H]  /  AST  55[H]  /  ALT  21  /  AlkPhos  61  10-14      Urinalysis Basic - ( 14 Oct 2024 11:11 )    Color: x / Appearance: x / SG: x / pH: x  Gluc: 103 mg/dL / Ketone: x  / Bili: x / Urobili: x   Blood: x / Protein: x / Nitrite: x   Leuk Esterase: x / RBC: x / WBC x   Sq Epi: x / Non Sq Epi: x / Bacteria: x                RADIOLOGY, EKG & ADDITIONAL TESTS: Reviewed.
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
## 2024-10-14 NOTE — ED PROVIDER NOTE - PROGRESS NOTE DETAILS
Patient seen by social work - due to unsteady gait, tremulousness and inability to find inpatient detox placement due to seizures, recommendation is to admit to hospital for further management.

## 2024-10-14 NOTE — SBIRT NOTE ADULT - NSSBIRTBRIEFINTDET_GEN_A_CORE
Screening results were reviewed with the patient. Patient was provided educational materials on low-risk guidelines and substance use and health. Motivation and goals were discussed.   Screening results were reviewed with the patient. Patient was provided educational materials on low-risk guidelines and substance use and health. Motivation and goals were discussed. Patient was not provided with a referral to substance use treatment because patient requires medical care. Patient expressed interest in a referral to an outpatient chemical dependency program upon discharge. CATCH team engaged. Patient's substance use will be addressed during their inpatient admission.

## 2024-10-14 NOTE — ED PROVIDER NOTE - OBJECTIVE STATEMENT
29-year-old female with past medical history of anemia, EtOH use presenting for alcohol withdrawal and seizure on Saturday.  Last drink was Friday.  Patient states she developed a seizure on Saturday, went to [facility] where she was treated with Keppra.  Was discharged yesterday.  Complaining of nausea, vomiting, diarrhea currently.  States she is unable to tolerate p.o. since Friday. 29-year-old female with past medical history of anemia, chronic EtOH use presenting for alcohol withdrawals and seizure on Saturday.  Last drink was Friday.  Patient states she developed a seizure on Saturday, went to [facility] where she was treated with Keppra and discharged home.  Self-weaning. Patient id interested in entering a detox program but unable to find placement yet.  Complaining of nausea, vomiting, diarrhea currently.  States she is unable to tolerate p.o. since Friday.
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
## 2024-10-14 NOTE — BH CHART NOTE - NSEVENTNOTEFT_PSY_ALL_CORE
Psychiatry consulted on 10/14 for outpatient referral. Chart reviewed. Communicated with primary team and clarified that patient had no acute psychiatric issues on this admission, the consult was because patient wanted to switch her outpatient psychiatrist and change her outpatient psychiatric regimen. Advised primary team to consult  for appointment, provided [facility] OPD phone number ([phone]) to the primary team.

## 2024-10-14 NOTE — ED ADULT NURSE NOTE - NSFALLHARMRISKINTERV_ED_ALL_ED
Communicate risk of Fall with Harm to all staff, patient, and family/Provide visual cue: red socks, yellow wristband, yellow gown, etc/Reinforce activity limits and safety measures with patient and family/Use of alarms - bed, stretcher, chair and/or video monitoring/Bed in lowest position, wheels locked, appropriate side rails in place/Call bell, personal items and telephone in reach/Instruct patient to call for assistance before getting out of bed/chair/stretcher/Non-slip footwear applied when patient is off stretcher/[place] to call system/Physically safe environment - no spills, clutter or unnecessary equipment/Purposeful Proactive Rounding/Room/bathroom lighting operational, light cord in reach

## 2024-10-14 NOTE — H&P ADULT - ATTENDING COMMENTS
Patient seen and examined independently in ED  Patient able to engage fully in conversation and is coherent without evidence of DT or active withdrawal    PAST MEDICAL & SURGICAL HISTORY:  Depression  Anxiety  Other specified postprocedural states  Anemia, unspecified type      TESTS & MEASUREMENTS:                        12.2   7.39  )-----------( 530      ( 14 Oct 2024 08:55 )             37.0       10-14    143  |  100  |  <3[L]  ----------------------------<  103[H]  3.1[L]   |  20  |  0.7    Ca    8.7      14 Oct 2024 11:11  Phos  2.0     10-14  Mg     1.6     10-14    TPro  8.0  /  Alb  4.7  /  TBili  1.0  /  DBili  0.4[H]  /  AST  55[H]  /  ALT  21  /  AlkPhos  61  10-14    LIVER FUNCTIONS - ( 14 Oct 2024 11:11 )  Alb: 4.7 g/dL / Pro: 8.0 g/dL / ALK PHOS: 61 U/L / ALT: 21 U/L / AST: 55 U/L / GGT: x             In summary:  HEALTH ISSUES - PROBLEM Dx:    - recurrent bouts of alcohol intoxication and withdrawal in the setting of alcohol use disorder   - Reported h/o of withdrawal seizures in the past. However, patient is noting that he was told she had a seizure recently that wasn't related to alcohol withdrawal. She noted that her kid has "seizure disorder". She presented recently to [facility] and was advised to be on Keppra   - recurrent nausea and vomiting and inability to tolerate oral diet at this time,      Likely related to alcohol gastritis or underlying PUD  - recurrent hypokalemia, likely due to reduced oral intake, heavy drinking, and emesis   - mood disorder reported, patient denies any self harm or suicidal ideation at the time of my encounter, she is seeking options related to chemical detox and alcohol rehab   - chronic anemia   - no evidence of acute heart failure or ACS at this time, patient denies chest pain and dyspnea     PLAN  - ciwa score   - BZD protocol   - Proton Pump Inhibitor  and PRN prokinetics   - replete K   - Pulmonary & Critical Care Medicine team recommended ECHO to evaluate for ETOH Cardiomyopathy (prognostication) , can be done as outpatient  - CATCH team eval and Follow up   - Neuro eval re: nuanced h/o epilepsy and possible need for maintenance AEDs or further w/u   - patient counseled re: chronic substance use and potential cognitive and physical lont term sequale   Case discussed with resident assigned.

## 2024-10-14 NOTE — ED ADULT NURSE NOTE - BIRTH SEX
Post tibialis pulses bilat can be found with doppler. Ant pedal pulses cannot be found by doppler. Bilat groin are intact with Prevena incision management systems to both groins.There is no discoloration under the groin dsgs.   Female

## 2024-10-14 NOTE — H&P ADULT - NSICDXFAMILYHX_GEN_ALL_CORE_FT
FAMILY HISTORY:  Father  Still living? Unknown  Family history of hypertension, Age at diagnosis: Age Unknown  FH: diabetes mellitus, Age at diagnosis: Age Unknown    Mother  Still living? Unknown  Family history of hypertension, Age at diagnosis: Age Unknown  FH: diabetes mellitus, Age at diagnosis: Age Unknown    Sibling  Still living? Yes, Estimated age: Age Unknown  Family history of alcoholism in sister, Age at diagnosis: Age Unknown
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
## 2024-10-14 NOTE — ED PROVIDER NOTE - CLINICAL SUMMARY MEDICAL DECISION MAKING FREE TEXT BOX
29-year-old female history of anxiety depression alcohol abuse marijuana use presents valuation nausea vomiting and reported seizure due to tapering down of her alcohol.  Here patient with mild tremor feeling weak.  Seen by SBIRT  admitted for alcohol drawl seizure.  Case discussed with critical care fellow for possible stepdown unit however CIWA score improved can be admitted to the floor.

## 2024-10-14 NOTE — ED ADULT NURSE NOTE - OBJECTIVE STATEMENT
PT presented to ED from home with complaints of ETOH abuse and withdrawel, states she had seizure on saturday after drinking and has not felt well since, states she is vomiting often, ciwa is 4 on presentation.

## 2024-10-14 NOTE — ED ADULT NURSE NOTE - NS ED NURSE LEVEL OF CONSCIOUSNESS ORIENTATION
----- Message from TOMMY Guerra sent at 1/7/2020  8:25 AM EST -----  Ask the patient to start taking magnesium oxide 1 tablet daily–I will E scribe.   Oriented - self; Oriented - place; Oriented - time

## 2024-10-14 NOTE — CONSULT NOTE ADULT - SUBJECTIVE AND OBJECTIVE BOX
Patient is a 29y old  Female who presents with a chief complaint of alchocol withdrawal.    HPI: 29-year-old female with past medical history of anemia, chronic EtOH use presenting for alcohol withdrawals and seizure on Saturday.  Last drink was Friday.  Patient states she developed a seizure on Saturday, went to [facility] where she was treated with Keppra and discharged home.  Self-weaning. Patient id interested in entering a detox program but unable to find placement yet.  Complaining of nausea, vomiting, diarrhea currently.  States she is unable to tolerate p.o. since Friday.      PAST MEDICAL & SURGICAL HISTORY:  Depression      Anxiety      Other specified postprocedural states      Anemia, unspecified type      H/O:   x3          SOCIAL HX:   Smoking                         ETOH                            Other    FAMILY HISTORY:  FH: diabetes mellitus (Father, Mother)    Family history of hypertension (Father, Mother)    :  No known cardiovacular family hisotry     Review Of Systems:     All ROS are negative except per HPI       Allergies    No Known Allergies    Intolerances          PHYSICAL EXAM    ICU Vital Signs Last 24 Hrs  T(C): 36.4 (14 Oct 2024 07:22), Max: 36.4 (14 Oct 2024 07:22)  T(F): 97.6 (14 Oct 2024 07:22), Max: 97.6 (14 Oct 2024 07:22)  HR: 91 (14 Oct 2024 07:22) (91 - 91)  BP: 136/100 (14 Oct 2024 07:22) (136/100 - 136/100)  BP(mean): --  ABP: --  ABP(mean): --  RR: 16 (14 Oct 2024 07:22) (16 - 16)  SpO2: 100% (14 Oct 2024 07:22) (100% - 100%)    O2 Parameters below as of 14 Oct 2024 07:22  Patient On (Oxygen Delivery Method): room air            CONSTITUTIONAL:  NAD    ENT:   Airway patent,   Mouth with normal mucosa.   No thrush    CARDIAC:   Normal rate,   Regular rhythm.    No edema    RESPIRATORY:   No wheezing  Bilateral BS   Not tachypneic,  No use of accessory muscles    GASTROINTESTINAL:  Abdomen soft,   Non-tender,   No guarding,   + BS    NEUROLOGICAL:   Alert and oriented   No motor deficits.    SKIN:   Skin normal color for race,   No evidence of rash.              LABS:                          12.2   7.39  )-----------( 530      ( 14 Oct 2024 08:55 )             37.0                                               10-14    141  |  95[L]  |  <3[L]  ----------------------------<  132[H]  3.4[L]   |  21  |  0.8    Ca    9.6      14 Oct 2024 08:55    TPro  9.4[H]  /  Alb  5.3[H]  /  TBili  1.2  /  DBili  x   /  AST  67[H]  /  ALT  26  /  AlkPhos  75  10-14                                             Urinalysis Basic - ( 14 Oct 2024 08:55 )    Color: x / Appearance: x / SG: x / pH: x  Gluc: 132 mg/dL / Ketone: x  / Bili: x / Urobili: x   Blood: x / Protein: x / Nitrite: x   Leuk Esterase: x / RBC: x / WBC x   Sq Epi: x / Non Sq Epi: x / Bacteria: x                                                  LIVER FUNCTIONS - ( 14 Oct 2024 08:55 )  Alb: 5.3 g/dL / Pro: 9.4 g/dL / ALK PHOS: 75 U/L / ALT: 26 U/L / AST: 67 U/L / GGT: x                                                                                                                                       X-Rays reviewed                                                                                     ECHO    CXR interpreted by me     MEDICATIONS  (STANDING):  chlordiazePOXIDE 50 milliGRAM(s) Oral every 6 hours  chlordiazePOXIDE   Oral   folic acid 1 milliGRAM(s) Oral daily  multivitamin 1 Tablet(s) Oral daily  thiamine 100 milliGRAM(s) Oral daily    MEDICATIONS  (PRN):  chlordiazePOXIDE 100 milliGRAM(s) Oral every 1 hour PRN Symptom-triggered: each CIWA -Ar score 8 or GREATER         Patient is a 29y old  Female who presents with a chief complaint of alchocol withdrawal.    HPI: 29-year-old female with past medical history of anemia, chronic EtOH use presenting for alcohol withdrawals and seizure on Saturday.  Last drink was Friday.  Patient states she developed a seizure on Saturday, went to [facility] where she was treated with Keppra and discharged home.  Self-weaning. Patient id interested in entering a detox program but unable to find placement yet.  Complaining of nausea, vomiting, diarrhea currently.  States she is unable to tolerate p.o. since Friday.      PAST MEDICAL & SURGICAL HISTORY:  Depression      Anxiety      Other specified postprocedural states      Anemia, unspecified type      H/O:   x3          SOCIAL HX:   Smoking       No                  ETOH        Yes                    Other Marijuana    FAMILY HISTORY:  FH: diabetes mellitus (Father, Mother)    Family history of hypertension (Father, Mother)    :  No known cardiovacular family hisotry     Review Of Systems:     All ROS are negative except per HPI       Allergies    No Known Allergies    Intolerances          PHYSICAL EXAM    ICU Vital Signs Last 24 Hrs  T(C): 36.4 (14 Oct 2024 07:22), Max: 36.4 (14 Oct 2024 07:22)  T(F): 97.6 (14 Oct 2024 07:22), Max: 97.6 (14 Oct 2024 07:22)  HR: 91 (14 Oct 2024 07:22) (91 - 91)  BP: 136/100 (14 Oct 2024 07:22) (136/100 - 136/100)  BP(mean): --  ABP: --  ABP(mean): --  RR: 16 (14 Oct 2024 07:22) (16 - 16)  SpO2: 100% (14 Oct 2024 07:22) (100% - 100%)    O2 Parameters below as of 14 Oct 2024 07:22  Patient On (Oxygen Delivery Method): room air            CONSTITUTIONAL:  NAD    ENT:   Airway patent,   Mouth with normal mucosa.   No thrush    CARDIAC:   Normal rate,   Regular rhythm.    No edema    RESPIRATORY:   No wheezing  Bilateral BS   Not tachypneic,  No use of accessory muscles    GASTROINTESTINAL:  Abdomen soft,   Non-tender,   No guarding,   + BS    NEUROLOGICAL:   Alert and oriented   No motor deficits.    SKIN:   Skin normal color for race,   No evidence of rash.              LABS:                          12.2   7.39  )-----------( 530      ( 14 Oct 2024 08:55 )             37.0                                               10-    141  |  95[L]  |  <3[L]  ----------------------------<  132[H]  3.4[L]   |  21  |  0.8    Ca    9.6      14 Oct 2024 08:55    TPro  9.4[H]  /  Alb  5.3[H]  /  TBili  1.2  /  DBili  x   /  AST  67[H]  /  ALT  26  /  AlkPhos  75  10-14                                             Urinalysis Basic - ( 14 Oct 2024 08:55 )    Color: x / Appearance: x / SG: x / pH: x  Gluc: 132 mg/dL / Ketone: x  / Bili: x / Urobili: x   Blood: x / Protein: x / Nitrite: x   Leuk Esterase: x / RBC: x / WBC x   Sq Epi: x / Non Sq Epi: x / Bacteria: x                                                  LIVER FUNCTIONS - ( 14 Oct 2024 08:55 )  Alb: 5.3 g/dL / Pro: 9.4 g/dL / ALK PHOS: 75 U/L / ALT: 26 U/L / AST: 67 U/L / GGT: x                                                                                                                                       X-Rays reviewed                                                                                     ECHO    CXR interpreted by me     MEDICATIONS  (STANDING):  chlordiazePOXIDE 50 milliGRAM(s) Oral every 6 hours  chlordiazePOXIDE   Oral   folic acid 1 milliGRAM(s) Oral daily  multivitamin 1 Tablet(s) Oral daily  thiamine 100 milliGRAM(s) Oral daily    MEDICATIONS  (PRN):  chlordiazePOXIDE 100 milliGRAM(s) Oral every 1 hour PRN Symptom-triggered: each CIWA -Ar score 8 or GREATER         Patient is a 29y old  Female who presents with a chief complaint of alchocol withdrawal.  29-year-old female with past medical history of anemia, chronic EtOH use presenting for alcohol withdrawals and seizure on Saturday.  Last drink was Friday.  Patient states she developed a seizure on Saturday, went to [facility] where she was treated with Keppra and discharged home.  Self-weaning. Patient is interested in entering a detox program but unable to find placement yet.  Complaining of nausea, vomiting, diarrhea currently.  States she is unable to tolerate p.o. since Friday.      PAST MEDICAL & SURGICAL HISTORY:  Depression      Anxiety      Other specified postprocedural states      Anemia, unspecified type      H/O:   x3          SOCIAL HX:   Smoking       No                  ETOH        Yes                    Other Marijuana    FAMILY HISTORY:  FH: diabetes mellitus (Father, Mother)    Family history of hypertension (Father, Mother)      Review Of Systems:     All ROS are negative except per HPI       Allergies    No Known Allergies    Intolerances          PHYSICAL EXAM    ICU Vital Signs Last 24 Hrs  T(C): 36.4 (14 Oct 2024 07:22), Max: 36.4 (14 Oct 2024 07:22)  T(F): 97.6 (14 Oct 2024 07:22), Max: 97.6 (14 Oct 2024 07:22)  HR: 91 (14 Oct 2024 07:22) (91 - 91)  BP: 136/100 (14 Oct 2024 07:22) (136/100 - 136/100)-  RR: 16 (14 Oct 2024 07:22) (16 - 16)  SpO2: 100% (14 Oct 2024 07:22) (100% - 100%)    O2 Parameters below as of 14 Oct 2024 07:22  Patient On (Oxygen Delivery Method): room air            CONSTITUTIONAL:  NAD    ENT:   Airway patent,   Mouth with normal mucosa.   No thrush    CARDIAC:   Normal rate,   Regular rhythm.    No edema    RESPIRATORY:   No wheezing  Bilateral BS   Not tachypneic,  No use of accessory muscles    GASTROINTESTINAL:  Abdomen soft,   Non-tender,   No guarding,   + BS    NEUROLOGICAL:   Alert and oriented   No motor deficits.              LABS:                          12.2   7.39  )-----------( 530      ( 14 Oct 2024 08:55 )             37.0                                               10-14    141  |  95[L]  |  <3[L]  ----------------------------<  132[H]  3.4[L]   |  21  |  0.8    Ca    9.6      14 Oct 2024 08:55    TPro  9.4[H]  /  Alb  5.3[H]  /  TBili  1.2  /  DBili  x   /  AST  67[H]  /  ALT  26  /  AlkPhos  75  10-14                                             Urinalysis Basic - ( 14 Oct 2024 08:55 )    Color: x / Appearance: x / SG: x / pH: x  Gluc: 132 mg/dL / Ketone: x  / Bili: x / Urobili: x   Blood: x / Protein: x / Nitrite: x   Leuk Esterase: x / RBC: x / WBC x   Sq Epi: x / Non Sq Epi: x / Bacteria: x                                                  LIVER FUNCTIONS - ( 14 Oct 2024 08:55 )  Alb: 5.3 g/dL / Pro: 9.4 g/dL / ALK PHOS: 75 U/L / ALT: 26 U/L / AST: 67 U/L / GGT: x                                                                                                                                           MEDICATIONS  (STANDING):  chlordiazePOXIDE 50 milliGRAM(s) Oral every 6 hours  chlordiazePOXIDE   Oral   folic acid 1 milliGRAM(s) Oral daily  multivitamin 1 Tablet(s) Oral daily  thiamine 100 milliGRAM(s) Oral daily    MEDICATIONS  (PRN):  chlordiazePOXIDE 100 milliGRAM(s) Oral every 1 hour PRN Symptom-triggered: each CIWA -Ar score 8 or GREATER
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
## 2024-10-14 NOTE — ED PROVIDER NOTE - PHYSICAL EXAMINATION
Physical Exam: VS reviewed.   Constitutional: Patient appears to be uncomfortable  EYES: Pupils 2+ reactive bilaterally  ENT: No tongue fasciculations noted  CARD: S1S2 RRR  RESP: CTAB  ABD: Soft, NT/ND  MSK: Moving all extremities well. Normal gait  Neuro: Awake, alert, oriented. Answering questions appropriately.   Psych: Cooperative, appropriate Physical Exam: VS reviewed.   Constitutional: Patient appears to be uncomfortable, tremulous  EYES: Pupils 2+ reactive bilaterally  ENT: No tongue fasciculations noted  CARD: S1S2 RRR  RESP: CTAB  ABD: Soft, NT/ND  MSK: Moving all extremities well. Unsteady gait  Neuro: Awake, alert, oriented. Answering questions appropriately.   Psych: Cooperative, appropriate

## 2024-10-14 NOTE — ED PROVIDER NOTE - DIFFERENTIAL DIAGNOSIS
Encounter details (provider/department) have been updated by [facility] staff.   Of note, HF details may not display.     As of this time CDM: Does not populate or display     Updated: Provider (request sent from pharmacy with dr stone pended)     Of note, CDM will not display. PCP not live with [facility].    Will resend refill request encounter to  Centralized Refill Staff Pool.     [facility]     Note composed:11:20 AM 11/02/2020           Differential Diagnosis aka. alcohol withdrawal,  dt

## 2024-10-14 NOTE — H&P ADULT - TIME BILLING
Direct clinical care, extensive patient education and counseling,  coordination with consultants, nursing and case management/social work teams, review of tests and scheduled medications,  and resident supervision. Time spent on the encounter excludes teaching time and/or separately reported services.

## 2024-10-15 LAB
ALBUMIN SERPL ELPH-MCNC: 4.8 G/DL — SIGNIFICANT CHANGE UP (ref 3.5–5.2)
ALP SERPL-CCNC: 73 U/L — SIGNIFICANT CHANGE UP (ref 30–115)
ALT FLD-CCNC: 23 U/L — SIGNIFICANT CHANGE UP (ref 0–41)
ANION GAP SERPL CALC-SCNC: 20 MMOL/L — HIGH (ref 7–14)
ANION GAP SERPL CALC-SCNC: 8 MMOL/L — SIGNIFICANT CHANGE UP (ref 7–14)
AST SERPL-CCNC: 44 U/L — HIGH (ref 0–41)
BILIRUB SERPL-MCNC: 0.9 MG/DL — SIGNIFICANT CHANGE UP (ref 0.2–1.2)
BUN SERPL-MCNC: <3 MG/DL — LOW (ref 10–20)
BUN SERPL-MCNC: <3 MG/DL — LOW (ref 10–20)
CALCIUM SERPL-MCNC: 9.4 MG/DL — SIGNIFICANT CHANGE UP (ref 8.4–10.4)
CALCIUM SERPL-MCNC: 9.6 MG/DL — SIGNIFICANT CHANGE UP (ref 8.4–10.5)
CHLORIDE SERPL-SCNC: 103 MMOL/L — SIGNIFICANT CHANGE UP (ref 98–110)
CHLORIDE SERPL-SCNC: 96 MMOL/L — LOW (ref 98–110)
CO2 SERPL-SCNC: 24 MMOL/L — SIGNIFICANT CHANGE UP (ref 17–32)
CO2 SERPL-SCNC: 27 MMOL/L — SIGNIFICANT CHANGE UP (ref 17–32)
CREAT SERPL-MCNC: 0.8 MG/DL — SIGNIFICANT CHANGE UP (ref 0.7–1.5)
CREAT SERPL-MCNC: 0.8 MG/DL — SIGNIFICANT CHANGE UP (ref 0.7–1.5)
EGFR: 101 ML/MIN/1.73M2 — SIGNIFICANT CHANGE UP
EGFR: 101 ML/MIN/1.73M2 — SIGNIFICANT CHANGE UP
GLUCOSE SERPL-MCNC: 106 MG/DL — HIGH (ref 70–99)
GLUCOSE SERPL-MCNC: 106 MG/DL — HIGH (ref 70–99)
HCT VFR BLD CALC: 37.2 % — SIGNIFICANT CHANGE UP (ref 37–47)
HGB BLD-MCNC: 12.1 G/DL — SIGNIFICANT CHANGE UP (ref 12–16)
MAGNESIUM SERPL-MCNC: 2.2 MG/DL — SIGNIFICANT CHANGE UP (ref 1.8–2.4)
MCHC RBC-ENTMCNC: 24.7 PG — LOW (ref 27–31)
MCHC RBC-ENTMCNC: 32.5 G/DL — SIGNIFICANT CHANGE UP (ref 32–37)
MCV RBC AUTO: 76.1 FL — LOW (ref 81–99)
NRBC # BLD: 0 /100 WBCS — SIGNIFICANT CHANGE UP (ref 0–0)
PHOSPHATE SERPL-MCNC: 2.8 MG/DL — SIGNIFICANT CHANGE UP (ref 2.1–4.9)
PLATELET # BLD AUTO: 494 K/UL — HIGH (ref 130–400)
PMV BLD: 9.3 FL — SIGNIFICANT CHANGE UP (ref 7.4–10.4)
POTASSIUM SERPL-MCNC: 2.8 MMOL/L — LOW (ref 3.5–5)
POTASSIUM SERPL-MCNC: 3.1 MMOL/L — LOW (ref 3.5–5)
POTASSIUM SERPL-SCNC: 2.8 MMOL/L — LOW (ref 3.5–5)
POTASSIUM SERPL-SCNC: 3.1 MMOL/L — LOW (ref 3.5–5)
PROT SERPL-MCNC: 8.3 G/DL — HIGH (ref 6–8)
RBC # BLD: 4.89 M/UL — SIGNIFICANT CHANGE UP (ref 4.2–5.4)
RBC # FLD: 20.5 % — HIGH (ref 11.5–14.5)
SODIUM SERPL-SCNC: 138 MMOL/L — SIGNIFICANT CHANGE UP (ref 135–146)
SODIUM SERPL-SCNC: 140 MMOL/L — SIGNIFICANT CHANGE UP (ref 135–146)
WBC # BLD: 8.02 K/UL — SIGNIFICANT CHANGE UP (ref 4.8–10.8)
WBC # FLD AUTO: 8.02 K/UL — SIGNIFICANT CHANGE UP (ref 4.8–10.8)

## 2024-10-15 PROCEDURE — 99232 SBSQ HOSP IP/OBS MODERATE 35: CPT

## 2024-10-15 PROCEDURE — 99222 1ST HOSP IP/OBS MODERATE 55: CPT

## 2024-10-15 RX ORDER — POTASSIUM CHLORIDE 10 MEQ
40 TABLET, EXTENDED RELEASE ORAL ONCE
Refills: 0 | Status: COMPLETED | OUTPATIENT
Start: 2024-10-15 | End: 2024-10-15

## 2024-10-15 RX ORDER — GABAPENTIN 300 MG/1
600 CAPSULE ORAL THREE TIMES A DAY
Refills: 0 | Status: DISCONTINUED | OUTPATIENT
Start: 2024-10-15 | End: 2024-10-15

## 2024-10-15 RX ORDER — LEVETIRACETAM 500 MG/1
500 TABLET, FILM COATED ORAL
Refills: 0 | Status: DISCONTINUED | OUTPATIENT
Start: 2024-10-15 | End: 2024-10-22

## 2024-10-15 RX ORDER — GABAPENTIN 300 MG/1
600 CAPSULE ORAL THREE TIMES A DAY
Refills: 0 | Status: DISCONTINUED | OUTPATIENT
Start: 2024-10-15 | End: 2024-10-22

## 2024-10-15 RX ORDER — POTASSIUM CHLORIDE 10 MEQ
20 TABLET, EXTENDED RELEASE ORAL
Refills: 0 | Status: DISCONTINUED | OUTPATIENT
Start: 2024-10-15 | End: 2024-10-15

## 2024-10-15 RX ORDER — POTASSIUM CHLORIDE 10 MEQ
20 TABLET, EXTENDED RELEASE ORAL
Refills: 0 | Status: COMPLETED | OUTPATIENT
Start: 2024-10-15 | End: 2024-10-15

## 2024-10-15 RX ADMIN — Medication 50 MILLIGRAM(S): at 05:32

## 2024-10-15 RX ADMIN — LEVETIRACETAM 500 MILLIGRAM(S): 500 TABLET, FILM COATED ORAL at 17:18

## 2024-10-15 RX ADMIN — Medication 50 MILLIEQUIVALENT(S): at 17:19

## 2024-10-15 RX ADMIN — FOLIC ACID 1 MILLIGRAM(S): 1 TABLET ORAL at 11:25

## 2024-10-15 RX ADMIN — GABAPENTIN 600 MILLIGRAM(S): 300 CAPSULE ORAL at 05:32

## 2024-10-15 RX ADMIN — Medication 50 MILLIEQUIVALENT(S): at 14:47

## 2024-10-15 RX ADMIN — PANTOPRAZOLE SODIUM 40 MILLIGRAM(S): 40 TABLET, DELAYED RELEASE ORAL at 08:29

## 2024-10-15 RX ADMIN — Medication 50 MILLIEQUIVALENT(S): at 11:55

## 2024-10-15 RX ADMIN — Medication 100 MILLIGRAM(S): at 11:34

## 2024-10-15 RX ADMIN — DIAZEPAM 5 MILLIGRAM(S): 10 FILM BUCCAL at 11:24

## 2024-10-15 RX ADMIN — MIRTAZAPINE 15 MILLIGRAM(S): 30 TABLET ORAL at 22:07

## 2024-10-15 RX ADMIN — Medication 50 MILLIGRAM(S): at 22:08

## 2024-10-15 RX ADMIN — Medication 50 MILLIGRAM(S): at 13:15

## 2024-10-15 RX ADMIN — GABAPENTIN 600 MILLIGRAM(S): 300 CAPSULE ORAL at 20:13

## 2024-10-15 RX ADMIN — Medication 100 MILLIGRAM(S): at 11:24

## 2024-10-15 RX ADMIN — QUETIAPINE FUMARATE 100 MILLIGRAM(S): 200 TABLET ORAL at 22:07

## 2024-10-15 RX ADMIN — Medication 650 MILLIGRAM(S): at 00:15

## 2024-10-15 RX ADMIN — GABAPENTIN 600 MILLIGRAM(S): 300 CAPSULE ORAL at 13:15

## 2024-10-15 RX ADMIN — ARIPIPRAZOLE 5 MILLIGRAM(S): 2 TABLET ORAL at 11:24

## 2024-10-15 RX ADMIN — Medication 1 TABLET(S): at 11:24

## 2024-10-15 RX ADMIN — SERTRALINE HYDROCHLORIDE 50 MILLIGRAM(S): 50 TABLET, FILM COATED ORAL at 11:25

## 2024-10-15 NOTE — PATIENT PROFILE ADULT - PATIENT'S SEXUAL ORIENTATION
Unknown Non-Graft Cartilage Fenestration Text: The cartilage was fenestrated with a 2mm punch biopsy to help facilitate healing.
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
## 2024-10-15 NOTE — PATIENT PROFILE ADULT - FUNCTIONAL ASSESSMENT - BASIC MOBILITY 6.
4-calculated by average/Not able to assess (calculate score using [facility] averaging method)

## 2024-10-15 NOTE — PROGRESS NOTE ADULT - SUBJECTIVE AND OBJECTIVE BOX
ALEJANDRO ALEGRIA 29y Female  MRN#: [id number]     Hospital Day: 1d    SUBJECTIVE     No overnight events. Patient seen and evaluated at bedside. Patient reports feeling confused upon awakening. When asking                                             ----------------------------------------------------------  OBJECTIVE  PAST MEDICAL & SURGICAL HISTORY  Depression    Anxiety    Other specified postprocedural states    Anemia, unspecified type    H/O:   x3                                              -----------------------------------------------------------  ALLERGIES:  No Known Allergies                                            ------------------------------------------------------------    HOME MEDICATIONS  Home Medications:  ARIPiprazole 5 mg oral tablet: 1 tab(s) orally once a day (14 Oct 2024 12:48)  diazePAM 5 mg oral tablet: 1 tab(s) orally once a day (14 Oct 2024 12:48)  gabapentin 600 mg oral tablet: 1 tab(s) orally 3 times a day (14 Oct 2024 12:48)  hydrOXYzine: 50 milligram(s) orally every 6 hours (14 Oct 2024 12:49)  lamoTRIgine 25 mg oral tablet: 2 tab(s) orally once a day (14 Oct 2024 12:48)  mirtazapine 15 mg oral tablet: 1 tab(s) orally once a day (at bedtime) (14 Oct 2024 12:49)  naltrexone 50 mg oral tablet: 1 tab(s) orally once a day (14 Oct 2024 12:49)  omeprazole 40 mg oral delayed release capsule: 1 cap(s) orally once a day (14 Oct 2024 12:41)  QUEtiapine 100 mg oral tablet: 1 tab(s) orally once a day (at bedtime) (14 Oct 2024 12:49)  sertraline 50 mg oral tablet: 1 tab(s) orally once a day (14 Oct 2024 12:48)  Zofran 4 mg oral tablet: 1 tab(s) orally 2 times a day (14 Oct 2024 12:49)                           MEDICATIONS:  STANDING MEDICATIONS  ARIPiprazole 5 milliGRAM(s) Oral daily  chlordiazePOXIDE 50 milliGRAM(s) Oral every 8 hours  chlordiazePOXIDE   Oral   diazepam    Tablet 5 milliGRAM(s) Oral daily  folic acid 1 milliGRAM(s) Oral daily  gabapentin 600 milliGRAM(s) Oral three times a day  mirtazapine 15 milliGRAM(s) Oral at bedtime  multivitamin 1 Tablet(s) Oral daily  pantoprazole    Tablet 40 milliGRAM(s) Oral before breakfast  potassium chloride  20 mEq/100 mL IVPB 20 milliEquivalent(s) IV Intermittent every 2 hours  QUEtiapine 100 milliGRAM(s) Oral at bedtime  sertraline 50 milliGRAM(s) Oral daily  thiamine 100 milliGRAM(s) Oral daily    PRN MEDICATIONS  chlordiazePOXIDE 100 milliGRAM(s) Oral every 1 hour PRN  hydrOXYzine hydrochloride Syrup 50 milliGRAM(s) Oral every 6 hours PRN                                            ------------------------------------------------------------  VITAL SIGNS: Last 24 Hours  T(C): 36.4 (15 Oct 2024 07:54), Max: 36.6 (14 Oct 2024 16:50)  T(F): 97.5 (15 Oct 2024 07:54), Max: 97.9 (14 Oct 2024 16:50)  HR: 111 (15 Oct 2024 07:54) (77 - 111)  BP: 124/88 (15 Oct 2024 07:54) (100/65 - 138/98)  BP(mean): 100 (15 Oct 2024 07:54) (100 - 100)  RR: 18 (15 Oct 2024 07:54) (18 - 18)  SpO2: 100% (15 Oct 2024 07:54) (100% - 100%)                                             --------------------------------------------------------------  LABS:                        12.1   8.02  )-----------( 494      ( 15 Oct 2024 07:56 )             37.2     10-15    140  |  96[L]  |  <3[L]  ----------------------------<  106[H]  2.8[L]   |  24  |  0.8    Ca    9.6      15 Oct 2024 07:56  Phos  2.8     10-15  Mg     2.2     10-15    TPro  8.3[H]  /  Alb  4.8  /  TBili  0.9  /  DBili  x   /  AST  44[H]  /  ALT  23  /  AlkPhos  73  10-15                                                            -------------------------------------------------------------  RADIOLOGY:                                            --------------------------------------------------------------    PHYSICAL EXAM:  GENERAL: NAD, lying in bed comfortably  HEAD:  Atraumatic, Normocephalic  EYES: EOMI, conjunctiva and sclera clear  ENT: Moist mucous membranes  NECK: Supple, No JVD  CHEST/LUNG: Clear to auscultation bilaterally; No rales, rhonchi, wheezing, or rubs. Unlabored respirations  HEART: regular rate and rhythm; No murmurs, rubs, or gallops  ABDOMEN: Bowel sounds present; Soft, Nontender, Nondistended.    EXTREMITIES: Warm. No clubbing, cyanosis, or edema  NERVOUS SYSTEM:  Alert & Oriented X3. No focal deficits   SKIN: No rashes or lesions                                           --------------------------------------------------------------                 ALEJANDRO ALEGRIA 29y Female  MRN#: [id number]     Hospital Day: 1d    SUBJECTIVE     No overnight events. Patient seen and evaluated at bedside. Patient reports feeling confused upon awakening. Patient also reports she has been having lapses in memory since her initial seizure episode.                                           ----------------------------------------------------------  OBJECTIVE  PAST MEDICAL & SURGICAL HISTORY  Depression    Anxiety    Other specified postprocedural states    Anemia, unspecified type    H/O:   x3                                              -----------------------------------------------------------  ALLERGIES:  No Known Allergies                                            ------------------------------------------------------------    HOME MEDICATIONS  Home Medications:  ARIPiprazole 5 mg oral tablet: 1 tab(s) orally once a day (14 Oct 2024 12:48)  diazePAM 5 mg oral tablet: 1 tab(s) orally once a day (14 Oct 2024 12:48)  gabapentin 600 mg oral tablet: 1 tab(s) orally 3 times a day (14 Oct 2024 12:48)  hydrOXYzine: 50 milligram(s) orally every 6 hours (14 Oct 2024 12:49)  lamoTRIgine 25 mg oral tablet: 2 tab(s) orally once a day (14 Oct 2024 12:48)  mirtazapine 15 mg oral tablet: 1 tab(s) orally once a day (at bedtime) (14 Oct 2024 12:49)  naltrexone 50 mg oral tablet: 1 tab(s) orally once a day (14 Oct 2024 12:49)  omeprazole 40 mg oral delayed release capsule: 1 cap(s) orally once a day (14 Oct 2024 12:41)  QUEtiapine 100 mg oral tablet: 1 tab(s) orally once a day (at bedtime) (14 Oct 2024 12:49)  sertraline 50 mg oral tablet: 1 tab(s) orally once a day (14 Oct 2024 12:48)  Zofran 4 mg oral tablet: 1 tab(s) orally 2 times a day (14 Oct 2024 12:49)                           MEDICATIONS:  STANDING MEDICATIONS  ARIPiprazole 5 milliGRAM(s) Oral daily  chlordiazePOXIDE 50 milliGRAM(s) Oral every 8 hours  chlordiazePOXIDE   Oral   diazepam    Tablet 5 milliGRAM(s) Oral daily  folic acid 1 milliGRAM(s) Oral daily  gabapentin 600 milliGRAM(s) Oral three times a day  mirtazapine 15 milliGRAM(s) Oral at bedtime  multivitamin 1 Tablet(s) Oral daily  pantoprazole    Tablet 40 milliGRAM(s) Oral before breakfast  potassium chloride  20 mEq/100 mL IVPB 20 milliEquivalent(s) IV Intermittent every 2 hours  QUEtiapine 100 milliGRAM(s) Oral at bedtime  sertraline 50 milliGRAM(s) Oral daily  thiamine 100 milliGRAM(s) Oral daily    PRN MEDICATIONS  chlordiazePOXIDE 100 milliGRAM(s) Oral every 1 hour PRN  hydrOXYzine hydrochloride Syrup 50 milliGRAM(s) Oral every 6 hours PRN                                            ------------------------------------------------------------  VITAL SIGNS: Last 24 Hours  T(C): 36.4 (15 Oct 2024 07:54), Max: 36.6 (14 Oct 2024 16:50)  T(F): 97.5 (15 Oct 2024 07:54), Max: 97.9 (14 Oct 2024 16:50)  HR: 111 (15 Oct 2024 07:54) (77 - 111)  BP: 124/88 (15 Oct 2024 07:54) (100/65 - 138/98)  BP(mean): 100 (15 Oct 2024 07:54) (100 - 100)  RR: 18 (15 Oct 2024 07:54) (18 - 18)  SpO2: 100% (15 Oct 2024 07:54) (100% - 100%)                                             --------------------------------------------------------------  LABS:                        12.1   8.02  )-----------( 494      ( 15 Oct 2024 07:56 )             37.2     10-15    140  |  96[L]  |  <3[L]  ----------------------------<  106[H]  2.8[L]   |  24  |  0.8    Ca    9.6      15 Oct 2024 07:56  Phos  2.8     10-15  Mg     2.2     10-15    TPro  8.3[H]  /  Alb  4.8  /  TBili  0.9  /  DBili  x   /  AST  44[H]  /  ALT  23  /  AlkPhos  73  10-15                PHYSICAL EXAM:  GENERAL: Appears anxious. Hands having resting tremor.   HEAD:  Atraumatic, Normocephalic  CHEST/LUNG: Clear to auscultation bilaterally. Unlabored respirations. No wheezes.   HEART: regular rate and rhythm; No murmurs  ABDOMEN: Bowel sounds present; Soft, Nontender, Nondistended.    EXTREMITIES: Warm. No edema. B/L resting tremor of hands.   NERVOUS SYSTEM:  Alert. Oriented x2-3. No focal deficits   SKIN: No rashes or lesions                                           --------------------------------------------------------------

## 2024-10-15 NOTE — PROGRESS NOTE ADULT - SUBJECTIVE AND OBJECTIVE BOX
Patient is a 29y old  Female who presents with a chief complaint of Nausea, vomiting, and seizure (15 Oct 2024 14:15)      Patient seen and examined at bedside.  Patient reports feeling tremors and tactile hallucinations   ALLERGIES:  No Known Allergies    MEDICATIONS:  ARIPiprazole 5 milliGRAM(s) Oral daily  chlordiazePOXIDE   Oral   chlordiazePOXIDE 100 milliGRAM(s) Oral every 1 hour PRN  chlordiazePOXIDE 50 milliGRAM(s) Oral every 8 hours  diazepam    Tablet 5 milliGRAM(s) Oral daily  folic acid 1 milliGRAM(s) Oral daily  gabapentin 600 milliGRAM(s) Oral three times a day  hydrOXYzine hydrochloride Syrup 50 milliGRAM(s) Oral every 6 hours PRN  levETIRAcetam 500 milliGRAM(s) Oral two times a day  mirtazapine 15 milliGRAM(s) Oral at bedtime  multivitamin 1 Tablet(s) Oral daily  pantoprazole    Tablet 40 milliGRAM(s) Oral before breakfast  QUEtiapine 100 milliGRAM(s) Oral at bedtime  sertraline 50 milliGRAM(s) Oral daily  thiamine 100 milliGRAM(s) Oral daily    Vital Signs Last 24 Hrs  T(F): 98.1 (15 Oct 2024 15:30), Max: 98.1 (15 Oct 2024 15:30)  HR: 107 (15 Oct 2024 15:30) (87 - 111)  BP: 123/92 (15 Oct 2024 15:30) (123/92 - 138/98)  RR: 18 (15 Oct 2024 15:30) (18 - 18)  SpO2: 100% (15 Oct 2024 15:30) (100% - 100%)  I&O's Summary      PHYSICAL EXAM:  General: NAD, A/O x 3  ENT: MMM  Neck: Supple, No JVD  Lungs: Clear to auscultation bilaterally  Cardio: RRR, S1/S2, No murmurs  Abdomen: Soft, Nontender, Nondistended; Bowel sounds present  Extremities: No cyanosis, No edema    LABS:                        12.1   8.02  )-----------( 494      ( 15 Oct 2024 07:56 )             37.2     10-15    140  |  96  |  <3  ----------------------------<  106  2.8   |  24  |  0.8    Ca    9.6      15 Oct 2024 07:56  Phos  2.8     10-15  Mg     2.2     10-15    TPro  8.3  /  Alb  4.8  /  TBili  0.9  /  DBili  x   /  AST  44  /  ALT  23  /  AlkPhos  73  10-15                  08:28 - VBG - pH: 7.42  | pCO2: 39    | pO2: 23    | Lactate: 1.3                  Urinalysis Basic - ( 15 Oct 2024 07:56 )    Color: x / Appearance: x / SG: x / pH: x  Gluc: 106 mg/dL / Ketone: x  / Bili: x / Urobili: x   Blood: x / Protein: x / Nitrite: x   Leuk Esterase: x / RBC: x / WBC x   Sq Epi: x / Non Sq Epi: x / Bacteria: x            RADIOLOGY & ADDITIONAL TESTS:    Care Discussed with Consultants/Other Providers:

## 2024-10-16 LAB
ALBUMIN SERPL ELPH-MCNC: 3.9 G/DL — SIGNIFICANT CHANGE UP (ref 3.5–5.2)
ALP SERPL-CCNC: 53 U/L — SIGNIFICANT CHANGE UP (ref 30–115)
ALT FLD-CCNC: 19 U/L — SIGNIFICANT CHANGE UP (ref 0–41)
ANION GAP SERPL CALC-SCNC: 19 MMOL/L — HIGH (ref 7–14)
AST SERPL-CCNC: 45 U/L — HIGH (ref 0–41)
BASOPHILS # BLD AUTO: 0.06 K/UL — SIGNIFICANT CHANGE UP (ref 0–0.2)
BASOPHILS NFR BLD AUTO: 1.1 % — HIGH (ref 0–1)
BILIRUB SERPL-MCNC: 0.4 MG/DL — SIGNIFICANT CHANGE UP (ref 0.2–1.2)
BUN SERPL-MCNC: 3 MG/DL — LOW (ref 10–20)
CALCIUM SERPL-MCNC: 9.1 MG/DL — SIGNIFICANT CHANGE UP (ref 8.4–10.5)
CHLORIDE SERPL-SCNC: 102 MMOL/L — SIGNIFICANT CHANGE UP (ref 98–110)
CO2 SERPL-SCNC: 22 MMOL/L — SIGNIFICANT CHANGE UP (ref 17–32)
CREAT SERPL-MCNC: 0.8 MG/DL — SIGNIFICANT CHANGE UP (ref 0.7–1.5)
EGFR: 101 ML/MIN/1.73M2 — SIGNIFICANT CHANGE UP
EOSINOPHIL # BLD AUTO: 0.03 K/UL — SIGNIFICANT CHANGE UP (ref 0–0.7)
EOSINOPHIL NFR BLD AUTO: 0.5 % — SIGNIFICANT CHANGE UP (ref 0–8)
GLUCOSE SERPL-MCNC: 87 MG/DL — SIGNIFICANT CHANGE UP (ref 70–99)
HCG SERPL-ACNC: <1 MIU/ML — SIGNIFICANT CHANGE UP
HCT VFR BLD CALC: 32.9 % — LOW (ref 37–47)
HGB BLD-MCNC: 10.9 G/DL — LOW (ref 12–16)
IMM GRANULOCYTES NFR BLD AUTO: 0.4 % — HIGH (ref 0.1–0.3)
LYMPHOCYTES # BLD AUTO: 2.15 K/UL — SIGNIFICANT CHANGE UP (ref 1.2–3.4)
LYMPHOCYTES # BLD AUTO: 39.3 % — SIGNIFICANT CHANGE UP (ref 20.5–51.1)
MAGNESIUM SERPL-MCNC: 1.6 MG/DL — LOW (ref 1.8–2.4)
MCHC RBC-ENTMCNC: 25 PG — LOW (ref 27–31)
MCHC RBC-ENTMCNC: 33.1 G/DL — SIGNIFICANT CHANGE UP (ref 32–37)
MCV RBC AUTO: 75.5 FL — LOW (ref 81–99)
MONOCYTES # BLD AUTO: 0.29 K/UL — SIGNIFICANT CHANGE UP (ref 0.1–0.6)
MONOCYTES NFR BLD AUTO: 5.3 % — SIGNIFICANT CHANGE UP (ref 1.7–9.3)
NEUTROPHILS # BLD AUTO: 2.92 K/UL — SIGNIFICANT CHANGE UP (ref 1.4–6.5)
NEUTROPHILS NFR BLD AUTO: 53.4 % — SIGNIFICANT CHANGE UP (ref 42.2–75.2)
NRBC # BLD: 0 /100 WBCS — SIGNIFICANT CHANGE UP (ref 0–0)
PLATELET # BLD AUTO: 407 K/UL — HIGH (ref 130–400)
PMV BLD: 10.2 FL — SIGNIFICANT CHANGE UP (ref 7.4–10.4)
POTASSIUM SERPL-MCNC: 4.7 MMOL/L — SIGNIFICANT CHANGE UP (ref 3.5–5)
POTASSIUM SERPL-SCNC: 4.7 MMOL/L — SIGNIFICANT CHANGE UP (ref 3.5–5)
PROT SERPL-MCNC: 6.9 G/DL — SIGNIFICANT CHANGE UP (ref 6–8)
RBC # BLD: 4.36 M/UL — SIGNIFICANT CHANGE UP (ref 4.2–5.4)
RBC # FLD: 20.7 % — HIGH (ref 11.5–14.5)
SODIUM SERPL-SCNC: 143 MMOL/L — SIGNIFICANT CHANGE UP (ref 135–146)
WBC # BLD: 5.47 K/UL — SIGNIFICANT CHANGE UP (ref 4.8–10.8)
WBC # FLD AUTO: 5.47 K/UL — SIGNIFICANT CHANGE UP (ref 4.8–10.8)

## 2024-10-16 PROCEDURE — 95816 EEG AWAKE AND DROWSY: CPT | Mod: 26

## 2024-10-16 PROCEDURE — 99233 SBSQ HOSP IP/OBS HIGH 50: CPT

## 2024-10-16 PROCEDURE — 93010 ELECTROCARDIOGRAM REPORT: CPT

## 2024-10-16 RX ORDER — MAGNESIUM SULFATE IN 0.9% NACL 2 G/50 ML
2 INTRAVENOUS SOLUTION, PIGGYBACK (ML) INTRAVENOUS
Refills: 0 | Status: COMPLETED | OUTPATIENT
Start: 2024-10-16 | End: 2024-10-16

## 2024-10-16 RX ORDER — TRIMETHOBENZAMIDE HCL 250 MG
200 CAPSULE ORAL EVERY 8 HOURS
Refills: 0 | Status: DISCONTINUED | OUTPATIENT
Start: 2024-10-16 | End: 2024-10-22

## 2024-10-16 RX ADMIN — LEVETIRACETAM 500 MILLIGRAM(S): 500 TABLET, FILM COATED ORAL at 17:03

## 2024-10-16 RX ADMIN — LEVETIRACETAM 500 MILLIGRAM(S): 500 TABLET, FILM COATED ORAL at 05:20

## 2024-10-16 RX ADMIN — ARIPIPRAZOLE 5 MILLIGRAM(S): 2 TABLET ORAL at 11:15

## 2024-10-16 RX ADMIN — Medication 25 GRAM(S): at 21:55

## 2024-10-16 RX ADMIN — PANTOPRAZOLE SODIUM 40 MILLIGRAM(S): 40 TABLET, DELAYED RELEASE ORAL at 05:23

## 2024-10-16 RX ADMIN — GABAPENTIN 600 MILLIGRAM(S): 300 CAPSULE ORAL at 05:20

## 2024-10-16 RX ADMIN — Medication 25 GRAM(S): at 19:53

## 2024-10-16 RX ADMIN — MIRTAZAPINE 15 MILLIGRAM(S): 30 TABLET ORAL at 21:52

## 2024-10-16 RX ADMIN — Medication 100 MILLIGRAM(S): at 20:26

## 2024-10-16 RX ADMIN — GABAPENTIN 600 MILLIGRAM(S): 300 CAPSULE ORAL at 21:52

## 2024-10-16 RX ADMIN — Medication 25 GRAM(S): at 17:03

## 2024-10-16 RX ADMIN — QUETIAPINE FUMARATE 100 MILLIGRAM(S): 200 TABLET ORAL at 21:52

## 2024-10-16 RX ADMIN — Medication 100 MILLIGRAM(S): at 11:15

## 2024-10-16 RX ADMIN — DIAZEPAM 5 MILLIGRAM(S): 10 FILM BUCCAL at 11:15

## 2024-10-16 RX ADMIN — GABAPENTIN 600 MILLIGRAM(S): 300 CAPSULE ORAL at 13:45

## 2024-10-16 RX ADMIN — Medication 40 MILLIEQUIVALENT(S): at 00:22

## 2024-10-16 RX ADMIN — Medication 50 MILLIGRAM(S): at 17:03

## 2024-10-16 RX ADMIN — SERTRALINE HYDROCHLORIDE 50 MILLIGRAM(S): 50 TABLET, FILM COATED ORAL at 11:14

## 2024-10-16 RX ADMIN — FOLIC ACID 1 MILLIGRAM(S): 1 TABLET ORAL at 11:16

## 2024-10-16 RX ADMIN — Medication 50 MILLIGRAM(S): at 08:16

## 2024-10-16 RX ADMIN — Medication 100 MILLIGRAM(S): at 04:19

## 2024-10-16 RX ADMIN — Medication 1 TABLET(S): at 11:15
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
## 2024-10-16 NOTE — EEG REPORT - NS EEG TEXT BOX
[facility] Department of Neurology  Inpatient Routine-EEG Report      Patient Name:	ALEJANDRO ALEGRIA    :	1995  MRN:	-  Study Date/Time:	10/15/2024, 12:12:57 PM  Referred by:	-    Brief Clinical History:  ALEJANDRO ALEGRIA is a 29 year old Female; study performed to investigate for seizures or markers of epilepsy.   Diagnosis Code: R40.4 Transient alteration of awareness    Patient Medication:  SEROQUEL    LIBRIUM    VALIUM    REMERON    PROTONX    ZOLOFT      Acquisition Details:  Electroencephalography was acquired using a minimum of 21 channels on an [employer] Neurology system v 9.3.1 with electrode placement according to the standard International 10-20 system following ACNS (American Clinical Neurophysiology Society) guidelines.  Anterior temporal T1 and T2 electrodes were utilized whenever possible.   The [employer] automated spike & seizure detections were all reviewed in detail, in addition to the entire raw EEG.    Findings:  Background:  continuous.   Voltage:  Normal (20uV)  Organization:  Appropriate anterior-posterior gradient  Posterior Dominant Rhythm:  8.5 Hz symmetric, well-organized, and well-modulated  Variability:  Yes	Reactivity:  Yes  Sleep:  Absent.  Focal abnormalities:  No persistent asymmetries of voltage or frequency.  Interictal Activity:  None  Focal Slowing:  None  Generalized Slowing:  No  Events:  1)	No electrographic seizures or significant clinical events.  Provocations:  1)	Hyperventilation: was not performed.  2)	Photic stimulation: was not performed.  Impression:  Normal REEG    Clinical Correlation:  Normal study does not exclude diagnosis of seizure disorder    Loi Alaniz MD  Attending Neurologist, Division of Epilepsy

## 2024-10-16 NOTE — PHARMACOTHERAPY INTERVENTION NOTE - COMMENTS
I spoke with Dr Mcnamara and recommended to change Trimethobenzamide 300 mg po q8h prn to 200 mg IM because the po is not available. Dr Mcnamara agreed to change the medication. Approved by Dontrell
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
## 2024-10-17 DIAGNOSIS — F43.12 POST-TRAUMATIC STRESS DISORDER, CHRONIC: ICD-10-CM

## 2024-10-17 DIAGNOSIS — Z87.898 PERSONAL HISTORY OF OTHER SPECIFIED CONDITIONS: ICD-10-CM

## 2024-10-17 DIAGNOSIS — F41.9 ANXIETY DISORDER, UNSPECIFIED: ICD-10-CM

## 2024-10-17 DIAGNOSIS — F10.939 ALCOHOL USE, UNSPECIFIED WITH WITHDRAWAL, UNSPECIFIED: ICD-10-CM

## 2024-10-17 LAB
ABO RH CONFIRMATION: SIGNIFICANT CHANGE UP
ALBUMIN SERPL ELPH-MCNC: 4.1 G/DL — SIGNIFICANT CHANGE UP (ref 3.5–5.2)
ALBUMIN SERPL ELPH-MCNC: 4.3 G/DL — SIGNIFICANT CHANGE UP (ref 3.5–5.2)
ALP SERPL-CCNC: 56 U/L — SIGNIFICANT CHANGE UP (ref 30–115)
ALP SERPL-CCNC: 56 U/L — SIGNIFICANT CHANGE UP (ref 30–115)
ALT FLD-CCNC: 16 U/L — SIGNIFICANT CHANGE UP (ref 0–41)
ALT FLD-CCNC: 16 U/L — SIGNIFICANT CHANGE UP (ref 0–41)
ANION GAP SERPL CALC-SCNC: 14 MMOL/L — SIGNIFICANT CHANGE UP (ref 7–14)
APTT BLD: 32.2 SEC — SIGNIFICANT CHANGE UP (ref 27–39.2)
AST SERPL-CCNC: 26 U/L — SIGNIFICANT CHANGE UP (ref 0–41)
AST SERPL-CCNC: 27 U/L — SIGNIFICANT CHANGE UP (ref 0–41)
BASOPHILS # BLD AUTO: 0.05 K/UL — SIGNIFICANT CHANGE UP (ref 0–0.2)
BASOPHILS NFR BLD AUTO: 0.6 % — SIGNIFICANT CHANGE UP (ref 0–1)
BILIRUB DIRECT SERPL-MCNC: 0.2 MG/DL — SIGNIFICANT CHANGE UP (ref 0–0.3)
BILIRUB INDIRECT FLD-MCNC: 0.4 MG/DL — SIGNIFICANT CHANGE UP (ref 0.2–1.2)
BILIRUB SERPL-MCNC: 0.6 MG/DL — SIGNIFICANT CHANGE UP (ref 0.2–1.2)
BILIRUB SERPL-MCNC: 0.6 MG/DL — SIGNIFICANT CHANGE UP (ref 0.2–1.2)
BUN SERPL-MCNC: 5 MG/DL — LOW (ref 10–20)
CALCIUM SERPL-MCNC: 9.3 MG/DL — SIGNIFICANT CHANGE UP (ref 8.4–10.5)
CHLORIDE SERPL-SCNC: 99 MMOL/L — SIGNIFICANT CHANGE UP (ref 98–110)
CO2 SERPL-SCNC: 27 MMOL/L — SIGNIFICANT CHANGE UP (ref 17–32)
CREAT SERPL-MCNC: 0.7 MG/DL — SIGNIFICANT CHANGE UP (ref 0.7–1.5)
EGFR: 119 ML/MIN/1.73M2 — SIGNIFICANT CHANGE UP
EOSINOPHIL # BLD AUTO: 0.04 K/UL — SIGNIFICANT CHANGE UP (ref 0–0.7)
EOSINOPHIL NFR BLD AUTO: 0.5 % — SIGNIFICANT CHANGE UP (ref 0–8)
GLUCOSE SERPL-MCNC: 112 MG/DL — HIGH (ref 70–99)
HCT VFR BLD CALC: 31.1 % — LOW (ref 37–47)
HGB BLD-MCNC: 10.3 G/DL — LOW (ref 12–16)
IMM GRANULOCYTES NFR BLD AUTO: 0.3 % — SIGNIFICANT CHANGE UP (ref 0.1–0.3)
INR BLD: 1.03 RATIO — SIGNIFICANT CHANGE UP (ref 0.65–1.3)
LYMPHOCYTES # BLD AUTO: 2.12 K/UL — SIGNIFICANT CHANGE UP (ref 1.2–3.4)
LYMPHOCYTES # BLD AUTO: 24.5 % — SIGNIFICANT CHANGE UP (ref 20.5–51.1)
MAGNESIUM SERPL-MCNC: 1.6 MG/DL — LOW (ref 1.8–2.4)
MCHC RBC-ENTMCNC: 24.9 PG — LOW (ref 27–31)
MCHC RBC-ENTMCNC: 33.1 G/DL — SIGNIFICANT CHANGE UP (ref 32–37)
MCV RBC AUTO: 75.3 FL — LOW (ref 81–99)
MONOCYTES # BLD AUTO: 0.31 K/UL — SIGNIFICANT CHANGE UP (ref 0.1–0.6)
MONOCYTES NFR BLD AUTO: 3.6 % — SIGNIFICANT CHANGE UP (ref 1.7–9.3)
NEUTROPHILS # BLD AUTO: 6.1 K/UL — SIGNIFICANT CHANGE UP (ref 1.4–6.5)
NEUTROPHILS NFR BLD AUTO: 70.5 % — SIGNIFICANT CHANGE UP (ref 42.2–75.2)
NRBC # BLD: 0 /100 WBCS — SIGNIFICANT CHANGE UP (ref 0–0)
PHOSPHATE SERPL-MCNC: 4.7 MG/DL — SIGNIFICANT CHANGE UP (ref 2.1–4.9)
PLATELET # BLD AUTO: 387 K/UL — SIGNIFICANT CHANGE UP (ref 130–400)
PMV BLD: 10.5 FL — HIGH (ref 7.4–10.4)
POTASSIUM SERPL-MCNC: 3 MMOL/L — LOW (ref 3.5–5)
POTASSIUM SERPL-SCNC: 3 MMOL/L — LOW (ref 3.5–5)
PROT SERPL-MCNC: 6.8 G/DL — SIGNIFICANT CHANGE UP (ref 6–8)
PROT SERPL-MCNC: 6.8 G/DL — SIGNIFICANT CHANGE UP (ref 6–8)
PROTHROM AB SERPL-ACNC: 11.7 SEC — SIGNIFICANT CHANGE UP (ref 9.95–12.87)
RBC # BLD: 4.13 M/UL — LOW (ref 4.2–5.4)
RBC # FLD: 20.6 % — HIGH (ref 11.5–14.5)
SODIUM SERPL-SCNC: 140 MMOL/L — SIGNIFICANT CHANGE UP (ref 135–146)
WBC # BLD: 8.65 K/UL — SIGNIFICANT CHANGE UP (ref 4.8–10.8)
WBC # FLD AUTO: 8.65 K/UL — SIGNIFICANT CHANGE UP (ref 4.8–10.8)

## 2024-10-17 PROCEDURE — 99233 SBSQ HOSP IP/OBS HIGH 50: CPT

## 2024-10-17 PROCEDURE — 93306 TTE W/DOPPLER COMPLETE: CPT | Mod: 26

## 2024-10-17 PROCEDURE — 99222 1ST HOSP IP/OBS MODERATE 55: CPT

## 2024-10-17 RX ORDER — DIAZEPAM 10 MG/1
5 FILM BUCCAL DAILY
Refills: 0 | Status: DISCONTINUED | OUTPATIENT
Start: 2024-10-17 | End: 2024-10-17

## 2024-10-17 RX ORDER — DIAZEPAM 10 MG/1
10 FILM BUCCAL EVERY 6 HOURS
Refills: 0 | Status: DISCONTINUED | OUTPATIENT
Start: 2024-10-17 | End: 2024-10-22

## 2024-10-17 RX ORDER — DIAZEPAM 10 MG/1
10 FILM BUCCAL AT BEDTIME
Refills: 0 | Status: DISCONTINUED | OUTPATIENT
Start: 2024-10-21 | End: 2024-10-21

## 2024-10-17 RX ORDER — DIAZEPAM 10 MG/1
5 FILM BUCCAL EVERY 12 HOURS
Refills: 0 | Status: DISCONTINUED | OUTPATIENT
Start: 2024-10-17 | End: 2024-10-22

## 2024-10-17 RX ORDER — ACETAMINOPHEN 500 MG
650 TABLET ORAL EVERY 6 HOURS
Refills: 0 | Status: DISCONTINUED | OUTPATIENT
Start: 2024-10-17 | End: 2024-10-22

## 2024-10-17 RX ORDER — DIAZEPAM 10 MG/1
FILM BUCCAL
Refills: 0 | Status: COMPLETED | OUTPATIENT
Start: 2024-10-17 | End: 2024-10-22

## 2024-10-17 RX ORDER — DIAZEPAM 10 MG/1
10 FILM BUCCAL EVERY 8 HOURS
Refills: 0 | Status: DISCONTINUED | OUTPATIENT
Start: 2024-10-18 | End: 2024-10-19

## 2024-10-17 RX ORDER — DIAZEPAM 10 MG/1
10 FILM BUCCAL EVERY 6 HOURS
Refills: 0 | Status: DISCONTINUED | OUTPATIENT
Start: 2024-10-17 | End: 2024-10-18

## 2024-10-17 RX ORDER — MAGNESIUM SULFATE IN 0.9% NACL 2 G/50 ML
2 INTRAVENOUS SOLUTION, PIGGYBACK (ML) INTRAVENOUS ONCE
Refills: 0 | Status: COMPLETED | OUTPATIENT
Start: 2024-10-17 | End: 2024-10-17

## 2024-10-17 RX ORDER — POTASSIUM CHLORIDE 10 MEQ
40 TABLET, EXTENDED RELEASE ORAL ONCE
Refills: 0 | Status: COMPLETED | OUTPATIENT
Start: 2024-10-17 | End: 2024-10-17

## 2024-10-17 RX ORDER — DIAZEPAM 10 MG/1
5 FILM BUCCAL AT BEDTIME
Refills: 0 | Status: COMPLETED | OUTPATIENT
Start: 2024-10-22 | End: 2024-10-22

## 2024-10-17 RX ORDER — DIAZEPAM 10 MG/1
10 FILM BUCCAL EVERY 12 HOURS
Refills: 0 | Status: DISCONTINUED | OUTPATIENT
Start: 2024-10-20 | End: 2024-10-20

## 2024-10-17 RX ADMIN — Medication 40 MILLIEQUIVALENT(S): at 21:33

## 2024-10-17 RX ADMIN — PANTOPRAZOLE SODIUM 40 MILLIGRAM(S): 40 TABLET, DELAYED RELEASE ORAL at 05:36

## 2024-10-17 RX ADMIN — DIAZEPAM 10 MILLIGRAM(S): 10 FILM BUCCAL at 23:05

## 2024-10-17 RX ADMIN — FOLIC ACID 1 MILLIGRAM(S): 1 TABLET ORAL at 11:27

## 2024-10-17 RX ADMIN — Medication 650 MILLIGRAM(S): at 21:35

## 2024-10-17 RX ADMIN — GABAPENTIN 600 MILLIGRAM(S): 300 CAPSULE ORAL at 05:36

## 2024-10-17 RX ADMIN — DIAZEPAM 5 MILLIGRAM(S): 10 FILM BUCCAL at 12:22

## 2024-10-17 RX ADMIN — GABAPENTIN 600 MILLIGRAM(S): 300 CAPSULE ORAL at 13:14

## 2024-10-17 RX ADMIN — LEVETIRACETAM 500 MILLIGRAM(S): 500 TABLET, FILM COATED ORAL at 05:36

## 2024-10-17 RX ADMIN — DIAZEPAM 10 MILLIGRAM(S): 10 FILM BUCCAL at 17:30

## 2024-10-17 RX ADMIN — Medication 650 MILLIGRAM(S): at 22:05

## 2024-10-17 RX ADMIN — Medication 1 TABLET(S): at 11:27

## 2024-10-17 RX ADMIN — Medication 50 MILLIGRAM(S): at 05:36

## 2024-10-17 RX ADMIN — Medication 100 MILLIGRAM(S): at 10:49

## 2024-10-17 RX ADMIN — ARIPIPRAZOLE 5 MILLIGRAM(S): 2 TABLET ORAL at 11:26

## 2024-10-17 RX ADMIN — Medication 25 GRAM(S): at 21:38

## 2024-10-17 RX ADMIN — MIRTAZAPINE 15 MILLIGRAM(S): 30 TABLET ORAL at 21:35

## 2024-10-17 RX ADMIN — SERTRALINE HYDROCHLORIDE 50 MILLIGRAM(S): 50 TABLET, FILM COATED ORAL at 11:27

## 2024-10-17 RX ADMIN — LEVETIRACETAM 500 MILLIGRAM(S): 500 TABLET, FILM COATED ORAL at 17:31

## 2024-10-17 RX ADMIN — QUETIAPINE FUMARATE 100 MILLIGRAM(S): 200 TABLET ORAL at 21:35

## 2024-10-17 RX ADMIN — GABAPENTIN 600 MILLIGRAM(S): 300 CAPSULE ORAL at 21:36
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
## 2024-10-17 NOTE — BH CONSULTATION LIAISON ASSESSMENT NOTE - OTHER PAST PSYCHIATRIC HISTORY (INCLUDE DETAILS REGARDING ONSET, COURSE OF ILLNESS, INPATIENT/OUTPATIENT TREATMENT)
-one prior inpatient alcohol detox 9/2020 [facility]  -one prior inpatient psychiatric admission @ [facility] for anxiety, depression, PTSD in 2019

## 2024-10-17 NOTE — BH CONSULTATION LIAISON ASSESSMENT NOTE - RISK ASSESSMENT
Protective: not suicidal, help seeking, limited access to lethal means  Risk: impulsive, active withdrawal, limited insight. Maladaptive coping , chronic trauma history

## 2024-10-17 NOTE — PROGRESS NOTE ADULT - NS ATTEST RISK PROBLEM GEN_ALL_CORE FT
--------------------------------Complexity of data reviewed ----------------------------------------------  The Patients complexity of data reviewed  is Low [ ] Moderate [ ] High [ x] due to the following:   CATEGORY 1: Reviewed prior external records [ ]  Considering/ Ordered a unique test:  Labs [x ] Imaging [x ] Stress Test  [ ] Other: Specify [ ]  Reviewed each unique test result [x ]   Assessment requiring an independent historian [ x]  CATEGORY 2: Independent interpretation of:   X-Ray [ ] EKG [ ]  Other: Specify  [ ]  CATEGORY 3: Discussion of management of tests with clinician outside of my group [x].  titrating parenteral drugs

## 2024-10-17 NOTE — CONSULT NOTE ADULT - TIME BILLING
Chart review of current notes, labs, imaging and past admission documentation, counseling, patient education and harm reduction counseling, coordination of care and documentation of treatment plan.
above

## 2024-10-17 NOTE — BH CONSULTATION LIAISON ASSESSMENT NOTE - NSBHCHARTREVIEWVS_PSY_A_CORE FT
Vital Signs Last 24 Hrs  T(C): 36.9 (17 Oct 2024 04:45), Max: 36.9 (16 Oct 2024 22:00)  T(F): 98.5 (17 Oct 2024 04:45), Max: 98.5 (16 Oct 2024 22:00)  HR: 123 (17 Oct 2024 04:45) (88 - 123)  BP: 106/65 (17 Oct 2024 04:45) (96/66 - 113/75)  BP(mean): 76 (16 Oct 2024 15:30) (76 - 76)  RR: 18 (16 Oct 2024 20:25) (18 - 18)  SpO2: 100% (16 Oct 2024 20:25) (100% - 100%)    Parameters below as of 16 Oct 2024 20:25  Patient On (Oxygen Delivery Method): room air

## 2024-10-17 NOTE — BH CONSULTATION LIAISON ASSESSMENT NOTE - NSBHCONSULTRECOMMENDOTHER_PSY_A_CORE FT
-C/w home Abilify, Zoloft, Mirtazapine, Seroquel. No acute concern for safety--qtc is 453 ms Acevedo on 10/16  -Patient requesting Valium. Discussed with addiction medication who is agreeable to use valium for alcohol withdrawal taper.  -No indication for IPP at this time  -Patient can follow up with [facility] for OP psychiatry -appreciate CATCH team recommendations.  -Psychiatry will sign off.
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
## 2024-10-17 NOTE — BH CONSULTATION LIAISON ASSESSMENT NOTE - NSBHCONSULTFOLLOWAFTERCARE_PSY_A_CORE FT
Patient to follow up with Outpatient chemical Dependency treatment program. Will defer to CATCH team for recommendations/referral.

## 2024-10-17 NOTE — PROGRESS NOTE ADULT - SUBJECTIVE AND OBJECTIVE BOX
SUBJECTIVE/OVERNIGHT EVENTS  Today is hospital day 3d. This morning patient was seen and examined at bedside, resting comfortably in bed. No acute or major events overnight.      CODE STATUS:    FAMILY COMMUNICATION  Contact date:  Name of person contacted:  Relationship to patient:  Communication details:    MEDICATIONS  STANDING MEDICATIONS  ARIPiprazole 5 milliGRAM(s) Oral daily  diazepam    Tablet   Oral   folic acid 1 milliGRAM(s) Oral daily  gabapentin 600 milliGRAM(s) Oral three times a day  levETIRAcetam 500 milliGRAM(s) Oral two times a day  mirtazapine 15 milliGRAM(s) Oral at bedtime  multivitamin 1 Tablet(s) Oral daily  pantoprazole    Tablet 40 milliGRAM(s) Oral before breakfast  QUEtiapine 100 milliGRAM(s) Oral at bedtime  sertraline 50 milliGRAM(s) Oral daily    PRN MEDICATIONS  chlordiazePOXIDE 100 milliGRAM(s) Oral every 1 hour PRN  diazepam    Tablet 10 milliGRAM(s) Oral every 6 hours PRN  diazepam    Tablet 5 milliGRAM(s) Oral every 12 hours PRN  hydrOXYzine hydrochloride Syrup 50 milliGRAM(s) Oral every 6 hours PRN  trimethobenzamide Injectable 200 milliGRAM(s) IntraMuscular every 8 hours PRN    VITALS  T(F): 97.5 (10-17-24 @ 12:27), Max: 98.5 (10-16-24 @ 22:00)  HR: 92 (10-17-24 @ 12:27) (88 - 123)  BP: 105/75 (10-17-24 @ 12:27) (96/66 - 113/75)  RR: 18 (10-17-24 @ 12:27) (18 - 18)  SpO2: 100% (10-16-24 @ 20:25) (100% - 100%)    PHYSICAL EXAM  GENERAL  (x  ) NAD, lying in bed comfortably     (  ) obtunded     (  ) lethargic     (  ) somnolent    HEAD  ( x ) Atraumatic     (  ) hematoma     (  ) laceration (specify location:       )     NECK  ( x ) Supple     (  ) neck stiffness     (  ) nuchal rigidity     (  )  no JVD     (  ) JVD present ( -- cm)    HEART  Rate -->  ( x ) normal rate    (  ) bradycardic    (  ) tachycardic  Rhythm -->  (  ) regular    (  ) regularly irregular    (  ) irregularly irregular  Murmurs -->  (  ) normal s1/s2    (  ) systolic murmur    (  ) diastolic murmur    (  ) continuous murmur     (  ) S3 present    (  ) S4 present    LUNGS  ( x )Unlabored respirations     (  ) tachypnea  (  ) B/L air entry     (  ) decreased breath sounds in:  (location     )    (  ) no adventitious sound     (  ) crackles     (  ) wheezing      (  ) rhonchi      (specify location:       )  (  ) chest wall tenderness (specify location:       )    ABDOMEN  ( x ) Soft     (  ) tense   |   (  ) nondistended     (  ) distended   |   (  ) +BS     (  ) hypoactive bowel sounds     (  ) hyperactive bowel sounds  (  ) nontender     (  ) RUQ tenderness     (  ) RLQ tenderness     (  ) LLQ tenderness     (  ) epigastric tenderness     (  ) diffuse tenderness  (  ) Splenomegaly      (  ) Hepatomegaly      (  ) Jaundice     (  ) ecchymosis     EXTREMITIES  ( x ) Normal     (  ) Rash     (  ) ecchymosis     (  ) varicose veins      (  ) pitting edema     (  ) non-pitting edema   (  ) ulceration     (  ) gangrene:     (location:     )    NERVOUS SYSTEM  ( x ) A&Ox3     (  ) confused     (  ) lethargic  CN II-XII:     (  ) Intact     (  ) focal deficits  (Specify:     )   Upper extremities:     (  ) strength X/5     (  ) focal deficit (specify:    )  Lower extremities:     (  ) strength  X/5    (  ) focal deficit (specify:    )    SKIN  (x  ) No rashes or lesions     (  ) maculopapular rash     (  ) pustules     (  ) vesicles     (  ) ulcer     (  ) ecchymosis     (specify location:     )    (  ) Indwelling Aceves Catheter   Date insterted:    Reason (  ) Critical illness     (  ) urinary retention    (  ) Accurate Ins/Outs Monitoring     (  ) CMO patient    (  ) Central Line  Date inserted:  Location: (  ) Right IJ   (  ) Left IJ   (  ) Right Fem   (  ) Left Fem    (  ) SPC  (  ) pigtail  (  ) PEG tube  (  ) colostomy  (  ) jejunostomy  (  ) U-Dall    LABS             10.9   5.47  )-----------( 407      ( 10-16-24 @ 06:12 )             32.9     143  |  102  |  3   -------------------------<  87   10-16-24 @ 06:12  4.7  |  22  |  0.8    Ca      9.1     10-16-24 @ 06:12  Mg     1.6     10-16-24 @ 06:12    TPro  6.9  /  Alb  3.9  /  TBili  0.4  /  DBili  x   /  AST  45  /  ALT  19  /  AlkPhos  53  /  GGT  x     10-16-24 @ 06:12        Urinalysis Basic - ( 16 Oct 2024 06:12 )    Color: x / Appearance: x / SG: x / pH: x  Gluc: 87 mg/dL / Ketone: x  / Bili: x / Urobili: x   Blood: x / Protein: x / Nitrite: x   Leuk Esterase: x / RBC: x / WBC x   Sq Epi: x / Non Sq Epi: x / Bacteria: x          IMAGING

## 2024-10-17 NOTE — BH CONSULTATION LIAISON ASSESSMENT NOTE - NSBHCHARTREVIEWINVESTIGATE_PSY_A_CORE FT
< from: 12 Lead ECG (10.16.24 @ 14:26) >      Ventricular Rate 89 BPM    Atrial Rate 89 BPM    P-R Interval 140 ms    QRS Duration 80 ms    Q-T Interval 402 ms    QTC Calculation(Bazett) 489 ms    Acevedo: 453 ms  [place]: 452 ms    < end of copied text >
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
## 2024-10-17 NOTE — BH CONSULTATION LIAISON ASSESSMENT NOTE - NSBHCHARTREVIEWLAB_PSY_A_CORE FT
10-16    143  |  102  |  3[L]  ----------------------------<  87  4.7   |  22  |  0.8    Ca    9.1      16 Oct 2024 06:12  Mg     1.6     10-16    TPro  6.9  /  Alb  3.9  /  TBili  0.4  /  DBili  x   /  AST  45[H]  /  ALT  19  /  AlkPhos  53  10-16                          10.9   5.47  )-----------( 407      ( 16 Oct 2024 06:12 )             32.9
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
## 2024-10-17 NOTE — BH CONSULTATION LIAISON ASSESSMENT NOTE - CURRENT MEDICATION
MEDICATIONS  (STANDING):  ARIPiprazole 5 milliGRAM(s) Oral daily  chlordiazePOXIDE   Oral   folic acid 1 milliGRAM(s) Oral daily  gabapentin 600 milliGRAM(s) Oral three times a day  levETIRAcetam 500 milliGRAM(s) Oral two times a day  mirtazapine 15 milliGRAM(s) Oral at bedtime  multivitamin 1 Tablet(s) Oral daily  pantoprazole    Tablet 40 milliGRAM(s) Oral before breakfast  QUEtiapine 100 milliGRAM(s) Oral at bedtime  sertraline 50 milliGRAM(s) Oral daily    MEDICATIONS  (PRN):  chlordiazePOXIDE 100 milliGRAM(s) Oral every 1 hour PRN Symptom-triggered: each CIWA -Ar score 8 or GREATER  hydrOXYzine hydrochloride Syrup 50 milliGRAM(s) Oral every 6 hours PRN Itching  trimethobenzamide Injectable 200 milliGRAM(s) IntraMuscular every 8 hours PRN Nausea and/or Vomiting
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
## 2024-10-18 LAB
ALBUMIN SERPL ELPH-MCNC: 3.8 G/DL — SIGNIFICANT CHANGE UP (ref 3.5–5.2)
ALP SERPL-CCNC: 55 U/L — SIGNIFICANT CHANGE UP (ref 30–115)
ALT FLD-CCNC: 14 U/L — SIGNIFICANT CHANGE UP (ref 0–41)
ANION GAP SERPL CALC-SCNC: 12 MMOL/L — SIGNIFICANT CHANGE UP (ref 7–14)
AST SERPL-CCNC: 21 U/L — SIGNIFICANT CHANGE UP (ref 0–41)
BASOPHILS # BLD AUTO: 0.04 K/UL — SIGNIFICANT CHANGE UP (ref 0–0.2)
BASOPHILS NFR BLD AUTO: 0.8 % — SIGNIFICANT CHANGE UP (ref 0–1)
BILIRUB SERPL-MCNC: 0.3 MG/DL — SIGNIFICANT CHANGE UP (ref 0.2–1.2)
BUN SERPL-MCNC: 12 MG/DL — SIGNIFICANT CHANGE UP (ref 10–20)
CALCIUM SERPL-MCNC: 9.3 MG/DL — SIGNIFICANT CHANGE UP (ref 8.4–10.5)
CHLORIDE SERPL-SCNC: 103 MMOL/L — SIGNIFICANT CHANGE UP (ref 98–110)
CHOLEST SERPL-MCNC: 229 MG/DL — HIGH
CO2 SERPL-SCNC: 26 MMOL/L — SIGNIFICANT CHANGE UP (ref 17–32)
CREAT SERPL-MCNC: 0.9 MG/DL — SIGNIFICANT CHANGE UP (ref 0.7–1.5)
EGFR: 88 ML/MIN/1.73M2 — SIGNIFICANT CHANGE UP
EOSINOPHIL # BLD AUTO: 0.09 K/UL — SIGNIFICANT CHANGE UP (ref 0–0.7)
EOSINOPHIL NFR BLD AUTO: 1.8 % — SIGNIFICANT CHANGE UP (ref 0–8)
GLUCOSE SERPL-MCNC: 85 MG/DL — SIGNIFICANT CHANGE UP (ref 70–99)
HCT VFR BLD CALC: 31.5 % — LOW (ref 37–47)
HDLC SERPL-MCNC: 136 MG/DL — SIGNIFICANT CHANGE UP
HGB BLD-MCNC: 10.5 G/DL — LOW (ref 12–16)
IMM GRANULOCYTES NFR BLD AUTO: 0.2 % — SIGNIFICANT CHANGE UP (ref 0.1–0.3)
LIPID PNL WITH DIRECT LDL SERPL: 87 MG/DL — SIGNIFICANT CHANGE UP
LYMPHOCYTES # BLD AUTO: 2.33 K/UL — SIGNIFICANT CHANGE UP (ref 1.2–3.4)
LYMPHOCYTES # BLD AUTO: 46.9 % — SIGNIFICANT CHANGE UP (ref 20.5–51.1)
MAGNESIUM SERPL-MCNC: 1.9 MG/DL — SIGNIFICANT CHANGE UP (ref 1.8–2.4)
MCHC RBC-ENTMCNC: 25.2 PG — LOW (ref 27–31)
MCHC RBC-ENTMCNC: 33.3 G/DL — SIGNIFICANT CHANGE UP (ref 32–37)
MCV RBC AUTO: 75.7 FL — LOW (ref 81–99)
MONOCYTES # BLD AUTO: 0.26 K/UL — SIGNIFICANT CHANGE UP (ref 0.1–0.6)
MONOCYTES NFR BLD AUTO: 5.2 % — SIGNIFICANT CHANGE UP (ref 1.7–9.3)
NEUTROPHILS # BLD AUTO: 2.24 K/UL — SIGNIFICANT CHANGE UP (ref 1.4–6.5)
NEUTROPHILS NFR BLD AUTO: 45.1 % — SIGNIFICANT CHANGE UP (ref 42.2–75.2)
NON HDL CHOLESTEROL: 93 MG/DL — SIGNIFICANT CHANGE UP
NRBC # BLD: 0 /100 WBCS — SIGNIFICANT CHANGE UP (ref 0–0)
PHOSPHATE SERPL-MCNC: 5.5 MG/DL — HIGH (ref 2.1–4.9)
PLATELET # BLD AUTO: 381 K/UL — SIGNIFICANT CHANGE UP (ref 130–400)
PMV BLD: 10 FL — SIGNIFICANT CHANGE UP (ref 7.4–10.4)
POTASSIUM SERPL-MCNC: 3.4 MMOL/L — LOW (ref 3.5–5)
POTASSIUM SERPL-SCNC: 3.4 MMOL/L — LOW (ref 3.5–5)
PROT SERPL-MCNC: 6.5 G/DL — SIGNIFICANT CHANGE UP (ref 6–8)
RBC # BLD: 4.16 M/UL — LOW (ref 4.2–5.4)
RBC # FLD: 20.2 % — HIGH (ref 11.5–14.5)
SODIUM SERPL-SCNC: 141 MMOL/L — SIGNIFICANT CHANGE UP (ref 135–146)
TRIGL SERPL-MCNC: 64 MG/DL — SIGNIFICANT CHANGE UP
WBC # BLD: 4.97 K/UL — SIGNIFICANT CHANGE UP (ref 4.8–10.8)
WBC # FLD AUTO: 4.97 K/UL — SIGNIFICANT CHANGE UP (ref 4.8–10.8)

## 2024-10-18 PROCEDURE — 72040 X-RAY EXAM NECK SPINE 2-3 VW: CPT | Mod: 26

## 2024-10-18 PROCEDURE — 99222 1ST HOSP IP/OBS MODERATE 55: CPT

## 2024-10-18 PROCEDURE — 72125 CT NECK SPINE W/O DYE: CPT | Mod: 26

## 2024-10-18 PROCEDURE — 76705 ECHO EXAM OF ABDOMEN: CPT | Mod: 26

## 2024-10-18 PROCEDURE — 99232 SBSQ HOSP IP/OBS MODERATE 35: CPT

## 2024-10-18 PROCEDURE — 70450 CT HEAD/BRAIN W/O DYE: CPT | Mod: 26

## 2024-10-18 RX ORDER — LISINOPRIL 40 MG
2.5 TABLET ORAL DAILY
Refills: 0 | Status: DISCONTINUED | OUTPATIENT
Start: 2024-10-18 | End: 2024-10-18

## 2024-10-18 RX ORDER — IBUPROFEN 200 MG
400 TABLET ORAL ONCE
Refills: 0 | Status: COMPLETED | OUTPATIENT
Start: 2024-10-18 | End: 2024-10-18

## 2024-10-18 RX ORDER — OXYCODONE HYDROCHLORIDE 30 MG/1
5 TABLET ORAL ONCE
Refills: 0 | Status: DISCONTINUED | OUTPATIENT
Start: 2024-10-18 | End: 2024-10-18

## 2024-10-18 RX ORDER — THIAMINE HCL 100 MG
500 TABLET ORAL EVERY 8 HOURS
Refills: 0 | Status: COMPLETED | OUTPATIENT
Start: 2024-10-18 | End: 2024-10-21

## 2024-10-18 RX ORDER — QUETIAPINE FUMARATE 200 MG/1
100 TABLET ORAL AT BEDTIME
Refills: 0 | Status: DISCONTINUED | OUTPATIENT
Start: 2024-10-19 | End: 2024-10-22

## 2024-10-18 RX ORDER — POTASSIUM CHLORIDE 10 MEQ
40 TABLET, EXTENDED RELEASE ORAL ONCE
Refills: 0 | Status: COMPLETED | OUTPATIENT
Start: 2024-10-18 | End: 2024-10-18

## 2024-10-18 RX ORDER — METHOCARBAMOL 500 MG/1
750 TABLET ORAL ONCE
Refills: 0 | Status: COMPLETED | OUTPATIENT
Start: 2024-10-18 | End: 2024-10-18

## 2024-10-18 RX ORDER — TRAMADOL HYDROCHLORIDE 50 MG/1
50 TABLET, COATED ORAL ONCE
Refills: 0 | Status: DISCONTINUED | OUTPATIENT
Start: 2024-10-18 | End: 2024-10-18

## 2024-10-18 RX ORDER — DIPHENHYDRAMINE HCL 12.5MG/5ML
25 ELIXIR ORAL ONCE
Refills: 0 | Status: COMPLETED | OUTPATIENT
Start: 2024-10-18 | End: 2024-10-18

## 2024-10-18 RX ORDER — LIDOCAINE HYDROCHLORIDE 40 MG/ML
1 SOLUTION TOPICAL EVERY 24 HOURS
Refills: 0 | Status: DISCONTINUED | OUTPATIENT
Start: 2024-10-18 | End: 2024-10-22

## 2024-10-18 RX ORDER — KETOROLAC TROMETHAMINE 30 MG/ML
15 INJECTION INTRAMUSCULAR; INTRAVENOUS ONCE
Refills: 0 | Status: DISCONTINUED | OUTPATIENT
Start: 2024-10-18 | End: 2024-10-18

## 2024-10-18 RX ORDER — METHOCARBAMOL 500 MG/1
500 TABLET ORAL EVERY 8 HOURS
Refills: 0 | Status: DISCONTINUED | OUTPATIENT
Start: 2024-10-18 | End: 2024-10-22

## 2024-10-18 RX ORDER — METOPROLOL TARTRATE 50 MG
12.5 TABLET ORAL EVERY 12 HOURS
Refills: 0 | Status: DISCONTINUED | OUTPATIENT
Start: 2024-10-18 | End: 2024-10-18

## 2024-10-18 RX ADMIN — TRAMADOL HYDROCHLORIDE 50 MILLIGRAM(S): 50 TABLET, COATED ORAL at 02:20

## 2024-10-18 RX ADMIN — TRAMADOL HYDROCHLORIDE 50 MILLIGRAM(S): 50 TABLET, COATED ORAL at 02:49

## 2024-10-18 RX ADMIN — LIDOCAINE HYDROCHLORIDE 1 PATCH: 40 SOLUTION TOPICAL at 11:08

## 2024-10-18 RX ADMIN — KETOROLAC TROMETHAMINE 15 MILLIGRAM(S): 30 INJECTION INTRAMUSCULAR; INTRAVENOUS at 06:34

## 2024-10-18 RX ADMIN — GABAPENTIN 600 MILLIGRAM(S): 300 CAPSULE ORAL at 22:01

## 2024-10-18 RX ADMIN — LEVETIRACETAM 500 MILLIGRAM(S): 500 TABLET, FILM COATED ORAL at 17:07

## 2024-10-18 RX ADMIN — MIRTAZAPINE 15 MILLIGRAM(S): 30 TABLET ORAL at 22:01

## 2024-10-18 RX ADMIN — OXYCODONE HYDROCHLORIDE 5 MILLIGRAM(S): 30 TABLET ORAL at 16:03

## 2024-10-18 RX ADMIN — Medication 105 MILLIGRAM(S): at 13:16

## 2024-10-18 RX ADMIN — PANTOPRAZOLE SODIUM 40 MILLIGRAM(S): 40 TABLET, DELAYED RELEASE ORAL at 05:22

## 2024-10-18 RX ADMIN — OXYCODONE HYDROCHLORIDE 5 MILLIGRAM(S): 30 TABLET ORAL at 14:32

## 2024-10-18 RX ADMIN — DIAZEPAM 10 MILLIGRAM(S): 10 FILM BUCCAL at 05:21

## 2024-10-18 RX ADMIN — GABAPENTIN 600 MILLIGRAM(S): 300 CAPSULE ORAL at 13:16

## 2024-10-18 RX ADMIN — Medication 400 MILLIGRAM(S): at 02:19

## 2024-10-18 RX ADMIN — Medication 650 MILLIGRAM(S): at 05:19

## 2024-10-18 RX ADMIN — KETOROLAC TROMETHAMINE 15 MILLIGRAM(S): 30 INJECTION INTRAMUSCULAR; INTRAVENOUS at 02:19

## 2024-10-18 RX ADMIN — METHOCARBAMOL 750 MILLIGRAM(S): 500 TABLET ORAL at 02:19

## 2024-10-18 RX ADMIN — Medication 105 MILLIGRAM(S): at 22:00

## 2024-10-18 RX ADMIN — Medication 25 MILLIGRAM(S): at 02:20

## 2024-10-18 RX ADMIN — FOLIC ACID 1 MILLIGRAM(S): 1 TABLET ORAL at 11:08

## 2024-10-18 RX ADMIN — GABAPENTIN 600 MILLIGRAM(S): 300 CAPSULE ORAL at 05:19

## 2024-10-18 RX ADMIN — ARIPIPRAZOLE 5 MILLIGRAM(S): 2 TABLET ORAL at 11:08

## 2024-10-18 RX ADMIN — DIAZEPAM 10 MILLIGRAM(S): 10 FILM BUCCAL at 11:08

## 2024-10-18 RX ADMIN — METHOCARBAMOL 500 MILLIGRAM(S): 500 TABLET ORAL at 22:00

## 2024-10-18 RX ADMIN — Medication 650 MILLIGRAM(S): at 05:49

## 2024-10-18 RX ADMIN — SERTRALINE HYDROCHLORIDE 50 MILLIGRAM(S): 50 TABLET, FILM COATED ORAL at 11:08

## 2024-10-18 RX ADMIN — LEVETIRACETAM 500 MILLIGRAM(S): 500 TABLET, FILM COATED ORAL at 05:22

## 2024-10-18 RX ADMIN — DIAZEPAM 10 MILLIGRAM(S): 10 FILM BUCCAL at 22:37

## 2024-10-18 RX ADMIN — METHOCARBAMOL 500 MILLIGRAM(S): 500 TABLET ORAL at 13:16

## 2024-10-18 RX ADMIN — DIAZEPAM 5 MILLIGRAM(S): 10 FILM BUCCAL at 02:40

## 2024-10-18 RX ADMIN — Medication 40 MILLIEQUIVALENT(S): at 09:25

## 2024-10-18 RX ADMIN — Medication 1 TABLET(S): at 11:08

## 2024-10-18 RX ADMIN — Medication 400 MILLIGRAM(S): at 02:49

## 2024-10-18 NOTE — PROVIDER CONTACT NOTE (FALL NOTIFICATION) - SITUATION
Last night around 8pm, the patient had an unwitnessed fall onto the chair at bedside. She states she hit her neck and back of head on the chair. On-call resident evaluated the patient and wnl
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
## 2024-10-18 NOTE — PROGRESS NOTE ADULT - SUBJECTIVE AND OBJECTIVE BOX
SUBJECTIVE/OVERNIGHT EVENTS  Today is hospital day 4d. This morning patient was seen and examined at bedside, resting comfortably in bed. No acute or major events overnight. Patient fell onto the chair last night and hit her neck and the back of her head. PE was wnl      CODE STATUS:    FAMILY COMMUNICATION  Contact date:  Name of person contacted:  Relationship to patient:  Communication details:    MEDICATIONS  STANDING MEDICATIONS  ARIPiprazole 5 milliGRAM(s) Oral daily  diazepam    Tablet   Oral   folic acid 1 milliGRAM(s) Oral daily  gabapentin 600 milliGRAM(s) Oral three times a day  levETIRAcetam 500 milliGRAM(s) Oral two times a day  mirtazapine 15 milliGRAM(s) Oral at bedtime  multivitamin 1 Tablet(s) Oral daily  pantoprazole    Tablet 40 milliGRAM(s) Oral before breakfast  QUEtiapine 100 milliGRAM(s) Oral at bedtime  sertraline 50 milliGRAM(s) Oral daily    PRN MEDICATIONS  chlordiazePOXIDE 100 milliGRAM(s) Oral every 1 hour PRN  diazepam    Tablet 10 milliGRAM(s) Oral every 6 hours PRN  diazepam    Tablet 5 milliGRAM(s) Oral every 12 hours PRN  hydrOXYzine hydrochloride Syrup 50 milliGRAM(s) Oral every 6 hours PRN  trimethobenzamide Injectable 200 milliGRAM(s) IntraMuscular every 8 hours PRN    VITALS  T(F): 97.5 (10-17-24 @ 12:27), Max: 98.5 (10-16-24 @ 22:00)  HR: 92 (10-17-24 @ 12:27) (88 - 123)  BP: 105/75 (10-17-24 @ 12:27) (96/66 - 113/75)  RR: 18 (10-17-24 @ 12:27) (18 - 18)  SpO2: 100% (10-16-24 @ 20:25) (100% - 100%)    PHYSICAL EXAM  GENERAL  (x  ) NAD, lying in bed comfortably     (  ) obtunded     (  ) lethargic     (  ) somnolent    HEAD  ( x ) Atraumatic     (  ) hematoma     (  ) laceration (specify location:       )     NECK  ( x ) Supple     (  ) neck stiffness     (  ) nuchal rigidity     (  )  no JVD     (  ) JVD present ( -- cm)    HEART  Rate -->  ( x ) normal rate    (  ) bradycardic    (  ) tachycardic  Rhythm -->  (  ) regular    (  ) regularly irregular    (  ) irregularly irregular  Murmurs -->  (  ) normal s1/s2    (  ) systolic murmur    (  ) diastolic murmur    (  ) continuous murmur     (  ) S3 present    (  ) S4 present    LUNGS  ( x )Unlabored respirations     (  ) tachypnea  (  ) B/L air entry     (  ) decreased breath sounds in:  (location     )    (  ) no adventitious sound     (  ) crackles     (  ) wheezing      (  ) rhonchi      (specify location:       )  (  ) chest wall tenderness (specify location:       )    ABDOMEN  ( x ) Soft     (  ) tense   |   (  ) nondistended     (  ) distended   |   (  ) +BS     (  ) hypoactive bowel sounds     (  ) hyperactive bowel sounds  (  ) nontender     (  ) RUQ tenderness     (  ) RLQ tenderness     (  ) LLQ tenderness     (  ) epigastric tenderness     (  ) diffuse tenderness  (  ) Splenomegaly      (  ) Hepatomegaly      (  ) Jaundice     (  ) ecchymosis     EXTREMITIES  ( x ) Normal     (  ) Rash     (  ) ecchymosis     (  ) varicose veins      (  ) pitting edema     (  ) non-pitting edema   (  ) ulceration     (  ) gangrene:     (location:     )    NERVOUS SYSTEM  ( x ) A&Ox3     (  ) confused     (  ) lethargic  CN II-XII:     (  ) Intact     (  ) focal deficits  (Specify:     )   Upper extremities:     (  ) strength X/5     (  ) focal deficit (specify:    )  Lower extremities:     (  ) strength  X/5    (  ) focal deficit (specify:    )    SKIN  (x  ) No rashes or lesions     (  ) maculopapular rash     (  ) pustules     (  ) vesicles     (  ) ulcer     (  ) ecchymosis     (specify location:     )    (  ) Indwelling Aceves Catheter   Date insterted:    Reason (  ) Critical illness     (  ) urinary retention    (  ) Accurate Ins/Outs Monitoring     (  ) CMO patient    (  ) Central Line  Date inserted:  Location: (  ) Right IJ   (  ) Left IJ   (  ) Right Fem   (  ) Left Fem    (  ) SPC  (  ) pigtail  (  ) PEG tube  (  ) colostomy  (  ) jejunostomy  (  ) U-Dall    LABS             10.9   5.47  )-----------( 407      ( 10-16-24 @ 06:12 )             32.9     143  |  102  |  3   -------------------------<  87   10-16-24 @ 06:12  4.7  |  22  |  0.8    Ca      9.1     10-16-24 @ 06:12  Mg     1.6     10-16-24 @ 06:12    TPro  6.9  /  Alb  3.9  /  TBili  0.4  /  DBili  x   /  AST  45  /  ALT  19  /  AlkPhos  53  /  GGT  x     10-16-24 @ 06:12        Urinalysis Basic - ( 16 Oct 2024 06:12 )    Color: x / Appearance: x / SG: x / pH: x  Gluc: 87 mg/dL / Ketone: x  / Bili: x / Urobili: x   Blood: x / Protein: x / Nitrite: x   Leuk Esterase: x / RBC: x / WBC x   Sq Epi: x / Non Sq Epi: x / Bacteria: x          IMAGING

## 2024-10-18 NOTE — CONSULT NOTE ADULT - ATTENDING COMMENTS
events noted, ETOH withdrawal, Last drink friday, Ativan protocol, CIWA, thiamine, folic acid, floor recall for any changes
30 y/o female with mildly reduced LV function in the setting of ETOH abuse and withdrawal, recent seizure.  BP is borderline, likely would not tolerate BB or Entresto. Not fluid overloaded at this time.    Recommend cessation of ETOH abuse, c/w psychiatry/detox support.  Outpatient CHF evaluation with HF clinic.  Repeat 2D echo as outpatient in 3 months
30 yo F with pmhx of anxiety, depression, PTSD, AUD (1 gal vodka per day), BDZ, marijuana use, sciatica, scoliosis?, GERD, ETOH w/d seizures in the past current admitted for witnessed GTC sz w/ fall down stairs as per fiance.  Symptoms lasted 8 minutes with post-ictal confusion had been cutting down ETOH and last drink 24 hr prior to event.  Had 2 similar episodes in the past, 1 in setting of ETOH w/d and the other several weeks after her last drink.  Had evaluation at [place] in the past unclear about workup.  Had recent evaluation at [facility] with neurologic workup including HCT/MRI per patient but doesn't recall results.  Has 1 son (5 kids) with seizure in childhood.  Has 1 brother with epilepsy () and 1 sister on keppra for seizure disorder.  Currently back to baseline cognitively with inconsistent exam.  Given risk factors for recurrent seizures, recommend start empiric keppra 500 mg BID and recommend obtain recent workup from [facility] if possible.  Discussed safety precautions with pt - no driving as per [facility] DMV x 6 - 12 months after "seizure" episode.  Also advised pt to avoid continue ETOH abuse since had electrolyte abnormalities with reported ataxia possibly sequelae of chronic ETOH exposure.  May f/u in [facility] Neurology clinic in 2 - 4 wks after discharge for medication adjustment.

## 2024-10-18 NOTE — PROVIDER CONTACT NOTE (FALL NOTIFICATION) - ACTION/TREATMENT ORDERED:
On call resident evaluated the patient and was negative. CTH and CT cervical spine were ordered and were negative for anything acute
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
## 2024-10-18 NOTE — PROGRESS NOTE ADULT - SUBJECTIVE AND OBJECTIVE BOX
Patient is a 29y old  Female who presents with a chief complaint of Nausea, vomiting, and seizure (10-18-24)      Pt seen and examined at bedside. No CP or SOB.          PAST MEDICAL & SURGICAL HISTORY:  Depression    Anxiety    Other specified postprocedural states    Anemia, unspecified type    H/O:   x3        VITAL SIGNS (Last 24 hrs):  T(C): 36.3 (10-18-24 @ 05:12), Max: 36.4 (10-17-24 @ 12:27)  HR: 111 (10-18-24 @ 05:12) (92 - 111)  BP: 106/69 (10-18-24 @ 05:12) (95/62 - 106/69)  RR: 18 (10-18-24 @ 05:12) (18 - 18)  SpO2: 98% (10-18-24 @ 05:12) (98% - 100%)  Wt(kg): --  Daily     Daily     I&O's Summary    17 Oct 2024 07:01  -  18 Oct 2024 07:00  --------------------------------------------------------  IN: 496 mL / OUT: 0 mL / NET: 496 mL        PHYSICAL EXAM:  GENERAL: NAD, well-developed  HEAD:  Atraumatic, Normocephalic  EYES: EOMI, PERRLA, conjunctiva and sclera clear  NECK: Supple, No JVD  CHEST/LUNG: Clear to auscultation bilaterally; No wheeze  HEART: Regular rate and rhythm; No murmurs, rubs, or gallops  ABDOMEN: Soft, Nontender, Nondistended; Bowel sounds present  EXTREMITIES:  2+ Peripheral Pulses, No clubbing, cyanosis, or edema  PSYCH: AAOx3  NEUROLOGY: non-focal  SKIN: No rashes or lesions    Labs Reviewed  Spoke to patient in regards to abnormal labs.    CBC Full  -  ( 18 Oct 2024 06:33 )  WBC Count : 4.97 K/uL  Hemoglobin : 10.5 g/dL  Hematocrit : 31.5 %  Platelet Count - Automated : 381 K/uL  Mean Cell Volume : 75.7 fL  Mean Cell Hemoglobin : 25.2 pg  Mean Cell Hemoglobin Concentration : 33.3 g/dL  Auto Neutrophil # : 2.24 K/uL  Auto Lymphocyte # : 2.33 K/uL  Auto Monocyte # : 0.26 K/uL  Auto Eosinophil # : 0.09 K/uL  Auto Basophil # : 0.04 K/uL  Auto Neutrophil % : 45.1 %  Auto Lymphocyte % : 46.9 %  Auto Monocyte % : 5.2 %  Auto Eosinophil % : 1.8 %  Auto Basophil % : 0.8 %    BMP:    10-18 @ 06:33    Blood Urea Nitrogen - 12  Calcium - 9.3  Carbond Dioxide - 26  Chloride - 103  Creatinine - 0.9  Glucose - 85  Potassium - 3.4  Sodium - 141      Hemoglobin A1c -   PT/INR - ( 17 Oct 2024 15:35 )   PT: 11.70 sec;   INR: 1.03 ratio         PTT - ( 17 Oct 2024 15:35 )  PTT:32.2 sec  Urine Culture:        COVID Labs  CRP:      D-Dimer:            Imaging reviewed independently and reviewed read        MEDICATIONS  (STANDING):  ARIPiprazole 5 milliGRAM(s) Oral daily  diazepam    Tablet   Oral   diazepam    Tablet 10 milliGRAM(s) Oral every 8 hours  folic acid 1 milliGRAM(s) Oral daily  gabapentin 600 milliGRAM(s) Oral three times a day  levETIRAcetam 500 milliGRAM(s) Oral two times a day  lidocaine   4% Patch 1 Patch Transdermal every 24 hours  lisinopril 2.5 milliGRAM(s) Oral daily  methocarbamol 500 milliGRAM(s) Oral every 8 hours  metoprolol tartrate 12.5 milliGRAM(s) Oral every 12 hours  mirtazapine 15 milliGRAM(s) Oral at bedtime  multivitamin 1 Tablet(s) Oral daily  oxyCODONE    IR 5 milliGRAM(s) Oral once  pantoprazole    Tablet 40 milliGRAM(s) Oral before breakfast  QUEtiapine 100 milliGRAM(s) Oral at bedtime  sertraline 50 milliGRAM(s) Oral daily  thiamine IVPB 500 milliGRAM(s) IV Intermittent every 8 hours    MEDICATIONS  (PRN):  acetaminophen     Tablet .. 650 milliGRAM(s) Oral every 6 hours PRN Mild Pain (1 - 3)  diazepam    Tablet 10 milliGRAM(s) Oral every 6 hours PRN CIWA-Ar score of 8 or greater  diazepam    Tablet 5 milliGRAM(s) Oral every 12 hours PRN anxiety  trimethobenzamide Injectable 200 milliGRAM(s) IntraMuscular every 8 hours PRN Nausea and/or Vomiting       Patient is a 29y old  Female who presents with a chief complaint of Nausea, vomiting, and seizure (10-18-24)      Pt seen and examined at bedside. No CP or SOB.          PAST MEDICAL & SURGICAL HISTORY:  Depression    Anxiety    Other specified postprocedural states    Anemia, unspecified type    H/O:   x3        VITAL SIGNS (Last 24 hrs):  T(C): 36.3 (10-18-24 @ 05:12), Max: 36.4 (10-17-24 @ 12:27)  HR: 111 (10-18-24 @ 05:12) (92 - 111)  BP: 106/69 (10-18-24 @ 05:12) (95/62 - 106/69)  RR: 18 (10-18-24 @ 05:12) (18 - 18)  SpO2: 98% (10-18-24 @ 05:12) (98% - 100%)  Wt(kg): --  Daily     Daily     I&O's Summary    17 Oct 2024 07:01  -  18 Oct 2024 07:00  --------------------------------------------------------  IN: 496 mL / OUT: 0 mL / NET: 496 mL        PHYSICAL EXAM:  GENERAL: NAD, well-developed  HEAD:  Atraumatic, Normocephalic  EYES: EOMI, PERRLA, conjunctiva and sclera clear  NECK: Supple, No JVD, trapezial tensing on the left side   CHEST/LUNG: Clear to auscultation bilaterally; No wheeze  HEART: Regular rate and rhythm; No murmurs, rubs, or gallops  ABDOMEN: Soft, Nontender, Nondistended; Bowel sounds present  EXTREMITIES:  2+ Peripheral Pulses, No clubbing, cyanosis, or edema  PSYCH: AAOx3  NEUROLOGY: non-focal  SKIN: No rashes or lesions    Labs Reviewed  Spoke to patient in regards to abnormal labs.    CBC Full  -  ( 18 Oct 2024 06:33 )  WBC Count : 4.97 K/uL  Hemoglobin : 10.5 g/dL  Hematocrit : 31.5 %  Platelet Count - Automated : 381 K/uL  Mean Cell Volume : 75.7 fL  Mean Cell Hemoglobin : 25.2 pg  Mean Cell Hemoglobin Concentration : 33.3 g/dL  Auto Neutrophil # : 2.24 K/uL  Auto Lymphocyte # : 2.33 K/uL  Auto Monocyte # : 0.26 K/uL  Auto Eosinophil # : 0.09 K/uL  Auto Basophil # : 0.04 K/uL  Auto Neutrophil % : 45.1 %  Auto Lymphocyte % : 46.9 %  Auto Monocyte % : 5.2 %  Auto Eosinophil % : 1.8 %  Auto Basophil % : 0.8 %    BMP:    10-18 @ 06:33    Blood Urea Nitrogen - 12  Calcium - 9.3  Carbond Dioxide - 26  Chloride - 103  Creatinine - 0.9  Glucose - 85  Potassium - 3.4  Sodium - 141      Hemoglobin A1c -   PT/INR - ( 17 Oct 2024 15:35 )   PT: 11.70 sec;   INR: 1.03 ratio         PTT - ( 17 Oct 2024 15:35 )  PTT:32.2 sec  Urine Culture:        COVID Labs  CRP:      D-Dimer:            Imaging reviewed independently and reviewed read        MEDICATIONS  (STANDING):  ARIPiprazole 5 milliGRAM(s) Oral daily  diazepam    Tablet   Oral   diazepam    Tablet 10 milliGRAM(s) Oral every 8 hours  folic acid 1 milliGRAM(s) Oral daily  gabapentin 600 milliGRAM(s) Oral three times a day  levETIRAcetam 500 milliGRAM(s) Oral two times a day  lidocaine   4% Patch 1 Patch Transdermal every 24 hours  lisinopril 2.5 milliGRAM(s) Oral daily  methocarbamol 500 milliGRAM(s) Oral every 8 hours  metoprolol tartrate 12.5 milliGRAM(s) Oral every 12 hours  mirtazapine 15 milliGRAM(s) Oral at bedtime  multivitamin 1 Tablet(s) Oral daily  oxyCODONE    IR 5 milliGRAM(s) Oral once  pantoprazole    Tablet 40 milliGRAM(s) Oral before breakfast  QUEtiapine 100 milliGRAM(s) Oral at bedtime  sertraline 50 milliGRAM(s) Oral daily  thiamine IVPB 500 milliGRAM(s) IV Intermittent every 8 hours    MEDICATIONS  (PRN):  acetaminophen     Tablet .. 650 milliGRAM(s) Oral every 6 hours PRN Mild Pain (1 - 3)  diazepam    Tablet 10 milliGRAM(s) Oral every 6 hours PRN CIWA-Ar score of 8 or greater  diazepam    Tablet 5 milliGRAM(s) Oral every 12 hours PRN anxiety  trimethobenzamide Injectable 200 milliGRAM(s) IntraMuscular every 8 hours PRN Nausea and/or Vomiting

## 2024-10-19 LAB
ALBUMIN SERPL ELPH-MCNC: 3.1 G/DL — LOW (ref 3.5–5.2)
ALP SERPL-CCNC: 38 U/L — SIGNIFICANT CHANGE UP (ref 30–115)
ALT FLD-CCNC: 11 U/L — SIGNIFICANT CHANGE UP (ref 0–41)
ANION GAP SERPL CALC-SCNC: 7 MMOL/L — SIGNIFICANT CHANGE UP (ref 7–14)
APPEARANCE UR: CLEAR — SIGNIFICANT CHANGE UP
APTT BLD: 30.1 SEC — SIGNIFICANT CHANGE UP (ref 27–39.2)
AST SERPL-CCNC: 19 U/L — SIGNIFICANT CHANGE UP (ref 0–41)
BASOPHILS # BLD AUTO: 0.01 K/UL — SIGNIFICANT CHANGE UP (ref 0–0.2)
BASOPHILS NFR BLD AUTO: 0.2 % — SIGNIFICANT CHANGE UP (ref 0–1)
BILIRUB SERPL-MCNC: 0.2 MG/DL — SIGNIFICANT CHANGE UP (ref 0.2–1.2)
BILIRUB UR-MCNC: NEGATIVE — SIGNIFICANT CHANGE UP
BUN SERPL-MCNC: 8 MG/DL — LOW (ref 10–20)
CALCIUM SERPL-MCNC: 8.1 MG/DL — LOW (ref 8.4–10.5)
CHLORIDE SERPL-SCNC: 102 MMOL/L — SIGNIFICANT CHANGE UP (ref 98–110)
CK MB CFR SERPL CALC: 2.5 NG/ML — SIGNIFICANT CHANGE UP (ref 0.6–6.3)
CK SERPL-CCNC: 519 U/L — HIGH (ref 0–225)
CO2 SERPL-SCNC: 26 MMOL/L — SIGNIFICANT CHANGE UP (ref 17–32)
COLOR SPEC: YELLOW — SIGNIFICANT CHANGE UP
CREAT SERPL-MCNC: 0.9 MG/DL — SIGNIFICANT CHANGE UP (ref 0.7–1.5)
DIFF PNL FLD: ABNORMAL
EGFR: 88 ML/MIN/1.73M2 — SIGNIFICANT CHANGE UP
EOSINOPHIL # BLD AUTO: 0.02 K/UL — SIGNIFICANT CHANGE UP (ref 0–0.7)
EOSINOPHIL NFR BLD AUTO: 0.3 % — SIGNIFICANT CHANGE UP (ref 0–8)
GLUCOSE SERPL-MCNC: 115 MG/DL — HIGH (ref 70–99)
GLUCOSE UR QL: NEGATIVE MG/DL — SIGNIFICANT CHANGE UP
HCT VFR BLD CALC: 25.4 % — LOW (ref 37–47)
HGB BLD-MCNC: 8.4 G/DL — LOW (ref 12–16)
IMM GRANULOCYTES NFR BLD AUTO: 0.3 % — SIGNIFICANT CHANGE UP (ref 0.1–0.3)
INR BLD: 1.03 RATIO — SIGNIFICANT CHANGE UP (ref 0.65–1.3)
KETONES UR-MCNC: ABNORMAL MG/DL
LEUKOCYTE ESTERASE UR-ACNC: ABNORMAL
LYMPHOCYTES # BLD AUTO: 0.75 K/UL — LOW (ref 1.2–3.4)
LYMPHOCYTES # BLD AUTO: 11.6 % — LOW (ref 20.5–51.1)
MCHC RBC-ENTMCNC: 25.3 PG — LOW (ref 27–31)
MCHC RBC-ENTMCNC: 33.1 G/DL — SIGNIFICANT CHANGE UP (ref 32–37)
MCV RBC AUTO: 76.5 FL — LOW (ref 81–99)
MONOCYTES # BLD AUTO: 0.31 K/UL — SIGNIFICANT CHANGE UP (ref 0.1–0.6)
MONOCYTES NFR BLD AUTO: 4.8 % — SIGNIFICANT CHANGE UP (ref 1.7–9.3)
NEUTROPHILS # BLD AUTO: 5.36 K/UL — SIGNIFICANT CHANGE UP (ref 1.4–6.5)
NEUTROPHILS NFR BLD AUTO: 82.8 % — HIGH (ref 42.2–75.2)
NITRITE UR-MCNC: POSITIVE
NRBC # BLD: 0 /100 WBCS — SIGNIFICANT CHANGE UP (ref 0–0)
PH UR: 6 — SIGNIFICANT CHANGE UP (ref 5–8)
PLATELET # BLD AUTO: 326 K/UL — SIGNIFICANT CHANGE UP (ref 130–400)
PMV BLD: 10 FL — SIGNIFICANT CHANGE UP (ref 7.4–10.4)
POTASSIUM SERPL-MCNC: 4 MMOL/L — SIGNIFICANT CHANGE UP (ref 3.5–5)
POTASSIUM SERPL-SCNC: 4 MMOL/L — SIGNIFICANT CHANGE UP (ref 3.5–5)
PROT SERPL-MCNC: 5.3 G/DL — LOW (ref 6–8)
PROT UR-MCNC: 30 MG/DL
PROTHROM AB SERPL-ACNC: 11.8 SEC — SIGNIFICANT CHANGE UP (ref 9.95–12.87)
RBC # BLD: 3.32 M/UL — LOW (ref 4.2–5.4)
RBC # FLD: 20 % — HIGH (ref 11.5–14.5)
SODIUM SERPL-SCNC: 135 MMOL/L — SIGNIFICANT CHANGE UP (ref 135–146)
SP GR SPEC: >1.03 — HIGH (ref 1–1.03)
TROPONIN T, HIGH SENSITIVITY RESULT: <6 NG/L — SIGNIFICANT CHANGE UP (ref 6–13)
UROBILINOGEN FLD QL: 1 MG/DL — SIGNIFICANT CHANGE UP (ref 0.2–1)
WBC # BLD: 6.47 K/UL — SIGNIFICANT CHANGE UP (ref 4.8–10.8)
WBC # FLD AUTO: 6.47 K/UL — SIGNIFICANT CHANGE UP (ref 4.8–10.8)

## 2024-10-19 PROCEDURE — 70498 CT ANGIOGRAPHY NECK: CPT | Mod: 26

## 2024-10-19 PROCEDURE — 99231 SBSQ HOSP IP/OBS SF/LOW 25: CPT

## 2024-10-19 PROCEDURE — 70496 CT ANGIOGRAPHY HEAD: CPT | Mod: 26

## 2024-10-19 PROCEDURE — 0042T: CPT

## 2024-10-19 PROCEDURE — 70450 CT HEAD/BRAIN W/O DYE: CPT | Mod: 26,59

## 2024-10-19 PROCEDURE — 93010 ELECTROCARDIOGRAM REPORT: CPT

## 2024-10-19 RX ORDER — ENOXAPARIN SODIUM 40MG/0.4ML
40 SYRINGE (ML) SUBCUTANEOUS EVERY 24 HOURS
Refills: 0 | Status: DISCONTINUED | OUTPATIENT
Start: 2024-10-19 | End: 2024-10-22

## 2024-10-19 RX ORDER — POLYETHYLENE GLYCOL 3350 17 G/17G
17 POWDER, FOR SOLUTION ORAL DAILY
Refills: 0 | Status: DISCONTINUED | OUTPATIENT
Start: 2024-10-19 | End: 2024-10-22

## 2024-10-19 RX ADMIN — PANTOPRAZOLE SODIUM 40 MILLIGRAM(S): 40 TABLET, DELAYED RELEASE ORAL at 06:27

## 2024-10-19 RX ADMIN — METHOCARBAMOL 500 MILLIGRAM(S): 500 TABLET ORAL at 14:11

## 2024-10-19 RX ADMIN — Medication 650 MILLIGRAM(S): at 21:47

## 2024-10-19 RX ADMIN — DIAZEPAM 10 MILLIGRAM(S): 10 FILM BUCCAL at 06:33

## 2024-10-19 RX ADMIN — Medication 650 MILLIGRAM(S): at 07:06

## 2024-10-19 RX ADMIN — METHOCARBAMOL 500 MILLIGRAM(S): 500 TABLET ORAL at 21:47

## 2024-10-19 RX ADMIN — LIDOCAINE HYDROCHLORIDE 1 PATCH: 40 SOLUTION TOPICAL at 12:27

## 2024-10-19 RX ADMIN — DIAZEPAM 5 MILLIGRAM(S): 10 FILM BUCCAL at 21:47

## 2024-10-19 RX ADMIN — Medication 1 TABLET(S): at 12:25

## 2024-10-19 RX ADMIN — MIRTAZAPINE 15 MILLIGRAM(S): 30 TABLET ORAL at 21:47

## 2024-10-19 RX ADMIN — Medication 105 MILLIGRAM(S): at 06:26

## 2024-10-19 RX ADMIN — QUETIAPINE FUMARATE 100 MILLIGRAM(S): 200 TABLET ORAL at 21:47

## 2024-10-19 RX ADMIN — SERTRALINE HYDROCHLORIDE 50 MILLIGRAM(S): 50 TABLET, FILM COATED ORAL at 12:40

## 2024-10-19 RX ADMIN — Medication 105 MILLIGRAM(S): at 14:13

## 2024-10-19 RX ADMIN — GABAPENTIN 600 MILLIGRAM(S): 300 CAPSULE ORAL at 06:27

## 2024-10-19 RX ADMIN — Medication 650 MILLIGRAM(S): at 22:37

## 2024-10-19 RX ADMIN — GABAPENTIN 600 MILLIGRAM(S): 300 CAPSULE ORAL at 14:11

## 2024-10-19 RX ADMIN — GABAPENTIN 600 MILLIGRAM(S): 300 CAPSULE ORAL at 21:47

## 2024-10-19 RX ADMIN — LIDOCAINE HYDROCHLORIDE 1 PATCH: 40 SOLUTION TOPICAL at 00:27

## 2024-10-19 RX ADMIN — POLYETHYLENE GLYCOL 3350 17 GRAM(S): 17 POWDER, FOR SOLUTION ORAL at 21:46

## 2024-10-19 RX ADMIN — Medication 105 MILLIGRAM(S): at 21:48

## 2024-10-19 RX ADMIN — Medication 40 MILLIGRAM(S): at 12:24

## 2024-10-19 RX ADMIN — FOLIC ACID 1 MILLIGRAM(S): 1 TABLET ORAL at 12:25

## 2024-10-19 RX ADMIN — LEVETIRACETAM 500 MILLIGRAM(S): 500 TABLET, FILM COATED ORAL at 18:10

## 2024-10-19 RX ADMIN — METHOCARBAMOL 500 MILLIGRAM(S): 500 TABLET ORAL at 06:26

## 2024-10-19 RX ADMIN — LEVETIRACETAM 500 MILLIGRAM(S): 500 TABLET, FILM COATED ORAL at 06:26

## 2024-10-19 RX ADMIN — DIAZEPAM 10 MILLIGRAM(S): 10 FILM BUCCAL at 14:11

## 2024-10-19 RX ADMIN — ARIPIPRAZOLE 5 MILLIGRAM(S): 2 TABLET ORAL at 12:25
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
## 2024-10-19 NOTE — STROKE CODE NOTE - NIH STROKE SCALE: 3. VISUAL, QM
05/02/23                            Jeny Beckham  [address]  [place] [address]    To Whom It May Concern:    This is to certify Jeny Beckham was evaluated with Andreea Mcneil NP on 05/02/23 and can return to regular work on 5/3/23. Please excuse for missed work 5/2/23.                Electronically signed by:  Andreea Mcneil NP  [place] [address]  [address] [address]  Dept Phone: [phone]        (0) No visual loss

## 2024-10-19 NOTE — PROGRESS NOTE ADULT - SUBJECTIVE AND OBJECTIVE BOX
Neurology Stroke Progress Note    INTERVAL HPI/OVERNIGHT EVENTS:  30 yo F with pmhx of anxiety, depression, PTSD, AUD (1 gal vodka per day), BDZ, marijuana use, sciatica, scoliosis?, GERD, ETOH w/d seizures in the past current admitted for witnessed GTC sz w/ fall down stairs as per darryl. General neurology had seen pt on 10/14 for seizures with negative work up at the time, advised to start prophylactic keppra     Symptoms lasted 8 minutes with post-ictal confusion had been cutting down ETOH and last drink 24 hr prior to event.  Had 2 similar episodes in the past, 1 in setting of ETOH w/d and the other several weeks after her last drink.  Had evaluation at [place] in the past unclear about workup.  Had recent evaluation at [facility] with neurologic workup including HCT/MRI per patient but doesn't recall results.  Has 1 son (5 kids) with seizure in childhood.  Has 1 brother with epilepsy () and 1 sister on keppra for seizure disorder.  Currently back to baseline cognitively with inconsistent exam.  Given risk factors for recurrent seizures, recommend start empiric keppra 500 mg BID and recommend obtain recent workup from [facility] if possible.  Discussed safety precautions with pt - no driving as per [facility] DMV x 6 - 12 months after "seizure" episode.  Also advised pt to avoid continue ETOH abuse since had electrolyte abnormalities with reported ataxia possibly sequelae of chronic ETOH exposure.  May f/u in [facility] Neurology clinic in 2 - 4 wks after discharge for medication adjustment.        MEDICATIONS  (STANDING):  ARIPiprazole 5 milliGRAM(s) Oral daily  diazepam    Tablet   Oral   diazepam    Tablet 10 milliGRAM(s) Oral every 8 hours  folic acid 1 milliGRAM(s) Oral daily  gabapentin 600 milliGRAM(s) Oral three times a day  levETIRAcetam 500 milliGRAM(s) Oral two times a day  lidocaine   4% Patch 1 Patch Transdermal every 24 hours  methocarbamol 500 milliGRAM(s) Oral every 8 hours  mirtazapine 15 milliGRAM(s) Oral at bedtime  multivitamin 1 Tablet(s) Oral daily  pantoprazole    Tablet 40 milliGRAM(s) Oral before breakfast  QUEtiapine 100 milliGRAM(s) Oral at bedtime  sertraline 50 milliGRAM(s) Oral daily  thiamine IVPB 500 milliGRAM(s) IV Intermittent every 8 hours    MEDICATIONS  (PRN):  acetaminophen     Tablet .. 650 milliGRAM(s) Oral every 6 hours PRN Mild Pain (1 - 3)  diazepam    Tablet 10 milliGRAM(s) Oral every 6 hours PRN CIWA-Ar score of 8 or greater  diazepam    Tablet 5 milliGRAM(s) Oral every 12 hours PRN anxiety  trimethobenzamide Injectable 200 milliGRAM(s) IntraMuscular every 8 hours PRN Nausea and/or Vomiting      Allergies    No Known Allergies    Intolerances        ROS: As per HPI, otherwise negative    Vital Signs Last 24 Hrs  T(C): 36.1 (19 Oct 2024 04:37), Max: 36.4 (18 Oct 2024 21:35)  T(F): 97 (19 Oct 2024 04:37), Max: 97.6 (18 Oct 2024 21:35)  HR: 102 (19 Oct 2024 04:37) (80 - 102)  BP: 91/57 (19 Oct 2024 04:37) (91/57 - 108/76)  BP(mean): 69 (19 Oct 2024 04:37) (69 - 86)  RR: 18 (19 Oct 2024 04:37) (18 - 18)  SpO2: --        Physical exam:  General: awake and alert, sitting comfortably, no acute distress    Neurologic:  Mental status: awake, alert, oriented to person, place, and time. Speech is fluent, able to name objects. Follows commands. Attention/concentration intact. No dysarthria, no aphasia.  Cranial nerves:   II: visual fields are full to confrontation. pupils equally round and reactive to light,   III, IV, VI: EOMI without nystagmus  V:  V1-V3 sensation intact,   VII: no facial droop, facie is symmetric with normal eye closure and smile  VIII: hearing is intact to finger rub  IX, X: Uvula is midline and soft palate rises symmetrically  XI: head turning and shoulder shrug are intact.  XII: tongue midline  Motor: Normal bulk and tone, strength 5/5 in b/l UE and LE,  strength 5/5. No pronator drift  Sensation: intact to light touch. No neglect.  Coordination: No dysmetria on finger-to-nose and heel-to-shin  Reflexes: downgoing toes bilaterally  Gait: narrow and steady, no ataxia. Romberg negative        LABS:                        10.5   4.97  )-----------( 381      ( 18 Oct 2024 06:33 )             31.5     10-18    141  |  103  |  12  ----------------------------<  85  3.4[L]   |  26  |  0.9    Ca    9.3      18 Oct 2024 06:33  Phos  5.5     10-18  Mg     1.9     10-18    TPro  6.5  /  Alb  3.8  /  TBili  0.3  /  DBili  x   /  AST  21  /  ALT  14  /  AlkPhos  55  10-18    PT/INR - ( 17 Oct 2024 15:35 )   PT: 11.70 sec;   INR: 1.03 ratio         PTT - ( 17 Oct 2024 15:35 )  PTT:32.2 sec  Urinalysis Basic - ( 18 Oct 2024 06:33 )    Color: x / Appearance: x / SG: x / pH: x  Gluc: 85 mg/dL / Ketone: x  / Bili: x / Urobili: x   Blood: x / Protein: x / Nitrite: x   Leuk Esterase: x / RBC: x / WBC x   Sq Epi: x / Non Sq Epi: x / Bacteria: x        RADIOLOGY & ADDITIONAL TESTS:   Neurology Stroke Progress Note    INTERVAL HPI/OVERNIGHT EVENTS:  28 yo F with pmhx of anxiety, depression, PTSD, AUD (1 gal vodka per day), BDZ, marijuana use, sciatica, scoliosis?, GERD, ETOH w/d seizures in the past current admitted for witnessed GTC sz w/ fall down stairs as per darryl. General neurology had seen pt on 10/14 for seizures secondary to alcohol withdrawal with negative work up at the time, advised to start prophylactic Keppra 500mg BID, maintain seizure precautions and follow up with neurology outpatient. Pt at baseline this morning at 6am when she received her keppra dose and valium dose.  At 8:45 am pt alerted team that she was experiencing right sided weakness, at that time team also noted dysarthria. Stroke code called for subjective right sided weakness and dysarthria. Upon arrival to code, pt stating that she is not only experiencing the right sided weakness but also pain in the right hip and head pain-but not a headache. Patient endorses similar symptoms a couple of months ago with negative work up at [facility].  Patient admits to many life stressors at this time and states she has lost many family members to strokes in the past. No seizure like activity of recent per primary team. Endorses hx of sickle cell trait, denies OCPs use, denies known blood clotting disorders. Denies vision changes, headache.       MEDICATIONS  (STANDING):  ARIPiprazole 5 milliGRAM(s) Oral daily  diazepam    Tablet   Oral   diazepam    Tablet 10 milliGRAM(s) Oral every 8 hours  folic acid 1 milliGRAM(s) Oral daily  gabapentin 600 milliGRAM(s) Oral three times a day  levETIRAcetam 500 milliGRAM(s) Oral two times a day  lidocaine   4% Patch 1 Patch Transdermal every 24 hours  methocarbamol 500 milliGRAM(s) Oral every 8 hours  mirtazapine 15 milliGRAM(s) Oral at bedtime  multivitamin 1 Tablet(s) Oral daily  pantoprazole    Tablet 40 milliGRAM(s) Oral before breakfast  QUEtiapine 100 milliGRAM(s) Oral at bedtime  sertraline 50 milliGRAM(s) Oral daily  thiamine IVPB 500 milliGRAM(s) IV Intermittent every 8 hours    MEDICATIONS  (PRN):  acetaminophen     Tablet .. 650 milliGRAM(s) Oral every 6 hours PRN Mild Pain (1 - 3)  diazepam    Tablet 10 milliGRAM(s) Oral every 6 hours PRN CIWA-Ar score of 8 or greater  diazepam    Tablet 5 milliGRAM(s) Oral every 12 hours PRN anxiety  trimethobenzamide Injectable 200 milliGRAM(s) IntraMuscular every 8 hours PRN Nausea and/or Vomiting      Allergies    No Known Allergies    Intolerances        ROS: As per HPI, otherwise negative    Vital Signs Last 24 Hrs  T(C): 36.1 (19 Oct 2024 04:37), Max: 36.4 (18 Oct 2024 21:35)  T(F): 97 (19 Oct 2024 04:37), Max: 97.6 (18 Oct 2024 21:35)  HR: 102 (19 Oct 2024 04:37) (80 - 102)  BP: 91/57 (19 Oct 2024 04:37) (91/57 - 108/76)  BP(mean): 69 (19 Oct 2024 04:37) (69 - 86)  RR: 18 (19 Oct 2024 04:37) (18 - 18)  SpO2: --        Physical exam:    Neurologic:  Mental status: awake, alert, oriented to person, place, and time. Speech is slurred at times and back to baseline at times, able to name objects. Follows commands. Attention/concentration intact.  Cranial nerves:   II: visual fields are full to confrontation. pupils equally round and reactive to light,   III, IV, VI: EOMI without nystagmus  V:  V1-V3 sensation intact,   VII: no facial droop, facie is symmetric with normal eye closure and smile  Motor: Normal bulk and tone, strength 5/5 in b/l UE and LE,  strength 5/5. No pronator drift  Sensation: decreased right sided sensation in face arm and leg   Coordination: No dysmetria on finger-to-nose and heel-to-shin        LABS:                        10.5   4.97  )-----------( 381      ( 18 Oct 2024 06:33 )             31.5     10-18    141  |  103  |  12  ----------------------------<  85  3.4[L]   |  26  |  0.9    Ca    9.3      18 Oct 2024 06:33  Phos  5.5     10-18  Mg     1.9     10-18    TPro  6.5  /  Alb  3.8  /  TBili  0.3  /  DBili  x   /  AST  21  /  ALT  14  /  AlkPhos  55  10-18    PT/INR - ( 17 Oct 2024 15:35 )   PT: 11.70 sec;   INR: 1.03 ratio         PTT - ( 17 Oct 2024 15:35 )  PTT:32.2 sec  Urinalysis Basic - ( 18 Oct 2024 06:33 )    Color: x / Appearance: x / SG: x / pH: x  Gluc: 85 mg/dL / Ketone: x  / Bili: x / Urobili: x   Blood: x / Protein: x / Nitrite: x   Leuk Esterase: x / RBC: x / WBC x   Sq Epi: x / Non Sq Epi: x / Bacteria: x        RADIOLOGY & ADDITIONAL TESTS:  < from: CT Brain Stroke Protocol (10.19.24 @ 09:34) >    IMPRESSION:    No acute intracranial pathology.    < end of copied text >    < from: CT Angio Neck Stroke Protocol w/ IV Cont (10.19.24 @ 09:50) >  IMPRESSION:    CT PERFUSION:  No perfusion deficits to suggest areas of completed infarction or at risk   territory.    CTA HEAD/NECK:  No large vessel occlusion, stenosis, aneurysm, or vascular malformation.    < end of copied text >

## 2024-10-19 NOTE — PROGRESS NOTE ADULT - SUBJECTIVE AND OBJECTIVE BOX
SUBJECTIVE/OVERNIGHT EVENTS  Today is hospital day 5d. This morning patient was seen and examined at bedside, resting comfortably in bed. No acute or major events overnight. Patient fell onto the chair last night and hit her neck and the back of her head. PE was wnl      CODE STATUS:    FAMILY COMMUNICATION  Contact date:  Name of person contacted:  Relationship to patient:  Communication details:    MEDICATIONS  STANDING MEDICATIONS  ARIPiprazole 5 milliGRAM(s) Oral daily  diazepam    Tablet   Oral   folic acid 1 milliGRAM(s) Oral daily  gabapentin 600 milliGRAM(s) Oral three times a day  levETIRAcetam 500 milliGRAM(s) Oral two times a day  mirtazapine 15 milliGRAM(s) Oral at bedtime  multivitamin 1 Tablet(s) Oral daily  pantoprazole    Tablet 40 milliGRAM(s) Oral before breakfast  QUEtiapine 100 milliGRAM(s) Oral at bedtime  sertraline 50 milliGRAM(s) Oral daily    PRN MEDICATIONS  chlordiazePOXIDE 100 milliGRAM(s) Oral every 1 hour PRN  diazepam    Tablet 10 milliGRAM(s) Oral every 6 hours PRN  diazepam    Tablet 5 milliGRAM(s) Oral every 12 hours PRN  hydrOXYzine hydrochloride Syrup 50 milliGRAM(s) Oral every 6 hours PRN  trimethobenzamide Injectable 200 milliGRAM(s) IntraMuscular every 8 hours PRN    VITALS  T(F): 97.5 (10-17-24 @ 12:27), Max: 98.5 (10-16-24 @ 22:00)  HR: 92 (10-17-24 @ 12:27) (88 - 123)  BP: 105/75 (10-17-24 @ 12:27) (96/66 - 113/75)  RR: 18 (10-17-24 @ 12:27) (18 - 18)  SpO2: 100% (10-16-24 @ 20:25) (100% - 100%)    PHYSICAL EXAM  GENERAL  (x  ) NAD, lying in bed comfortably     (  ) obtunded     (  ) lethargic     (  ) somnolent    HEAD  ( x ) Atraumatic     (  ) hematoma     (  ) laceration (specify location:       )     NECK  ( x ) Supple     (  ) neck stiffness     (  ) nuchal rigidity     (  )  no JVD     (  ) JVD present ( -- cm)    HEART  Rate -->  ( x ) normal rate    (  ) bradycardic    (  ) tachycardic  Rhythm -->  (  ) regular    (  ) regularly irregular    (  ) irregularly irregular  Murmurs -->  (  ) normal s1/s2    (  ) systolic murmur    (  ) diastolic murmur    (  ) continuous murmur     (  ) S3 present    (  ) S4 present    LUNGS  ( x )Unlabored respirations     (  ) tachypnea  (  ) B/L air entry     (  ) decreased breath sounds in:  (location     )    (  ) no adventitious sound     (  ) crackles     (  ) wheezing      (  ) rhonchi      (specify location:       )  (  ) chest wall tenderness (specify location:       )    ABDOMEN  ( x ) Soft     (  ) tense   |   (  ) nondistended     (  ) distended   |   (  ) +BS     (  ) hypoactive bowel sounds     (  ) hyperactive bowel sounds  (  ) nontender     (  ) RUQ tenderness     (  ) RLQ tenderness     (  ) LLQ tenderness     (  ) epigastric tenderness     (  ) diffuse tenderness  (  ) Splenomegaly      (  ) Hepatomegaly      (  ) Jaundice     (  ) ecchymosis     EXTREMITIES  ( x ) Normal     (  ) Rash     (  ) ecchymosis     (  ) varicose veins      (  ) pitting edema     (  ) non-pitting edema   (  ) ulceration     (  ) gangrene:     (location:     )    NERVOUS SYSTEM  ( x ) A&Ox3     (  ) confused     (  ) lethargic  CN II-XII:     (  ) Intact     (  ) focal deficits  (Specify:     )   Upper extremities:     (  ) strength X/5     (  ) focal deficit (specify:    )  Lower extremities:     (  ) strength  X/5    (  ) focal deficit (specify:    )    SKIN  (x  ) No rashes or lesions     (  ) maculopapular rash     (  ) pustules     (  ) vesicles     (  ) ulcer     (  ) ecchymosis     (specify location:     )    (  ) Indwelling Aceves Catheter   Date insterted:    Reason (  ) Critical illness     (  ) urinary retention    (  ) Accurate Ins/Outs Monitoring     (  ) CMO patient    (  ) Central Line  Date inserted:  Location: (  ) Right IJ   (  ) Left IJ   (  ) Right Fem   (  ) Left Fem    (  ) SPC  (  ) pigtail  (  ) PEG tube  (  ) colostomy  (  ) jejunostomy  (  ) U-Dall    LABS             10.9   5.47  )-----------( 407      ( 10-16-24 @ 06:12 )             32.9     143  |  102  |  3   -------------------------<  87   10-16-24 @ 06:12  4.7  |  22  |  0.8    Ca      9.1     10-16-24 @ 06:12  Mg     1.6     10-16-24 @ 06:12    TPro  6.9  /  Alb  3.9  /  TBili  0.4  /  DBili  x   /  AST  45  /  ALT  19  /  AlkPhos  53  /  GGT  x     10-16-24 @ 06:12        Urinalysis Basic - ( 16 Oct 2024 06:12 )    Color: x / Appearance: x / SG: x / pH: x  Gluc: 87 mg/dL / Ketone: x  / Bili: x / Urobili: x   Blood: x / Protein: x / Nitrite: x   Leuk Esterase: x / RBC: x / WBC x   Sq Epi: x / Non Sq Epi: x / Bacteria: x          IMAGING SUBJECTIVE/OVERNIGHT EVENTS  Today is hospital day 5d. This morning patient was seen and examined at bedside, resting comfortably in bed. No acute or major events overnight. Patient had a code stroke this AM for inability to move R leg      CODE STATUS:    FAMILY COMMUNICATION  Contact date:  Name of person contacted:  Relationship to patient:  Communication details:    MEDICATIONS  STANDING MEDICATIONS  ARIPiprazole 5 milliGRAM(s) Oral daily  diazepam    Tablet   Oral   folic acid 1 milliGRAM(s) Oral daily  gabapentin 600 milliGRAM(s) Oral three times a day  levETIRAcetam 500 milliGRAM(s) Oral two times a day  mirtazapine 15 milliGRAM(s) Oral at bedtime  multivitamin 1 Tablet(s) Oral daily  pantoprazole    Tablet 40 milliGRAM(s) Oral before breakfast  QUEtiapine 100 milliGRAM(s) Oral at bedtime  sertraline 50 milliGRAM(s) Oral daily    PRN MEDICATIONS  chlordiazePOXIDE 100 milliGRAM(s) Oral every 1 hour PRN  diazepam    Tablet 10 milliGRAM(s) Oral every 6 hours PRN  diazepam    Tablet 5 milliGRAM(s) Oral every 12 hours PRN  hydrOXYzine hydrochloride Syrup 50 milliGRAM(s) Oral every 6 hours PRN  trimethobenzamide Injectable 200 milliGRAM(s) IntraMuscular every 8 hours PRN    VITALS  T(F): 97.5 (10-17-24 @ 12:27), Max: 98.5 (10-16-24 @ 22:00)  HR: 92 (10-17-24 @ 12:27) (88 - 123)  BP: 105/75 (10-17-24 @ 12:27) (96/66 - 113/75)  RR: 18 (10-17-24 @ 12:27) (18 - 18)  SpO2: 100% (10-16-24 @ 20:25) (100% - 100%)    PHYSICAL EXAM  GENERAL  (x  ) NAD, lying in bed comfortably     (  ) obtunded     (  ) lethargic     (  ) somnolent    HEAD  ( x ) Atraumatic     (  ) hematoma     (  ) laceration (specify location:       )     NECK  ( x ) Supple     (  ) neck stiffness     (  ) nuchal rigidity     (  )  no JVD     (  ) JVD present ( -- cm)    HEART  Rate -->  ( x ) normal rate    (  ) bradycardic    (  ) tachycardic  Rhythm -->  (  ) regular    (  ) regularly irregular    (  ) irregularly irregular  Murmurs -->  (  ) normal s1/s2    (  ) systolic murmur    (  ) diastolic murmur    (  ) continuous murmur     (  ) S3 present    (  ) S4 present    LUNGS  ( x )Unlabored respirations     (  ) tachypnea  (  ) B/L air entry     (  ) decreased breath sounds in:  (location     )    (  ) no adventitious sound     (  ) crackles     (  ) wheezing      (  ) rhonchi      (specify location:       )  (  ) chest wall tenderness (specify location:       )    ABDOMEN  ( x ) Soft     (  ) tense   |   (  ) nondistended     (  ) distended   |   (  ) +BS     (  ) hypoactive bowel sounds     (  ) hyperactive bowel sounds  (  ) nontender     (  ) RUQ tenderness     (  ) RLQ tenderness     (  ) LLQ tenderness     (  ) epigastric tenderness     (  ) diffuse tenderness  (  ) Splenomegaly      (  ) Hepatomegaly      (  ) Jaundice     (  ) ecchymosis     EXTREMITIES  ( x ) Normal     (  ) Rash     (  ) ecchymosis     (  ) varicose veins      (  ) pitting edema     (  ) non-pitting edema   (  ) ulceration     (  ) gangrene:     (location:     )    NERVOUS SYSTEM  ( x ) A&Ox3     (  ) confused     (  ) lethargic  CN II-XII:     (  ) Intact     (  ) focal deficits  (Specify:     )   Upper extremities:     (  ) strength X/5     (  ) focal deficit (specify:    )  Lower extremities:     (  ) strength  X/5    (  ) focal deficit (specify:    )    SKIN  (x  ) No rashes or lesions     (  ) maculopapular rash     (  ) pustules     (  ) vesicles     (  ) ulcer     (  ) ecchymosis     (specify location:     )    (  ) Indwelling Aceves Catheter   Date insterted:    Reason (  ) Critical illness     (  ) urinary retention    (  ) Accurate Ins/Outs Monitoring     (  ) CMO patient    (  ) Central Line  Date inserted:  Location: (  ) Right IJ   (  ) Left IJ   (  ) Right Fem   (  ) Left Fem    (  ) SPC  (  ) pigtail  (  ) PEG tube  (  ) colostomy  (  ) jejunostomy  (  ) U-Dall    LABS             10.9   5.47  )-----------( 407      ( 10-16-24 @ 06:12 )             32.9     143  |  102  |  3   -------------------------<  87   10-16-24 @ 06:12  4.7  |  22  |  0.8    Ca      9.1     10-16-24 @ 06:12  Mg     1.6     10-16-24 @ 06:12    TPro  6.9  /  Alb  3.9  /  TBili  0.4  /  DBili  x   /  AST  45  /  ALT  19  /  AlkPhos  53  /  GGT  x     10-16-24 @ 06:12        Urinalysis Basic - ( 16 Oct 2024 06:12 )    Color: x / Appearance: x / SG: x / pH: x  Gluc: 87 mg/dL / Ketone: x  / Bili: x / Urobili: x   Blood: x / Protein: x / Nitrite: x   Leuk Esterase: x / RBC: x / WBC x   Sq Epi: x / Non Sq Epi: x / Bacteria: x          IMAGING

## 2024-10-19 NOTE — PROGRESS NOTE ADULT - ASSESSMENT
30 yo F with pmhx of anxiety, depression, PTSD, AUD (1 gal vodka per day), BDZ, marijuana use, sciatica, scoliosis?, GERD, ETOH w/d seizures in the past current admitted for witnessed GTC sz w/ fall down stairs as per darryl. General neurology had seen pt on 10/14 for seizures secondary to alcohol withdrawal with negative work up at the time, advised to start prophylactic Keppra 500mg BID, maintain seizure precautions and follow up with neurology outpatient. Pt at baseline this morning at 6am when she received her keppra dose and valium dose.  At 8:45 am pt alerted team that she was experiencing right sided weakness, at that time team also noted dysarthria. Stroke code called for subjective right sided weakness and dysarthria. Upon arrival to code, pt stating that she is not only experiencing the right sided weakness but also pain in the right hip and head pain-but not a headache. Patient endorses similar symptoms a couple of months ago with negative work up at [facility]. CTH negative, CTA head and neck with no LVO, and CTP negative. NIHSS 1 for subjective right sided decreased sensation in face arm and leg. No objective right sided deficits noted, and dysarthria inconsistent throughout exam. No tnk as symptoms nondisabling.     Impression: 28 y/o F with sudden onset right sided weakness and inconsistent dysarthria with negative CTH/CTA/CTP. Symptoms less likely related to seizure as pt had taken keppra and valium this morning and more likely functionally/structurally related as pt with inconsistent symptoms and stating the right sided weakness is now pain like.     RECS:       30 yo F with pmhx of anxiety, depression, PTSD, AUD (1 gal vodka per day), BDZ, marijuana use, sciatica, scoliosis?, GERD, ETOH w/d seizures in the past current admitted for witnessed GTC sz w/ fall down stairs as per fiance. General neurology had seen pt on 10/14 for seizures secondary to alcohol withdrawal with negative work up at the time, advised to start prophylactic Keppra 500mg BID, maintain seizure precautions and follow up with neurology outpatient. Pt at baseline this morning at 6am when she received her keppra dose and valium dose.  At 8:45 am pt alerted team that she was experiencing right sided weakness, at that time team also noted dysarthria. Stroke code called for subjective right sided weakness and dysarthria. Upon arrival to code, pt stating that she is not only experiencing the right sided weakness but also pain in the right hip and head pain-but not a headache. Patient endorses similar symptoms a couple of months ago with negative work up at [facility]. CTH negative, CTA head and neck with no LVO, and CTP negative. NIHSS 1 for subjective right sided decreased sensation in face arm and leg. No objective right sided deficits noted, and dysarthria inconsistent throughout exam. No tnk as symptoms nondisabling.     Impression: 30 y/o F with sudden onset right sided weakness and inconsistent dysarthria with negative CTH/CTA/CTP. Symptoms less likely related to seizure as pt had taken keppra and valium this morning and more likely functionally/structurally related as pt with inconsistent symptoms and stating the right sided weakness is now pain like.     RECS:  -no further inpatient neurological work up   -continue with outpatient recommendations as previously stated in last note     Discussed with Dr. Mahmood

## 2024-10-20 PROCEDURE — 99231 SBSQ HOSP IP/OBS SF/LOW 25: CPT

## 2024-10-20 PROCEDURE — 72158 MRI LUMBAR SPINE W/O & W/DYE: CPT | Mod: 26

## 2024-10-20 RX ORDER — CEFTRIAXONE SODIUM 10 G
1000 VIAL (EA) INJECTION EVERY 24 HOURS
Refills: 0 | Status: COMPLETED | OUTPATIENT
Start: 2024-10-20 | End: 2024-10-22

## 2024-10-20 RX ORDER — MELATONIN 5 MG
3 TABLET ORAL AT BEDTIME
Refills: 0 | Status: DISCONTINUED | OUTPATIENT
Start: 2024-10-20 | End: 2024-10-22

## 2024-10-20 RX ADMIN — Medication 105 MILLIGRAM(S): at 21:46

## 2024-10-20 RX ADMIN — LIDOCAINE HYDROCHLORIDE 1 PATCH: 40 SOLUTION TOPICAL at 11:43

## 2024-10-20 RX ADMIN — Medication 105 MILLIGRAM(S): at 05:03

## 2024-10-20 RX ADMIN — GABAPENTIN 600 MILLIGRAM(S): 300 CAPSULE ORAL at 05:02

## 2024-10-20 RX ADMIN — Medication 3 MILLIGRAM(S): at 23:40

## 2024-10-20 RX ADMIN — LIDOCAINE HYDROCHLORIDE 1 PATCH: 40 SOLUTION TOPICAL at 05:20

## 2024-10-20 RX ADMIN — Medication 40 MILLIGRAM(S): at 11:40

## 2024-10-20 RX ADMIN — DIAZEPAM 10 MILLIGRAM(S): 10 FILM BUCCAL at 18:21

## 2024-10-20 RX ADMIN — MIRTAZAPINE 15 MILLIGRAM(S): 30 TABLET ORAL at 21:46

## 2024-10-20 RX ADMIN — Medication 100 MILLIGRAM(S): at 05:00

## 2024-10-20 RX ADMIN — Medication 650 MILLIGRAM(S): at 06:28

## 2024-10-20 RX ADMIN — Medication 105 MILLIGRAM(S): at 13:59

## 2024-10-20 RX ADMIN — GABAPENTIN 600 MILLIGRAM(S): 300 CAPSULE ORAL at 13:59

## 2024-10-20 RX ADMIN — Medication 1 TABLET(S): at 11:41

## 2024-10-20 RX ADMIN — POLYETHYLENE GLYCOL 3350 17 GRAM(S): 17 POWDER, FOR SOLUTION ORAL at 11:42

## 2024-10-20 RX ADMIN — LEVETIRACETAM 500 MILLIGRAM(S): 500 TABLET, FILM COATED ORAL at 18:20

## 2024-10-20 RX ADMIN — DIAZEPAM 10 MILLIGRAM(S): 10 FILM BUCCAL at 05:01

## 2024-10-20 RX ADMIN — Medication 650 MILLIGRAM(S): at 05:02

## 2024-10-20 RX ADMIN — FOLIC ACID 1 MILLIGRAM(S): 1 TABLET ORAL at 11:41

## 2024-10-20 RX ADMIN — DIAZEPAM 5 MILLIGRAM(S): 10 FILM BUCCAL at 13:21

## 2024-10-20 RX ADMIN — QUETIAPINE FUMARATE 100 MILLIGRAM(S): 200 TABLET ORAL at 21:47

## 2024-10-20 RX ADMIN — GABAPENTIN 600 MILLIGRAM(S): 300 CAPSULE ORAL at 21:46

## 2024-10-20 RX ADMIN — METHOCARBAMOL 500 MILLIGRAM(S): 500 TABLET ORAL at 13:59

## 2024-10-20 RX ADMIN — METHOCARBAMOL 500 MILLIGRAM(S): 500 TABLET ORAL at 05:02

## 2024-10-20 RX ADMIN — LEVETIRACETAM 500 MILLIGRAM(S): 500 TABLET, FILM COATED ORAL at 05:02

## 2024-10-20 RX ADMIN — METHOCARBAMOL 500 MILLIGRAM(S): 500 TABLET ORAL at 21:47

## 2024-10-20 RX ADMIN — PANTOPRAZOLE SODIUM 40 MILLIGRAM(S): 40 TABLET, DELAYED RELEASE ORAL at 05:02

## 2024-10-20 RX ADMIN — SERTRALINE HYDROCHLORIDE 50 MILLIGRAM(S): 50 TABLET, FILM COATED ORAL at 11:43

## 2024-10-20 RX ADMIN — ARIPIPRAZOLE 5 MILLIGRAM(S): 2 TABLET ORAL at 11:46

## 2024-10-20 NOTE — DIETITIAN INITIAL EVALUATION ADULT - PERTINENT LABORATORY DATA
10-19    135  |  102  |  8[L]  ----------------------------<  115[H]  4.0   |  26  |  0.9    Ca    8.1[L]      19 Oct 2024 10:12  Phos  5.5     10-18  Mg     1.9     10-18    TPro  5.3[L]  /  Alb  3.1[L]  /  TBili  0.2  /  DBili  x   /  AST  19  /  ALT  11  /  AlkPhos  38  10-19  POCT Blood Glucose.: 131 mg/dL (10-19-24 @ 09:11)

## 2024-10-20 NOTE — DIETITIAN INITIAL EVALUATION ADULT - OTHER CALCULATIONS
Estimated Calorie Needs: [id number] x AF 1.3-1.5=6056-5123rzwm/day -Due to PCM  Estimated Protein Needs: 71-87grams/day (1.3-1.6grams/kg of admit weight) -Due to PCM and alcohol abuse  Estimated Fluid Needs: 1632-1904mL/day (30-35mL/kg of admit weight) -Due to alcohol abuse

## 2024-10-20 NOTE — DIETITIAN INITIAL EVALUATION ADULT - ADD RECOMMEND
1.Continue to provide a MVI once daily  2.Continue to provide Folic Acid once daily  3.Continue to provide Thiamine supplement once daily

## 2024-10-20 NOTE — PROGRESS NOTE ADULT - SUBJECTIVE AND OBJECTIVE BOX
Patient is a 29y old  Female who presents with a chief complaint of Alcohol dependence with withdrawal     (10-20-24)      Pt seen and examined at bedside. No CP or SOB.          PAST MEDICAL & SURGICAL HISTORY:  Depression    Anxiety    Other specified postprocedural states    Anemia, unspecified type    H/O:   x3        VITAL SIGNS (Last 24 hrs):  T(C): 36.9 (10-20-24 @ 07:42), Max: 37.2 (10-19-24 @ 20:16)  HR: 91 (10-20-24 @ 07:42) (90 - 92)  BP: 101/67 (10-20-24 @ 07:42) (95/62 - 117/79)  RR: 100 (10-20-24 @ 07:42) (17 - 100)  SpO2: 97% (10-20-24 @ 07:42) (97% - 97%)  Wt(kg): --  Daily Height in cm: 167.64 (20 Oct 2024 00:47)    Daily     I&O's Summary    19 Oct 2024 07:01  -  20 Oct 2024 07:00  --------------------------------------------------------  IN: 900 mL / OUT: 2140 mL / NET: -1240 mL        PHYSICAL EXAM:  GENERAL: NAD, well-developed  HEAD:  Atraumatic, Normocephalic  EYES: EOMI, PERRLA, conjunctiva and sclera clear  NECK: Supple, No JVD  CHEST/LUNG: Clear to auscultation bilaterally; No wheeze  HEART: Regular rate and rhythm; No murmurs, rubs, or gallops  ABDOMEN: Soft, Nontender, Nondistended; Bowel sounds present  EXTREMITIES:  2+ Peripheral Pulses, No clubbing, cyanosis, or edema  PSYCH: AAOx3  NEUROLOGY: non-focal  SKIN: No rashes or lesions    Labs Reviewed  Spoke to patient in regards to abnormal labs.    CBC Full  -  ( 19 Oct 2024 10:12 )  WBC Count : 6.47 K/uL  Hemoglobin : 8.4 g/dL  Hematocrit : 25.4 %  Platelet Count - Automated : 326 K/uL  Mean Cell Volume : 76.5 fL  Mean Cell Hemoglobin : 25.3 pg  Mean Cell Hemoglobin Concentration : 33.1 g/dL  Auto Neutrophil # : 5.36 K/uL  Auto Lymphocyte # : 0.75 K/uL  Auto Monocyte # : 0.31 K/uL  Auto Eosinophil # : 0.02 K/uL  Auto Basophil # : 0.01 K/uL  Auto Neutrophil % : 82.8 %  Auto Lymphocyte % : 11.6 %  Auto Monocyte % : 4.8 %  Auto Eosinophil % : 0.3 %  Auto Basophil % : 0.2 %    BMP:    10-19 @ 10:12    Blood Urea Nitrogen - 8  Calcium - 8.1  Carbond Dioxide - 26  Chloride - 102  Creatinine - 0.9  Glucose - 115  Potassium - 4.0  Sodium - 135      Hemoglobin A1c -   PT/INR - ( 19 Oct 2024 10:12 )   PT: 11.80 sec;   INR: 1.03 ratio         PTT - ( 19 Oct 2024 10:12 )  PTT:30.1 sec  Urine Culture:        COVID Labs  CRP:      D-Dimer:            Imaging reviewed independently and reviewed read        MEDICATIONS  (STANDING):  ARIPiprazole 5 milliGRAM(s) Oral daily  cefTRIAXone   IVPB 1000 milliGRAM(s) IV Intermittent every 24 hours  diazepam    Tablet   Oral   diazepam    Tablet 10 milliGRAM(s) Oral every 12 hours  enoxaparin Injectable 40 milliGRAM(s) SubCutaneous every 24 hours  folic acid 1 milliGRAM(s) Oral daily  gabapentin 600 milliGRAM(s) Oral three times a day  levETIRAcetam 500 milliGRAM(s) Oral two times a day  lidocaine   4% Patch 1 Patch Transdermal every 24 hours  methocarbamol 500 milliGRAM(s) Oral every 8 hours  mirtazapine 15 milliGRAM(s) Oral at bedtime  multivitamin 1 Tablet(s) Oral daily  pantoprazole    Tablet 40 milliGRAM(s) Oral before breakfast  polyethylene glycol 3350 17 Gram(s) Oral daily  QUEtiapine 100 milliGRAM(s) Oral at bedtime  sertraline 50 milliGRAM(s) Oral daily  thiamine IVPB 500 milliGRAM(s) IV Intermittent every 8 hours    MEDICATIONS  (PRN):  acetaminophen     Tablet .. 650 milliGRAM(s) Oral every 6 hours PRN Mild Pain (1 - 3)  diazepam    Tablet 10 milliGRAM(s) Oral every 6 hours PRN CIWA-Ar score of 8 or greater  diazepam    Tablet 5 milliGRAM(s) Oral every 12 hours PRN anxiety  trimethobenzamide Injectable 200 milliGRAM(s) IntraMuscular every 8 hours PRN Nausea and/or Vomiting
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
## 2024-10-20 NOTE — DIETITIAN INITIAL EVALUATION ADULT - NAME AND PHONE
Intervention: 1.Meals and Snacks 2.Medical Food Supplement 3.Vitamin Supplement  Monitor/Evaluate: Diet order, energy intake, nutrition focused physical findings, anemia profile

## 2024-10-20 NOTE — DIETITIAN INITIAL EVALUATION ADULT - ORAL INTAKE PTA/DIET HISTORY
The patient reports following a regular diet at home. However, the patient reports consuming <50% of meals secondary to poor appetite and alcohol abuse. The patient reports consuming ensure supplement three times daily. Denies chewing and swallowing difficulty. NKFA. The patient reports experiencing an 80# unintentional weight loss in x1 year secondary to poor PO intake and alcohol abuse.

## 2024-10-20 NOTE — DIETITIAN INITIAL EVALUATION ADULT - NSFNSGIIOFT_GEN_A_CORE
Dx: 28y/o female with h/o anxiety, depression, PTSD, AUD (1 gal vodka per day), BDZ, marijuana use, sciatica, scoliosis?, GERD, ETOH withdrawal seizures in the past, admitted for witnessed GTC seizure with fall down the stairs as per fiance. Noted to have sudden onset right sided weakness and inconsistent dysarthria with negative CTH/CTA/CTP. Symptoms less likely related to seizure as pt had taken keppra and valium this morning and more likely functionally/structurally related as pt with inconsistent symptoms and stating the right sided weakness is now pain like.

## 2024-10-20 NOTE — DIETITIAN INITIAL EVALUATION ADULT - PERTINENT MEDS FT
MEDICATIONS  (STANDING):  ARIPiprazole 5 milliGRAM(s) Oral daily  diazepam    Tablet   Oral   diazepam    Tablet 10 milliGRAM(s) Oral every 12 hours  enoxaparin Injectable 40 milliGRAM(s) SubCutaneous every 24 hours  folic acid 1 milliGRAM(s) Oral daily  gabapentin 600 milliGRAM(s) Oral three times a day  levETIRAcetam 500 milliGRAM(s) Oral two times a day  lidocaine   4% Patch 1 Patch Transdermal every 24 hours  methocarbamol 500 milliGRAM(s) Oral every 8 hours  mirtazapine 15 milliGRAM(s) Oral at bedtime  multivitamin 1 Tablet(s) Oral daily  pantoprazole    Tablet 40 milliGRAM(s) Oral before breakfast  polyethylene glycol 3350 17 Gram(s) Oral daily  QUEtiapine 100 milliGRAM(s) Oral at bedtime  sertraline 50 milliGRAM(s) Oral daily  thiamine IVPB 500 milliGRAM(s) IV Intermittent every 8 hours    MEDICATIONS  (PRN):  acetaminophen     Tablet .. 650 milliGRAM(s) Oral every 6 hours PRN Mild Pain (1 - 3)  diazepam    Tablet 10 milliGRAM(s) Oral every 6 hours PRN CIWA-Ar score of 8 or greater  diazepam    Tablet 5 milliGRAM(s) Oral every 12 hours PRN anxiety  trimethobenzamide Injectable 200 milliGRAM(s) IntraMuscular every 8 hours PRN Nausea and/or Vomiting

## 2024-10-20 NOTE — DIETITIAN INITIAL EVALUATION ADULT - MALNUTRITION
Message   3 month average blood sugar has improved!! same medication, recheck 6 months     Verified Results  [facility]) CMP14+eGFR [id number] 07:34AM Ranjit Copper     Test Name Result Flag Reference   Glucose, Serum 146 mg/dL H 65-99   BUN 15 mg/dL  8-27   Creatinine, Serum 0 72 mg/dL  0 57-1 00   eGFR If NonAfricn Am 87 mL/min/1 73  >59   eGFR If Africn Am 100 mL/min/1 73  >59   BUN/Creatinine Ratio 21  11-26   Sodium, Serum 141 mmol/L  136-144   Potassium, Serum 5 1 mmol/L  3 5-5 2   Chloride, Serum 105 mmol/L     Carbon Dioxide, Total 24 mmol/L  18-29   Calcium, Serum 9 3 mg/dL  8 7-10 3   Protein, Total, Serum 6 7 g/dL  6 0-8 5   Albumin, Serum 4 1 g/dL  3 6-4 8   Globulin, Total 2 6 g/dL  1 5-4 5   A/G Ratio 1 6  1 1-2 5   Bilirubin, Total 0 4 mg/dL  0 0-1 2   Alkaline Phosphatase, S 91 IU/L     AST (SGOT) 19 IU/L  0-40   ALT (SGPT) 26 IU/L  0-32     (1) HEMOGLOBIN A1C [id number] 07:34AM Yuki Reina     Test Name Result Flag Reference   Hemoglobin A1c 6 0 % H 4 8-5 6   Pre-diabetes: 5 7 - 6 4           Diabetes: >6 4           Glycemic control for adults with diabetes: <7 0
Severe protein calorie malnutrition in the context of chronic illness

## 2024-10-20 NOTE — DIETITIAN NUTRITION RISK NOTIFICATION - TREATMENT: THE FOLLOWING DIET HAS BEEN RECOMMENDED
Diet, Regular:   Supplement Feeding Modality:  Oral  Ensure Plus High Protein Cans or Servings Per Day:  1       Frequency:  Three Times a day (10-20-24 @ 01:06) [Pending Verification By Attending]  Diet, Regular:   DASH/TLC {Sodium & Cholesterol Restricted} (DASH)  Low Sodium (10-19-24 @ 17:33) [Active]

## 2024-10-21 ENCOUNTER — TRANSCRIPTION ENCOUNTER (OUTPATIENT)
Age: 29
End: 2024-10-21

## 2024-10-21 LAB
ALBUMIN SERPL ELPH-MCNC: 3.6 G/DL — SIGNIFICANT CHANGE UP (ref 3.5–5.2)
ALP SERPL-CCNC: 41 U/L — SIGNIFICANT CHANGE UP (ref 30–115)
ALT FLD-CCNC: 17 U/L — SIGNIFICANT CHANGE UP (ref 0–41)
ANION GAP SERPL CALC-SCNC: 12 MMOL/L — SIGNIFICANT CHANGE UP (ref 7–14)
AST SERPL-CCNC: 41 U/L — SIGNIFICANT CHANGE UP (ref 0–41)
BASOPHILS # BLD AUTO: 0.05 K/UL — SIGNIFICANT CHANGE UP (ref 0–0.2)
BASOPHILS NFR BLD AUTO: 1.4 % — HIGH (ref 0–1)
BILIRUB SERPL-MCNC: <0.2 MG/DL — SIGNIFICANT CHANGE UP (ref 0.2–1.2)
BUN SERPL-MCNC: 7 MG/DL — LOW (ref 10–20)
CALCIUM SERPL-MCNC: 8.7 MG/DL — SIGNIFICANT CHANGE UP (ref 8.4–10.5)
CHLORIDE SERPL-SCNC: 105 MMOL/L — SIGNIFICANT CHANGE UP (ref 98–110)
CO2 SERPL-SCNC: 25 MMOL/L — SIGNIFICANT CHANGE UP (ref 17–32)
CREAT SERPL-MCNC: 0.8 MG/DL — SIGNIFICANT CHANGE UP (ref 0.7–1.5)
EGFR: 101 ML/MIN/1.73M2 — SIGNIFICANT CHANGE UP
EOSINOPHIL # BLD AUTO: 0.1 K/UL — SIGNIFICANT CHANGE UP (ref 0–0.7)
EOSINOPHIL NFR BLD AUTO: 2.8 % — SIGNIFICANT CHANGE UP (ref 0–8)
GLUCOSE BLDC GLUCOMTR-MCNC: 104 MG/DL — HIGH (ref 70–99)
GLUCOSE SERPL-MCNC: 86 MG/DL — SIGNIFICANT CHANGE UP (ref 70–99)
HCT VFR BLD CALC: 28.7 % — LOW (ref 37–47)
HGB BLD-MCNC: 9.4 G/DL — LOW (ref 12–16)
HIV 1+2 AB+HIV1 P24 AG SERPL QL IA: SIGNIFICANT CHANGE UP
IMM GRANULOCYTES NFR BLD AUTO: 0.3 % — SIGNIFICANT CHANGE UP (ref 0.1–0.3)
LYMPHOCYTES # BLD AUTO: 1.5 K/UL — SIGNIFICANT CHANGE UP (ref 1.2–3.4)
LYMPHOCYTES # BLD AUTO: 41.8 % — SIGNIFICANT CHANGE UP (ref 20.5–51.1)
MAGNESIUM SERPL-MCNC: 1.6 MG/DL — LOW (ref 1.8–2.4)
MCHC RBC-ENTMCNC: 24.9 PG — LOW (ref 27–31)
MCHC RBC-ENTMCNC: 32.8 G/DL — SIGNIFICANT CHANGE UP (ref 32–37)
MCV RBC AUTO: 76.1 FL — LOW (ref 81–99)
MONOCYTES # BLD AUTO: 0.25 K/UL — SIGNIFICANT CHANGE UP (ref 0.1–0.6)
MONOCYTES NFR BLD AUTO: 7 % — SIGNIFICANT CHANGE UP (ref 1.7–9.3)
NEUTROPHILS # BLD AUTO: 1.68 K/UL — SIGNIFICANT CHANGE UP (ref 1.4–6.5)
NEUTROPHILS NFR BLD AUTO: 46.7 % — SIGNIFICANT CHANGE UP (ref 42.2–75.2)
NRBC # BLD: 0 /100 WBCS — SIGNIFICANT CHANGE UP (ref 0–0)
PLATELET # BLD AUTO: 397 K/UL — SIGNIFICANT CHANGE UP (ref 130–400)
PMV BLD: 10 FL — SIGNIFICANT CHANGE UP (ref 7.4–10.4)
POTASSIUM SERPL-MCNC: 3.8 MMOL/L — SIGNIFICANT CHANGE UP (ref 3.5–5)
POTASSIUM SERPL-SCNC: 3.8 MMOL/L — SIGNIFICANT CHANGE UP (ref 3.5–5)
PROT SERPL-MCNC: 6 G/DL — SIGNIFICANT CHANGE UP (ref 6–8)
RBC # BLD: 3.77 M/UL — LOW (ref 4.2–5.4)
RBC # FLD: 19.9 % — HIGH (ref 11.5–14.5)
SODIUM SERPL-SCNC: 142 MMOL/L — SIGNIFICANT CHANGE UP (ref 135–146)
WBC # BLD: 3.59 K/UL — LOW (ref 4.8–10.8)
WBC # FLD AUTO: 3.59 K/UL — LOW (ref 4.8–10.8)

## 2024-10-21 PROCEDURE — 99232 SBSQ HOSP IP/OBS MODERATE 35: CPT

## 2024-10-21 RX ORDER — DIAZEPAM 10 MG/1
2 FILM BUCCAL ONCE
Refills: 0 | Status: DISCONTINUED | OUTPATIENT
Start: 2024-10-21 | End: 2024-10-22

## 2024-10-21 RX ORDER — CYCLOBENZAPRINE HYDROCHLORIDE 30 MG/1
5 CAPSULE, EXTENDED RELEASE ORAL THREE TIMES A DAY
Refills: 0 | Status: DISCONTINUED | OUTPATIENT
Start: 2024-10-21 | End: 2024-10-22

## 2024-10-21 RX ORDER — MAGNESIUM SULFATE IN 0.9% NACL 2 G/50 ML
2 INTRAVENOUS SOLUTION, PIGGYBACK (ML) INTRAVENOUS
Refills: 0 | Status: COMPLETED | OUTPATIENT
Start: 2024-10-21 | End: 2024-10-21

## 2024-10-21 RX ORDER — CHLORHEXIDINE GLUCONATE 40 MG/ML
1 SOLUTION TOPICAL DAILY
Refills: 0 | Status: DISCONTINUED | OUTPATIENT
Start: 2024-10-21 | End: 2024-10-22

## 2024-10-21 RX ADMIN — METHOCARBAMOL 500 MILLIGRAM(S): 500 TABLET ORAL at 05:03

## 2024-10-21 RX ADMIN — ARIPIPRAZOLE 5 MILLIGRAM(S): 2 TABLET ORAL at 12:21

## 2024-10-21 RX ADMIN — Medication 650 MILLIGRAM(S): at 12:20

## 2024-10-21 RX ADMIN — GABAPENTIN 600 MILLIGRAM(S): 300 CAPSULE ORAL at 21:41

## 2024-10-21 RX ADMIN — Medication 40 MILLIGRAM(S): at 09:19

## 2024-10-21 RX ADMIN — Medication 105 MILLIGRAM(S): at 05:54

## 2024-10-21 RX ADMIN — Medication 650 MILLIGRAM(S): at 05:22

## 2024-10-21 RX ADMIN — Medication 1 TABLET(S): at 12:21

## 2024-10-21 RX ADMIN — PANTOPRAZOLE SODIUM 40 MILLIGRAM(S): 40 TABLET, DELAYED RELEASE ORAL at 05:04

## 2024-10-21 RX ADMIN — Medication 25 GRAM(S): at 12:45

## 2024-10-21 RX ADMIN — QUETIAPINE FUMARATE 100 MILLIGRAM(S): 200 TABLET ORAL at 23:17

## 2024-10-21 RX ADMIN — MIRTAZAPINE 15 MILLIGRAM(S): 30 TABLET ORAL at 21:42

## 2024-10-21 RX ADMIN — METHOCARBAMOL 500 MILLIGRAM(S): 500 TABLET ORAL at 15:08

## 2024-10-21 RX ADMIN — FOLIC ACID 1 MILLIGRAM(S): 1 TABLET ORAL at 12:21

## 2024-10-21 RX ADMIN — Medication 3 MILLIGRAM(S): at 23:17

## 2024-10-21 RX ADMIN — DIAZEPAM 5 MILLIGRAM(S): 10 FILM BUCCAL at 09:19

## 2024-10-21 RX ADMIN — DIAZEPAM 10 MILLIGRAM(S): 10 FILM BUCCAL at 21:41

## 2024-10-21 RX ADMIN — LEVETIRACETAM 500 MILLIGRAM(S): 500 TABLET, FILM COATED ORAL at 05:04

## 2024-10-21 RX ADMIN — METHOCARBAMOL 500 MILLIGRAM(S): 500 TABLET ORAL at 21:42

## 2024-10-21 RX ADMIN — GABAPENTIN 600 MILLIGRAM(S): 300 CAPSULE ORAL at 05:04

## 2024-10-21 RX ADMIN — SERTRALINE HYDROCHLORIDE 50 MILLIGRAM(S): 50 TABLET, FILM COATED ORAL at 12:21

## 2024-10-21 RX ADMIN — Medication 25 GRAM(S): at 15:09

## 2024-10-21 RX ADMIN — LEVETIRACETAM 500 MILLIGRAM(S): 500 TABLET, FILM COATED ORAL at 17:47

## 2024-10-21 RX ADMIN — POLYETHYLENE GLYCOL 3350 17 GRAM(S): 17 POWDER, FOR SOLUTION ORAL at 12:20

## 2024-10-21 RX ADMIN — LIDOCAINE HYDROCHLORIDE 1 PATCH: 40 SOLUTION TOPICAL at 12:20

## 2024-10-21 RX ADMIN — GABAPENTIN 600 MILLIGRAM(S): 300 CAPSULE ORAL at 15:08

## 2024-10-21 RX ADMIN — Medication 100 MILLIGRAM(S): at 05:04

## 2024-10-21 NOTE — DISCHARGE NOTE PROVIDER - DETAILS OF MALNUTRITION DIAGNOSIS/DIAGNOSES
This patient has been assessed with a concern for Malnutrition and was treated during this hospitalization for the following Nutrition diagnosis/diagnoses:     -  10/20/2024: Severe protein-calorie malnutrition

## 2024-10-21 NOTE — DISCHARGE NOTE PROVIDER - CARE PROVIDER_API CALL
Her/She Meri Lantigua  Internal Medicine  [address] [place] [address]  Phone: [phone]  Fax: [phone]  Follow Up Time:    Meri Lantigua  Internal Medicine  [address] [place] [address]  Phone: [phone]  Fax: [phone]  Follow Up Time:     Vj Mohan  Neurology  [address] [place] [address], [address]  [place] [address]  Phone: [phone]  Fax: [phone]  Follow Up Time: 2 weeks

## 2024-10-21 NOTE — PROGRESS NOTE ADULT - SUBJECTIVE AND OBJECTIVE BOX
SUBJECTIVE/OVERNIGHT EVENTS  Today is hospital day 7d. This morning patient was seen and examined at bedside, resting comfortably in bed. No acute or major events overnight.      CODE STATUS:      PMH:  Depression    Anxiety    No pertinent past medical history    Other specified postprocedural states    Anemia, unspecified type          PSH:  No significant past surgical history    No significant past surgical history    H/O:           MEDICATIONS  STANDING MEDICATIONS  ARIPiprazole 5 milliGRAM(s) Oral daily  cefTRIAXone   IVPB 1000 milliGRAM(s) IV Intermittent every 24 hours  chlorhexidine 2% Cloths 1 Application(s) Topical daily  diazepam    Tablet 10 milliGRAM(s) Oral at bedtime  diazepam    Tablet   Oral   enoxaparin Injectable 40 milliGRAM(s) SubCutaneous every 24 hours  folic acid 1 milliGRAM(s) Oral daily  gabapentin 600 milliGRAM(s) Oral three times a day  levETIRAcetam 500 milliGRAM(s) Oral two times a day  lidocaine   4% Patch 1 Patch Transdermal every 24 hours  melatonin 3 milliGRAM(s) Oral at bedtime  methocarbamol 500 milliGRAM(s) Oral every 8 hours  mirtazapine 15 milliGRAM(s) Oral at bedtime  multivitamin 1 Tablet(s) Oral daily  pantoprazole    Tablet 40 milliGRAM(s) Oral before breakfast  polyethylene glycol 3350 17 Gram(s) Oral daily  QUEtiapine 100 milliGRAM(s) Oral at bedtime  sertraline 50 milliGRAM(s) Oral daily    PRN MEDICATIONS  acetaminophen     Tablet .. 650 milliGRAM(s) Oral every 6 hours PRN  cyclobenzaprine 5 milliGRAM(s) Oral three times a day PRN  diazepam    Tablet 10 milliGRAM(s) Oral every 6 hours PRN  diazepam    Tablet 2 milliGRAM(s) Oral once PRN  diazepam    Tablet 5 milliGRAM(s) Oral every 12 hours PRN  trimethobenzamide Injectable 200 milliGRAM(s) IntraMuscular every 8 hours PRN    VITALS  T(F): 97 (10-21-24 @ 14:20), Max: 98.8 (10-20-24 @ 19:01)  HR: 93 (10-21-24 @ 14:20) (93 - 101)  BP: 101/67 (10-21-24 @ 14:20) (97/65 - 105/71)  RR: 18 (10-21-24 @ 14:20) (18 - 18)  SpO2: 100% (10-21-24 @ 05:26) (100% - 100%)  POCT Blood Glucose.: 104 mg/dL (10-21-24 @ 11:28)    PHYSICAL EXAM  GENERAL  ( x ) NAD, lying in bed comfortably     (  ) obtunded     (  ) lethargic     (  ) somnolent    HEAD  ( x ) Atraumatic     (  ) hematoma     (  ) laceration (specify location:       )     NECK  ( x ) Supple     (  ) neck stiffness     (  ) nuchal rigidity     (  )  no JVD     (  ) JVD present ( -- cm)    HEART  Rate -->  (x  ) normal rate    (  ) bradycardic    (  ) tachycardic  Rhythm -->  ( x ) regular    (  ) regularly irregular    (  ) irregularly irregular  Murmurs -->  ( x ) normal s1/s2    (  ) systolic murmur    (  ) diastolic murmur    (  ) continuous murmur     (  ) S3 present    (  ) S4 present    LUNGS  ( x )Unlabored respirations     (  ) tachypnea  ( x ) B/L air entry     (  ) decreased breath sounds in:  (location     )    ( x ) no adventitious sound     (  ) crackles     (  ) wheezing      (  ) rhonchi      (specify location:       )  (  ) chest wall tenderness (specify location:       )    ABDOMEN  ( x ) Soft     (  ) tense   |   (  ) nondistended     (  ) distended   |   (  ) +BS     (  ) hypoactive bowel sounds     (  ) hyperactive bowel sounds  (  ) nontender     (  ) RUQ tenderness     (  ) RLQ tenderness     (  ) LLQ tenderness     (  ) epigastric tenderness     (  ) diffuse tenderness  (  ) Splenomegaly      (  ) Hepatomegaly      (  ) Jaundice     (  ) ecchymosis     EXTREMITIES  ( x ) Normal     (  ) Rash     (  ) ecchymosis     (  ) varicose veins      (  ) pitting edema     (  ) non-pitting edema   (  ) ulceration     (  ) gangrene:     (location:     )    NERVOUS SYSTEM  (x  ) A&Ox3     (  ) confused     (  ) lethargic  CN II-XII:     (  ) Intact     (  ) focal deficits  (Specify:     )   Upper extremities:     (  ) strength X/5     (  ) focal deficit (specify:    )  Lower extremities:     (  ) strength  X/5    (  ) focal deficit (specify:    )      LABS             9.4    3.59  )-----------( 397      ( 10-21-24 @ 07:11 )             28.7     142  |  105  |  7   -------------------------<  86   10-21-24 @ 07:11  3.8  |  25  |  0.8    Ca      8.7     10-21-24 @ 07:11  Mg     1.6     10-21-24 @ 07:11    TPro  6.0  /  Alb  3.6  /  TBili  <0.2  /  DBili  x   /  AST  41  /  ALT  17  /  AlkPhos  41  /  GGT  x     10-21-24 @ 07:11      CKMB Units: 2.5 ng/mL (10-19-24 @ 10:12)  Troponin T, High Sensitivity Result: <6 ng/L (10-19-24 @ 10:12)    Urinalysis Basic - ( 21 Oct 2024 07:11 )    Color: x / Appearance: x / SG: x / pH: x  Gluc: 86 mg/dL / Ketone: x  / Bili: x / Urobili: x   Blood: x / Protein: x / Nitrite: x   Leuk Esterase: x / RBC: x / WBC x   Sq Epi: x / Non Sq Epi: x / Bacteria: x          IMAGING

## 2024-10-21 NOTE — PROGRESS NOTE ADULT - SUBJECTIVE AND OBJECTIVE BOX
Patient is a 29y old  Female who presents with a chief complaint of Nausea, vomiting, and seizure (20 Oct 2024 15:13)      Patient seen and examined at bedside.  Patient reports back pain and anxiety   ALLERGIES:  No Known Allergies    MEDICATIONS:  acetaminophen     Tablet .. 650 milliGRAM(s) Oral every 6 hours PRN  ARIPiprazole 5 milliGRAM(s) Oral daily  cefTRIAXone   IVPB 1000 milliGRAM(s) IV Intermittent every 24 hours  chlorhexidine 2% Cloths 1 Application(s) Topical daily  cyclobenzaprine 5 milliGRAM(s) Oral three times a day PRN  diazepam    Tablet 10 milliGRAM(s) Oral every 6 hours PRN  diazepam    Tablet 10 milliGRAM(s) Oral at bedtime  diazepam    Tablet   Oral   diazepam    Tablet 5 milliGRAM(s) Oral every 12 hours PRN  diazepam    Tablet 2 milliGRAM(s) Oral once PRN  enoxaparin Injectable 40 milliGRAM(s) SubCutaneous every 24 hours  folic acid 1 milliGRAM(s) Oral daily  gabapentin 600 milliGRAM(s) Oral three times a day  levETIRAcetam 500 milliGRAM(s) Oral two times a day  lidocaine   4% Patch 1 Patch Transdermal every 24 hours  melatonin 3 milliGRAM(s) Oral at bedtime  methocarbamol 500 milliGRAM(s) Oral every 8 hours  mirtazapine 15 milliGRAM(s) Oral at bedtime  multivitamin 1 Tablet(s) Oral daily  pantoprazole    Tablet 40 milliGRAM(s) Oral before breakfast  polyethylene glycol 3350 17 Gram(s) Oral daily  QUEtiapine 100 milliGRAM(s) Oral at bedtime  sertraline 50 milliGRAM(s) Oral daily  trimethobenzamide Injectable 200 milliGRAM(s) IntraMuscular every 8 hours PRN    Vital Signs Last 24 Hrs  T(F): 97 (21 Oct 2024 14:20), Max: 98.8 (20 Oct 2024 19:01)  HR: 93 (21 Oct 2024 14:20) (93 - 101)  BP: 101/67 (21 Oct 2024 14:20) (97/65 - 105/71)  RR: 18 (21 Oct 2024 14:20) (18 - 18)  SpO2: 100% (21 Oct 2024 05:26) (100% - 100%)  I&O's Summary    20 Oct 2024 07:01  -  21 Oct 2024 07:00  --------------------------------------------------------  IN: 720 mL / OUT: 1 mL / NET: 719 mL    21 Oct 2024 07:01  -  21 Oct 2024 15:27  --------------------------------------------------------  IN: 240 mL / OUT: 0 mL / NET: 240 mL        PHYSICAL EXAM:  General: NAD, A/O x 3, anxious   ENT: MMM  Neck: Supple, No JVD  Lungs: Clear to auscultation bilaterally  Cardio: RRR, S1/S2, No murmurs  Abdomen: Soft, Nontender, Nondistended; Bowel sounds present  Extremities: No cyanosis, No edema    LABS:                        9.4    3.59  )-----------( 397      ( 21 Oct 2024 07:11 )             28.7     10-21    142  |  105  |  7   ----------------------------<  86  3.8   |  25  |  0.8    Ca    8.7      21 Oct 2024 07:11  Mg     1.6     10-21    TPro  6.0  /  Alb  3.6  /  TBili  <0.2  /  DBili  x   /  AST  41  /  ALT  17  /  AlkPhos  41  10-21      PT/INR - ( 19 Oct 2024 10:12 )   PT: 11.80 sec;   INR: 1.03 ratio         PTT - ( 19 Oct 2024 10:12 )  PTT:30.1 sec    CARDIAC MARKERS ( 19 Oct 2024 10:12 )  x     / x     / x     / x     / 2.5 ng/mL      10-18 Chol 229 mg/dL LDL --  mg/dL Trig 64 mg/dL              POCT Blood Glucose.: 104 mg/dL (21 Oct 2024 11:28)      Urinalysis Basic - ( 21 Oct 2024 07:11 )    Color: x / Appearance: x / SG: x / pH: x  Gluc: 86 mg/dL / Ketone: x  / Bili: x / Urobili: x   Blood: x / Protein: x / Nitrite: x   Leuk Esterase: x / RBC: x / WBC x   Sq Epi: x / Non Sq Epi: x / Bacteria: x            RADIOLOGY & ADDITIONAL TESTS:    Care Discussed with Consultants/Other Providers:

## 2024-10-21 NOTE — PROGRESS NOTE ADULT - NUTRITIONAL ASSESSMENT
This patient has been assessed with a concern for Malnutrition and has been determined to have a diagnosis/diagnoses of Severe protein-calorie malnutrition.    This patient is being managed with:   Diet Regular-  DASH/TLC {Sodium & Cholesterol Restricted} (DASH)  Low Sodium  Entered: Oct 19 2024  5:33PM    The following pending diet order is being considered for treatment of Severe protein-calorie malnutrition:  Diet Regular-  Supplement Feeding Modality:  Oral  Ensure Plus High Protein Cans or Servings Per Day:  1       Frequency:  Three Times a day  Entered: Oct 20 2024  1:06AM  

## 2024-10-21 NOTE — DISCHARGE NOTE PROVIDER - PROVIDER TOKENS
PROVIDER:[TOKEN:[[id number]]] PROVIDER:[TOKEN:[[id number]]],PROVIDER:[TOKEN:[[id number]],FOLLOWUP:[2 weeks]]
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
## 2024-10-21 NOTE — DISCHARGE NOTE PROVIDER - CARE PROVIDERS DIRECT ADDRESSES
[email or web address]

## 2024-10-21 NOTE — DISCHARGE NOTE PROVIDER - ATTENDING DISCHARGE PHYSICAL EXAMINATION:
Vital Signs Last 24 Hrs  T(F): 97.8 (22 Oct 2024 05:30), Max: 98.2 (21 Oct 2024 20:12)  HR: 99 (22 Oct 2024 07:47) (84 - 99)  BP: 92/60 (22 Oct 2024 07:47) (80/41 - 102/67)  RR: 18 (22 Oct 2024 07:47) (18 - 18)  SpO2: 100% (22 Oct 2024 05:30) (99% - 100%)    PHYSICAL EXAM:  GENERAL: NAD, well-groomed, well-developed  HEAD:  Atraumatic, Normocephalic  EYES: EOMI, conjunctiva and sclera clear  ENMT: Moist mucous membranes, Good dentition, no thrush  NECK: Supple, No JVD  CHEST/LUNG: Clear to auscultation bilaterally, good air entry, non-labored breathing  HEART: RRR; S1/S2, No murmur  ABDOMEN: Soft, Nontender, Nondistended; Bowel sounds present  VASCULAR: Normal pulses, Normal capillary refill  EXTREMITIES: No calf tenderness, No cyanosis, No edema  LYMPH: Normal; No lymphadenopathy noted  SKIN: Warm, Intact  PSYCH: agitated mood, Normal affect  NERVOUS SYSTEM:  A/O x3, poor concentration; CN 2-12 intact, No focal deficits

## 2024-10-21 NOTE — DISCHARGE NOTE PROVIDER - NSDCCPCAREPLAN_GEN_ALL_CORE_FT
PRINCIPAL DISCHARGE DIAGNOSIS  Diagnosis: Alcohol dependence with withdrawal  Assessment and Plan of Treatment: Refrain completely from alcohol use. Alcohol withdrawal is a medical emergency and can cause seizures. The risk for addiction and complications, including cirrhosis (liver damage) and varices (which can cause bleeding), can be dangerous, irreversible, and deadly. Please follow up with your primary care and cardologist after discharge.
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
## 2024-10-21 NOTE — DISCHARGE NOTE PROVIDER - NSDCMRMEDTOKEN_GEN_ALL_CORE_FT
ARIPiprazole 5 mg oral tablet: 1 tab(s) orally once a day  diazePAM 5 mg oral tablet: 1 tab(s) orally once a day  gabapentin 600 mg oral tablet: 1 tab(s) orally 3 times a day  hydrOXYzine: 50 milligram(s) orally every 6 hours  lamoTRIgine 25 mg oral tablet: 2 tab(s) orally once a day  mirtazapine 15 mg oral tablet: 1 tab(s) orally once a day (at bedtime)  naltrexone 50 mg oral tablet: 1 tab(s) orally once a day  omeprazole 40 mg oral delayed release capsule: 1 cap(s) orally once a day  QUEtiapine 100 mg oral tablet: 1 tab(s) orally once a day (at bedtime)  sertraline 50 mg oral tablet: 1 tab(s) orally once a day  Zofran 4 mg oral tablet: 1 tab(s) orally 2 times a day   ARIPiprazole 5 mg oral tablet: 1 tab(s) orally once a day  folic acid 1 mg oral tablet: 1 tab(s) orally once a day  gabapentin 600 mg oral tablet: 1 tab(s) orally 3 times a day  levETIRAcetam 500 mg oral tablet: 1 tab(s) orally 2 times a day  lidocaine 4% topical film: Apply topically to affected area once a day  melatonin 3 mg oral tablet: 1 tab(s) orally once a day (at bedtime)  methocarbamol 500 mg oral tablet: 1 tab(s) orally every 8 hours  mirtazapine 15 mg oral tablet: 1 tab(s) orally once a day (at bedtime)  Multiple Vitamins oral tablet: 1 tab(s) orally once a day  omeprazole 40 mg oral delayed release capsule: 1 cap(s) orally once a day  QUEtiapine 100 mg oral tablet: 1 tab(s) orally once a day (at bedtime)  sertraline 50 mg oral tablet: 1 tab(s) orally once a day  Valium 5 mg oral tablet: 1 tab(s) orally once a day (at bedtime) Take last dose of your valium taper at 10pm tonight MDD: 1 tablet

## 2024-10-21 NOTE — PROGRESS NOTE ADULT - PROVIDER SPECIALTY LIST ADULT
Hospitalist
Internal Medicine
Internal Medicine
Hospitalist
Internal Medicine
Neurology
Hospitalist
Hospitalist
Internal Medicine

## 2024-10-21 NOTE — DISCHARGE NOTE PROVIDER - NSDCFUSCHEDAPPT_GEN_ALL_CORE_FT
Meri Lantigua [facility] PreAdmits  Scheduled Appointment: 11/06/2024    [facility]  [place] [id number] [address]   Scheduled Appointment: 11/06/2024     Ashely Hastings  [facility] PreAdmits  Scheduled Appointment: 10/24/2024    [facility]  PSYCHIATRY  [address]  Scheduled Appointment: 10/24/2024    Meri Lantigua  [facility] PreAdmits  Scheduled Appointment: 11/06/2024    Meri Lantigua  [facility]  [address]  Scheduled Appointment: 11/06/2024

## 2024-10-21 NOTE — PROGRESS NOTE ADULT - REASON FOR ADMISSION
Nausea, vomiting, and seizure
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
## 2024-10-22 ENCOUNTER — TRANSCRIPTION ENCOUNTER (OUTPATIENT)
Age: 29
End: 2024-10-22

## 2024-10-22 VITALS — SYSTOLIC BLOOD PRESSURE: 95 MMHG | HEART RATE: 81 BPM | TEMPERATURE: 98 F | DIASTOLIC BLOOD PRESSURE: 58 MMHG

## 2024-10-22 PROCEDURE — 99239 HOSP IP/OBS DSCHRG MGMT >30: CPT

## 2024-10-22 RX ORDER — LIDOCAINE HYDROCHLORIDE 40 MG/ML
1 SOLUTION TOPICAL
Qty: 0 | Refills: 0 | DISCHARGE
Start: 2024-10-22

## 2024-10-22 RX ORDER — MIRTAZAPINE 30 MG/1
1 TABLET ORAL
Qty: 14 | Refills: 0
Start: 2024-10-22 | End: 2024-11-04

## 2024-10-22 RX ORDER — SERTRALINE HYDROCHLORIDE 50 MG/1
1 TABLET, FILM COATED ORAL
Refills: 0 | DISCHARGE

## 2024-10-22 RX ORDER — ARIPIPRAZOLE 2 MG/1
1 TABLET ORAL
Qty: 14 | Refills: 0
Start: 2024-10-22 | End: 2024-11-04

## 2024-10-22 RX ORDER — OMEPRAZOLE 10 MG
1 CAPSULE,DELAYED RELEASE (ENTERIC COATED) ORAL
Qty: 14 | Refills: 0
Start: 2024-10-22 | End: 2024-11-04

## 2024-10-22 RX ORDER — LIDOCAINE HYDROCHLORIDE 40 MG/ML
1 SOLUTION TOPICAL
Qty: 14 | Refills: 0
Start: 2024-10-22 | End: 2024-11-04

## 2024-10-22 RX ORDER — SERTRALINE HYDROCHLORIDE 50 MG/1
1 TABLET, FILM COATED ORAL
Qty: 14 | Refills: 0
Start: 2024-10-22 | End: 2024-11-04

## 2024-10-22 RX ORDER — LAMOTRIGINE 100 MG/1
2 TABLET ORAL
Refills: 0 | DISCHARGE

## 2024-10-22 RX ORDER — DIAZEPAM 10 MG/1
1 FILM BUCCAL
Qty: 1 | Refills: 0
Start: 2024-10-22 | End: 2024-10-22

## 2024-10-22 RX ORDER — MELATONIN 5 MG
1 TABLET ORAL
Qty: 14 | Refills: 0
Start: 2024-10-22 | End: 2024-11-04

## 2024-10-22 RX ORDER — LEVETIRACETAM 500 MG/1
1 TABLET, FILM COATED ORAL
Qty: 28 | Refills: 0
Start: 2024-10-22 | End: 2024-11-04

## 2024-10-22 RX ORDER — ARIPIPRAZOLE 2 MG/1
1 TABLET ORAL
Refills: 0 | DISCHARGE

## 2024-10-22 RX ORDER — FOLIC ACID 1 MG/1
1 TABLET ORAL
Qty: 14 | Refills: 0
Start: 2024-10-22 | End: 2024-11-04

## 2024-10-22 RX ORDER — QUETIAPINE FUMARATE 200 MG/1
1 TABLET ORAL
Qty: 14 | Refills: 0
Start: 2024-10-22 | End: 2024-11-04

## 2024-10-22 RX ORDER — B-COMPLEX WITH VITAMIN C
1 VIAL (ML) INJECTION
Qty: 14 | Refills: 0
Start: 2024-10-22 | End: 2024-11-04

## 2024-10-22 RX ORDER — HYDROXYZINE HCL 25 MG
50 TABLET ORAL
Refills: 0 | DISCHARGE

## 2024-10-22 RX ORDER — LIDOCAINE HYDROCHLORIDE 40 MG/ML
1 SOLUTION TOPICAL
Qty: 0 | Refills: 0
Start: 2024-10-22

## 2024-10-22 RX ORDER — METHOCARBAMOL 500 MG/1
1 TABLET ORAL
Qty: 42 | Refills: 0
Start: 2024-10-22 | End: 2024-11-04

## 2024-10-22 RX ORDER — OMEPRAZOLE 10 MG
1 CAPSULE,DELAYED RELEASE (ENTERIC COATED) ORAL
Refills: 0 | DISCHARGE

## 2024-10-22 RX ORDER — GABAPENTIN 300 MG/1
1 CAPSULE ORAL
Qty: 42 | Refills: 0
Start: 2024-10-22 | End: 2024-11-04

## 2024-10-22 RX ORDER — LIDOCAINE HYDROCHLORIDE 40 MG/ML
1 SOLUTION TOPICAL
Qty: 3 | Refills: 0
Start: 2024-10-22 | End: 2024-11-04

## 2024-10-22 RX ORDER — QUETIAPINE FUMARATE 200 MG/1
1 TABLET ORAL
Refills: 0 | DISCHARGE

## 2024-10-22 RX ORDER — ONDANSETRON HYDROCHLORIDE 2 MG/ML
1 INJECTION, SOLUTION INTRAMUSCULAR; INTRAVENOUS
Refills: 0 | DISCHARGE

## 2024-10-22 RX ORDER — MIRTAZAPINE 30 MG/1
1 TABLET ORAL
Refills: 0 | DISCHARGE

## 2024-10-22 RX ORDER — DIAZEPAM 10 MG/1
1 FILM BUCCAL
Refills: 0 | DISCHARGE

## 2024-10-22 RX ORDER — GABAPENTIN 300 MG/1
1 CAPSULE ORAL
Refills: 0 | DISCHARGE

## 2024-10-22 RX ORDER — NALTREXONE HYDROCHLORIDE 50 MG/1
1 TABLET, FILM COATED ORAL
Refills: 0 | DISCHARGE

## 2024-10-22 RX ADMIN — Medication 650 MILLIGRAM(S): at 09:21

## 2024-10-22 RX ADMIN — GABAPENTIN 600 MILLIGRAM(S): 300 CAPSULE ORAL at 06:02

## 2024-10-22 RX ADMIN — SERTRALINE HYDROCHLORIDE 50 MILLIGRAM(S): 50 TABLET, FILM COATED ORAL at 11:44

## 2024-10-22 RX ADMIN — DIAZEPAM 5 MILLIGRAM(S): 10 FILM BUCCAL at 06:07

## 2024-10-22 RX ADMIN — ARIPIPRAZOLE 5 MILLIGRAM(S): 2 TABLET ORAL at 11:45

## 2024-10-22 RX ADMIN — Medication 100 MILLIGRAM(S): at 04:44

## 2024-10-22 RX ADMIN — POLYETHYLENE GLYCOL 3350 17 GRAM(S): 17 POWDER, FOR SOLUTION ORAL at 11:44

## 2024-10-22 RX ADMIN — Medication 40 MILLIGRAM(S): at 09:19

## 2024-10-22 RX ADMIN — LIDOCAINE HYDROCHLORIDE 1 PATCH: 40 SOLUTION TOPICAL at 11:44

## 2024-10-22 RX ADMIN — LEVETIRACETAM 500 MILLIGRAM(S): 500 TABLET, FILM COATED ORAL at 06:03

## 2024-10-22 RX ADMIN — FOLIC ACID 1 MILLIGRAM(S): 1 TABLET ORAL at 11:45

## 2024-10-22 RX ADMIN — PANTOPRAZOLE SODIUM 40 MILLIGRAM(S): 40 TABLET, DELAYED RELEASE ORAL at 06:02

## 2024-10-22 RX ADMIN — METHOCARBAMOL 500 MILLIGRAM(S): 500 TABLET ORAL at 06:01

## 2024-10-22 RX ADMIN — Medication 1 TABLET(S): at 11:45

## 2024-10-22 RX ADMIN — CHLORHEXIDINE GLUCONATE 1 APPLICATION(S): 40 SOLUTION TOPICAL at 11:47

## 2024-10-22 NOTE — DISCHARGE NOTE NURSING/CASE MANAGEMENT/SOCIAL WORK - NSDCPETBCESMAN_GEN_ALL_CORE
N/A If you are a smoker, it is important for your health to stop smoking. Please be aware that second hand smoke is also harmful.

## 2024-10-22 NOTE — DISCHARGE NOTE NURSING/CASE MANAGEMENT/SOCIAL WORK - PATIENT PORTAL LINK FT
You can access the FollowMyHealth Patient Portal offered by [facility] by registering at the following website: [email or web address]. By joining [employer]’s FollowMyHealth portal, you will also be able to view your health information using other applications (apps) compatible with our system.

## 2024-10-22 NOTE — DISCHARGE NOTE NURSING/CASE MANAGEMENT/SOCIAL WORK - FINANCIAL ASSISTANCE
[facility] provides services at a reduced cost to those who are determined to be eligible through [facility]’s financial assistance program. Information regarding [facility]’s financial assistance program can be found by going to [email or web address] or by calling [phone].

## 2024-10-22 NOTE — CHART NOTE - NSCHARTNOTEFT_GEN_A_CORE
Istop checked on 10/22/2024:    PDI	Current Rx	Drug Type	Rx Written	Rx Dispensed	Drug	Quantity	Days Supply  A	N	B	08/21/2024	08/22/2024	diazepam 5 mg tablet	15	15  Prescriber Name Artemio Cavanaugh  Prescriber TIGRE # [id number]  Payment Method Medicaid  Dispenser [facility] #[id number]
Discussed with medicine resident to clarify consult. Patient currently has un-optimized psychotropic medication regimen. Patient currently endorsing visual and tactile hallucination. Medicine team has concerns for managing alcohol withdrawal.    Recommendations:  -Consult addiction medicine to help manage alcohol withdrawal.  -Suspect new onset hallucinations are secondary to alcohol withdrawal symptoms.  -Psychiatry will follow up tomorrow for medication optimization.

## 2024-10-23 ENCOUNTER — EMERGENCY (EMERGENCY)
Facility: HOSPITAL | Age: 29
LOS: 0 days | Discharge: HOME | End: 2024-10-23
Attending: EMERGENCY MEDICINE
Payer: MEDICAID

## 2024-10-23 VITALS
WEIGHT: 125 LBS | DIASTOLIC BLOOD PRESSURE: 76 MMHG | OXYGEN SATURATION: 99 % | HEART RATE: 105 BPM | RESPIRATION RATE: 18 BRPM | HEIGHT: 66 IN | SYSTOLIC BLOOD PRESSURE: 114 MMHG | TEMPERATURE: 100 F

## 2024-10-23 DIAGNOSIS — F41.9 ANXIETY DISORDER, UNSPECIFIED: ICD-10-CM

## 2024-10-23 DIAGNOSIS — Z98.891 HISTORY OF UTERINE SCAR FROM PREVIOUS SURGERY: Chronic | ICD-10-CM

## 2024-10-23 DIAGNOSIS — Z01.89 ENCOUNTER FOR OTHER SPECIFIED SPECIAL EXAMINATIONS: ICD-10-CM

## 2024-10-23 DIAGNOSIS — F43.10 POST-TRAUMATIC STRESS DISORDER, UNSPECIFIED: ICD-10-CM

## 2024-10-23 DIAGNOSIS — F32.A DEPRESSION, UNSPECIFIED: ICD-10-CM

## 2024-10-23 PROCEDURE — 99283 EMERGENCY DEPT VISIT LOW MDM: CPT

## 2024-10-23 PROCEDURE — 99282 EMERGENCY DEPT VISIT SF MDM: CPT

## 2024-10-23 NOTE — ED PROVIDER NOTE - CLINICAL SUMMARY MEDICAL DECISION MAKING FREE TEXT BOX
pt requesting admission because she feels "they didn't do anything for me".  no indications for admission.  she has had outpt services set up for her.

## 2024-10-23 NOTE — ED ADULT TRIAGE NOTE - CHIEF COMPLAINT QUOTE
pt states she was here a few hours ago, states her legs gave out at home and fell into metal table hitting her left hip and landing on her right hip, pt states that she gets intermittent numbness down both legs due to her scoliosis that causes her to fall

## 2024-10-23 NOTE — ED PROVIDER NOTE - PATIENT PORTAL LINK FT
You can access the FollowMyHealth Patient Portal offered by [facility] by registering at the following website: [email or web address]. By joining [email or web address]’s FollowMyHealth portal, you will also be able to view your health information using other applications (apps) compatible with our system.
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
## 2024-10-24 ENCOUNTER — APPOINTMENT (OUTPATIENT)
Dept: PSYCHIATRY | Facility: CLINIC | Age: 29
End: 2024-10-24

## 2024-10-25 ENCOUNTER — EMERGENCY (EMERGENCY)
Facility: HOSPITAL | Age: 29
LOS: 1 days | Discharge: HOME | End: 2024-10-25
Attending: EMERGENCY MEDICINE
Payer: MEDICAID

## 2024-10-25 VITALS
HEART RATE: 104 BPM | RESPIRATION RATE: 20 BRPM | HEIGHT: 66 IN | DIASTOLIC BLOOD PRESSURE: 88 MMHG | TEMPERATURE: 98 F | OXYGEN SATURATION: 98 % | SYSTOLIC BLOOD PRESSURE: 137 MMHG | WEIGHT: 119.93 LBS

## 2024-10-25 DIAGNOSIS — Z98.891 HISTORY OF UTERINE SCAR FROM PREVIOUS SURGERY: Chronic | ICD-10-CM

## 2024-10-25 PROCEDURE — 99284 EMERGENCY DEPT VISIT MOD MDM: CPT

## 2024-10-25 NOTE — ED ADULT TRIAGE NOTE - CHIEF COMPLAINT QUOTE
pt states she was here to see her sister and she missed her psych appt and needs her 5mg of diazepam

## 2024-10-26 VITALS
OXYGEN SATURATION: 100 % | TEMPERATURE: 99 F | SYSTOLIC BLOOD PRESSURE: 130 MMHG | HEART RATE: 98 BPM | DIASTOLIC BLOOD PRESSURE: 89 MMHG | RESPIRATION RATE: 20 BRPM

## 2024-10-26 PROCEDURE — 99285 EMERGENCY DEPT VISIT HI MDM: CPT

## 2024-10-26 RX ORDER — DIAZEPAM 10 MG/1
5 TABLET ORAL ONCE
Refills: 0 | Status: DISCONTINUED | OUTPATIENT
Start: 2024-10-26 | End: 2024-10-26

## 2024-10-26 RX ORDER — DIAZEPAM 10 MG/1
1 TABLET ORAL
Qty: 10 | Refills: 0
Start: 2024-10-26 | End: 2024-11-04

## 2024-10-26 RX ADMIN — DIAZEPAM 5 MILLIGRAM(S): 10 TABLET ORAL at 04:05
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
## 2024-10-26 NOTE — ED PROVIDER NOTE - NSFOLLOWUPINSTRUCTIONS_ED_ALL_ED_FT
** You declined to speak to social work or go to detox for alcohol.       ** Go to the nearest Emergency Department if you experience any new or concerning symptoms, such as:   - worsening pain  - chest pain  - difficulty breathing  - passing out  - unable to eat or drink  - unable to move or feel part of your body  - fever, chills

## 2024-10-26 NOTE — ED PROVIDER NOTE - PROGRESS NOTE DETAILS
Shagufta Fonseca MD, Attending  Pt now sober, no tremors, no tongue fasciculations, awake, Aox3. Pt offered to speak to SW, but declined. Pt states she has on a place to go. Shagufta Fonseca MD, Attending  4 RNs have tried to obtain labs without success. Pt now sober, no tremors, no tongue fasciculations, awake, Aox3. Pt offered to speak to SW for housing issues and going to detox, Pt declined. states she has on a place to go. Rx sent..

## 2024-10-26 NOTE — ED ADULT NURSE NOTE - OBJECTIVE STATEMENT
forehead
Patient presents for medication refill after missing psychiatry appointment.  Pt A&Ox4, denies sob/chest pain, no medical complaints, requesting to speak with SW.  Pt denies SI/HI.

## 2024-10-26 NOTE — ED PROVIDER NOTE - PHYSICAL EXAMINATION
Shagufta Fonseca MD Attending  GEN: Patient crying, incoherent, appears intoxicated.   HEENT: normocephalic, atraumatic, EOMI, no scleral icterus  CARDIAC: RRR  PULM: protecting airway, No increased wob.   MSK: Moving all extremities, no edema. 5/5 strength and full ROM in all extremities.     NEURO: A&Ox3, no focal neurological deficits, CN 2-12 grossly intact  SKIN: warm, dry, no rash.

## 2024-10-26 NOTE — ED PROVIDER NOTE - PATIENT PORTAL LINK FT
You can access the FollowMyHealth Patient Portal offered by [facility] by registering at the following website: [email or web address]. By joining [employer]’s FollowMyHealth portal, you will also be able to view your health information using other applications (apps) compatible with our system.

## 2024-10-26 NOTE — ED PROVIDER NOTE - CLINICAL SUMMARY MEDICAL DECISION MAKING FREE TEXT BOX
Shagufta Fonseca MD, Attending  ddx includes, but is not limited to the following: alcohol intoxication, polysubstance abuse, ?homelessness?  plan: benzodiazepine in the ED to prevent acute withdrawal, re-eval. lower suspicion for acute metabolic derangement or sever anemia, but will obtain CBC and CMP (recently labs reviewed) since ETOH and HCG is being obtained.   update: informed by RN at signout that 4 RNs have tried obtaining labs, pt now awake and there is no longer clinical utility for ETOH level. Pt negative HCG on oct 16. Pt agrees to follow up with outpatient on PMD   ----

## 2024-10-30 DIAGNOSIS — I45.81 LONG QT SYNDROME: ICD-10-CM

## 2024-10-30 DIAGNOSIS — F32.A DEPRESSION, UNSPECIFIED: ICD-10-CM

## 2024-10-30 DIAGNOSIS — T76.11XD: ICD-10-CM

## 2024-10-30 DIAGNOSIS — E51.2 WERNICKE'S ENCEPHALOPATHY: ICD-10-CM

## 2024-10-30 DIAGNOSIS — E87.6 HYPOKALEMIA: ICD-10-CM

## 2024-10-30 DIAGNOSIS — K76.0 FATTY (CHANGE OF) LIVER, NOT ELSEWHERE CLASSIFIED: ICD-10-CM

## 2024-10-30 DIAGNOSIS — D84.9 IMMUNODEFICIENCY, UNSPECIFIED: ICD-10-CM

## 2024-10-30 DIAGNOSIS — R26.89 OTHER ABNORMALITIES OF GAIT AND MOBILITY: ICD-10-CM

## 2024-10-30 DIAGNOSIS — K29.20 ALCOHOLIC GASTRITIS WITHOUT BLEEDING: ICD-10-CM

## 2024-10-30 DIAGNOSIS — I50.20 UNSPECIFIED SYSTOLIC (CONGESTIVE) HEART FAILURE: ICD-10-CM

## 2024-10-30 DIAGNOSIS — F10.239 ALCOHOL DEPENDENCE WITH WITHDRAWAL, UNSPECIFIED: ICD-10-CM

## 2024-10-30 DIAGNOSIS — F43.12 POST-TRAUMATIC STRESS DISORDER, CHRONIC: ICD-10-CM

## 2024-10-30 DIAGNOSIS — D53.8 OTHER SPECIFIED NUTRITIONAL ANEMIAS: ICD-10-CM

## 2024-10-30 DIAGNOSIS — F10.288 ALCOHOL DEPENDENCE WITH OTHER ALCOHOL-INDUCED DISORDER: ICD-10-CM

## 2024-10-30 DIAGNOSIS — E43 UNSPECIFIED SEVERE PROTEIN-CALORIE MALNUTRITION: ICD-10-CM

## 2024-10-30 DIAGNOSIS — I42.6 ALCOHOLIC CARDIOMYOPATHY: ICD-10-CM

## 2024-10-30 DIAGNOSIS — R29.810 FACIAL WEAKNESS: ICD-10-CM

## 2024-10-30 DIAGNOSIS — H49.9 UNSPECIFIED PARALYTIC STRABISMUS: ICD-10-CM

## 2024-10-30 DIAGNOSIS — F12.90 CANNABIS USE, UNSPECIFIED, UNCOMPLICATED: ICD-10-CM

## 2024-10-30 DIAGNOSIS — E83.42 HYPOMAGNESEMIA: ICD-10-CM

## 2024-10-30 DIAGNOSIS — G40.909 EPILEPSY, UNSPECIFIED, NOT INTRACTABLE, WITHOUT STATUS EPILEPTICUS: ICD-10-CM

## 2024-10-30 DIAGNOSIS — R20.1 HYPOESTHESIA OF SKIN: ICD-10-CM

## 2024-10-30 DIAGNOSIS — R33.9 RETENTION OF URINE, UNSPECIFIED: ICD-10-CM

## 2024-10-30 DIAGNOSIS — F10.251 ALCOHOL DEPENDENCE WITH ALCOHOL-INDUCED PSYCHOTIC DISORDER WITH HALLUCINATIONS: ICD-10-CM

## 2024-10-30 DIAGNOSIS — R47.1 DYSARTHRIA AND ANARTHRIA: ICD-10-CM
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
## 2024-10-30 DIAGNOSIS — R29.898 OTHER SYMPTOMS AND SIGNS INVOLVING THE MUSCULOSKELETAL SYSTEM: ICD-10-CM

## 2024-10-30 DIAGNOSIS — F13.10 SEDATIVE, HYPNOTIC OR ANXIOLYTIC ABUSE, UNCOMPLICATED: ICD-10-CM

## 2024-11-06 ENCOUNTER — APPOINTMENT (OUTPATIENT)
Dept: INTERNAL MEDICINE | Facility: CLINIC | Age: 29
End: 2024-11-06

## 2024-12-13 NOTE — BH CONSULTATION LIAISON ASSESSMENT NOTE - NSBHMSEAFFQUAL_PSY_A_CORE
Left message - there is a result note on My Chart from Dr Grigsby with additional recommendations and instructions. Call back on Monday if further questions.    Depressed

## 2025-01-07 NOTE — BH CONSULTATION LIAISON ASSESSMENT NOTE - NSBHMSETHTCONTENT_PSY_A_CORE
[facility] MULTIDISCIPLINARY CLINIC  SURVIVORSHIP SERVICES CARE COORDINATION NOTE  [facility]      Call placed to PATIENT  RE: open referral for survivorship care plan and treatment summary visit.    Left message with SURVIVORSHIP INFO AS WELL AS MY CONTACT INFO. ADVISED I WOULD NOT HAVE ACCESS TO MY DESK PHONE OR VM UNTIL TOMORROW AND I WOULD FU WITH MS. RODAS     Introduced myself and reviewed purpose and goals of survivorship visit as well as what to expect..    CALLED TO SCHEDULE APPT, LVM WITH REFERRAL INFO AS WELL AS MY DESK PHONE NUMBER. I ADVISED THAT I WAS CALLING FROM HOME TODAY AND WILL NOT HAVE ACCESS TO THAT PHONE OR VM UNTIL TOMORROW. WILL FOLLOW UP              
Unremarkable

## 2025-01-30 NOTE — PROGRESS NOTE ADULT - ASSESSMENT
MRI was obtained per primary yesterday - indication 'seizure evaluation'.    It was unrevealing for seizure evaluation, negative for any cortical lesions.   There is quite extensive white matter changes consistent with small vessel disease likely due to his diabetes.  There is incidental small subcortical L parietal infarct also as part of small vessel disease. Pt has already been started on aspirin this admission. He has had echocardiogram as well 1/22/2025 which was unrevealing.     Will add on a lipid panel.        A/P: 25y  at 14w3d presenting with nausea, vomiting, inability to tolerate PO 2/2 Hyperemesis. Pt observed in CDU overnight, reports slightly improved however still with emesis and inability to tolerate PO.

## 2025-02-21 NOTE — ED PROVIDER NOTE - PATIENT PORTAL LINK FT
Pt was seen the other day and forgot to get tested for strep. Pt still c/o a sore throat. Test was negative so pt advised no antibiotic needed and to treat symptoms accordingly with OTC meds. Pt was agreeable.   
You can access the FollowMyHealth Patient Portal offered by [facility] by registering at the following website: [email or web address]. By joining [employer]’s FollowMyHealth portal, you will also be able to view your health information using other applications (apps) compatible with our system.

## 2025-07-25 NOTE — OB RN PATIENT PROFILE - SPIRITUAL CULTURAL, RELIGIOUS PRACTICES/VALUES, PROFILE
Independently breastfeeding with periodic formula supplement.  Encouraged to continue breast with early hunger cues, avoiding routine formula supplements to promote milk supply.  Encouraged to call for concerns as needed.  Appreciative and verbalized understanding.    none